# Patient Record
Sex: FEMALE | Race: WHITE | NOT HISPANIC OR LATINO | Employment: OTHER | URBAN - METROPOLITAN AREA
[De-identification: names, ages, dates, MRNs, and addresses within clinical notes are randomized per-mention and may not be internally consistent; named-entity substitution may affect disease eponyms.]

---

## 2017-01-04 ENCOUNTER — OFFICE VISIT (OUTPATIENT)
Dept: MEDICAL GROUP | Facility: MEDICAL CENTER | Age: 72
End: 2017-01-04
Payer: MEDICARE

## 2017-01-04 VITALS
SYSTOLIC BLOOD PRESSURE: 128 MMHG | TEMPERATURE: 98.2 F | OXYGEN SATURATION: 97 % | WEIGHT: 173 LBS | DIASTOLIC BLOOD PRESSURE: 72 MMHG | BODY MASS INDEX: 27.8 KG/M2 | HEIGHT: 66 IN | HEART RATE: 66 BPM | RESPIRATION RATE: 12 BRPM

## 2017-01-04 DIAGNOSIS — Z01.818 PREOPERATIVE CLEARANCE: ICD-10-CM

## 2017-01-04 DIAGNOSIS — R73.03 PREDIABETES: ICD-10-CM

## 2017-01-04 DIAGNOSIS — M54.2 NECK PAIN: ICD-10-CM

## 2017-01-04 DIAGNOSIS — R73.9 HYPERGLYCEMIA: ICD-10-CM

## 2017-01-04 DIAGNOSIS — I10 HYPERTENSION GOAL BP (BLOOD PRESSURE) < 140/80: Chronic | ICD-10-CM

## 2017-01-04 PROCEDURE — 99214 OFFICE O/P EST MOD 30 MIN: CPT | Mod: 25 | Performed by: NURSE PRACTITIONER

## 2017-01-04 PROCEDURE — 93000 ELECTROCARDIOGRAM COMPLETE: CPT | Performed by: NURSE PRACTITIONER

## 2017-01-04 NOTE — ASSESSMENT & PLAN NOTE
Chronic issue.  Not checking BG @ home.  Taking metformin 500 mg twice daily - forgets about 1-2 x weekly.  Not exercising.  Trying to eat healthy but admits this has been tough.  Has lost 3 lbs in the past 6 months.

## 2017-01-04 NOTE — MR AVS SNAPSHOT
"        Larissa Ramirez   2017 11:20 AM   Office Visit   MRN: [id number]    Department:  [address]   Dept Phone:  [phone]    Description:  Female : 1945   Provider:  CORNELIA Yi           Reason for Visit     Other patient needs clearance to be sedated for a MRI    Orders Needed she needs labs ordered before MRI     Foot Pain patient states she is having issues her feet burning and looking red, onset 2-3 days       Allergies as of 2017     Allergen Noted Reactions    Sulfa Drugs 2013   Nausea    Levofloxacin 2014   Swelling    Swelling of tongue and neck    Lyrica 2013   Rash    Nitrofurantoin 2015       Swelling of lips tongue      You were diagnosed with     Preoperative clearance   [485866]       Neck pain   [770417]       Prediabetes   [276070]       Hyperglycemia   [678304]       Hypertension goal BP (blood pressure) < 140/80   [003753]         Vital Signs     Blood Pressure Pulse Temperature Respirations Height Weight    128/72 mmHg 66 36.8 °C (98.2 °F) 12 1.676 m (5' 6\") 78.472 kg (173 lb)    Body Mass Index Oxygen Saturation Last Menstrual Period Smoking Status          27.94 kg/m2 97% 1986 Never Smoker         Basic Information     Date Of Birth Sex Race Ethnicity Preferred Language    1945 Female White Non- English      Problem List              ICD-10-CM Priority Class Noted - Resolved    Hypertension goal BP (blood pressure) < 140/80 (Chronic) I10   2012 - Present    Prediabetes R73.03   2014 - Present    Vitamin D deficiency disease E55.9   2014 - Present    Chronic sciatica of right side M54.31   10/13/2014 - Present    DDD (degenerative disc disease), lumbar M51.36   10/13/2014 - Present    Chronic shoulder pain M25.519, G89.29   10/13/2014 - Present      Health Maintenance        Date Due Completion Dates    IMM DTaP/Tdap/Td Vaccine (1 - Tdap) 3/5/1965 ---    IMM ZOSTER VACCINE 3/5/2006 ---    IMM " PNEUMOCOCCAL 65+ (ADULT) LOW/MEDIUM RISK SERIES (1 of 2 - PCV13) 3/5/2011 ---    IMM INFLUENZA (1) 9/1/2016 ---    MAMMOGRAM 3/23/2017 3/23/2016, 2/24/2015, 2/19/2015, 2/18/2014, 1/14/2013    BONE DENSITY 2/18/2019 2/18/2014    COLONOSCOPY 1/22/2023 1/22/2013 (Prv Comp), 9/20/2012    Override on 1/22/2013: Previously completed            Current Immunizations     No immunizations on file.      Below and/or attached are the medications your provider expects you to take. Review all of your home medications and newly ordered medications with your provider and/or pharmacist. Follow medication instructions as directed by your provider and/or pharmacist. Please keep your medication list with you and share with your provider. Update the information when medications are discontinued, doses are changed, or new medications (including over-the-counter products) are added; and carry medication information at all times in the event of emergency situations     Allergies:  SULFA DRUGS - Nausea     LEVOFLOXACIN - Swelling     LYRICA - Rash     NITROFURANTOIN - (reactions not documented)               Medications  Valid as of: January 04, 2017 - 11:49 AM    Generic Name Brand Name Tablet Size Instructions for use    Cholecalciferol (Tab) cholecalciferol 1000 UNIT Take 1,000 Units by mouth every day.        DiazePAM (Tab) VALIUM 2 MG Take 2 mg by mouth every 6 hours as needed for Sleep. Take two to three tabs by mouth at bedtime as needed        Estradiol (RING) ESTRING 2 MG Insert vaginally, leave in place for 3 months and remove        Hydrocodone-Acetaminophen (Tab) NORCO  MG Take 1 Tab by mouth every 8 hours as needed.        Lisinopril-Hydrochlorothiazide (Tab) PRINZIDE, ZESTORETIC 20-25 MG Take 1 Tab by mouth every day. TAKE 1 TAB BY MOUTH EVERY DAY.        MetFORMIN HCl (Tab) GLUCOPHAGE 500 MG TAKE 1 TABLET BY MOUTH TWICE A DAY WITH MEAL        Multiple Vitamins-Iron   Take 1 Tab by mouth every day.        TraMADol HCl  (Tab) ULTRAM 50 MG Take  mg by mouth every 6 hours as needed.        .                 Medicines prescribed today were sent to:     [id number] [place] [place] - [address] [place] [address]    Phone: [phone] Fax: [phone]    Open 24 Hours?: No    [id number] [place] [place] - [address]    [address] [place] [address]    Phone: [phone] Fax: [phone]    Open 24 Hours?: No      Medication refill instructions:       If your prescription bottle indicates you have medication refills left, it is not necessary to call your provider’s office. Please contact your pharmacy and they will refill your medication.    If your prescription bottle indicates you do not have any refills left, you may request refills at any time through one of the following ways: The online [employer] system (except Urgent Care), by calling your provider’s office, or by asking your pharmacy to contact your provider’s office with a refill request. Medication refills are processed only during regular business hours and may not be available until the next business day. Your provider may request additional information or to have a follow-up visit with you prior to refilling your medication.   *Please Note: Medication refills are assigned a new Rx number when refilled electronically. Your pharmacy may indicate that no refills were authorized even though a new prescription for the same medication is available at the pharmacy. Please request the medicine by name with the pharmacy before contacting your provider for a refill.        Your To Do List     Future Labs/Procedures Complete By Expires    BASIC METABOLIC PANEL  As directed 1/4/2018    CBC WITH DIFFERENTIAL  As directed 1/4/2018    HEMOGLOBIN A1C  As directed 1/5/2018    LIPID PROFILE  As directed 1/5/2018         [employer] Access Code: Activation code not generated  Current [employer] Status: Active

## 2017-01-04 NOTE — PROGRESS NOTES
"Chief Complaint   Patient presents with   • Other     patient needs clearance to be sedated for a MRI   • Orders Needed     she needs labs ordered before MRI    • Foot Pain     patient states she is having issues her feet burning and looking red, onset 2-3 days        HPI   Larissa is a 71-year-old established female here with a few issues:  #1- neck pain:    Chronic issue but worsening.  History of laminectomy in 2003. Had a fall 3 months ago and needs to have a repeat MRI.  Requesting clearance to have conscious sedation for MRI.  Seeing Dr. Mobley for her neck care. Not having any chest pain, heart palpitations, difficulty breathing or swallowing. She has a history of hypertension but no other cardiac issues that we're aware of.   #2-Prediabetes:  Chronic issue.  Not checking BG @ home.  Taking metformin 500 mg twice daily - forgets about 1-2 x weekly.  Not exercising.  Trying to eat healthy but admits this has been tough.  Has lost 3 lbs in the past 6 months.  She is due for blood work. She's concerned because she occasionally notices that she has increased redness to the bottom of her feet with intermittent burning sensation. She is off of gabapentin because she states it was causing her memory loss.  #3-Hypertension goal BP (blood pressure) < 140/80  Chronic issue.  Well controlled all the time per pt.  No CP or leg swelling.  Taking lisinopril / hctz in the morning.      Past medical, surgical, family, and social history is reviewed and updated in Epic chart by me today.   Medications and allergies reviewed and updated in Epic chart by me today.     ROS:   As documented in history of present illness above    Exam:  Blood pressure 128/72, pulse 66, temperature 36.8 °C (98.2 °F), resp. rate 12, height 1.676 m (5' 6\"), weight 78.472 kg (173 lb), last menstrual period 06/27/1986, SpO2 97 %.  Constitutional: Alert, no distress, plus 3 vital signs  Skin:  Warm, dry, no rashes invisible areas  Eye: Equal, round and " reactive, conjunctiva clear  ENMT: Lips without lesions, good dentition, oropharynx clear    Neck: Trachea midline  Respiratory: Unlabored respiration, lungs clear to auscultation, no wheezes, no rhonchi  Cardiovascular: Normal rate and rhythm  Abdomen: Soft, nontender, no masses or hepatosplenomegaly  Psych: Alert, pleasant, well-groomed, normal affect  Monofilament exam:  Monofilament testing with a 10 gram force: sensation intact in 4/4 bilaterally.   Visual Inspection: Feet without maceration, ulcers, fissures. The bottoms of her feet feel slightly cold. No cyanosis to her feet.      A/P:  1. Preoperative clearance  -check bmp.  Fax clearance when labs return.  - EKG - Clinic Performed - [facility].      2. Neck pain  -followed by Dr. Mobley    3. Prediabetes  -monofilament updated.  Needs labs.   -She is working on her weight and has progressively lost a few pounds every 6 months. Discussed a low carbohydrate diet and the need for her to return to exercise.   - BASIC METABOLIC PANEL; Future  - LIPID PROFILE; Future  - CBC WITH DIFFERENTIAL; Future    4. Hyperglycemia  - HEMOGLOBIN A1C; Future    5. Hypertension goal BP (blood pressure) < 140/80  -stable.  Continue same.  - BASIC METABOLIC PANEL; Future  - LIPID PROFILE; Future  - CBC WITH DIFFERENTIAL; Future

## 2017-01-04 NOTE — ASSESSMENT & PLAN NOTE
Chronic issue.  Well controlled all the time per pt.  No CP or leg swelling.  Taking lisinopril / hctz in the morning.

## 2017-01-11 ENCOUNTER — HOSPITAL ENCOUNTER (OUTPATIENT)
Dept: LAB | Facility: MEDICAL CENTER | Age: 72
End: 2017-01-11
Attending: NURSE PRACTITIONER
Payer: MEDICARE

## 2017-01-11 DIAGNOSIS — I10 HYPERTENSION GOAL BP (BLOOD PRESSURE) < 140/80: Chronic | ICD-10-CM

## 2017-01-11 DIAGNOSIS — R73.9 HYPERGLYCEMIA: ICD-10-CM

## 2017-01-11 DIAGNOSIS — R73.03 PREDIABETES: ICD-10-CM

## 2017-01-11 LAB
ANION GAP SERPL CALC-SCNC: 7 MMOL/L (ref 0–11.9)
BASOPHILS # BLD AUTO: 0.06 K/UL (ref 0–0.12)
BASOPHILS NFR BLD AUTO: 1.1 % (ref 0–1.8)
BUN SERPL-MCNC: 21 MG/DL (ref 8–22)
CALCIUM SERPL-MCNC: 10.5 MG/DL (ref 8.5–10.5)
CHLORIDE SERPL-SCNC: 102 MMOL/L (ref 96–112)
CHOLEST SERPL-MCNC: 143 MG/DL (ref 100–199)
CO2 SERPL-SCNC: 31 MMOL/L (ref 20–33)
CREAT SERPL-MCNC: 0.88 MG/DL (ref 0.5–1.4)
EOSINOPHIL # BLD: 0.16 K/UL (ref 0–0.51)
EOSINOPHIL NFR BLD AUTO: 2.8 % (ref 0–6.9)
ERYTHROCYTE [DISTWIDTH] IN BLOOD BY AUTOMATED COUNT: 41.5 FL (ref 35.9–50)
EST. AVERAGE GLUCOSE BLD GHB EST-MCNC: 105 MG/DL
GLUCOSE SERPL-MCNC: 91 MG/DL (ref 65–99)
HBA1C MFR BLD: 5.3 % (ref 0–5.6)
HCT VFR BLD AUTO: 41 % (ref 37–47)
HDLC SERPL-MCNC: 45 MG/DL
HGB BLD-MCNC: 13.1 G/DL (ref 12–16)
IMM GRANULOCYTES # BLD AUTO: 0.01 K/UL (ref 0–0.11)
IMM GRANULOCYTES NFR BLD AUTO: 0.2 % (ref 0–0.9)
LDLC SERPL CALC-MCNC: 63 MG/DL
LYMPHOCYTES # BLD: 2.29 K/UL (ref 1–4.8)
LYMPHOCYTES NFR BLD AUTO: 40.3 % (ref 22–41)
MCH RBC QN AUTO: 29.6 PG (ref 27–33)
MCHC RBC AUTO-ENTMCNC: 32 G/DL (ref 33.6–35)
MCV RBC AUTO: 92.8 FL (ref 81.4–97.8)
MONOCYTES # BLD: 0.46 K/UL (ref 0–0.85)
MONOCYTES NFR BLD AUTO: 8.1 % (ref 0–13.4)
NEUTROPHILS # BLD: 2.7 K/UL (ref 2–7.15)
NEUTROPHILS NFR BLD AUTO: 47.5 % (ref 44–72)
NRBC # BLD AUTO: 0 K/UL
NRBC BLD-RTO: 0 /100 WBC
PLATELET # BLD AUTO: 264 K/UL (ref 164–446)
PMV BLD AUTO: 9.2 FL (ref 9–12.9)
POTASSIUM SERPL-SCNC: 4.2 MMOL/L (ref 3.6–5.5)
RBC # BLD AUTO: 4.42 M/UL (ref 4.2–5.4)
SODIUM SERPL-SCNC: 140 MMOL/L (ref 135–145)
TRIGL SERPL-MCNC: 176 MG/DL (ref 0–149)
WBC # BLD AUTO: 5.7 K/UL (ref 4.8–10.8)

## 2017-01-11 PROCEDURE — 36415 COLL VENOUS BLD VENIPUNCTURE: CPT

## 2017-01-11 PROCEDURE — 85025 COMPLETE CBC W/AUTO DIFF WBC: CPT

## 2017-01-11 PROCEDURE — 80061 LIPID PANEL: CPT

## 2017-01-11 PROCEDURE — 83036 HEMOGLOBIN GLYCOSYLATED A1C: CPT

## 2017-01-11 PROCEDURE — 80048 BASIC METABOLIC PNL TOTAL CA: CPT

## 2017-01-13 ENCOUNTER — TELEPHONE (OUTPATIENT)
Dept: MEDICAL GROUP | Facility: MEDICAL CENTER | Age: 72
End: 2017-01-13

## 2017-01-13 NOTE — TELEPHONE ENCOUNTER
"----- Message from CORNELIA Yi sent at 1/12/2017  7:26 PM PST -----  Regarding: FW: Non-Urgent Medical Question  Contact: [phone]  See \"letters,\" and this needs to be faxed to [facility].  Thanks!  ----- Message -----     From: Bakari Rosado Ass't     Sent: 1/12/2017   3:10 PM       To: CORNELIA Yi  Subject: FW: Non-Urgent Medical Question                      ----- Message -----     From: Larissa Ramirez     Sent: 1/12/2017   3:09 PM       To: [address] [place] [address]  Subject: Non-Urgent Medical Question                      Hi,  Just wanted to make sure you fax my authorization to be sedated to the [facility] [place] at the [facility].  Thank you    "

## 2017-01-19 ENCOUNTER — OFFICE VISIT (OUTPATIENT)
Dept: MEDICAL GROUP | Facility: MEDICAL CENTER | Age: 72
End: 2017-01-19
Payer: MEDICARE

## 2017-01-19 VITALS
HEIGHT: 66 IN | RESPIRATION RATE: 12 BRPM | DIASTOLIC BLOOD PRESSURE: 84 MMHG | WEIGHT: 174 LBS | TEMPERATURE: 97.4 F | BODY MASS INDEX: 27.97 KG/M2 | SYSTOLIC BLOOD PRESSURE: 142 MMHG | OXYGEN SATURATION: 93 % | HEART RATE: 69 BPM

## 2017-01-19 DIAGNOSIS — N94.9 VAGINAL DISCOMFORT: ICD-10-CM

## 2017-01-19 PROCEDURE — 99213 OFFICE O/P EST LOW 20 MIN: CPT | Performed by: NURSE PRACTITIONER

## 2017-01-19 NOTE — MR AVS SNAPSHOT
"        Larissa Ramirez   2017 3:20 PM   Office Visit   MRN: [id number]    Department:  [address]   Dept Phone:  [phone]    Description:  Female : 1945   Provider:  CORNELIA Yi           Reason for Visit     Other patient is having issues with her estring because she states she can't get it to feel comfortable and she feels a bump inside her vagina on the right side       Allergies as of 2017     Allergen Noted Reactions    Sulfa Drugs 2013   Nausea    Levofloxacin 2014   Swelling    Swelling of tongue and neck    Lyrica 2013   Rash    Nitrofurantoin 2015       Swelling of lips tongue      Vital Signs     Blood Pressure Pulse Temperature Respirations Height Weight    142/84 mmHg 69 36.3 °C (97.4 °F) 12 1.676 m (5' 6\") 78.926 kg (174 lb)    Body Mass Index Oxygen Saturation Last Menstrual Period Smoking Status          28.10 kg/m2 93% 1986 Never Smoker         Basic Information     Date Of Birth Sex Race Ethnicity Preferred Language    1945 Female White Non- English      Your appointments     2017  8:45 AM   MR SP WO 30 with [address] MRI 2   [facility] - MRI - [address]    [address]  [place] [id number] [phone]              Problem List              ICD-10-CM Priority Class Noted - Resolved    Hypertension goal BP (blood pressure) < 140/80 (Chronic) I10   2012 - Present    Prediabetes R73.03   2014 - Present    Vitamin D deficiency disease E55.9   2014 - Present    Chronic sciatica of right side M54.31   10/13/2014 - Present    DDD (degenerative disc disease), lumbar M51.36   10/13/2014 - Present    Chronic shoulder pain M25.519, G89.29   10/13/2014 - Present      Health Maintenance        Date Due Completion Dates    IMM DTaP/Tdap/Td Vaccine (1 - Tdap) 3/5/1965 ---    IMM ZOSTER VACCINE 3/5/2006 ---    IMM PNEUMOCOCCAL 65+ (ADULT) LOW/MEDIUM RISK SERIES (1 of 2 - PCV13) 3/5/2011 " ---    IMM INFLUENZA (1) 9/1/2016 ---    MAMMOGRAM 3/23/2017 3/23/2016, 2/24/2015, 2/19/2015, 2/18/2014, 1/14/2013    BONE DENSITY 2/18/2019 2/18/2014    COLONOSCOPY 1/22/2023 1/22/2013 (Prv Comp), 9/20/2012    Override on 1/22/2013: Previously completed            Current Immunizations     No immunizations on file.      Below and/or attached are the medications your provider expects you to take. Review all of your home medications and newly ordered medications with your provider and/or pharmacist. Follow medication instructions as directed by your provider and/or pharmacist. Please keep your medication list with you and share with your provider. Update the information when medications are discontinued, doses are changed, or new medications (including over-the-counter products) are added; and carry medication information at all times in the event of emergency situations     Allergies:  SULFA DRUGS - Nausea     LEVOFLOXACIN - Swelling     LYRICA - Rash     NITROFURANTOIN - (reactions not documented)               Medications  Valid as of: January 19, 2017 -  4:24 PM    Generic Name Brand Name Tablet Size Instructions for use    Cholecalciferol (Tab) cholecalciferol 1000 UNIT Take 1,000 Units by mouth every day.        DiazePAM (Tab) VALIUM 2 MG Take 2 mg by mouth every 6 hours as needed for Sleep. Take two to three tabs by mouth at bedtime as needed        Estradiol (RING) ESTRING 2 MG Insert vaginally, leave in place for 3 months and remove        Hydrocodone-Acetaminophen (Tab) NORCO  MG Take 1 Tab by mouth every 8 hours as needed.        Lisinopril-Hydrochlorothiazide (Tab) PRINZIDE, ZESTORETIC 20-25 MG Take 1 Tab by mouth every day. TAKE 1 TAB BY MOUTH EVERY DAY.        MetFORMIN HCl (Tab) GLUCOPHAGE 500 MG TAKE 1 TABLET BY MOUTH TWICE A DAY WITH MEAL        Multiple Vitamins-Iron   Take 1 Tab by mouth every day.        TraMADol HCl (Tab) ULTRAM 50 MG Take  mg by mouth every 6 hours as needed.         .                 Medicines prescribed today were sent to:     [id number] - [place], [place] - [address]    [address] [place] [address]    Phone: [phone] Fax: [phone]    Open 24 Hours?: No    [id number] [place] [place] - [address]    [address] [place] [address]    Phone: [phone] Fax: [phone]    Open 24 Hours?: No      Medication refill instructions:       If your prescription bottle indicates you have medication refills left, it is not necessary to call your provider’s office. Please contact your pharmacy and they will refill your medication.    If your prescription bottle indicates you do not have any refills left, you may request refills at any time through one of the following ways: The online [employer] system (except Urgent Care), by calling your provider’s office, or by asking your pharmacy to contact your provider’s office with a refill request. Medication refills are processed only during regular business hours and may not be available until the next business day. Your provider may request additional information or to have a follow-up visit with you prior to refilling your medication.   *Please Note: Medication refills are assigned a new Rx number when refilled electronically. Your pharmacy may indicate that no refills were authorized even though a new prescription for the same medication is available at the pharmacy. Please request the medicine by name with the pharmacy before contacting your provider for a refill.           [employer] Access Code: Activation code not generated  Current [employer] Status: Active

## 2017-01-20 NOTE — PROGRESS NOTES
"Chief Complaint   Patient presents with   • Other     patient is having issues with her estring because she states she can't get it to feel comfortable and she feels a bump inside her vagina on the right side        HPI  Ce is a 71-year-old established female here with concern that something is abnormal to the inside of her vaginal area on the right side. She states that she typically uses an Estring for vaginal dryness but removed it about a week ago because it felt uncomfortable. Her first Estring usage was back in July and she states that it went really well for several months. She mentions that she does not typically do any sort of self vaginal exams. She is not having any abnormal vaginal discharge. She had a hysterectomy in 2015 because of large fibroids and irregular bleeding.      Past medical, surgical, family, and social history is reviewed and updated in Epic chart by me today.   Medications and allergies reviewed and updated in Epic chart by me today.     ROS:   As documented in history of present illness above    Exam:  Blood pressure 142/84, pulse 69, temperature 36.3 °C (97.4 °F), resp. rate 12, height 1.676 m (5' 6\"), weight 78.926 kg (174 lb), last menstrual period 06/27/1986, SpO2 93 %.  Constitutional: Alert, no distress, plus 3 vital signs  Skin:  Warm, dry, no rashes invisible areas  Eye: Equal, round and reactive, conjunctiva clear  ENMT: Lips without lesions  Neck: Trachea midline  Abdomen: Soft, nontender  : She had a normal pelvic exam today without any abnormalities noticed in her vaginal vault. With a speculum, no cervix is seen or any masses. She does not have any tenderness on pelvic exam. She has a very mild vaginal wall prolapse with Valsalva maneuver.  Psych: Alert, pleasant, well-groomed, normal affect    A/P:  #1-vaginal discomfort: Benign exam today. Suspect that she has never become familiar with what her internal vaginal area feels like. Encouraged her to replace her Estring " and insert it further up if possible. Discussed she can bring her Estring by and I will insert it for her if needed. Discussed that she has very slight vaginal wall prolapse and she should work on Jerrica exercises. She'll follow up as needed.

## 2017-01-25 ENCOUNTER — HOSPITAL ENCOUNTER (OUTPATIENT)
Dept: RADIOLOGY | Facility: MEDICAL CENTER | Age: 72
End: 2017-01-25
Attending: PHYSICIAN ASSISTANT
Payer: MEDICARE

## 2017-01-25 ENCOUNTER — HOSPITAL ENCOUNTER (OUTPATIENT)
Dept: RADIOLOGY | Facility: MEDICAL CENTER | Age: 72
End: 2017-01-25
Attending: SURGERY
Payer: MEDICARE

## 2017-01-25 VITALS
DIASTOLIC BLOOD PRESSURE: 62 MMHG | BODY MASS INDEX: 27.32 KG/M2 | OXYGEN SATURATION: 96 % | HEART RATE: 60 BPM | HEIGHT: 66 IN | SYSTOLIC BLOOD PRESSURE: 138 MMHG | WEIGHT: 170 LBS | TEMPERATURE: 97.6 F | RESPIRATION RATE: 16 BRPM

## 2017-01-25 DIAGNOSIS — M54.12 RADICULOPATHY, CERVICAL: ICD-10-CM

## 2017-01-25 DIAGNOSIS — M54.12 CERVICAL RADICULOPATHY: ICD-10-CM

## 2017-01-25 PROCEDURE — 01922 ANES N-INVAS IMG/RADJ THER: CPT

## 2017-01-25 PROCEDURE — 72141 MRI NECK SPINE W/O DYE: CPT

## 2017-01-25 PROCEDURE — 700111 HCHG RX REV CODE 636 W/ 250 OVERRIDE (IP)

## 2017-01-25 PROCEDURE — 72050 X-RAY EXAM NECK SPINE 4/5VWS: CPT

## 2017-01-25 PROCEDURE — 700101 HCHG RX REV CODE 250

## 2017-01-25 ASSESSMENT — PAIN SCALES - GENERAL
PAINLEVEL_OUTOF10: 0

## 2017-01-25 NOTE — IP AVS SNAPSHOT
" <p align=\"LEFT\"><IMG SRC=\"//[email or web address]$/[email or web address]\" alt=\"Image\" WIDTH=\"50%\" HEIGHT=\"200\" BORDER=\"\"></p>      `           Larissa Ramirez   MRN: [id number]    Department:  [facility] MRI [address]   Date of Visit:              `  Discharge Instructions       MRI ADULT DISCHARGE INSTRUCTIONS    You have been medicated today for your scan. Please follow the instructions below to ensure your safe recovery. If you have any questions or problems, feel free to call us at [phone] or [phone].     1.   Have someone stay with you to assist you as needed.    2.   Do not drive or operate any mechanical devices.    3.   Do not perform any activity that requires concentration. Make no major decisions over the next 24 hours.     4.   Be careful changing positions from laying down to sitting or standing, as you may become dizzy.     5.   Do not drink alcohol for 48 hours.    6.   There are no restrictions for eating your normal meals. Drink fluids.    7.   You may continue your usual medications for pain, tranquilizers, muscle relaxants or sedatives when awake.     8.   Tomorrow, you may continue your normal daily activities.     9.   Pressure dressing on 10 - 15 minutes. If swelling or bleeding occurs when removed, continue placing direct pressure on injection site for another 5 minutes, or until bleeding stops.     I have been informed of and understand the above discharge instructions.      `       Diet / Nutrition:    Follow any diet instructions given to you by your doctor or the dietician, including how much salt (sodium) you are allowed each day.    If you are overweight, talk to your doctor about a weight reduction plan.    Activity:    Remain physically active following your doctor's instructions about exercise and activity.    Rest often.     Any time you become even a little tired or short of breath, SIT DOWN and rest.    Worsening Symptoms:    Report any of the following signs and symptoms to the doctor's " office immediately:    *Pain of jaw, arm, or neck  *Chest pain not relieved by medication                               *Dizziness or loss of consciousness  *Difficulty breathing even when at rest   *More tired than usual                                       *Bleeding drainage or swelling of surgical site  *Swelling of feet, ankles, legs or stomach                 *Fever (>100ºF)  *Pink or blood tinged sputum  *Weight gain (3lbs/day or 5lbs /week)           *Shock from internal defibrillator (if applicable)  *Palpitations or irregular heartbeats                *Cool and/or numb extremities    Stroke Awareness    Common Risk Factors for Stroke include:    Age  Atrial Fibrillation  Carotid Artery Stenosis  Diabetes Mellitus  Excessive alcohol consumption  High blood pressure  Overweight   Physical inactivity  Smoking    Warning signs and symptoms of a stroke include:    *Sudden numbness or weakness of the face, arm or leg (especially on one side of the body).  *Sudden confusion, trouble speaking or understanding.  *Sudden trouble seeing in one or both eyes.  *Sudden trouble walking, dizziness, loss of balance or coordination.Sudden severe headache with no known cause.    It is very important to get treatment quickly when a stroke occurs. If you experience any of the above warning signs, call 911 immediately.                    `     Quit Smoking / Tobacco Use:    I understand the use of any tobacco products increases my chance of suffering from future heart disease or stroke and could cause other illnesses which may shorten my life. Quitting the use of tobacco products is the single most important thing I can do to improve my health. For further information on smoking / tobacco cessation call a Toll Free Quit Line at 1-893.636.3699 (*[employer] [place]) or 1-938.861.4970 (American Lung Association) or you can access the web based program at www.lungusa.org.    [place] Tobacco Users Help Line:  (689) 538-6400        Toll Free: 2-615-584-6236  Quit Tobacco Program [facility] (854)327-2771    Crisis Hotline:    [place] Crisis Hotline:  3-345-EMZZPLM or 1-431.857.2808    [place] Crisis Hotline:    1-879.867.4454 or 982-102-2346    Discharge Survey:   Thank you for choosing [facility]. We hope we did everything we could to make your hospital stay a pleasant one. You may be receiving a phone survey and we would appreciate your time and participation in answering the questions. Your input is very valuable to us in our efforts to improve our service to our patients and their families.        My signature on this form indicates that:    1. I have reviewed and understand the above information.  2. My questions regarding this information have been answered to my satisfaction.  3. I have formulated a plan with my discharge nurse to obtain my prescribed medications for home.                   `           Patient or Caregiver Signature:  ____________________________________________________________    Date:  ____________________________________________________________       `

## 2017-01-25 NOTE — DISCHARGE INSTRUCTIONS
MRI ADULT DISCHARGE INSTRUCTIONS    You have been medicated today for your scan. Please follow the instructions below to ensure your safe recovery. If you have any questions or problems, feel free to call us at [phone] or [phone].     1.   Have someone stay with you to assist you as needed.    2.   Do not drive or operate any mechanical devices.    3.   Do not perform any activity that requires concentration. Make no major decisions over the next 24 hours.     4.   Be careful changing positions from laying down to sitting or standing, as you may become dizzy.     5.   Do not drink alcohol for 48 hours.    6.   There are no restrictions for eating your normal meals. Drink fluids.    7.   You may continue your usual medications for pain, tranquilizers, muscle relaxants or sedatives when awake.     8.   Tomorrow, you may continue your normal daily activities.     9.   Pressure dressing on 10 - 15 minutes. If swelling or bleeding occurs when removed, continue placing direct pressure on injection site for another 5 minutes, or until bleeding stops.     I have been informed of and understand the above discharge instructions.

## 2017-01-30 ENCOUNTER — OFFICE VISIT (OUTPATIENT)
Dept: MEDICAL GROUP | Facility: MEDICAL CENTER | Age: 72
End: 2017-01-30
Payer: MEDICARE

## 2017-01-30 VITALS
BODY MASS INDEX: 27.32 KG/M2 | TEMPERATURE: 97 F | DIASTOLIC BLOOD PRESSURE: 72 MMHG | SYSTOLIC BLOOD PRESSURE: 132 MMHG | HEART RATE: 64 BPM | HEIGHT: 66 IN | OXYGEN SATURATION: 95 % | WEIGHT: 170 LBS

## 2017-01-30 DIAGNOSIS — M50.30 DDD (DEGENERATIVE DISC DISEASE), CERVICAL: ICD-10-CM

## 2017-01-30 DIAGNOSIS — N81.10 VAGINAL WALL PROLAPSE: ICD-10-CM

## 2017-01-30 DIAGNOSIS — N95.2 ATROPHIC VAGINITIS: ICD-10-CM

## 2017-01-30 DIAGNOSIS — I10 ESSENTIAL HYPERTENSION: ICD-10-CM

## 2017-01-30 DIAGNOSIS — R73.03 PREDIABETES: ICD-10-CM

## 2017-01-30 PROCEDURE — 1101F PT FALLS ASSESS-DOCD LE1/YR: CPT | Performed by: NURSE PRACTITIONER

## 2017-01-30 PROCEDURE — G8432 DEP SCR NOT DOC, RNG: HCPCS | Performed by: NURSE PRACTITIONER

## 2017-01-30 PROCEDURE — G8484 FLU IMMUNIZE NO ADMIN: HCPCS | Performed by: NURSE PRACTITIONER

## 2017-01-30 PROCEDURE — 3017F COLORECTAL CA SCREEN DOC REV: CPT | Performed by: NURSE PRACTITIONER

## 2017-01-30 PROCEDURE — 4040F PNEUMOC VAC/ADMIN/RCVD: CPT | Mod: 8P | Performed by: NURSE PRACTITIONER

## 2017-01-30 PROCEDURE — G8420 CALC BMI NORM PARAMETERS: HCPCS | Performed by: NURSE PRACTITIONER

## 2017-01-30 PROCEDURE — 3014F SCREEN MAMMO DOC REV: CPT | Performed by: NURSE PRACTITIONER

## 2017-01-30 PROCEDURE — 99214 OFFICE O/P EST MOD 30 MIN: CPT | Performed by: NURSE PRACTITIONER

## 2017-01-30 PROCEDURE — 1036F TOBACCO NON-USER: CPT | Performed by: NURSE PRACTITIONER

## 2017-01-30 RX ORDER — LISINOPRIL AND HYDROCHLOROTHIAZIDE 25; 20 MG/1; MG/1
1 TABLET ORAL
Qty: 90 TAB | Refills: 2 | Status: SHIPPED | OUTPATIENT
Start: 2017-01-30 | End: 2017-12-26 | Stop reason: SDUPTHER

## 2017-01-30 NOTE — ASSESSMENT & PLAN NOTE
"Chronic issue.  Having increased pain with inserting estring for the past few weeks - brings in estring today to have me insert it.  Feels the estring closer to vaginal opening and \"like it isn't in far enough.\"   Had hysterectomy 2015 with Dr. Parks.  Feels vaginal area is very \"hot,\" without estring but no itching.       "

## 2017-01-30 NOTE — MR AVS SNAPSHOT
"Larissa Ramirez   2017 8:20 AM   Office Visit   MRN: [id number]    Department:  [address]   Dept Phone:  [phone]    Description:  Female : 1945   Provider:  CORNELIA Yi           Reason for Visit     Follow-Up     Medication Refill           Allergies as of 2017     Allergen Noted Reactions    Sulfa Drugs 2013   Nausea    Levofloxacin 2014   Swelling    Swelling of tongue and neck    Lyrica 2013   Rash    Nitrofurantoin 2015       Swelling of lips tongue      You were diagnosed with     Essential hypertension   [2217184]       Prediabetes   [139633]       DDD (degenerative disc disease), cervical   [861614]       Atrophic vaginitis   [082786]       Vaginal wall prolapse   [917388]         Vital Signs     Blood Pressure Pulse Temperature Height Weight Body Mass Index    132/72 mmHg 64 36.1 °C (97 °F) 1.676 m (5' 6\") 77.111 kg (170 lb) 27.45 kg/m2    Oxygen Saturation Last Menstrual Period Breastfeeding? Smoking Status          95% 1986 No Never Smoker         Basic Information     Date Of Birth Sex Race Ethnicity Preferred Language    1945 Female White Non- English      Problem List              ICD-10-CM Priority Class Noted - Resolved    Hypertension goal BP (blood pressure) < 140/80 (Chronic) I10   2012 - Present    Prediabetes R73.03   2014 - Present    Vitamin D deficiency disease E55.9   2014 - Present    Chronic sciatica of right side M54.31   10/13/2014 - Present    DDD (degenerative disc disease), lumbar M51.36   10/13/2014 - Present    Chronic shoulder pain M25.519, G89.29   10/13/2014 - Present    DDD (degenerative disc disease), cervical M50.30   2017 - Present    Atrophic vaginitis N95.2   2017 - Present      Health Maintenance        Date Due Completion Dates    IMM DTaP/Tdap/Td Vaccine (1 - Tdap) 3/5/1965 ---    IMM ZOSTER VACCINE 3/5/2006 ---    IMM PNEUMOCOCCAL 65+ (ADULT) LOW/MEDIUM " RISK SERIES (1 of 2 - PCV13) 3/5/2011 ---    IMM INFLUENZA (1) 9/1/2016 ---    MAMMOGRAM 3/23/2017 3/23/2016, 2/24/2015, 2/19/2015, 2/18/2014, 1/14/2013    COLONOSCOPY 9/20/2017 9/20/2012    BONE DENSITY 2/18/2019 2/18/2014            Current Immunizations     No immunizations on file.      Below and/or attached are the medications your provider expects you to take. Review all of your home medications and newly ordered medications with your provider and/or pharmacist. Follow medication instructions as directed by your provider and/or pharmacist. Please keep your medication list with you and share with your provider. Update the information when medications are discontinued, doses are changed, or new medications (including over-the-counter products) are added; and carry medication information at all times in the event of emergency situations     Allergies:  SULFA DRUGS - Nausea     LEVOFLOXACIN - Swelling     LYRICA - Rash     NITROFURANTOIN - (reactions not documented)               Medications  Valid as of: January 30, 2017 -  8:44 AM    Generic Name Brand Name Tablet Size Instructions for use    Cholecalciferol (Tab) cholecalciferol 1000 UNIT Take 1,000 Units by mouth every day.        DiazePAM (Tab) VALIUM 2 MG Take 2 mg by mouth every 6 hours as needed for Sleep. Take two to three tabs by mouth at bedtime as needed        Estradiol (RING) ESTRING 2 MG Insert vaginally, leave in place for 3 months and remove        Hydrocodone-Acetaminophen (Tab) NORCO  MG Take 1 Tab by mouth every 8 hours as needed.        Lisinopril-Hydrochlorothiazide (Tab) PRINZIDE, ZESTORETIC 20-25 MG Take 1 Tab by mouth every day. TAKE 1 TAB BY MOUTH EVERY DAY.        MetFORMIN HCl (Tab) GLUCOPHAGE 500 MG TAKE 1 TABLET BY MOUTH TWICE A DAY WITH MEAL        MetFORMIN HCl (Tab) GLUCOPHAGE 500 MG Take 1 Tab by mouth every day.        Multiple Vitamins-Iron   Take 1 Tab by mouth every day.        TraMADol HCl (Tab) ULTRAM 50 MG Take   mg by mouth every 6 hours as needed.        .                 Medicines prescribed today were sent to:     [facility]Y #[id number] - [place], [place] - [address]    [address] [place] [address]    Phone: [phone] Fax: [phone]    Open 24 Hours?: No    [facility]/PHARMACY #[id number] - [place], [place] - [address]    [address] [place] [address]    Phone: [phone] Fax: [phone]    Open 24 Hours?: No      Medication refill instructions:       If your prescription bottle indicates you have medication refills left, it is not necessary to call your provider’s office. Please contact your pharmacy and they will refill your medication.    If your prescription bottle indicates you do not have any refills left, you may request refills at any time through one of the following ways: The online SAVO system (except Urgent Care), by calling your provider’s office, or by asking your pharmacy to contact your provider’s office with a refill request. Medication refills are processed only during regular business hours and may not be available until the next business day. Your provider may request additional information or to have a follow-up visit with you prior to refilling your medication.   *Please Note: Medication refills are assigned a new Rx number when refilled electronically. Your pharmacy may indicate that no refills were authorized even though a new prescription for the same medication is available at the pharmacy. Please request the medicine by name with the pharmacy before contacting your provider for a refill.           SAVO Access Code: Activation code not generated  Current SAVO Status: Active

## 2017-01-30 NOTE — PROGRESS NOTES
"Chief Complaint   Patient presents with   • Follow-Up   • Medication Refill       HPI  Larissa is a 70 yo est female here with a few issues:   #1-Prediabetes  chronic issue. Requesting a refill on her metformin. Working very hard to cut down on sugar.  Requesting refill of metformin - taking once daily at 500 mg.  A1C 5.3 last month.      #2-DDD (degenerative disc disease), cervical  Chronic issue.  Working with Dr. Mobley to avoid surgery - just told that she can try facet injections for a bit, very excited about this.     #3-Atrophic vaginitis  Chronic issue.  Having increased pain with inserting estring for the past few weeks - brings in estring today to have me insert it.  Feels the estring closer to vaginal opening and \"like it isn't in far enough.\"   Had hysterectomy 2015 with Dr. Parks.  Feels vaginal area is very \"hot,\" without estring but no itching.     #4-hypertension:  Chronic issue. Blood pressure very well controlled. Requesting refill of lisinopril/hydrochlorothiazide. Not having any chest pain or leg swelling.    Past medical, surgical, family, and social history is reviewed and updated in Epic chart by me today.   Medications and allergies reviewed and updated in Epic chart by me today.     ROS:   As documented in history of present illness above    Exam:  Blood pressure 132/72, pulse 64, temperature 36.1 °C (97 °F), height 1.676 m (5' 6\"), weight 77.111 kg (170 lb), last menstrual period 06/27/1986, SpO2 95 %, not currently breastfeeding.  Constitutional: Alert, no distress, plus 3 vital signs  Skin:  Warm, dry, no rashes invisible areas  Eye: Equal, round and reactive, conjunctiva clear  ENMT: Lips without lesions  Neck: Trachea midline  Respiratory: Unlabored respiration  Cardiovascular: Normal rate   : She does have vaginal wall prolapse and a slight rectocele with Valsalva maneuver. I was easily able to insert her Estring without pain and she states that it felt comfortable after being " inserted  Psych: Alert, pleasant, well-groomed, normal affect    A/P:  1. Essential hypertension  -Stable. Continue same. Refilled medication.  - lisinopril-hydrochlorothiazide (PRINZIDE, ZESTORETIC) 20-25 MG per tablet; Take 1 Tab by mouth every day. TAKE 1 TAB BY MOUTH EVERY DAY.  Dispense: 90 Tab; Refill: 2    2. Prediabetes  -Excellent, she may be able to completely come off of metformin if her A1c stays below 5.8 at 500 mg of metformin once daily. She feels more comfortable staying on metformin at this time  - metformin (GLUCOPHAGE) 500 MG Tab; Take 1 Tab by mouth every day.  Dispense: 90 Tab; Refill: 2    3. DDD (degenerative disc disease), cervical  -Stable and being followed by neurosurgery.    4. Atrophic vaginitis  -Estring reinserted by myself. Discussed ways in which to make the Estring insertion more comfortable. Encouraged her to return to gynecology for a check on vaginal wall prolapse.    5. Vaginal wall prolapse  -As above. Discussed Kegle exercises, trunk strengthening and weight loss.

## 2017-01-30 NOTE — ASSESSMENT & PLAN NOTE
Working very hard to cut down on sugar.  Requesting refill of metformin - taking once daily at 500 mg.  A1C 5.3.

## 2017-01-30 NOTE — ASSESSMENT & PLAN NOTE
Chronic issue.  Working with Dr. Mobley to avoid surgery - just told that she can try facet injections for a bit.

## 2017-02-14 ENCOUNTER — HOSPITAL ENCOUNTER (OUTPATIENT)
Dept: PAIN MANAGEMENT | Facility: REHABILITATION | Age: 72
End: 2017-02-14
Attending: SPECIALIST
Payer: MEDICARE

## 2017-02-14 ENCOUNTER — HOSPITAL ENCOUNTER (OUTPATIENT)
Dept: RADIOLOGY | Facility: REHABILITATION | Age: 72
End: 2017-02-14
Attending: SPECIALIST
Payer: MEDICARE

## 2017-02-14 VITALS
HEIGHT: 66 IN | BODY MASS INDEX: 26.96 KG/M2 | WEIGHT: 167.77 LBS | SYSTOLIC BLOOD PRESSURE: 139 MMHG | OXYGEN SATURATION: 92 % | RESPIRATION RATE: 16 BRPM | HEART RATE: 57 BPM | DIASTOLIC BLOOD PRESSURE: 73 MMHG | TEMPERATURE: 97.8 F

## 2017-02-14 PROCEDURE — 64494 INJ PARAVERT F JNT L/S 2 LEV: CPT

## 2017-02-14 PROCEDURE — 64495 INJ PARAVERT F JNT L/S 3 LEV: CPT

## 2017-02-14 PROCEDURE — 700111 HCHG RX REV CODE 636 W/ 250 OVERRIDE (IP)

## 2017-02-14 PROCEDURE — 64491 INJ PARAVERT F JNT C/T 2 LEV: CPT

## 2017-02-14 PROCEDURE — 64492 INJ PARAVERT F JNT C/T 3 LEV: CPT

## 2017-02-14 PROCEDURE — 64490 INJ PARAVERT F JNT C/T 1 LEV: CPT

## 2017-02-14 PROCEDURE — 64493 INJ PARAVERT F JNT L/S 1 LEV: CPT

## 2017-02-14 RX ORDER — TRIAMCINOLONE ACETONIDE 40 MG/ML
INJECTION, SUSPENSION INTRA-ARTICULAR; INTRAMUSCULAR
Status: COMPLETED
Start: 2017-02-14 | End: 2017-02-14

## 2017-02-14 RX ORDER — DEXAMETHASONE SODIUM PHOSPHATE 10 MG/ML
INJECTION, SOLUTION INTRAMUSCULAR; INTRAVENOUS
Status: COMPLETED
Start: 2017-02-14 | End: 2017-02-14

## 2017-02-14 RX ORDER — BUPIVACAINE HYDROCHLORIDE 2.5 MG/ML
INJECTION, SOLUTION EPIDURAL; INFILTRATION; INTRACAUDAL
Status: COMPLETED
Start: 2017-02-14 | End: 2017-02-14

## 2017-02-14 RX ORDER — MIDAZOLAM HYDROCHLORIDE 1 MG/ML
INJECTION INTRAMUSCULAR; INTRAVENOUS
Status: COMPLETED
Start: 2017-02-14 | End: 2017-02-14

## 2017-02-14 RX ORDER — IBUPROFEN 800 MG/1
800 TABLET ORAL EVERY 8 HOURS PRN
Status: ON HOLD | COMMUNITY
End: 2018-05-18

## 2017-02-14 RX ADMIN — DEXAMETHASONE SODIUM PHOSPHATE 10 MG: 10 INJECTION, SOLUTION INTRAMUSCULAR; INTRAVENOUS at 10:40

## 2017-02-14 RX ADMIN — FENTANYL CITRATE 50 MCG: 50 INJECTION, SOLUTION INTRAMUSCULAR; INTRAVENOUS at 10:31

## 2017-02-14 RX ADMIN — BUPIVACAINE HYDROCHLORIDE 5 ML: 2.5 INJECTION, SOLUTION EPIDURAL; INFILTRATION; INTRACAUDAL; PERINEURAL at 10:40

## 2017-02-14 RX ADMIN — MIDAZOLAM HYDROCHLORIDE 1 MG: 1 INJECTION, SOLUTION INTRAMUSCULAR; INTRAVENOUS at 10:31

## 2017-02-14 ASSESSMENT — PAIN SCALES - GENERAL
PAINLEVEL_OUTOF10: 4
PAINLEVEL_OUTOF10: 3
PAINLEVEL_OUTOF10: 6

## 2017-02-14 NOTE — PROGRESS NOTES
Current meds. See medication reconciliation form. Reviewed with pt.Pt took ibuprofen 400 mg last night. Takes nightly. Pt denies taking ASA,other blood thinners or other anti-inflammatories.Dr. Gonzales made aware pre procedure.Pt has a ride post-procedure (, Guillermo is ). Printed and verbal discharge instructions given to pt who verbalized understanding.

## 2017-02-15 NOTE — PROCEDURES
DATE OF SERVICE:  02/14/2017    PROCEDURES:  1.  Right C3 medial branch block.  2.  Right C4 medial branch block.  3.  Right C5 medial branch block.  4.  Right C6 medial branch block.  5.  Right C7 medial branch block.  6.  Fluoroscopy for needle guidance, confirmation of placement.  7.  IV sedation per patient request.    DIAGNOSES:  1.  Cervical spondylosis with mechanical neck pain, shoulder pain, right side   greater than left.  2.  Anxiety over procedure, patient requests intravenous sedation.    DESCRIPTION OF PROCEDURE:  After informed consent with the patient prone, she   is monitored and sedated with Versed and fentanyl.  Head was turned slightly   to the left.  Fluoroscopy was utilized to identify the lateral facet concavity   margin of C3 through C7.  The neck was prepped with Betadine, sterile draped   and anesthetized.  Under intermittent fluoroscopic guidance and local   anesthesia, a 25-gauge 3-1/2 inch spinal needle was passed to the junction of   the right C7 transverse process with the superior articular process and   advanced 1-2 millimeters over the superior edge; 0.5 mL of 0.25% bupivacaine,   2 mg of dexamethasone were injected.  Needle was removed.    The right C6 targeting the posterolateral facet body concavity is approached   with the same technique, injected with the same medication.  The needle was   removed.    The right C5 target was approached with the same technique, injected with the   same medication.  The needle was removed.    The right C4 target was approached with the same technique and injected with   the same medication.  The needle was removed.    The right C3 target was approached with the same technique and injected with   the same medication.  The needle was removed.    She tolerated this well.    PLAN:  The patient will be discharged with a pain diary to document pain   relief to screen for radiofrequency ablation.       ____________________________________     CELINA BRAVO  MD TEZ GROVES / CHARLIE    DD:  02/14/2017 10:45:53  DT:  02/15/2017 04:10:24    D#:  [id number]  Job#:  [id number]

## 2017-03-06 ENCOUNTER — HOSPITAL ENCOUNTER (OUTPATIENT)
Dept: RADIOLOGY | Facility: MEDICAL CENTER | Age: 72
End: 2017-03-06
Attending: NEUROLOGICAL SURGERY
Payer: MEDICARE

## 2017-03-06 DIAGNOSIS — M25.562 ARTHRALGIA OF BOTH LOWER LEGS: ICD-10-CM

## 2017-03-06 DIAGNOSIS — M25.561 ARTHRALGIA OF BOTH LOWER LEGS: ICD-10-CM

## 2017-03-06 PROCEDURE — 73562 X-RAY EXAM OF KNEE 3: CPT | Mod: LT

## 2017-03-06 PROCEDURE — 73562 X-RAY EXAM OF KNEE 3: CPT | Mod: RT

## 2017-03-10 ENCOUNTER — PATIENT OUTREACH (OUTPATIENT)
Dept: HEALTH INFORMATION MANAGEMENT | Facility: OTHER | Age: 72
End: 2017-03-10

## 2017-03-20 NOTE — PROGRESS NOTES
Attempt #:4    Verify PCP: yes    Communication Preference Obtained: yes     Annual Wellness Visit Scheduling  1. Scheduling Status:Not Scheduled. Patient states they have too many other visits          Care Gap Scheduling (Attempt to Schedule EACH Overdue Care Gap!)     Health Maintenance Due   Topic Date Due   • IMM DTaP/Tdap/Td Vaccine (1 - Tdap) 03/05/1965   • IMM ZOSTER VACCINE  03/05/2006   • IMM PNEUMOCOCCAL 65+ (ADULT) LOW/MEDIUM RISK SERIES (1 of 2 - PCV13) 03/05/2011   • IMM INFLUENZA (1) 09/01/2016   • Annual Wellness Visit  03/17/2017         "[employer] Activation: already active  "[employer] Fabricio: no  Virtual Visits: no  Opt In to Text Messages: no

## 2017-03-22 ENCOUNTER — HOSPITAL ENCOUNTER (OUTPATIENT)
Dept: RADIOLOGY | Facility: MEDICAL CENTER | Age: 72
End: 2017-03-22
Attending: NEUROLOGICAL SURGERY
Payer: MEDICARE

## 2017-03-22 VITALS
RESPIRATION RATE: 15 BRPM | HEIGHT: 66 IN | WEIGHT: 169 LBS | HEART RATE: 70 BPM | DIASTOLIC BLOOD PRESSURE: 64 MMHG | TEMPERATURE: 98.6 F | SYSTOLIC BLOOD PRESSURE: 122 MMHG | BODY MASS INDEX: 27.16 KG/M2 | OXYGEN SATURATION: 96 %

## 2017-03-22 DIAGNOSIS — M48.061 SPINAL STENOSIS, LUMBAR REGION, WITHOUT NEUROGENIC CLAUDICATION: ICD-10-CM

## 2017-03-22 LAB — GLUCOSE BLD-MCNC: 90 MG/DL (ref 65–99)

## 2017-03-22 PROCEDURE — 700111 HCHG RX REV CODE 636 W/ 250 OVERRIDE (IP)

## 2017-03-22 PROCEDURE — 01922 ANES N-INVAS IMG/RADJ THER: CPT

## 2017-03-22 PROCEDURE — 700101 HCHG RX REV CODE 250

## 2017-03-22 PROCEDURE — 82962 GLUCOSE BLOOD TEST: CPT

## 2017-03-22 PROCEDURE — 72148 MRI LUMBAR SPINE W/O DYE: CPT

## 2017-03-22 ASSESSMENT — PAIN SCALES - GENERAL
PAINLEVEL_OUTOF10: 0
PAINLEVEL_OUTOF10: 3

## 2017-03-22 NOTE — DISCHARGE INSTRUCTIONS
MRI ADULT DISCHARGE INSTRUCTIONS    You have been medicated today for your scan. Please follow the instructions below to ensure your safe recovery. If you have any questions or problems, feel free to call us at [phone] or [phone].     1.   Have someone stay with you to assist you as needed.    2.   Do not drive or operate any mechanical devices.    3.   Do not perform any activity that requires concentration. Make no major decisions over the next 24 hours.     4.   Be careful changing positions from laying down to sitting or standing, as you may become dizzy.     5.   Do not drink alcohol for 48 hours.    6.   There are no restrictions for eating your normal meals. Drink fluids.    7.   You may continue your usual medications for pain, tranquilizers, muscle relaxants or sedatives when awake.     8.   Tomorrow, you may continue your normal daily activities.     9.   Pressure dressing on 10 - 15 minutes. If swelling or bleeding occurs when removed, continue placing direct pressure on injection site for another 5 minutes, or until bleeding stops.     I have been informed of and understand the above discharge instructions. Midazolam (VERSED)  What is this medicine?  You were given MIDAZOLAM (DANIEL wetzel) for your procedure today. This medication is a benzodiazepine. It is used to cause relaxation or sleep before surgery and to block the memory of the procedure.  This medicine may be used for other purposes; ask your health care provider or pharmacist if you have questions.  What side effects may I notice from receiving this medicine?  Side effects that you should report to your doctor or health care professional as soon as possible:  • allergic reactions like skin rash, itching or hives, swelling of the face, lips, or tongue  • breathing problems  • confusion  • dizziness or lightheadedness  • fast, irregular heartbeat  • halluninations during recovery  • numbness or tingling in the hands or feet  • pain, redness,  or swelling at site where injected  • seizures  Side effects that usually do not require medical attention (report to your doctor or health care professional if they continue or are bothersome):  • coughing  • headache  • hiccups  • involuntary eye and muscle movements  • loss of memory of events just before, during, and after use  • nausea, vomiting  • speech problems  • tiredness  • trouble sleeping or nightmares  This list may not describe all possible side effects. Call your doctor for medical advice about side effects. You may report side effects to FDA at 1-459-MCW-2667.    Fentanyl  What is this medicine?  You were given FENTANYL (FEN ta nil) for your procedure today, it is a pain reliever. It is used to treat breakthrough pain that your long acting pain medicine does not control. Do not use this medicine for a pain that will go away in a few days like pain from surgery, doctor or dentist visits.   This medicine may be used for other purposes; ask your health care provider or pharmacist if you have questions.  What side effects may I notice from receiving this medicine?  Side effects that you should report to your doctor or health care professional as soon as possible:  • allergic reactions like skin rash, itching or hives, swelling of the face, lips, or tongue  • breathing problems  • changes in vision  • confusion  • dry mouth  • feeling faint, lightheaded  • hallucination  • irregular heartbeat  • mouth pain, sores  • problems with balance, talking, walking  • trouble passing urine or change in the amount of urine  • unusual bleeding or bruising  • unusually weak or tired  Side effects that usually do not require medical attention (report to your doctor or health care professional if they continue or are bothersome):  • dizzy  • headache  • loss of appetite  • nausea, vomiting  • sweating  • tingling in mouth  This list may not describe all possible side effects. Call your doctor for medical advice about  side effects. You may report side effects to FDA at 2-330-FDA-0925.

## 2017-03-22 NOTE — IP AVS SNAPSHOT
" <p align=\"LEFT\"><IMG SRC=\"//[email or web address]$/[email or web address]\" alt=\"Image\" WIDTH=\"50%\" HEIGHT=\"200\" BORDER=\"\"></p>      `           Larissa Ramirez   MRN: [id number]    Department:  [address]   Date of Visit:              `  Discharge Instructions       MRI ADULT DISCHARGE INSTRUCTIONS    You have been medicated today for your scan. Please follow the instructions below to ensure your safe recovery. If you have any questions or problems, feel free to call us at [phone] or [phone].     1.   Have someone stay with you to assist you as needed.    2.   Do not drive or operate any mechanical devices.    3.   Do not perform any activity that requires concentration. Make no major decisions over the next 24 hours.     4.   Be careful changing positions from laying down to sitting or standing, as you may become dizzy.     5.   Do not drink alcohol for 48 hours.    6.   There are no restrictions for eating your normal meals. Drink fluids.    7.   You may continue your usual medications for pain, tranquilizers, muscle relaxants or sedatives when awake.     8.   Tomorrow, you may continue your normal daily activities.     9.   Pressure dressing on 10 - 15 minutes. If swelling or bleeding occurs when removed, continue placing direct pressure on injection site for another 5 minutes, or until bleeding stops.     I have been informed of and understand the above discharge instructions. Midazolam (VERSED)  What is this medicine?  You were given MIDAZOLAM (DANIEL wetzel) for your procedure today. This medication is a benzodiazepine. It is used to cause relaxation or sleep before surgery and to block the memory of the procedure.  This medicine may be used for other purposes; ask your health care provider or pharmacist if you have questions.  What side effects may I notice from receiving this medicine?  Side effects that you should report to your doctor or health care professional as soon as possible:  • allergic " reactions like skin rash, itching or hives, swelling of the face, lips, or tongue  • breathing problems  • confusion  • dizziness or lightheadedness  • fast, irregular heartbeat  • halluninations during recovery  • numbness or tingling in the hands or feet  • pain, redness, or swelling at site where injected  • seizures  Side effects that usually do not require medical attention (report to your doctor or health care professional if they continue or are bothersome):  • coughing  • headache  • hiccups  • involuntary eye and muscle movements  • loss of memory of events just before, during, and after use  • nausea, vomiting  • speech problems  • tiredness  • trouble sleeping or nightmares  This list may not describe all possible side effects. Call your doctor for medical advice about side effects. You may report side effects to FDA at 6-312-FDA-2091.    Fentanyl  What is this medicine?  You were given FENTANYL (FEN ta nil) for your procedure today, it is a pain reliever. It is used to treat breakthrough pain that your long acting pain medicine does not control. Do not use this medicine for a pain that will go away in a few days like pain from surgery, doctor or dentist visits.   This medicine may be used for other purposes; ask your health care provider or pharmacist if you have questions.  What side effects may I notice from receiving this medicine?  Side effects that you should report to your doctor or health care professional as soon as possible:  • allergic reactions like skin rash, itching or hives, swelling of the face, lips, or tongue  • breathing problems  • changes in vision  • confusion  • dry mouth  • feeling faint, lightheaded  • hallucination  • irregular heartbeat  • mouth pain, sores  • problems with balance, talking, walking  • trouble passing urine or change in the amount of urine  • unusual bleeding or bruising  • unusually weak or tired  Side effects that usually do not require medical attention  (report to your doctor or health care professional if they continue or are bothersome):  • dizzy  • headache  • loss of appetite  • nausea, vomiting  • sweating  • tingling in mouth  This list may not describe all possible side effects. Call your doctor for medical advice about side effects. You may report side effects to FDA at 3-704-SDS-0340.       `       Diet / Nutrition:    Follow any diet instructions given to you by your doctor or the dietician, including how much salt (sodium) you are allowed each day.    If you are overweight, talk to your doctor about a weight reduction plan.    Activity:    Remain physically active following your doctor's instructions about exercise and activity.    Rest often.     Any time you become even a little tired or short of breath, SIT DOWN and rest.    Worsening Symptoms:    Report any of the following signs and symptoms to the doctor's office immediately:    *Pain of jaw, arm, or neck  *Chest pain not relieved by medication                               *Dizziness or loss of consciousness  *Difficulty breathing even when at rest   *More tired than usual                                       *Bleeding drainage or swelling of surgical site  *Swelling of feet, ankles, legs or stomach                 *Fever (>100ºF)  *Pink or blood tinged sputum  *Weight gain (3lbs/day or 5lbs /week)           *Shock from internal defibrillator (if applicable)  *Palpitations or irregular heartbeats                *Cool and/or numb extremities    Stroke Awareness    Common Risk Factors for Stroke include:    Age  Atrial Fibrillation  Carotid Artery Stenosis  Diabetes Mellitus  Excessive alcohol consumption  High blood pressure  Overweight   Physical inactivity  Smoking    Warning signs and symptoms of a stroke include:    *Sudden numbness or weakness of the face, arm or leg (especially on one side of the body).  *Sudden confusion, trouble speaking or understanding.  *Sudden trouble seeing in one or  both eyes.  *Sudden trouble walking, dizziness, loss of balance or coordination.Sudden severe headache with no known cause.    It is very important to get treatment quickly when a stroke occurs. If you experience any of the above warning signs, call 911 immediately.                    `     Quit Smoking / Tobacco Use:    I understand the use of any tobacco products increases my chance of suffering from future heart disease or stroke and could cause other illnesses which may shorten my life. Quitting the use of tobacco products is the single most important thing I can do to improve my health. For further information on smoking / tobacco cessation call a Toll Free Quit Line at 1-123.637.7767 (*[employer] [place]) or 1-583.762.8649 (American [employer] Association) or you can access the web based program at www.lungTune.org.    [place] Tobacco Users Help Line:  (455) 747-8620       Toll Free: 1-905.514.6321  Quit Tobacco Program [facility] (417)528-6415    Crisis Hotline:    [place] Crisis Hotline:  1-935-BHRSVNB or 1-564.377.1573    [place] Crisis Hotline:    1-492.968.3120 or 984-178-2478    Discharge Survey:   Thank you for choosing [facility]. We hope we did everything we could to make your hospital stay a pleasant one. You may be receiving a phone survey and we would appreciate your time and participation in answering the questions. Your input is very valuable to us in our efforts to improve our service to our patients and their families.        My signature on this form indicates that:    1. I have reviewed and understand the above information.  2. My questions regarding this information have been answered to my satisfaction.  3. I have formulated a plan with my discharge nurse to obtain my prescribed medications for home.                   `           Patient or Caregiver Signature:  ____________________________________________________________    Date:   ____________________________________________________________       `

## 2017-03-28 ENCOUNTER — HOSPITAL ENCOUNTER (OUTPATIENT)
Dept: RADIOLOGY | Facility: REHABILITATION | Age: 72
End: 2017-03-28
Attending: SPECIALIST
Payer: MEDICARE

## 2017-03-28 ENCOUNTER — HOSPITAL ENCOUNTER (OUTPATIENT)
Dept: PAIN MANAGEMENT | Facility: REHABILITATION | Age: 72
End: 2017-03-28
Attending: SPECIALIST
Payer: MEDICARE

## 2017-03-28 VITALS
WEIGHT: 170.19 LBS | DIASTOLIC BLOOD PRESSURE: 59 MMHG | OXYGEN SATURATION: 97 % | BODY MASS INDEX: 27.35 KG/M2 | HEIGHT: 66 IN | TEMPERATURE: 97.1 F | SYSTOLIC BLOOD PRESSURE: 122 MMHG | RESPIRATION RATE: 16 BRPM | HEART RATE: 61 BPM

## 2017-03-28 PROCEDURE — 700111 HCHG RX REV CODE 636 W/ 250 OVERRIDE (IP)

## 2017-03-28 PROCEDURE — 62321 NJX INTERLAMINAR CRV/THRC: CPT

## 2017-03-28 PROCEDURE — 700117 HCHG RX CONTRAST REV CODE 255

## 2017-03-28 PROCEDURE — 99152 MOD SED SAME PHYS/QHP 5/>YRS: CPT

## 2017-03-28 PROCEDURE — 62323 NJX INTERLAMINAR LMBR/SAC: CPT | Mod: PO

## 2017-03-28 RX ORDER — DEXAMETHASONE SODIUM PHOSPHATE 10 MG/ML
INJECTION, SOLUTION INTRAMUSCULAR; INTRAVENOUS
Status: COMPLETED
Start: 2017-03-28 | End: 2017-03-28

## 2017-03-28 RX ORDER — METHYLPREDNISOLONE ACETATE 80 MG/ML
INJECTION, SUSPENSION INTRA-ARTICULAR; INTRALESIONAL; INTRAMUSCULAR; SOFT TISSUE
Status: COMPLETED
Start: 2017-03-28 | End: 2017-03-28

## 2017-03-28 RX ORDER — MIDAZOLAM HYDROCHLORIDE 1 MG/ML
INJECTION INTRAMUSCULAR; INTRAVENOUS
Status: COMPLETED
Start: 2017-03-28 | End: 2017-03-28

## 2017-03-28 RX ORDER — BUPIVACAINE HYDROCHLORIDE 2.5 MG/ML
INJECTION, SOLUTION EPIDURAL; INFILTRATION; INTRACAUDAL
Status: COMPLETED
Start: 2017-03-28 | End: 2017-03-28

## 2017-03-28 RX ADMIN — MIDAZOLAM HYDROCHLORIDE 1 MG: 1 INJECTION, SOLUTION INTRAMUSCULAR; INTRAVENOUS at 10:10

## 2017-03-28 RX ADMIN — FENTANYL CITRATE 50 MCG: 50 INJECTION, SOLUTION INTRAMUSCULAR; INTRAVENOUS at 10:07

## 2017-03-28 RX ADMIN — IOHEXOL 3 ML: 240 INJECTION, SOLUTION INTRATHECAL; INTRAVASCULAR; INTRAVENOUS; ORAL at 10:11

## 2017-03-28 RX ADMIN — MIDAZOLAM HYDROCHLORIDE 1 MG: 1 INJECTION, SOLUTION INTRAMUSCULAR; INTRAVENOUS at 10:07

## 2017-03-28 RX ADMIN — METHYLPREDNISOLONE ACETATE 80 MG: 80 INJECTION, SUSPENSION INTRA-ARTICULAR; INTRALESIONAL; INTRAMUSCULAR; SOFT TISSUE at 10:13

## 2017-03-28 ASSESSMENT — PAIN SCALES - GENERAL
PAINLEVEL_OUTOF10: 4
PAINLEVEL_OUTOF10: 6

## 2017-03-28 NOTE — PROGRESS NOTES
Pt positioned prone by CNA and X-ray tech. Arms resting on stool under table . Pillow placed under feet and lower legs by CNA.

## 2017-03-28 NOTE — PROGRESS NOTES
Current meds. See medication reconciliation form. Reviewed with pt. Pt denies taking ASA,other blood thinners or anti-inflammatories. Pt has a ride post-procedure (, Guillermo is ). Printed and verbal discharge instructions given to pt who verbalized understanding.

## 2017-03-29 NOTE — PROCEDURES
DATE OF SERVICE:  03/28/2017    PROCEDURES:    1.  Lumbar epidural steroid injection.    2.  Fluoroscopy for needle guidance, confirmation of placement, and   epidurogram.    3.  IV sedation per patient request.      DIAGNOSES:    1.  Lumbar spondylostenosis with back and left greater than right leg pain.    2.  Anxiety over procedure, patient requests intravenous sedation.      DESCRIPTION OF PROCEDURE:  After informed consent with the patient prone, she   is monitored and sedated with Versed and fentanyl.  Fluoroscopy was utilized   to identify the L4-L5 interspace.  The back is prepped with Betadine, sterile   draped and anesthetized.  Under intermittent fluoroscopic guidance and local   anesthesia, a 20-gauge epidural needle was passed just left of midline to the   dorsal epidural space at L4-L5 using loss of resistance technique with saline.    Contrast was injected confirming epidurogram with bilateral cephalocaudal   spread.  I slowly injected 80 mg Depo-Medrol, 4 mL of 1% lidocaine, 4 mL of   normal saline.  Needle was removed.  She tolerated this well.      PLAN:  Follow up Dr. Mobley for reevaluation for possible surgery.       ____________________________________     CELINA GROVES MD    [facility] / [facility]    DD:  03/28/2017 10:19:10  DT:  03/28/2017 19:26:33    D#:  [id number]  Job#:  [id number]    cc: RAMON MOBLEY MD, [facility]

## 2017-04-11 ENCOUNTER — HOSPITAL ENCOUNTER (OUTPATIENT)
Dept: RADIOLOGY | Facility: MEDICAL CENTER | Age: 72
End: 2017-04-11
Attending: NEUROLOGICAL SURGERY
Payer: MEDICARE

## 2017-04-11 DIAGNOSIS — M48.061 SPINAL STENOSIS, LUMBAR REGION, WITHOUT NEUROGENIC CLAUDICATION: ICD-10-CM

## 2017-04-11 PROCEDURE — 72110 X-RAY EXAM L-2 SPINE 4/>VWS: CPT

## 2017-04-17 ENCOUNTER — OFFICE VISIT (OUTPATIENT)
Dept: MEDICAL GROUP | Facility: PHYSICIAN GROUP | Age: 72
End: 2017-04-17
Payer: MEDICARE

## 2017-04-17 VITALS
HEART RATE: 70 BPM | SYSTOLIC BLOOD PRESSURE: 124 MMHG | OXYGEN SATURATION: 97 % | WEIGHT: 166 LBS | HEIGHT: 66 IN | DIASTOLIC BLOOD PRESSURE: 66 MMHG | RESPIRATION RATE: 16 BRPM | BODY MASS INDEX: 26.68 KG/M2 | TEMPERATURE: 98.6 F

## 2017-04-17 DIAGNOSIS — G89.29 CHRONIC BILATERAL LOW BACK PAIN WITH BILATERAL SCIATICA: ICD-10-CM

## 2017-04-17 DIAGNOSIS — E78.5 DYSLIPIDEMIA: ICD-10-CM

## 2017-04-17 DIAGNOSIS — I10 ESSENTIAL HYPERTENSION: ICD-10-CM

## 2017-04-17 DIAGNOSIS — R73.01 IMPAIRED FASTING BLOOD SUGAR: ICD-10-CM

## 2017-04-17 DIAGNOSIS — M54.42 CHRONIC BILATERAL LOW BACK PAIN WITH BILATERAL SCIATICA: ICD-10-CM

## 2017-04-17 DIAGNOSIS — Z11.59 NEED FOR HEPATITIS C SCREENING TEST: ICD-10-CM

## 2017-04-17 DIAGNOSIS — G89.29 CHRONIC NECK PAIN: ICD-10-CM

## 2017-04-17 DIAGNOSIS — K80.20 GALLSTONES: ICD-10-CM

## 2017-04-17 DIAGNOSIS — M54.41 CHRONIC BILATERAL LOW BACK PAIN WITH BILATERAL SCIATICA: ICD-10-CM

## 2017-04-17 DIAGNOSIS — M85.80 OSTEOPENIA: ICD-10-CM

## 2017-04-17 DIAGNOSIS — Z78.0 POSTMENOPAUSAL: ICD-10-CM

## 2017-04-17 DIAGNOSIS — M54.2 CHRONIC NECK PAIN: ICD-10-CM

## 2017-04-17 PROCEDURE — 99215 OFFICE O/P EST HI 40 MIN: CPT | Performed by: FAMILY MEDICINE

## 2017-04-17 PROCEDURE — 3014F SCREEN MAMMO DOC REV: CPT | Performed by: FAMILY MEDICINE

## 2017-04-17 PROCEDURE — G8417 CALC BMI ABV UP PARAM F/U: HCPCS | Performed by: FAMILY MEDICINE

## 2017-04-17 PROCEDURE — 3017F COLORECTAL CA SCREEN DOC REV: CPT | Performed by: FAMILY MEDICINE

## 2017-04-17 PROCEDURE — 1036F TOBACCO NON-USER: CPT | Performed by: FAMILY MEDICINE

## 2017-04-17 PROCEDURE — G8432 DEP SCR NOT DOC, RNG: HCPCS | Performed by: FAMILY MEDICINE

## 2017-04-17 PROCEDURE — 1101F PT FALLS ASSESS-DOCD LE1/YR: CPT | Performed by: FAMILY MEDICINE

## 2017-04-17 PROCEDURE — 4040F PNEUMOC VAC/ADMIN/RCVD: CPT | Mod: 8P | Performed by: FAMILY MEDICINE

## 2017-04-17 ASSESSMENT — PATIENT HEALTH QUESTIONNAIRE - PHQ9: CLINICAL INTERPRETATION OF PHQ2 SCORE: 0

## 2017-04-17 NOTE — PROGRESS NOTES
Subjective:      Larissa Ramirez is a 72 y.o. female who presents with [facility]            HPI     This is a 72-year-old white female who [facility]. She was previously under the care of AMANDA Yi. She wants to switch to me because Milagros moved to this outside of [facility].    She has impaired fasting blood sugar for which she is taking metformin. When she had her last blood work in January 2017 her hemoglobin A1c went down to normal at 5.3 and her fasting blood sugar was normal. She was instructed to decrease the dose of her metformin from 500 mg twice daily to 500 mg once daily only which she has been doing. She said she has been careful in her diet and she has been trying to lose weight. She has lost 8 pounds since January 2017.    In terms of her hypertension, she takes lisinopril/HCTZ with good control of her blood pressure.    She has dyslipidemia which she manages with her efforts only.    She sees a pain specialist Dr. Garcia with the [facility] for chronic neck pain with radiation to the right upper extremity with numbness and tingling of the right arm and right hand. She had C-spine surgery in 2006. She also has chronic low back pain with radiation of the pain down the legs. She is followed by Dr. Mobley who did her C-spine surgery. She said she will need to have another C-spine surgery in the future and lumbar spine surgery as well. She takes pain medications tramadol, hydrocodone/APAP prescribed by her pain specialist and diazepam at night. She is also on ibuprofen.    She was found to have incidental finding of gallstones when she had her MRI of the lumbar spine as far back as 2014. This showed again when she had most recent MRI done in March 2017. She did not have any problems or symptoms before but lately she started having pain sides of the upper back when she eats fatty foods. She denies any pain in the abdomen, nausea,  vomiting. She also suffers from osteopenia with the last bone density scan in 2014. She takes calcium and vitamin D supplementation.    She is requesting hepatitis C screening. She is a baby boomer.    I reviewed the following    Past Medical History   Diagnosis Date   • Other specified symptom associated with female genital organs    • Dental disorder      partials   • Bronchitis 2005   • Hypertension 2015     controlled on meds   • Pre-diabetes    • Chronic neck pain      followed by [facility] Dr. Itz Lopez   • Chronic low back pain      followed by [facility] Dr. Itz Lopez        Past Surgical History   Procedure Laterality Date   • Other neurological surg  2006     neck cage/fusion   • Hysterectomy robotic xi  7/9/2015     Procedure: HYSTERECTOMY ROBOTIC XI W/ BILATERAL SALPINGO-OOPHORECTOMY, MCCALLS PROCEDURES;  Surgeon: Brian Parks M.D.;  Location: SURGERY [facility];  Service:        Allergies   Allergen Reactions   • Sulfa Drugs Nausea   • Amitriptyline Swelling     Facial swelling    • Levofloxacin Swelling     Swelling of tongue and neck   • Lyrica Rash   • Nitrofurantoin      Swelling of lips tongue       Current Outpatient Prescriptions   Medication Sig Dispense Refill   • DICLOFENAC SODIUM TD Apply  to skin as directed.     • ibuprofen (MOTRIN) 800 MG Tab Take 800 mg by mouth every 8 hours as needed.     • lisinopril-hydrochlorothiazide (PRINZIDE, ZESTORETIC) 20-25 MG per tablet Take 1 Tab by mouth every day. TAKE 1 TAB BY MOUTH EVERY DAY. 90 Tab 2   • metformin (GLUCOPHAGE) 500 MG Tab Take 1 Tab by mouth every day. 90 Tab 2   • metformin (GLUCOPHAGE) 500 MG Tab TAKE 1 TABLET BY MOUTH TWICE A DAY WITH MEAL 60 Tab 2   • estradiol (ESTRING) 2 MG vaginal ring Insert vaginally, leave in place for 3 months and remove 1 Each 3   • hydrocodone/acetaminophen (NORCO)  MG Tab Take 1 Tab by mouth every 8 hours as needed. 90 Tab 0   • vitamin D (CHOLECALCIFEROL) 1000 UNIT TABS Take 1,000 Units  by mouth every day.     • Multiple Vitamins-Iron (MULTIVITAMIN/IRON PO) Take 1 Tab by mouth every day.     • diazepam (VALIUM) 2 MG TABS Take 2 mg by mouth every 6 hours as needed for Sleep. Take two to three tabs by mouth at bedtime as needed     • tramadol (ULTRAM) 50 MG TABS Take  mg by mouth every 6 hours as needed.       No current facility-administered medications for this visit.        History reviewed. No pertinent family history.    Social History     Social History   • Marital Status:      Spouse Name: N/A   • Number of Children: 3   • Years of Education: N/A     Occupational History   • retired      Social History Main Topics   • Smoking status: Never Smoker    • Smokeless tobacco: Never Used   • Alcohol Use: 0.0 oz/week     0 Standard drinks or equivalent per week      Comment: 1 per  2-3 months   • Drug Use: No   • Sexual Activity:     Partners: Male     Other Topics Concern   • Not on file     Social History Narrative          ROS     Review of Systems  Constitutional: Negative for fever, chills, weight loss and malaise/fatigue.   HEENT: Negative for ear pain, nosebleeds, congestion, sore throat and neck pain.    Eyes: Negative for blurred vision.   Respiratory: Negative for cough, sputum production, shortness of breath and wheezing.    Cardiovascular: Negative for chest pain, palpitations, orthopnea and leg swelling.   Gastrointestinal: Negative for heartburn, nausea, vomiting and abdominal pain.   Genitourinary: Negative for dysuria, urgency and frequency.   Musculoskeletal: Negative for myalgias, joint pain. Positive neck pain and back pain.   Skin: Negative for rash and itching.   Neurological: Negative for dizziness, tingling, tremors, sensory change, focal weakness and headaches.   Endo/Heme/Allergies: Does not bruise/bleed easily.   Psychiatric/Behavioral: Negative for depression, anxiety, or memory loss.     All other systems reviewed and are negative except as in HPI.          "Objective:     /66 mmHg  Pulse 70  Temp(Src) 37 °C (98.6 °F)  Resp 16  Ht 1.676 m (5' 6\")  Wt 75.297 kg (166 lb)  BMI 26.81 kg/m2  SpO2 97%  LMP 06/27/1986     Physical Exam   Constitutional: She is oriented to person, place, and time. She appears well-developed and well-nourished. No distress.   HENT:   Head: Normocephalic and atraumatic.   Right Ear: External ear normal.   Left Ear: External ear normal.   Nose: Nose normal.   Mouth/Throat: Oropharynx is clear and moist. No oropharyngeal exudate.   Eyes: Conjunctivae and EOM are normal. Pupils are equal, round, and reactive to light. Right eye exhibits no discharge. Left eye exhibits no discharge. No scleral icterus.   Neck: Normal range of motion. Neck supple. No JVD present. No tracheal deviation present. No thyromegaly present.   Cardiovascular: Normal rate, regular rhythm, normal heart sounds and intact distal pulses.  Exam reveals no gallop and no friction rub.    No murmur heard.  Pulmonary/Chest: Effort normal and breath sounds normal. No stridor. No respiratory distress. She has no wheezes. She has no rales. She exhibits no tenderness.   Abdominal: Soft. Bowel sounds are normal. She exhibits no distension and no mass. There is no tenderness. There is no rebound and no guarding. No hernia.   Musculoskeletal: Normal range of motion. She exhibits no edema, tenderness or deformity.   Lymphadenopathy:     She has no cervical adenopathy.   Neurological: She is alert and oriented to person, place, and time. She has normal reflexes. She displays normal reflexes. No cranial nerve deficit. She exhibits normal muscle tone. Coordination normal.   Skin: Skin is warm and dry. No rash noted. She is not diaphoretic. No erythema. No pallor.   Psychiatric: She has a normal mood and affect. Her behavior is normal. Judgment and thought content normal.   Nursing note and vitals reviewed.                 Assessment/Plan:     1. Impaired fasting blood sugar  Her " last blood work came back normal fasting blood sugar and normal hemoglobin A1c. We will discontinue metformin. Even if her blood sugar starts to run slightly high and her hemoglobin A1c also slightly high I told her I will not put her on medication unless she has uncontrolled diabetes. She will manage this with her own efforts including diet, exercise and weight loss. Recheck blood work next visit.  - HEMOGLOBIN A1C; Future  - LIPID PROFILE; Future  - COMP METABOLIC PANEL; Future  - VITAMIN D,25 HYDROXY; Future  - HEP C VIRUS ANTIBODY; Future    2. Essential hypertension  Controlled on her medication.  - HEMOGLOBIN A1C; Future  - LIPID PROFILE; Future  - COMP METABOLIC PANEL; Future  - VITAMIN D,25 HYDROXY; Future  - HEP C VIRUS ANTIBODY; Future    3. Dyslipidemia  We will do a follow-up lipid panel next visit. She is managing this with her own efforts.  - HEMOGLOBIN A1C; Future  - LIPID PROFILE; Future  - COMP METABOLIC PANEL; Future  - VITAMIN D,25 HYDROXY; Future  - HEP C VIRUS ANTIBODY; Future    4. Chronic neck pain  Continue follow-up at pain specialists and surgeon. She is on pain medications and anti-inflammatory including diazepam prescribed by the pain specialist.  - HEMOGLOBIN A1C; Future  - LIPID PROFILE; Future  - COMP METABOLIC PANEL; Future  - VITAMIN D,25 HYDROXY; Future  - HEP C VIRUS ANTIBODY; Future    5. Chronic bilateral low back pain with bilateral sciatica  Same as #4.  - HEMOGLOBIN A1C; Future  - LIPID PROFILE; Future  - COMP METABOLIC PANEL; Future  - VITAMIN D,25 HYDROXY; Future  - HEP C VIRUS ANTIBODY; Future    6. Gallstones  At this time she wants to hold off on referral to surgeon unless it starts to get worse especially if she starts having pain in the right upper quadrant with nausea and vomiting. ER precautions discussed with patient.  - HEMOGLOBIN A1C; Future  - LIPID PROFILE; Future  - COMP METABOLIC PANEL; Future  - VITAMIN D,25 HYDROXY; Future  - HEP C VIRUS ANTIBODY;  Future    7. Osteopenia  Continue calcium and vitamin D supplementation. We will check vitamin D level. We will do bone density scan.  - DS-BONE DENSITY STUDY (DEXA); Future  - HEMOGLOBIN A1C; Future  - LIPID PROFILE; Future  - COMP METABOLIC PANEL; Future  - VITAMIN D,25 HYDROXY; Future  - HEP C VIRUS ANTIBODY; Future    8. Postmenopausal  We will do bone density scan.  - DS-BONE DENSITY STUDY (DEXA); Future  - HEMOGLOBIN A1C; Future  - LIPID PROFILE; Future  - COMP METABOLIC PANEL; Future  - VITAMIN D,25 HYDROXY; Future  - HEP C VIRUS ANTIBODY; Future    9. Need for hepatitis C screening test  We will do screening hepatitis C per her request and per CDC guidelines because she was born between 1944 and 1964..  - HEMOGLOBIN A1C; Future  - LIPID PROFILE; Future  - COMP METABOLIC PANEL; Future  - VITAMIN D,25 HYDROXY; Future  - HEP C VIRUS ANTIBODY; Future    40 minutes were spent on this visit. More than 50% of which was spent going over her medical problems, medications, blood work, plan of care and follow-up.    Please note that this dictation was created using voice recognition software. I have worked with consultants from the vendor as well as technical experts from [facility]  [employer] to optimize the interface. I have made every reasonable attempt to correct obvious errors, but I expect that there are errors of grammar and possibly content I did not discover before finalizing the note.

## 2017-04-17 NOTE — MR AVS SNAPSHOT
"        Larissa Ramirez   2017 1:20 PM   Office Visit   MRN: [id number]    Department:  [facility]   Dept Phone:  [phone]    Description:  Female : 1945   Provider:  Kaitlyn Long M.D.           Reason for Visit     Establish Care new pt       Allergies as of 2017     Allergen Noted Reactions    Sulfa Drugs 2013   Nausea    Amitriptyline 2017   Swelling    Facial swelling     Levofloxacin 2014   Swelling    Swelling of tongue and neck    Lyrica 2013   Rash    Nitrofurantoin 2015       Swelling of lips tongue      You were diagnosed with     Impaired fasting blood sugar   [826967]       Essential hypertension   [7222067]       Dyslipidemia   [221278]       Chronic neck pain   [368309]       Chronic bilateral low back pain with bilateral sciatica   [6676494]       Gallstones   [463347]       Osteopenia   [680970]       Postmenopausal   [721451]       Need for hepatitis C screening test   [726451]         Vital Signs     Blood Pressure Pulse Temperature Respirations Height Weight    124/66 mmHg 70 37 °C (98.6 °F) 16 1.676 m (5' 6\") 75.297 kg (166 lb)    Body Mass Index Oxygen Saturation Last Menstrual Period Smoking Status          26.81 kg/m2 97% 1986 Never Smoker         Basic Information     Date Of Birth Sex Race Ethnicity Preferred Language    1945 Female White Non- English      Your appointments     2017  1:00 PM   ANNUAL WELLNESS with Kaitlyn Long M.D., [facility]    [facility] (--)    [address]  [address]  [facility] [id number] [phone]              Problem List              ICD-10-CM Priority Class Noted - Resolved    Hypertension goal BP (blood pressure) < 140/80 (Chronic) I10   2012 - Present    Prediabetes R73.03   2014 - Present    Vitamin D deficiency disease E55.9   2014 - Present    Chronic sciatica of right side M54.31   10/13/2014 - Present    DDD (degenerative disc disease), " lumbar M51.36   10/13/2014 - Present    Chronic shoulder pain M25.519, G89.29   10/13/2014 - Present    DDD (degenerative disc disease), cervical M50.30   1/30/2017 - Present    Atrophic vaginitis N95.2   1/30/2017 - Present      Health Maintenance        Date Due Completion Dates    MAMMOGRAM 3/23/2017 3/23/2016, 2/24/2015, 2/19/2015, 2/18/2014, 1/14/2013    IMM PNEUMOCOCCAL 65+ (ADULT) LOW/MEDIUM RISK SERIES (1 of 2 - PCV13) 4/17/2018 (Originally 3/5/2011) ---    IMM DTaP/Tdap/Td Vaccine (1 - Tdap) 4/17/2018 (Originally 3/5/1965) ---    IMM ZOSTER VACCINE 4/17/2018 (Originally 3/5/2006) ---    COLONOSCOPY 9/20/2017 9/20/2012    BONE DENSITY 2/18/2019 2/18/2014            Current Immunizations     No immunizations on file.      Below and/or attached are the medications your provider expects you to take. Review all of your home medications and newly ordered medications with your provider and/or pharmacist. Follow medication instructions as directed by your provider and/or pharmacist. Please keep your medication list with you and share with your provider. Update the information when medications are discontinued, doses are changed, or new medications (including over-the-counter products) are added; and carry medication information at all times in the event of emergency situations     Allergies:  SULFA DRUGS - Nausea     AMITRIPTYLINE - Swelling     LEVOFLOXACIN - Swelling     LYRICA - Rash     NITROFURANTOIN - (reactions not documented)               Medications  Valid as of: April 17, 2017 -  3:19 PM    Generic Name Brand Name Tablet Size Instructions for use    Cholecalciferol (Tab) cholecalciferol 1000 UNIT Take 1,000 Units by mouth every day.        DiazePAM (Tab) VALIUM 2 MG Take 2 mg by mouth every 6 hours as needed for Sleep. Take two to three tabs by mouth at bedtime as needed        Diclofenac Sodium   Apply  to skin as directed.        Estradiol (RING) ESTRING 2 MG Insert vaginally, leave in place for 3  months and remove        Hydrocodone-Acetaminophen (Tab) NORCO  MG Take 1 Tab by mouth every 8 hours as needed.        Ibuprofen (Tab) MOTRIN 800 MG Take 800 mg by mouth every 8 hours as needed.        Lisinopril-Hydrochlorothiazide (Tab) PRINZIDE, ZESTORETIC 20-25 MG Take 1 Tab by mouth every day. TAKE 1 TAB BY MOUTH EVERY DAY.        MetFORMIN HCl (Tab) GLUCOPHAGE 500 MG TAKE 1 TABLET BY MOUTH TWICE A DAY WITH MEAL        MetFORMIN HCl (Tab) GLUCOPHAGE 500 MG Take 1 Tab by mouth every day.        Multiple Vitamins-Iron   Take 1 Tab by mouth every day.        TraMADol HCl (Tab) ULTRAM 50 MG Take  mg by mouth every 6 hours as needed.        .                 Medicines prescribed today were sent to:     [id number] [place] - [address]    [address] [place] [address]    Phone: [phone] Fax: [phone]    Open 24 Hours?: No    [id number] - [place] - [address] [place] [address]    Phone: [phone] Fax: [phone]    Open 24 Hours?: No      Medication refill instructions:       If your prescription bottle indicates you have medication refills left, it is not necessary to call your provider’s office. Please contact your pharmacy and they will refill your medication.    If your prescription bottle indicates you do not have any refills left, you may request refills at any time through one of the following ways: The online [employer] system (except Urgent Care), by calling your provider’s office, or by asking your pharmacy to contact your provider’s office with a refill request. Medication refills are processed only during regular business hours and may not be available until the next business day. Your provider may request additional information or to have a follow-up visit with you prior to refilling your medication.   *Please Note: Medication refills are assigned a new Rx number when refilled electronically. Your pharmacy may indicate that no refills were  authorized even though a new prescription for the same medication is available at the pharmacy. Please request the medicine by name with the pharmacy before contacting your provider for a refill.        Your To Do List     Future Labs/Procedures Complete By Expires    COMP METABOLIC PANEL  As directed 4/18/2018    DS-BONE DENSITY STUDY (DEXA)  As directed 10/18/2017    HEMOGLOBIN A1C  As directed 4/18/2018    HEP C VIRUS ANTIBODY  As directed 4/18/2018    LIPID PROFILE  As directed 4/18/2018    VITAMIN D,25 HYDROXY  As directed 4/18/2018         [employer] Access Code: Activation code not generated  Current [employer] Status: Active

## 2017-05-02 ENCOUNTER — HOSPITAL ENCOUNTER (OUTPATIENT)
Dept: PAIN MANAGEMENT | Facility: REHABILITATION | Age: 72
End: 2017-05-02
Attending: SPECIALIST
Payer: MEDICARE

## 2017-05-02 ENCOUNTER — HOSPITAL ENCOUNTER (OUTPATIENT)
Dept: RADIOLOGY | Facility: REHABILITATION | Age: 72
End: 2017-05-02
Attending: SPECIALIST
Payer: MEDICARE

## 2017-05-02 VITALS
RESPIRATION RATE: 17 BRPM | SYSTOLIC BLOOD PRESSURE: 120 MMHG | WEIGHT: 165.79 LBS | BODY MASS INDEX: 26.64 KG/M2 | OXYGEN SATURATION: 95 % | HEART RATE: 65 BPM | TEMPERATURE: 98.2 F | DIASTOLIC BLOOD PRESSURE: 53 MMHG | HEIGHT: 66 IN

## 2017-05-02 PROCEDURE — 700117 HCHG RX CONTRAST REV CODE 255

## 2017-05-02 PROCEDURE — 700111 HCHG RX REV CODE 636 W/ 250 OVERRIDE (IP)

## 2017-05-02 PROCEDURE — 62323 NJX INTERLAMINAR LMBR/SAC: CPT

## 2017-05-02 RX ORDER — METHYLPREDNISOLONE ACETATE 80 MG/ML
INJECTION, SUSPENSION INTRA-ARTICULAR; INTRALESIONAL; INTRAMUSCULAR; SOFT TISSUE
Status: COMPLETED
Start: 2017-05-02 | End: 2017-05-02

## 2017-05-02 RX ORDER — MIDAZOLAM HYDROCHLORIDE 1 MG/ML
INJECTION INTRAMUSCULAR; INTRAVENOUS
Status: COMPLETED
Start: 2017-05-02 | End: 2017-05-02

## 2017-05-02 RX ORDER — DEXAMETHASONE SODIUM PHOSPHATE 10 MG/ML
INJECTION, SOLUTION INTRAMUSCULAR; INTRAVENOUS
Status: COMPLETED
Start: 2017-05-02 | End: 2017-05-02

## 2017-05-02 RX ORDER — BUPIVACAINE HYDROCHLORIDE 2.5 MG/ML
INJECTION, SOLUTION EPIDURAL; INFILTRATION; INTRACAUDAL
Status: COMPLETED
Start: 2017-05-02 | End: 2017-05-02

## 2017-05-02 RX ADMIN — METHYLPREDNISOLONE ACETATE 80 MG: 80 INJECTION, SUSPENSION INTRA-ARTICULAR; INTRALESIONAL; INTRAMUSCULAR; SOFT TISSUE at 10:05

## 2017-05-02 RX ADMIN — IOHEXOL 8 ML: 240 INJECTION, SOLUTION INTRATHECAL; INTRAVASCULAR; INTRAVENOUS; ORAL at 10:05

## 2017-05-02 RX ADMIN — MIDAZOLAM HYDROCHLORIDE 1 MG: 1 INJECTION, SOLUTION INTRAMUSCULAR; INTRAVENOUS at 10:04

## 2017-05-02 RX ADMIN — FENTANYL CITRATE 50 MCG: 50 INJECTION, SOLUTION INTRAMUSCULAR; INTRAVENOUS at 10:04

## 2017-05-02 ASSESSMENT — PAIN SCALES - GENERAL
PAINLEVEL_OUTOF10: 3
PAINLEVEL_OUTOF10: 5
PAINLEVEL_OUTOF10: 3

## 2017-05-03 NOTE — PROCEDURES
DATE OF SERVICE:  05/02/2017    DATE OF PROCEDURE:  05/02/2017    PROCEDURES:  1.  Right L4-L5 lumbar epidural steroid injection.  2.  Fluoroscopy for needle guidance, confirmation, and placement.  3.  IV sedation per patient request.    DIAGNOSES:  1.  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  2.  Anxiety over procedure, patient requests intravenous sedation.    DESCRIPTION OF PROCEDURE:  After informed consent with the patient prone,   fluoroscopy was utilized to identify the right L4-L5 interspace.  She is   monitored and sedated with Versed and fentanyl.  Back was prepped with   Betadine, sterilely draped and anesthetized.  Under intermittent fluoroscopic   guidance and local anesthesia, a 20-gauge epidural needle was passed right of   midline to the dorsal epidural space at L4-L5 using loss of resistance   technique with saline.  Contrast was injected confirming patchy epidurogram   bilateral.  I slowly injected 80 mg Depo-Medrol, 5 mL normal saline, 1-2 mL of   1% lidocaine.  Needle was removed.  She tolerated this well.    PLAN:  Follow up with Dr. Mobley for ongoing care.  In the meantime, she is   requesting I repeat the right cervical facet blocks to screen for   radiofrequency ablation as this was extremely effective in relieving both her   neck and shoulder pain.       ____________________________________     MD TEZ CAMPOS / CHARLIE    DD:  05/02/2017 10:14:03  DT:  05/02/2017 22:44:29    D#:  [id number]  Job#:  [id number]    cc: RAMON MOBLEY MD

## 2017-05-17 ENCOUNTER — APPOINTMENT (OUTPATIENT)
Dept: MEDICAL GROUP | Facility: PHYSICIAN GROUP | Age: 72
End: 2017-05-17
Payer: MEDICARE

## 2017-05-18 ENCOUNTER — TELEPHONE (OUTPATIENT)
Dept: MEDICAL GROUP | Facility: PHYSICIAN GROUP | Age: 72
End: 2017-05-18

## 2017-05-18 ENCOUNTER — APPOINTMENT (OUTPATIENT)
Dept: MEDICAL GROUP | Facility: PHYSICIAN GROUP | Age: 72
End: 2017-05-18
Payer: MEDICARE

## 2017-05-19 ENCOUNTER — OFFICE VISIT (OUTPATIENT)
Dept: MEDICAL GROUP | Facility: PHYSICIAN GROUP | Age: 72
End: 2017-05-19
Payer: MEDICARE

## 2017-05-19 ENCOUNTER — TELEPHONE (OUTPATIENT)
Dept: MEDICAL GROUP | Facility: PHYSICIAN GROUP | Age: 72
End: 2017-05-19

## 2017-05-19 VITALS
HEART RATE: 81 BPM | HEIGHT: 66 IN | WEIGHT: 166 LBS | RESPIRATION RATE: 16 BRPM | BODY MASS INDEX: 26.68 KG/M2 | DIASTOLIC BLOOD PRESSURE: 72 MMHG | TEMPERATURE: 98.2 F | OXYGEN SATURATION: 95 % | SYSTOLIC BLOOD PRESSURE: 122 MMHG

## 2017-05-19 DIAGNOSIS — N81.10 ACQUIRED FEMALE BLADDER PROLAPSE: ICD-10-CM

## 2017-05-19 PROCEDURE — 1036F TOBACCO NON-USER: CPT | Performed by: NURSE PRACTITIONER

## 2017-05-19 PROCEDURE — 1101F PT FALLS ASSESS-DOCD LE1/YR: CPT | Performed by: NURSE PRACTITIONER

## 2017-05-19 PROCEDURE — 4040F PNEUMOC VAC/ADMIN/RCVD: CPT | Mod: 8P | Performed by: NURSE PRACTITIONER

## 2017-05-19 PROCEDURE — 99213 OFFICE O/P EST LOW 20 MIN: CPT | Performed by: NURSE PRACTITIONER

## 2017-05-19 PROCEDURE — G8432 DEP SCR NOT DOC, RNG: HCPCS | Performed by: NURSE PRACTITIONER

## 2017-05-19 PROCEDURE — G8419 CALC BMI OUT NRM PARAM NOF/U: HCPCS | Performed by: NURSE PRACTITIONER

## 2017-05-19 PROCEDURE — 3017F COLORECTAL CA SCREEN DOC REV: CPT | Performed by: NURSE PRACTITIONER

## 2017-05-19 PROCEDURE — 3014F SCREEN MAMMO DOC REV: CPT | Performed by: NURSE PRACTITIONER

## 2017-05-19 ASSESSMENT — PATIENT HEALTH QUESTIONNAIRE - PHQ9: CLINICAL INTERPRETATION OF PHQ2 SCORE: 0

## 2017-05-19 NOTE — MR AVS SNAPSHOT
"        Larissa Ramirez   2017 2:40 PM   Office Visit   MRN: [id number]    Department:  [facility]   Dept Phone:  [phone]    Description:  Female : 1945   Provider:  CECE Basilio           Reason for Visit     Pelvic Pain           Allergies as of 2017     Allergen Noted Reactions    Sulfa Drugs 2013   Nausea    Amitriptyline 2017   Swelling    Facial swelling     Levofloxacin 2014   Swelling    Swelling of tongue and neck    Lyrica 2013   Rash    Nitrofurantoin 2015       Swelling of lips tongue      You were diagnosed with     Acquired female bladder prolapse   [843391]         Vital Signs     Blood Pressure Pulse Temperature Respirations Height Weight    122/72 mmHg 81 36.8 °C (98.2 °F) 16 1.676 m (5' 5.98\") 75.297 kg (166 lb)    Body Mass Index Oxygen Saturation Last Menstrual Period Smoking Status          26.81 kg/m2 95% 1986 Never Smoker         Basic Information     Date Of Birth Sex Race Ethnicity Preferred Language    1945 Female White Non- English      Your appointments     2017 12:30 PM   BONE DENSITY (DEXASCAN) with [facility] BD 1   [facility] ([address])    [address] [address]   [phone]           No calcium supplements 24 hours prior to exam, including antacids or multivitamins w/calcium.  Pt may eat and drink normally.  No injection procedures prior to Dexa on the same day.  No barium contrast, no CTs with IV or oral contrast, no Pet/CTs and no Nuc Med injections for 7 days prior to a Dexa (including Barium Swallow, Upper GI, Small Bowel Series).  If scheduling a Dexa on the same day as a CT with IV or oral contrast, any test with barium, Pet/CT or a Nuc Med with injection, always schedule the Dexa before the other study.            2017  1:00 PM   MA SCRN10 with [facility] MG 2   [facility] ([address])    [address]  [place] [address]   [phone]           No deodorant, powder, perfume or lotion under the arm or breast area.            Jun 12, 2017  1:00 PM   ANNUAL WELLNESS with Kaitlyn Long M.D., [facility]    [facility] [place] (--)    [address]  [address]  [place] [address]   [phone]              Problem List              ICD-10-CM Priority Class Noted - Resolved    Hypertension goal BP (blood pressure) < 140/80 (Chronic) I10   4/20/2012 - Present    Prediabetes R73.03   1/22/2014 - Present    Vitamin D deficiency disease E55.9   2/25/2014 - Present    Chronic sciatica of right side M54.31   10/13/2014 - Present    DDD (degenerative disc disease), lumbar M51.36   10/13/2014 - Present    Chronic shoulder pain M25.519, G89.29   10/13/2014 - Present    DDD (degenerative disc disease), cervical M50.30   1/30/2017 - Present    Atrophic vaginitis N95.2   1/30/2017 - Present    Acquired female bladder prolapse N81.10   5/19/2017 - Present      Health Maintenance        Date Due Completion Dates    MAMMOGRAM 3/23/2017 3/23/2016, 2/24/2015, 2/19/2015, 2/18/2014, 1/14/2013    IMM PNEUMOCOCCAL 65+ (ADULT) LOW/MEDIUM RISK SERIES (1 of 2 - PCV13) 4/17/2018 (Originally 3/5/2011) ---    IMM DTaP/Tdap/Td Vaccine (1 - Tdap) 4/17/2018 (Originally 3/5/1965) ---    IMM ZOSTER VACCINE 4/17/2018 (Originally 3/5/2006) ---    COLONOSCOPY 9/20/2017 9/20/2012    BONE DENSITY 2/18/2019 2/18/2014            Current Immunizations     No immunizations on file.      Below and/or attached are the medications your provider expects you to take. Review all of your home medications and newly ordered medications with your provider and/or pharmacist. Follow medication instructions as directed by your provider and/or pharmacist. Please keep your medication list with you and share with your provider. Update the information when medications are discontinued, doses are changed, or new medications (including  over-the-counter products) are added; and carry medication information at all times in the event of emergency situations     Allergies:  SULFA DRUGS - Nausea     AMITRIPTYLINE - Swelling     LEVOFLOXACIN - Swelling     LYRICA - Rash     NITROFURANTOIN - (reactions not documented)               Medications  Valid as of: May 19, 2017 -  3:14 PM    Generic Name Brand Name Tablet Size Instructions for use    Cholecalciferol (Tab) cholecalciferol 1000 UNIT Take 1,000 Units by mouth every day.        DiazePAM (Tab) VALIUM 2 MG Take 2 mg by mouth every 6 hours as needed for Sleep. Take two to three tabs by mouth at bedtime as needed        Diclofenac Sodium   Apply  to skin as directed.        Estradiol (RING) ESTRING 2 MG Insert vaginally, leave in place for 3 months and remove        Hydrocodone-Acetaminophen (Tab) NORCO  MG Take 1 Tab by mouth every 8 hours as needed.        Ibuprofen (Tab) MOTRIN 800 MG Take 800 mg by mouth every 8 hours as needed.        Lisinopril-Hydrochlorothiazide (Tab) PRINZIDE, ZESTORETIC 20-25 MG Take 1 Tab by mouth every day. TAKE 1 TAB BY MOUTH EVERY DAY.        Multiple Vitamins-Iron   Take 1 Tab by mouth every day.        TraMADol HCl (Tab) ULTRAM 50 MG Take  mg by mouth every 6 hours as needed.        .                 Medicines prescribed today were sent to:     [id number] - [place] - [address] [place] [address]    Phone: [phone] Fax: [phone]    Open 24 Hours?: No    [id number] - [place] - [address] [place] [address]    Phone: [phone] Fax: [phone]    Open 24 Hours?: No      Medication refill instructions:       If your prescription bottle indicates you have medication refills left, it is not necessary to call your provider’s office. Please contact your pharmacy and they will refill your medication.    If your prescription bottle indicates you do not have any refills left, you may request refills at any  time through one of the following ways: The online [employer] system (except Urgent Care), by calling your provider’s office, or by asking your pharmacy to contact your provider’s office with a refill request. Medication refills are processed only during regular business hours and may not be available until the next business day. Your provider may request additional information or to have a follow-up visit with you prior to refilling your medication.   *Please Note: Medication refills are assigned a new Rx number when refilled electronically. Your pharmacy may indicate that no refills were authorized even though a new prescription for the same medication is available at the pharmacy. Please request the medicine by name with the pharmacy before contacting your provider for a refill.        Referral     A referral request has been sent to our patient care coordination department. Please allow 3-5 business days for us to process this request and contact you either by phone or mail. If you do not hear from us by the 5th business day, please call us at [phone].           [employer] Access Code: Activation code not generated  Current [employer] Status: Active

## 2017-05-19 NOTE — PROGRESS NOTES
Chief Complaint   Patient presents with   • Pelvic Pain       HISTORY OF PRESENT ILLNESS: Patient is a 72 y.o. female established patient who presents today to discuss bladder prolapse.    Acquired female bladder prolapse  This is a new problem. Patient states that she's been unable to insert her Estring as it is painful. Patient states that she does do self vaginal exam and noticed something pushing down, patient states that she is having to urinate more frequently. Is having issues with waking up at night to urinate. States that she was also having increased vaginal dryness related to inability to use Estring. Patient denies pain, dysuria. Denies issues with defecation.       Patient Active Problem List    Diagnosis Date Noted   • Acquired female bladder prolapse 05/19/2017   • DDD (degenerative disc disease), cervical 01/30/2017   • Atrophic vaginitis 01/30/2017   • Chronic sciatica of right side 10/13/2014   • DDD (degenerative disc disease), lumbar 10/13/2014   • Chronic shoulder pain 10/13/2014   • Vitamin D deficiency disease 02/25/2014   • Prediabetes 01/22/2014   • Hypertension goal BP (blood pressure) < 140/80 04/20/2012       Allergies:Sulfa drugs; Amitriptyline; Levofloxacin; Lyrica; and Nitrofurantoin    Current Outpatient Prescriptions   Medication Sig Dispense Refill   • DICLOFENAC SODIUM TD Apply  to skin as directed.     • ibuprofen (MOTRIN) 800 MG Tab Take 800 mg by mouth every 8 hours as needed.     • lisinopril-hydrochlorothiazide (PRINZIDE, ZESTORETIC) 20-25 MG per tablet Take 1 Tab by mouth every day. TAKE 1 TAB BY MOUTH EVERY DAY. 90 Tab 2   • estradiol (ESTRING) 2 MG vaginal ring Insert vaginally, leave in place for 3 months and remove 1 Each 3   • hydrocodone/acetaminophen (NORCO)  MG Tab Take 1 Tab by mouth every 8 hours as needed. 90 Tab 0   • vitamin D (CHOLECALCIFEROL) 1000 UNIT TABS Take 1,000 Units by mouth every day.     • Multiple Vitamins-Iron (MULTIVITAMIN/IRON PO) Take 1 Tab  "by mouth every day.     • diazepam (VALIUM) 2 MG TABS Take 2 mg by mouth every 6 hours as needed for Sleep. Take two to three tabs by mouth at bedtime as needed     • tramadol (ULTRAM) 50 MG TABS Take  mg by mouth every 6 hours as needed.       No current facility-administered medications for this visit.       Social History   Substance Use Topics   • Smoking status: Never Smoker    • Smokeless tobacco: Never Used   • Alcohol Use: 0.0 oz/week     0 Standard drinks or equivalent per week      Comment: 1 per  2-3 months       Family Status   Relation Status Death Age   • Mother     • Father     • Son Alive    • Daughter Alive    • Daughter Alive    History reviewed. No pertinent family history.    Review of Systems:   Constitutional:  Negative for fever, chills, weight loss and malaise/fatigue.   HEENT:  Negative for ear pain, nosebleeds, congestion, sore throat and neck pain.    Eyes:  Negative for blurred vision.   Respiratory:  Negative for cough, sputum production, shortness of breath and wheezing.    Cardiovascular:  Negative for chest pain, palpitations, orthopnea and leg swelling.   Gastrointestinal:  Negative for heartburn, nausea, vomiting and abdominal pain.   Genitourinary:  Negative for dysuria, positive urgency and frequency.   Musculoskeletal:  Negative for myalgias, back pain and joint pain.   Skin:  Negative for rash and itching.   Neurological:  Negative for dizziness, tingling, tremors, sensory change, focal weakness and headaches.   Endo/Heme/Allergies:  Does not bruise/bleed easily.   Psychiatric/Behavioral:  Negative for depression, suicidal ideas and memory loss.  The patient is not nervous/anxious and does not have insomnia.    All other systems reviewed and are negative except as in HPI.    Exam:  Blood pressure 122/72, pulse 81, temperature 36.8 °C (98.2 °F), resp. rate 16, height 1.676 m (5' 5.98\"), weight 75.297 kg (166 lb), last menstrual period 1986, SpO2 95 " "%.  General:  Normal appearing. No distress.  Pulmonary:  Clear to ausculation.  Normal effort. No rales, ronchi, or wheezing.  Cardiovascular:  Regular rate and rhythm without murmur. Carotid and radial pulses are intact and equal bilaterally.  Abdomen:  Soft, nontender, nondistended. Normal bowel sounds. Liver and spleen are not palpable  Neurologic:  Grossly nonfocal  Skin:  Warm and dry.  No obvious lesions.  Musculoskeletal:  Normal gait. No extremity cyanosis, clubbing, or edema.  Psych:  Normal mood and affect. Alert and oriented x3. Judgment and insight is normal.  :Perineum and external genitalia normal without rash. Vagina with scant discharge. Hysterectomy scar noted. Bimanual exam without adnexal masses or cervical motion tenderness. Vaginal wall prolapse is noted on exam, worsens with valsalva.      Medical decision-making and discussion: Ce is a generally well-appearing 72-year-old female patient here today to discuss vaginal symptoms. Patient states she has been having vaginal discomfort and feels like something is \"pushing out\" of her vagina. Patient states symptoms have been worsening and that she was in the past told if symptoms worsen she will need to be referred to urology. Urology referral is placed at this time. Patient states that all questions and concerns addressed at this time. Patient is encouraged to be seen in the emergency room for chest pain, palpitations, shortness of breath, dizziness, severe abdominal pain or other concerning symptoms.    Please note that this dictation was created using voice recognition software. I have made every reasonable attempt to correct obvious errors, but I expect that there are errors of grammar and possibly content that I did not discover before finalizing the note.    Assessment/Plan:  1. Acquired female bladder prolapse  REFERRAL TO UROLOGY          I have placed the below orders and discussed them with an approved delegating provider. The MA is " performing the below orders under the direction of Dr. Long.

## 2017-05-19 NOTE — ASSESSMENT & PLAN NOTE
This is a new problem. Patient states that she's been unable to insert her Estring as it is painful. Patient states that she does do self vaginal exam and noticed something pushing down, patient states that she is having to urinate more frequently. Is having issues with waking up at night to urinate. States that she was also having increased vaginal dryness related to inability to use Estring. Patient denies pain, dysuria. Denies issues with defecation.

## 2017-05-30 ENCOUNTER — HOSPITAL ENCOUNTER (OUTPATIENT)
Dept: PAIN MANAGEMENT | Facility: REHABILITATION | Age: 72
End: 2017-05-30
Attending: SPECIALIST
Payer: MEDICARE

## 2017-05-30 ENCOUNTER — HOSPITAL ENCOUNTER (OUTPATIENT)
Dept: RADIOLOGY | Facility: REHABILITATION | Age: 72
End: 2017-05-30
Attending: SPECIALIST
Payer: MEDICARE

## 2017-05-30 VITALS
TEMPERATURE: 97.2 F | HEART RATE: 62 BPM | SYSTOLIC BLOOD PRESSURE: 126 MMHG | BODY MASS INDEX: 26.22 KG/M2 | HEIGHT: 66 IN | OXYGEN SATURATION: 96 % | WEIGHT: 163.14 LBS | DIASTOLIC BLOOD PRESSURE: 55 MMHG | RESPIRATION RATE: 16 BRPM

## 2017-05-30 PROCEDURE — 64492 INJ PARAVERT F JNT C/T 3 LEV: CPT

## 2017-05-30 PROCEDURE — 700111 HCHG RX REV CODE 636 W/ 250 OVERRIDE (IP)

## 2017-05-30 PROCEDURE — 99152 MOD SED SAME PHYS/QHP 5/>YRS: CPT

## 2017-05-30 PROCEDURE — 700117 HCHG RX CONTRAST REV CODE 255

## 2017-05-30 PROCEDURE — 64491 INJ PARAVERT F JNT C/T 2 LEV: CPT

## 2017-05-30 PROCEDURE — 64490 INJ PARAVERT F JNT C/T 1 LEV: CPT

## 2017-05-30 RX ORDER — DEXAMETHASONE SODIUM PHOSPHATE 10 MG/ML
INJECTION, SOLUTION INTRAMUSCULAR; INTRAVENOUS
Status: COMPLETED
Start: 2017-05-30 | End: 2017-05-30

## 2017-05-30 RX ORDER — MIDAZOLAM HYDROCHLORIDE 1 MG/ML
INJECTION INTRAMUSCULAR; INTRAVENOUS
Status: COMPLETED
Start: 2017-05-30 | End: 2017-05-30

## 2017-05-30 RX ORDER — BUPIVACAINE HYDROCHLORIDE 2.5 MG/ML
INJECTION, SOLUTION EPIDURAL; INFILTRATION; INTRACAUDAL
Status: COMPLETED
Start: 2017-05-30 | End: 2017-05-30

## 2017-05-30 RX ADMIN — MIDAZOLAM HYDROCHLORIDE 1 MG: 1 INJECTION, SOLUTION INTRAMUSCULAR; INTRAVENOUS at 09:57

## 2017-05-30 RX ADMIN — BUPIVACAINE HYDROCHLORIDE 4 ML: 2.5 INJECTION, SOLUTION EPIDURAL; INFILTRATION; INTRACAUDAL; PERINEURAL at 10:05

## 2017-05-30 RX ADMIN — IOHEXOL 1 ML: 240 INJECTION, SOLUTION INTRATHECAL; INTRAVASCULAR; INTRAVENOUS; ORAL at 10:04

## 2017-05-30 RX ADMIN — DEXAMETHASONE SODIUM PHOSPHATE 10 MG: 10 INJECTION, SOLUTION INTRAMUSCULAR; INTRAVENOUS at 10:06

## 2017-05-30 RX ADMIN — FENTANYL CITRATE 50 MCG: 50 INJECTION, SOLUTION INTRAMUSCULAR; INTRAVENOUS at 09:57

## 2017-05-30 ASSESSMENT — PAIN SCALES - GENERAL
PAINLEVEL_OUTOF10: 5
PAINLEVEL_OUTOF10: 2

## 2017-05-30 NOTE — PROGRESS NOTES
Medication reconciliation reviewed with patient denied taking   any anti- inflammatories medication.Patient stated that she's  been  off  Motrin 800 mg.   for  2 days . Discharge instruction given to patient and verbalized understanding. Patient  has ride home ( [place] /  /  ). Dr. Gonzales notified.

## 2017-05-30 NOTE — PROGRESS NOTES
Pt position prone by RN and CST.  Arms tucked to the sides.  Pillow placed under feet and lower legs by CST. Right side done not bilateral due to insurance requirements.

## 2017-05-31 NOTE — PROCEDURES
DATE OF SERVICE:  05/31/2017    PROCEDURES:  1.  Right C3 medial branch block.  2.  Right C4 medial branch block.  3.  Right C5 medial branch block.  4.  Right C6 medial branch block.  5.  Fluoroscopy for needle guidance confirmation placement.  6.  IV sedation per patient request.    DIAGNOSES:  1.  Cervical spondylosis with mechanical neck pain and facet syndrome.  2.  Anxiety over procedure, patient requests intravenous sedation.    DESCRIPTION OF PROCEDURE:  After informed consent, patient was placed prone,   monitored and sedated with Versed and fentanyl.  Head was turned slightly to   the left.  Fluoroscopy was utilized to identify the right lateral facet body   concavities of C3, C4, C5 and C6 and neck is prepped with Betadine, sterile   draped and anesthetized.  Under intermittent fluoroscopic guidance and local   anesthesia, a 22-gauge 3.5-inch spinal needle was passed to the posterolateral   facet body concavity and then advanced partially across the lateral aspect of   the facet body starting at C3.  Contrast was injected confirming spread along   the pathway of the medial branch nerve.  I slowly injected 0.5 mL of 0.25%   bupivacaine, 2 mg of dexamethasone, needle was removed.    The right C4 target was approached with the same technique and injected with   the same medication.  The needle was removed.    The right C5 target was approached with the same technique, injected with the   same medication.  The needle was removed.    The right C6 target was approached with the same technique, injected with the   same medication.  The needle was removed.    She tolerated this well and discharged with a pain diary.    PLAN:  Return to treat the left side.  Offer radiofrequency ablation if   appropriate.       ____________________________________     MD TEZ CAMPOS / CHARLIE    DD:  05/30/2017 10:15:27  DT:  05/30/2017 23:06:20    D#:  [id number]  Job#:  [id number]    cc: [facility]

## 2017-06-01 ENCOUNTER — TELEPHONE (OUTPATIENT)
Dept: MEDICAL GROUP | Facility: PHYSICIAN GROUP | Age: 72
End: 2017-06-01

## 2017-06-01 NOTE — TELEPHONE ENCOUNTER
Left message for patient to call back regarding pre-visit planning. Please transfer call to Gogo @ [phone]

## 2017-06-05 ENCOUNTER — HOSPITAL ENCOUNTER (OUTPATIENT)
Dept: RADIOLOGY | Facility: MEDICAL CENTER | Age: 72
End: 2017-06-05
Attending: FAMILY MEDICINE
Payer: MEDICARE

## 2017-06-05 DIAGNOSIS — Z78.0 POSTMENOPAUSAL: ICD-10-CM

## 2017-06-05 DIAGNOSIS — Z12.31 VISIT FOR SCREENING MAMMOGRAM: ICD-10-CM

## 2017-06-05 DIAGNOSIS — Z12.31 ENCOUNTER FOR SCREENING MAMMOGRAM FOR BREAST CANCER: ICD-10-CM

## 2017-06-05 DIAGNOSIS — M85.80 OSTEOPENIA: ICD-10-CM

## 2017-06-05 PROCEDURE — 77063 BREAST TOMOSYNTHESIS BI: CPT

## 2017-06-05 PROCEDURE — 77080 DXA BONE DENSITY AXIAL: CPT

## 2017-06-09 ENCOUNTER — HOSPITAL ENCOUNTER (OUTPATIENT)
Dept: LAB | Facility: MEDICAL CENTER | Age: 72
End: 2017-06-09
Attending: FAMILY MEDICINE
Payer: MEDICARE

## 2017-06-09 DIAGNOSIS — G89.29 CHRONIC BILATERAL LOW BACK PAIN WITH BILATERAL SCIATICA: ICD-10-CM

## 2017-06-09 DIAGNOSIS — M85.80 OSTEOPENIA: ICD-10-CM

## 2017-06-09 DIAGNOSIS — M54.2 CHRONIC NECK PAIN: ICD-10-CM

## 2017-06-09 DIAGNOSIS — M54.42 CHRONIC BILATERAL LOW BACK PAIN WITH BILATERAL SCIATICA: ICD-10-CM

## 2017-06-09 DIAGNOSIS — I10 ESSENTIAL HYPERTENSION: ICD-10-CM

## 2017-06-09 DIAGNOSIS — E78.5 DYSLIPIDEMIA: ICD-10-CM

## 2017-06-09 DIAGNOSIS — Z78.0 POSTMENOPAUSAL: ICD-10-CM

## 2017-06-09 DIAGNOSIS — G89.29 CHRONIC NECK PAIN: ICD-10-CM

## 2017-06-09 DIAGNOSIS — Z11.59 NEED FOR HEPATITIS C SCREENING TEST: ICD-10-CM

## 2017-06-09 DIAGNOSIS — M54.41 CHRONIC BILATERAL LOW BACK PAIN WITH BILATERAL SCIATICA: ICD-10-CM

## 2017-06-09 DIAGNOSIS — R73.01 IMPAIRED FASTING BLOOD SUGAR: ICD-10-CM

## 2017-06-09 DIAGNOSIS — K80.20 GALLSTONES: ICD-10-CM

## 2017-06-09 LAB
25(OH)D3 SERPL-MCNC: 31 NG/ML (ref 30–100)
ALBUMIN SERPL BCP-MCNC: 3.6 G/DL (ref 3.2–4.9)
ALBUMIN/GLOB SERPL: 1.2 G/DL
ALP SERPL-CCNC: 60 U/L (ref 30–99)
ALT SERPL-CCNC: 12 U/L (ref 2–50)
ANION GAP SERPL CALC-SCNC: 8 MMOL/L (ref 0–11.9)
AST SERPL-CCNC: 13 U/L (ref 12–45)
BILIRUB SERPL-MCNC: 0.3 MG/DL (ref 0.1–1.5)
BUN SERPL-MCNC: 21 MG/DL (ref 8–22)
CALCIUM SERPL-MCNC: 10.2 MG/DL (ref 8.5–10.5)
CHLORIDE SERPL-SCNC: 102 MMOL/L (ref 96–112)
CHOLEST SERPL-MCNC: 125 MG/DL (ref 100–199)
CO2 SERPL-SCNC: 31 MMOL/L (ref 20–33)
CREAT SERPL-MCNC: 0.88 MG/DL (ref 0.5–1.4)
EST. AVERAGE GLUCOSE BLD GHB EST-MCNC: 117 MG/DL
GFR SERPL CREATININE-BSD FRML MDRD: >60 ML/MIN/1.73 M 2
GLOBULIN SER CALC-MCNC: 3 G/DL (ref 1.9–3.5)
GLUCOSE SERPL-MCNC: 81 MG/DL (ref 65–99)
HBA1C MFR BLD: 5.7 % (ref 0–5.6)
HCV AB SER QL: NEGATIVE
HDLC SERPL-MCNC: 41 MG/DL
LDLC SERPL CALC-MCNC: 48 MG/DL
POTASSIUM SERPL-SCNC: 3.8 MMOL/L (ref 3.6–5.5)
PROT SERPL-MCNC: 6.6 G/DL (ref 6–8.2)
SODIUM SERPL-SCNC: 141 MMOL/L (ref 135–145)
TRIGL SERPL-MCNC: 178 MG/DL (ref 0–149)

## 2017-06-09 PROCEDURE — 80061 LIPID PANEL: CPT

## 2017-06-09 PROCEDURE — 80053 COMPREHEN METABOLIC PANEL: CPT

## 2017-06-09 PROCEDURE — 86803 HEPATITIS C AB TEST: CPT

## 2017-06-09 PROCEDURE — 83036 HEMOGLOBIN GLYCOSYLATED A1C: CPT

## 2017-06-09 PROCEDURE — 36415 COLL VENOUS BLD VENIPUNCTURE: CPT

## 2017-06-09 PROCEDURE — 82306 VITAMIN D 25 HYDROXY: CPT

## 2017-06-12 ENCOUNTER — OFFICE VISIT (OUTPATIENT)
Dept: MEDICAL GROUP | Facility: PHYSICIAN GROUP | Age: 72
End: 2017-06-12
Payer: MEDICARE

## 2017-06-12 VITALS
TEMPERATURE: 98.7 F | DIASTOLIC BLOOD PRESSURE: 61 MMHG | WEIGHT: 167.11 LBS | BODY MASS INDEX: 27.84 KG/M2 | OXYGEN SATURATION: 97 % | HEART RATE: 62 BPM | SYSTOLIC BLOOD PRESSURE: 112 MMHG | HEIGHT: 65 IN | RESPIRATION RATE: 16 BRPM

## 2017-06-12 DIAGNOSIS — E78.5 DYSLIPIDEMIA: ICD-10-CM

## 2017-06-12 DIAGNOSIS — Z00.00 MEDICARE ANNUAL WELLNESS VISIT, SUBSEQUENT: ICD-10-CM

## 2017-06-12 DIAGNOSIS — N81.10 FEMALE BLADDER PROLAPSE: ICD-10-CM

## 2017-06-12 DIAGNOSIS — R73.01 IMPAIRED FASTING BLOOD SUGAR: ICD-10-CM

## 2017-06-12 DIAGNOSIS — M85.89 OSTEOPENIA OF MULTIPLE SITES: ICD-10-CM

## 2017-06-12 DIAGNOSIS — M54.2 CHRONIC NECK PAIN: ICD-10-CM

## 2017-06-12 DIAGNOSIS — M54.42 CHRONIC BILATERAL LOW BACK PAIN WITH BILATERAL SCIATICA: ICD-10-CM

## 2017-06-12 DIAGNOSIS — M54.41 CHRONIC BILATERAL LOW BACK PAIN WITH BILATERAL SCIATICA: ICD-10-CM

## 2017-06-12 DIAGNOSIS — G89.29 CHRONIC NECK PAIN: ICD-10-CM

## 2017-06-12 DIAGNOSIS — G89.29 CHRONIC BILATERAL LOW BACK PAIN WITH BILATERAL SCIATICA: ICD-10-CM

## 2017-06-12 DIAGNOSIS — I10 ESSENTIAL HYPERTENSION: ICD-10-CM

## 2017-06-12 PROCEDURE — G0439 PPPS, SUBSEQ VISIT: HCPCS | Performed by: FAMILY MEDICINE

## 2017-06-12 ASSESSMENT — PATIENT HEALTH QUESTIONNAIRE - PHQ9: CLINICAL INTERPRETATION OF PHQ2 SCORE: 0

## 2017-06-12 ASSESSMENT — PAIN SCALES - GENERAL: PAINLEVEL: 3=SLIGHT PAIN

## 2017-06-12 NOTE — MR AVS SNAPSHOT
"        Larissa Ramirez   2017 1:00 PM   Office Visit   MRN: [id number]    Department:  [facility]   Dept Phone:  [phone]    Description:  Female : 1945   Provider:  Kaitlyn Long M.D.; [facility]            Reason for Visit     Annual Wellness Visit           Allergies as of 2017     Allergen Noted Reactions    Sulfa Drugs 2013   Nausea    Amitriptyline 2017   Swelling    Facial swelling     Levofloxacin 2014   Swelling    Swelling of tongue and neck    Lyrica 2013   Rash    Nitrofurantoin 2015       Swelling of lips tongue      You were diagnosed with     Medicare annual wellness visit, subsequent   [998381]       Essential hypertension   [3744427]       Impaired fasting blood sugar   [209879]       Dyslipidemia   [361222]       Osteopenia of multiple sites   [7411910]       Chronic neck pain   [155263]       Chronic bilateral low back pain with bilateral sciatica   [6293422]       Female bladder prolapse   [698493]         Vital Signs     Blood Pressure Pulse Temperature Respirations Height Weight    112/61 mmHg 62 37.1 °C (98.7 °F) 16 1.651 m (5' 5\") 75.8 kg (167 lb 1.7 oz)    Body Mass Index Oxygen Saturation Last Menstrual Period Smoking Status          27.81 kg/m2 97% 1986 Never Smoker         Basic Information     Date Of Birth Sex Race Ethnicity Preferred Language    1945 Female White Non- English      Your appointments     Dec 18, 2017  1:00 PM   Established Patient with Kaitlyn Long M.D.   [facility] (--)    [address]  [address]  [place] [address] [phone]           You will be receiving a confirmation call a few days before your appointment from our automated call confirmation system.              Problem List              ICD-10-CM Priority Class Noted - Resolved    Hypertension goal BP (blood pressure) < 140/80 (Chronic) I10   2012 - Present    Prediabetes R73.03   2014 - Present "    Vitamin D deficiency disease E55.9   2/25/2014 - Present    Chronic sciatica of right side M54.31   10/13/2014 - Present    DDD (degenerative disc disease), lumbar M51.36   10/13/2014 - Present    Chronic shoulder pain M25.519, G89.29   10/13/2014 - Present    DDD (degenerative disc disease), cervical M50.30   1/30/2017 - Present    Atrophic vaginitis N95.2   1/30/2017 - Present    Acquired female bladder prolapse N81.10   5/19/2017 - Present    Dyslipidemia E78.5   6/12/2017 - Present    Impaired fasting blood sugar R73.01   6/12/2017 - Present    Chronic bilateral low back pain with bilateral sciatica M54.42, M54.41, G89.29   6/12/2017 - Present    Chronic neck pain M54.2, G89.29   6/12/2017 - Present      Health Maintenance        Date Due Completion Dates    IMM PNEUMOCOCCAL 65+ (ADULT) LOW/MEDIUM RISK SERIES (1 of 2 - PCV13) 4/17/2018 (Originally 3/5/2011) ---    IMM DTaP/Tdap/Td Vaccine (1 - Tdap) 4/17/2018 (Originally 3/5/1965) ---    IMM ZOSTER VACCINE 4/17/2018 (Originally 3/5/2006) ---    COLONOSCOPY 9/20/2017 9/20/2012    MAMMOGRAM 6/5/2018 6/5/2017, 3/23/2016, 2/24/2015, 2/19/2015, 2/18/2014, 1/14/2013    BONE DENSITY 6/5/2022 6/5/2017, 2/18/2014            Current Immunizations     No immunizations on file.      Below and/or attached are the medications your provider expects you to take. Review all of your home medications and newly ordered medications with your provider and/or pharmacist. Follow medication instructions as directed by your provider and/or pharmacist. Please keep your medication list with you and share with your provider. Update the information when medications are discontinued, doses are changed, or new medications (including over-the-counter products) are added; and carry medication information at all times in the event of emergency situations     Allergies:  SULFA DRUGS - Nausea     AMITRIPTYLINE - Swelling     LEVOFLOXACIN - Swelling     LYRICA - Rash     NITROFURANTOIN -  (reactions not documented)               Medications  Valid as of: June 12, 2017 -  2:09 PM    Generic Name Brand Name Tablet Size Instructions for use    Cholecalciferol (Tab) cholecalciferol 1000 UNIT Take 1,000 Units by mouth every day.        DiazePAM (Tab) VALIUM 2 MG Take 2 mg by mouth every 6 hours as needed for Sleep. Take two to three tabs by mouth at bedtime as needed        Diclofenac Sodium   Apply  to skin as directed.        Estradiol (RING) ESTRING 2 MG Insert vaginally, leave in place for 3 months and remove        Hydrocodone-Acetaminophen (Tab) NORCO  MG Take 1 Tab by mouth every 8 hours as needed.        Ibuprofen (Tab) MOTRIN 800 MG Take 800 mg by mouth every 8 hours as needed.        Lisinopril-Hydrochlorothiazide (Tab) PRINZIDE, ZESTORETIC 20-25 MG Take 1 Tab by mouth every day. TAKE 1 TAB BY MOUTH EVERY DAY.        Multiple Vitamins-Iron   Take 1 Tab by mouth every day.        TraMADol HCl (Tab) ULTRAM 50 MG Take  mg by mouth every 6 hours as needed.        .                 Medicines prescribed today were sent to:     [facility]Y #[id number] - [place] - [address] [place] [address]    Phone: [phone] Fax: [phone]    Open 24 Hours?: No    [facility]ARM[facility]Y #[id number] - [place] - [address] [address]    Phone: [phone] Fax: [phone]    Open 24 Hours?: No      Medication refill instructions:       If your prescription bottle indicates you have medication refills left, it is not necessary to call your provider’s office. Please contact your pharmacy and they will refill your medication.    If your prescription bottle indicates you do not have any refills left, you may request refills at any time through one of the following ways: The online [employer] system (except Urgent Care), by calling your provider’s office, or by asking your pharmacy to contact your provider’s office with a refill request. Medication refills are processed only during  regular business hours and may not be available until the next business day. Your provider may request additional information or to have a follow-up visit with you prior to refilling your medication.   *Please Note: Medication refills are assigned a new Rx number when refilled electronically. Your pharmacy may indicate that no refills were authorized even though a new prescription for the same medication is available at the pharmacy. Please request the medicine by name with the pharmacy before contacting your provider for a refill.           [employer] Access Code: Activation code not generated  Current [employer] Status: Active

## 2017-06-12 NOTE — PROGRESS NOTES
Chief Complaint   Patient presents with   • Annual Wellness Visit         HPI:  Larissa Ramirez is a 72 y.o. female here for Medicare Annual Wellness Visit      Patient Active Problem List    Diagnosis Date Noted   • Acquired female bladder prolapse 05/19/2017   • DDD (degenerative disc disease), cervical 01/30/2017   • Atrophic vaginitis 01/30/2017   • Chronic sciatica of right side 10/13/2014   • DDD (degenerative disc disease), lumbar 10/13/2014   • Chronic shoulder pain 10/13/2014   • Vitamin D deficiency disease 02/25/2014   • Prediabetes 01/22/2014   • Hypertension goal BP (blood pressure) < 140/80 04/20/2012       Current Outpatient Prescriptions   Medication Sig Dispense Refill   • DICLOFENAC SODIUM TD Apply  to skin as directed.     • ibuprofen (MOTRIN) 800 MG Tab Take 800 mg by mouth every 8 hours as needed.     • lisinopril-hydrochlorothiazide (PRINZIDE, ZESTORETIC) 20-25 MG per tablet Take 1 Tab by mouth every day. TAKE 1 TAB BY MOUTH EVERY DAY. 90 Tab 2   • hydrocodone/acetaminophen (NORCO)  MG Tab Take 1 Tab by mouth every 8 hours as needed. 90 Tab 0   • vitamin D (CHOLECALCIFEROL) 1000 UNIT TABS Take 1,000 Units by mouth every day.     • diazepam (VALIUM) 2 MG TABS Take 2 mg by mouth every 6 hours as needed for Sleep. Take two to three tabs by mouth at bedtime as needed     • tramadol (ULTRAM) 50 MG TABS Take  mg by mouth every 6 hours as needed.     • estradiol (ESTRING) 2 MG vaginal ring Insert vaginally, leave in place for 3 months and remove 1 Each 3   • Multiple Vitamins-Iron (MULTIVITAMIN/IRON PO) Take 1 Tab by mouth every day.       No current facility-administered medications for this visit.        Patient is taking medications as noted in medication list.  Current supplements as per medication list.   Chronic narcotic pain medicines: yes    Allergies: Sulfa drugs; Amitriptyline; Levofloxacin; Lyrica; and Nitrofurantoin    Current social contact/activities: out to dinner with friends      Is patient current with immunizations?  No, due for Pt declines all vaccines. Patient is interested in receiving NONE today.     She  reports that she has never smoked. She has never used smokeless tobacco. She reports that she drinks alcohol. She reports that she does not use illicit drugs.  Counseling given: Not Answered        DPA/Advanced Directive:  Patient does not have an Advanced Directive.  A packet and workshop information was given on Advanced Directives.    ROS:    Gait: Uses no assistive device   Ostomy: no  Other tubes: no   Amputations: no   Chronic oxygen use: no   Last eye exam: 2012   Wears hearing aids: no   : Reports incontinence.       Screening:        Little interest or pleasure in doing things?  0 - not at all  Feeling down, depressed, or hopeless?  0 - not at all  Patient Health Questionnaire Score: 0  If depressive symptoms identified deferred to follow up visit unless specifically addressed in assessment and plan.    Interpretation of PHQ-9 Total Score   Score Severity   1-4 Minimal Depression   5-9 Mild Depression   10-14 Moderate Depression   15-19 Moderately Severe Depression   20-27 Severe Depression    Screening for Cognitive Impairment    Three Minute Recall (banana, sunrise, fence)  3/3    Draw clock face with all 12 numbers set to the hand to show 10 minutes past 11 o'clock  1 4/5    If cognitive concerns identified deferred to follow up visit unless specifically addressed in assessment and plan.    Fall Risk Assessment    Has the patient had two or more falls in the last year or any fall with injury in the last year?  No  If Fall Risk identified deferred to follow up visit unless specifically addressed in assessment and plan.    Safety Assessment    Throw rugs on floor.  No  Handrails on all stairs.  Yes  Good lighting in all hallways.  Yes  Difficulty hearing.  No  Patient counseled about all safety risks that were identified.    Functional Assessment ADLs    Are there any  barriers preventing you from cooking for yourself or meeting nutritional needs?  No.    Are there any barriers preventing you from driving safely or obtaining transportation?  No.    Are there any barriers preventing you from using a telephone or calling for help?  No.    Are there any barriers preventing you from shopping?  No.    Are there any barriers preventing you from taking care of your own finances?  No.    Are there any barriers preventing you from managing your medications?  No.    Are currently engaging any exercise or physical activity?  Yes.  Walk 1.5 miles a day    Health Maintenance Summary                Annual Wellness Visit Overdue 3/17/2017      Done 3/16/2016 Visit Dx: Medicare annual wellness visit, subsequent    IMM PNEUMOCOCCAL 65+ (ADULT) LOW/MEDIUM RISK SERIES Postponed 4/17/2018 Originally 3/5/2011. Patient Refused    IMM DTaP/Tdap/Td Vaccine Postponed 4/17/2018 Originally 3/5/1965. Patient Refused    IMM ZOSTER VACCINE Postponed 4/17/2018 Originally 3/5/2006. Patient Refused    COLONOSCOPY Next Due 9/20/2017      Done 9/20/2012 REFERRAL TO GI FOR COLONOSCOPY    MAMMOGRAM Next Due 6/5/2018      Done 6/5/2017 MA-MAMMO SCREENING BILAT W/TOMOSYNTHESIS W/CAD     Patient has more history with this topic...    BONE DENSITY Next Due 6/5/2022      Done 6/5/2017 DS-BONE DENSITY STUDY (DEXA)     Patient has more history with this topic...          Patient Care Team:  Kaitlyn Long M.D. as PCP - General (Family Medicine)  Alhaji Nair PA-C as Consulting Physician (Surgery)  Safia Mobley M.D. as Consulting Physician (Neurosurgery)  Will Naqvi O.D. as Consulting Physician (Optometry)  Sugey Florentino M.D. (OB/Gyn)  CORNELIA Lujan (Family Medicine)  Jose Gonzales M.D. (Anesthesia)    Social History   Substance Use Topics   • Smoking status: Never Smoker    • Smokeless tobacco: Never Used   • Alcohol Use: 0.0 oz/week     0 Standard drinks or equivalent per week       "Comment: 1 per  2-3 months     No family history on file.  She  has a past medical history of Other specified symptom associated with female genital organs; Dental disorder; Bronchitis (2005); Hypertension (2015); Pre-diabetes; Chronic neck pain; and Chronic low back pain. She also has no past medical history of Breast cancer (CMS-HCC).   Past Surgical History   Procedure Laterality Date   • Other neurological surg  2006     neck cage/fusion   • Hysterectomy robotic xi  7/9/2015     Procedure: HYSTERECTOMY ROBOTIC XI W/ BILATERAL SALPINGO-OOPHORECTOMY, MCCALLS PROCEDURES;  Surgeon: Brian Parks M.D.;  Location[facility] SURGERY [facility];  Service:            Exam:     Blood pressure 112/61, pulse 62, temperature 37.1 °C (98.7 °F), resp. rate 16, height 1.651 m (5' 5\"), weight 75.8 kg (167 lb 1.7 oz), last menstrual period 06/27/1986, SpO2 97 %. Body mass index is 27.81 kg/(m^2).    Hearing good.    Dentition good  Alert, oriented in no acute distress.  Eye contact is good, speech goal directed, affect calm  Neck-supple, no lymphadenopathy, no thyromegaly  Lungs-clear to auscultation, no rales, no wheezes  Heart-regular rate and rhythm, no murmur  Extremities-no edema, clubbing, cyanosis    Hospital Outpatient Visit on 06/09/2017   Component Date Value   • Glycohemoglobin 06/09/2017 5.7*   • Est Avg Glucose 06/09/2017 117    • Cholesterol,Tot 06/09/2017 125    • Triglycerides 06/09/2017 178*   • HDL 06/09/2017 41    • LDL 06/09/2017 48    • Sodium 06/09/2017 141    • Potassium 06/09/2017 3.8    • Chloride 06/09/2017 102    • Co2 06/09/2017 31    • Anion Gap 06/09/2017 8.0    • Glucose 06/09/2017 81    • Bun 06/09/2017 21    • Creatinine 06/09/2017 0.88    • Calcium 06/09/2017 10.2    • AST(SGOT) 06/09/2017 13    • ALT(SGPT) 06/09/2017 12    • Alkaline Phosphatase 06/09/2017 60    • Total Bilirubin 06/09/2017 0.3    • Albumin 06/09/2017 3.6    • Total Protein 06/09/2017 6.6    • Globulin 06/09/2017 3.0    • A-G Ratio " 06/09/2017 1.2    • 25-Hydroxy   Vitamin D 25 06/09/2017 31    • Hepatitis C Antibody 06/09/2017 Negative    • GFR If  06/09/2017 >60    • GFR If Non  Ameri* 06/09/2017 >60        Assessment and Plan. The following treatment and monitoring plan is recommended:      1. Medicare annual wellness visit, subsequent  Up-to-date with colonoscopy. She will be due for another colonoscopy in September of this year due to one adenomatous polyp when she had colonoscopy 5 years ago. Up-to-date with mammogram and bone density scan. Patient refuses to get all vaccinations.    2. Essential hypertension  Controlled on her medication.    3. Impaired fasting blood sugar  Blood sugar is normal but she has increased risk for diabetes. She will continue to avoid sweets and decrease the carbohydrates.    4. Dyslipidemia  All at target except slightly elevated triglycerides. Advised to watch the carbohydrates and to avoid sweets.    5. Osteopenia of multiple sites  Continue calcium and vitamin D supplementation. Vitamin D level is normal.    6. Chronic neck pain  She is followed by her pain specialist Dr. Garcia. She is on narcotic pain medication prescribed by her pain specialist. She takes Valium at night for muscle spasms. She will contact her surgeon Dr. Mobley when she is ready to have another surgery.    7. Chronic bilateral low back pain with bilateral sciatica  She is followed by her pain specialist Dr. Garcia. She is on narcotic pain medication. She is also on Valium to help with muscle spasm.    8. Female bladder prolapse  She has an appointment with the urologist next month for evaluation and treatment.      Services suggested: No services needed at this time  Health Care Screening recommendations as per orders if indicated.  Referrals offered: PT/OT/Nutrition counseling/Behavioral Health/Smoking cessation as per orders if indicated.    Discussion today about general wellness and lifestyle habits:     · Prevent falls and reduce trip hazards; Cautioned about securing or removing rugs.  · Have a working fire alarm and carbon monoxide detector;   · Engage in regular physical activity and social activities       Follow-up: Follow-up in 6 months.      Please note that this dictation was created using voice recognition software. I have worked with consultants from the vendor as well as technical experts from [facility] to optimize the interface. I have made every reasonable attempt to correct obvious errors, but I expect that there are errors of grammar and possibly content I did not discover before finalizing the note.

## 2017-06-20 ENCOUNTER — HOSPITAL ENCOUNTER (OUTPATIENT)
Dept: RADIOLOGY | Facility: REHABILITATION | Age: 72
End: 2017-06-20
Attending: SPECIALIST
Payer: MEDICARE

## 2017-06-20 ENCOUNTER — HOSPITAL ENCOUNTER (OUTPATIENT)
Dept: PAIN MANAGEMENT | Facility: REHABILITATION | Age: 72
End: 2017-06-20
Attending: SPECIALIST
Payer: MEDICARE

## 2017-06-20 VITALS
TEMPERATURE: 97.4 F | BODY MASS INDEX: 26.57 KG/M2 | DIASTOLIC BLOOD PRESSURE: 63 MMHG | WEIGHT: 165.34 LBS | HEIGHT: 66 IN | RESPIRATION RATE: 17 BRPM | OXYGEN SATURATION: 96 % | SYSTOLIC BLOOD PRESSURE: 135 MMHG | HEART RATE: 59 BPM

## 2017-06-20 PROCEDURE — 64492 INJ PARAVERT F JNT C/T 3 LEV: CPT

## 2017-06-20 PROCEDURE — 99152 MOD SED SAME PHYS/QHP 5/>YRS: CPT

## 2017-06-20 PROCEDURE — 64491 INJ PARAVERT F JNT C/T 2 LEV: CPT

## 2017-06-20 PROCEDURE — 64490 INJ PARAVERT F JNT C/T 1 LEV: CPT

## 2017-06-20 PROCEDURE — 700111 HCHG RX REV CODE 636 W/ 250 OVERRIDE (IP)

## 2017-06-20 RX ORDER — BUPIVACAINE HYDROCHLORIDE 2.5 MG/ML
INJECTION, SOLUTION EPIDURAL; INFILTRATION; INTRACAUDAL
Status: COMPLETED
Start: 2017-06-20 | End: 2017-06-20

## 2017-06-20 RX ORDER — DEXAMETHASONE SODIUM PHOSPHATE 10 MG/ML
INJECTION, SOLUTION INTRAMUSCULAR; INTRAVENOUS
Status: COMPLETED
Start: 2017-06-20 | End: 2017-06-20

## 2017-06-20 RX ORDER — MIDAZOLAM HYDROCHLORIDE 1 MG/ML
INJECTION INTRAMUSCULAR; INTRAVENOUS
Status: COMPLETED
Start: 2017-06-20 | End: 2017-06-20

## 2017-06-20 RX ORDER — LIDOCAINE HYDROCHLORIDE 20 MG/ML
INJECTION, SOLUTION EPIDURAL; INFILTRATION; INTRACAUDAL; PERINEURAL
Status: COMPLETED
Start: 2017-06-20 | End: 2017-06-20

## 2017-06-20 RX ADMIN — MIDAZOLAM HYDROCHLORIDE 1 MG: 1 INJECTION, SOLUTION INTRAMUSCULAR; INTRAVENOUS at 10:12

## 2017-06-20 RX ADMIN — DEXAMETHASONE SODIUM PHOSPHATE 10 MG: 10 INJECTION, SOLUTION INTRAMUSCULAR; INTRAVENOUS at 10:17

## 2017-06-20 RX ADMIN — FENTANYL CITRATE 50 MCG: 50 INJECTION, SOLUTION INTRAMUSCULAR; INTRAVENOUS at 10:12

## 2017-06-20 RX ADMIN — BUPIVACAINE HYDROCHLORIDE 4 ML: 2.5 INJECTION, SOLUTION EPIDURAL; INFILTRATION; INTRACAUDAL; PERINEURAL at 10:17

## 2017-06-20 ASSESSMENT — PAIN SCALES - GENERAL
PAINLEVEL_OUTOF10: 2
PAINLEVEL_OUTOF10: 2
PAINLEVEL_OUTOF10: 4

## 2017-06-20 NOTE — PROGRESS NOTES
Current meds. See medication reconciliation form. Reviewed with pt. Pt denies taking ASA,other blood thinners or anti-inflammatories. Pt has a ride post-procedure (, [facility]ill[facility] is ). Printed and verbal discharge instructions given to pt who verbalized understanding.t took tramadol and hydrocodone this AM. Dr. Gonzales made aware pre-procedure.

## 2017-06-20 NOTE — PROGRESS NOTES
Pt position prone by CST, RN and X-ray tech.  Arms right tucked to the side, left resting on head of table.  Pillow placed under feet and lower legs by CST.

## 2017-06-20 NOTE — PROGRESS NOTES
Patient had coffee and italo crackers tolerated well. Cold compress applied to the affected area. Printed home care instruction reviewed with patient and verbalized understanding.

## 2017-06-21 NOTE — PROCEDURES
DATE OF SERVICE:  06/20/2017    DATE OF PROCEDURE:  06/20/2017    PROCEDURES:  1.  Left C3 medial branch block.  2.  Left C4 medial branch block.  3.  Left C5 medial branch block.  4.  Left C6 medial branch block.  5.  Fluoroscopy for needle guidance confirmation of placement.  6.  IV sedation per patient request.    DIAGNOSES:  1.  Cervical spondylosis with facet syndrome.  2.  Anxiety over procedure, patient requests intravenous sedation.    DESCRIPTION OF PROCEDURE:  After informed consent with the patient prone, she   is monitored and sedated with Versed and fentanyl.  Fluoroscopy was utilized   to identify the posterolateral border of the C3, C4, C5, and C6 facet body   concavity.  Neck is prepped with Betadine, sterile draped and anesthetized   under intermittent fluoroscopic guidance and local anesthesia, a 22-guage   spinal needle was passed all along the posterolateral facet body concavity of   C3 and advanced a few millimeters.  There was negative aspiration for heme.  I   injected 0.5 mL of 0.25% bupivacaine and 2 mg of dexamethasone, needle was   removed.    The left C4 target was approached with the same technique and injected with   the same medication.  The needle was removed.    The left C5 target was approached with the same technique, injected with the   same medication.  The needle was removed.    The left C6 target was approached with the same technique, injected with the   same medication.  The needle was removed.    She tolerated this well.    PLAN:  Follow up for ongoing pain management needs.  She will be discharged   with a pain diary to document pain relief today to screen for radiofrequency   ablation.       ____________________________________     MD TEZ CAMPOS / CHARLIE    DD:  06/20/2017 10:24:45  DT:  06/20/2017 22:21:58    D#:  [id number]  Job#:  [id number]    cc: [facility]

## 2017-08-22 ENCOUNTER — HOSPITAL ENCOUNTER (OUTPATIENT)
Dept: PAIN MANAGEMENT | Facility: REHABILITATION | Age: 72
End: 2017-08-22
Attending: SPECIALIST
Payer: MEDICARE

## 2017-08-22 ENCOUNTER — HOSPITAL ENCOUNTER (OUTPATIENT)
Dept: RADIOLOGY | Facility: REHABILITATION | Age: 72
End: 2017-08-22
Attending: SPECIALIST
Payer: MEDICARE

## 2017-08-22 VITALS
WEIGHT: 166.01 LBS | BODY MASS INDEX: 26.68 KG/M2 | SYSTOLIC BLOOD PRESSURE: 124 MMHG | HEIGHT: 66 IN | HEART RATE: 57 BPM | DIASTOLIC BLOOD PRESSURE: 51 MMHG | TEMPERATURE: 97 F | RESPIRATION RATE: 16 BRPM | OXYGEN SATURATION: 96 %

## 2017-08-22 PROCEDURE — 64491 INJ PARAVERT F JNT C/T 2 LEV: CPT

## 2017-08-22 PROCEDURE — 700117 HCHG RX CONTRAST REV CODE 255

## 2017-08-22 PROCEDURE — 64492 INJ PARAVERT F JNT C/T 3 LEV: CPT

## 2017-08-22 PROCEDURE — 700111 HCHG RX REV CODE 636 W/ 250 OVERRIDE (IP)

## 2017-08-22 PROCEDURE — 64490 INJ PARAVERT F JNT C/T 1 LEV: CPT

## 2017-08-22 PROCEDURE — 99152 MOD SED SAME PHYS/QHP 5/>YRS: CPT

## 2017-08-22 RX ORDER — MIDAZOLAM HYDROCHLORIDE 1 MG/ML
INJECTION INTRAMUSCULAR; INTRAVENOUS
Status: COMPLETED
Start: 2017-08-22 | End: 2017-08-22

## 2017-08-22 RX ORDER — DEXAMETHASONE SODIUM PHOSPHATE 10 MG/ML
INJECTION, SOLUTION INTRAMUSCULAR; INTRAVENOUS
Status: COMPLETED
Start: 2017-08-22 | End: 2017-08-22

## 2017-08-22 RX ORDER — BUPIVACAINE HYDROCHLORIDE 2.5 MG/ML
INJECTION, SOLUTION EPIDURAL; INFILTRATION; INTRACAUDAL
Status: COMPLETED
Start: 2017-08-22 | End: 2017-08-22

## 2017-08-22 RX ADMIN — MIDAZOLAM HYDROCHLORIDE 1 MG: 1 INJECTION, SOLUTION INTRAMUSCULAR; INTRAVENOUS at 09:13

## 2017-08-22 RX ADMIN — IOHEXOL 3 ML: 240 INJECTION, SOLUTION INTRATHECAL; INTRAVASCULAR; INTRAVENOUS; ORAL at 09:20

## 2017-08-22 RX ADMIN — FENTANYL CITRATE 50 MCG: 50 INJECTION, SOLUTION INTRAMUSCULAR; INTRAVENOUS at 09:14

## 2017-08-22 RX ADMIN — BUPIVACAINE HYDROCHLORIDE 4 ML: 2.5 INJECTION, SOLUTION EPIDURAL; INFILTRATION; INTRACAUDAL; PERINEURAL at 09:21

## 2017-08-22 RX ADMIN — DEXAMETHASONE SODIUM PHOSPHATE 10 MG: 10 INJECTION, SOLUTION INTRAMUSCULAR; INTRAVENOUS at 09:21

## 2017-08-22 NOTE — PROGRESS NOTES
Patient positioned pre-procedure by RN,CSTs and xray tech. Pillow placed under lower legs and feet for support.

## 2017-10-03 ENCOUNTER — HOSPITAL ENCOUNTER (OUTPATIENT)
Dept: PAIN MANAGEMENT | Facility: REHABILITATION | Age: 72
End: 2017-10-03
Attending: SPECIALIST
Payer: MEDICARE

## 2017-10-03 ENCOUNTER — HOSPITAL ENCOUNTER (OUTPATIENT)
Dept: RADIOLOGY | Facility: REHABILITATION | Age: 72
End: 2017-10-03
Attending: SPECIALIST

## 2017-10-03 VITALS
DIASTOLIC BLOOD PRESSURE: 47 MMHG | SYSTOLIC BLOOD PRESSURE: 119 MMHG | HEART RATE: 62 BPM | TEMPERATURE: 96.9 F | RESPIRATION RATE: 16 BRPM | HEIGHT: 66 IN | WEIGHT: 166.89 LBS | OXYGEN SATURATION: 94 % | BODY MASS INDEX: 26.82 KG/M2

## 2017-10-03 PROCEDURE — 64492 INJ PARAVERT F JNT C/T 3 LEV: CPT

## 2017-10-03 PROCEDURE — 700101 HCHG RX REV CODE 250

## 2017-10-03 PROCEDURE — 700111 HCHG RX REV CODE 636 W/ 250 OVERRIDE (IP)

## 2017-10-03 PROCEDURE — 64491 INJ PARAVERT F JNT C/T 2 LEV: CPT

## 2017-10-03 PROCEDURE — 700117 HCHG RX CONTRAST REV CODE 255

## 2017-10-03 PROCEDURE — 64490 INJ PARAVERT F JNT C/T 1 LEV: CPT

## 2017-10-03 PROCEDURE — 99152 MOD SED SAME PHYS/QHP 5/>YRS: CPT

## 2017-10-03 RX ORDER — MIDAZOLAM HYDROCHLORIDE 1 MG/ML
INJECTION INTRAMUSCULAR; INTRAVENOUS
Status: COMPLETED
Start: 2017-10-03 | End: 2017-10-03

## 2017-10-03 RX ORDER — LIDOCAINE HYDROCHLORIDE 20 MG/ML
INJECTION, SOLUTION EPIDURAL; INFILTRATION; INTRACAUDAL; PERINEURAL
Status: COMPLETED
Start: 2017-10-03 | End: 2017-10-03

## 2017-10-03 RX ORDER — BUPIVACAINE HYDROCHLORIDE 2.5 MG/ML
INJECTION, SOLUTION EPIDURAL; INFILTRATION; INTRACAUDAL
Status: COMPLETED
Start: 2017-10-03 | End: 2017-10-03

## 2017-10-03 RX ORDER — DEXAMETHASONE SODIUM PHOSPHATE 10 MG/ML
INJECTION, SOLUTION INTRAMUSCULAR; INTRAVENOUS
Status: COMPLETED
Start: 2017-10-03 | End: 2017-10-03

## 2017-10-03 RX ADMIN — MIDAZOLAM HYDROCHLORIDE 2 MG: 1 INJECTION, SOLUTION INTRAMUSCULAR; INTRAVENOUS at 09:24

## 2017-10-03 RX ADMIN — FENTANYL CITRATE 50 MCG: 50 INJECTION, SOLUTION INTRAMUSCULAR; INTRAVENOUS at 09:26

## 2017-10-03 RX ADMIN — LIDOCAINE HYDROCHLORIDE 5 ML: 20 INJECTION, SOLUTION EPIDURAL; INFILTRATION; INTRACAUDAL; PERINEURAL at 09:29

## 2017-10-03 RX ADMIN — IOHEXOL 5 ML: 240 INJECTION, SOLUTION INTRATHECAL; INTRAVASCULAR; INTRAVENOUS; ORAL at 09:29

## 2017-10-03 RX ADMIN — DEXAMETHASONE SODIUM PHOSPHATE 10 MG: 10 INJECTION, SOLUTION INTRAMUSCULAR; INTRAVENOUS at 09:30

## 2017-10-03 ASSESSMENT — PAIN SCALES - GENERAL
PAINLEVEL_OUTOF10: 4
PAINLEVEL_OUTOF10: 2

## 2017-10-03 NOTE — PROGRESS NOTES
Reviewed Plan of Care with patient. Confirmed that she has stopped her Ibuprofen on Saturday September 30 pm and her aspirin one week ago.all blood thinning medications for last weeks.  Confirmed that she has a . Reviewed home care instructions with patient prior to procedure. All questions and concerns addressed.   Dr Gonzales aware.

## 2017-10-04 NOTE — PROCEDURES
DATE OF SERVICE:  10/03/2017    PROCEDURES:  1.  Left C3, C4, C5, C6 medial branch blocks.  2.  Fluoroscopy for needle guidance confirmation of placement.  3.  IV sedation per patient request.    DIAGNOSES:  1.  Cervical spondylosis with mechanical neck pain.  2.  Extreme anxiety over procedure, the patient requests intravenous sedation.    DESCRIPTION OF PROCEDURE:  After informed consent, the patient was placed   prone, head turned slightly to the right, she was monitored and sedated with   Versed and fentanyl.  Fluoroscopy was utilized to identify the lateral facet   body concavities of C3, C4, C5, C6.  Neck is prepped with Betadine, sterile   draped and anesthetized under intermittent fluoroscopic guidance and local   anesthesia.  A 22-gauge spinal needle was passed to the posterolateral facet   body concavity of C3, then advanced partially across the lateral facet body   trapezoid, contrast was injected confirming appropriate spread.  I slowly   injected 0.5 mL of 0.2% lidocaine and 2 mg of dexamethasone.  The needle was   removed.    The left C4 target was approached with the same technique, injected with the   same medication.  The needle was removed.    The left C5 target was approached with the same technique, injected with the   same medication.  The needle was removed.    The left C6 target was approached with the same technique and injected with   the same medication.  The needle removed.    She tolerated this well.    PLAN:  Discharged with a pain diary to document pain relief to screen for   radiofrequency ablation.       ____________________________________     MD TEZ CAMPOS / CHARLIE    DD:  10/03/2017 09:37:56  DT:  10/03/2017 17:18:14    D#:  [id number]  Job#:  [id number]    cc: [facility]

## 2017-11-09 ENCOUNTER — HOSPITAL ENCOUNTER (OUTPATIENT)
Dept: LAB | Facility: MEDICAL CENTER | Age: 72
End: 2017-11-09
Attending: SPECIALIST
Payer: MEDICARE

## 2017-11-09 PROCEDURE — 88175 CYTOPATH C/V AUTO FLUID REDO: CPT

## 2017-11-13 LAB — CYTOLOGY REG CYTOL: NORMAL

## 2017-12-05 ENCOUNTER — HOSPITAL ENCOUNTER (OUTPATIENT)
Dept: RADIOLOGY | Facility: REHABILITATION | Age: 72
End: 2017-12-05
Attending: SPECIALIST

## 2017-12-05 ENCOUNTER — HOSPITAL ENCOUNTER (OUTPATIENT)
Dept: PAIN MANAGEMENT | Facility: REHABILITATION | Age: 72
End: 2017-12-05
Attending: SPECIALIST
Payer: MEDICARE

## 2017-12-05 VITALS
BODY MASS INDEX: 27.49 KG/M2 | RESPIRATION RATE: 16 BRPM | TEMPERATURE: 98.1 F | HEIGHT: 66 IN | HEART RATE: 60 BPM | SYSTOLIC BLOOD PRESSURE: 135 MMHG | DIASTOLIC BLOOD PRESSURE: 62 MMHG | WEIGHT: 171.08 LBS | OXYGEN SATURATION: 96 %

## 2017-12-05 PROCEDURE — 99153 MOD SED SAME PHYS/QHP EA: CPT

## 2017-12-05 PROCEDURE — 700117 HCHG RX CONTRAST REV CODE 255

## 2017-12-05 PROCEDURE — 64634 DESTROY C/TH FACET JNT ADDL: CPT

## 2017-12-05 PROCEDURE — 64633 DESTROY CERV/THOR FACET JNT: CPT

## 2017-12-05 PROCEDURE — 700101 HCHG RX REV CODE 250

## 2017-12-05 PROCEDURE — 700111 HCHG RX REV CODE 636 W/ 250 OVERRIDE (IP)

## 2017-12-05 PROCEDURE — 99152 MOD SED SAME PHYS/QHP 5/>YRS: CPT

## 2017-12-05 RX ORDER — MIDAZOLAM HYDROCHLORIDE 1 MG/ML
INJECTION INTRAMUSCULAR; INTRAVENOUS
Status: COMPLETED
Start: 2017-12-05 | End: 2017-12-05

## 2017-12-05 RX ORDER — LIDOCAINE HYDROCHLORIDE 20 MG/ML
INJECTION, SOLUTION EPIDURAL; INFILTRATION; INTRACAUDAL; PERINEURAL
Status: COMPLETED
Start: 2017-12-05 | End: 2017-12-05

## 2017-12-05 RX ORDER — DEXAMETHASONE SODIUM PHOSPHATE 10 MG/ML
INJECTION, SOLUTION INTRAMUSCULAR; INTRAVENOUS
Status: COMPLETED
Start: 2017-12-05 | End: 2017-12-05

## 2017-12-05 RX ADMIN — DEXAMETHASONE SODIUM PHOSPHATE 5 MG: 10 INJECTION, SOLUTION INTRAMUSCULAR; INTRAVENOUS at 09:27

## 2017-12-05 RX ADMIN — IOHEXOL 6 ML: 240 INJECTION, SOLUTION INTRATHECAL; INTRAVASCULAR; INTRAVENOUS; ORAL at 09:36

## 2017-12-05 RX ADMIN — FENTANYL CITRATE 25 MCG: 50 INJECTION, SOLUTION INTRAMUSCULAR; INTRAVENOUS at 09:39

## 2017-12-05 RX ADMIN — LIDOCAINE HYDROCHLORIDE 5 ML: 20 INJECTION, SOLUTION EPIDURAL; INFILTRATION; INTRACAUDAL; PERINEURAL at 09:27

## 2017-12-05 RX ADMIN — MIDAZOLAM HYDROCHLORIDE 2 MG: 1 INJECTION, SOLUTION INTRAMUSCULAR; INTRAVENOUS at 09:21

## 2017-12-05 RX ADMIN — FENTANYL CITRATE 25 MCG: 50 INJECTION, SOLUTION INTRAMUSCULAR; INTRAVENOUS at 09:23

## 2017-12-05 ASSESSMENT — PAIN SCALES - GENERAL
PAINLEVEL_OUTOF10: 3
PAINLEVEL_OUTOF10: 4
PAINLEVEL_OUTOF10: 0

## 2017-12-05 NOTE — PROGRESS NOTES
Current medications reviewed with pt, see medications reconciliation form. Pt shanice taking ASA or other blood thinners or anti-inflammatories.  Pt has a ride post-procedure ( Guillermo to drive).  Printed and verbal discharge instructions given to pt who verbalized understanding.Hand off report given to JULI Min RN at 0848.

## 2017-12-05 NOTE — PROGRESS NOTES
Hand off report received from Keven at 0910.Pt tolerated fluids, no nausea, able to ambulate. Discharged per MD orders.at 1015.

## 2017-12-05 NOTE — PROGRESS NOTES
Pt tolerated procedure. Grounding pad removed leaving intact skin. Ambulated back.   Handoff report to Radha Krueger RN

## 2017-12-06 NOTE — PROCEDURES
DATE OF SERVICE:  12/05/2017    PROCEDURES:  1.  Right C3, C4, C5, and C6 medial branch neurotomies.  2.  Fluoroscopy for needle guidance confirmation of placement.  3.  IV sedation per patient request.    DIAGNOSES:  1.  Cervical spondylosis with mechanical neck pain, cervicogenic headache.  2.  Anxiety over procedure, the patient requests intravenous sedation.    DESCRIPTION OF PROCEDURE:  After informed consent with the patient prone, she   is monitored and sedated with Versed and fentanyl.  Fluoroscopy was utilized   to identify the lateral facet body concavities of C3, C4, C5, and C6.  Neck is   prepped with Betadine, sterile draped and anesthetized under intermittent   fluoroscopic guidance.  Local anesthesia, 20-gauge radiofrequency needle was   passed to the aforementioned target at each level making contact with   periosteum and advanced midway across the facet body concavity confirmed in   both AP and oblique views, 2 Hz motor stimulation was carried out at each   level confirming local motor activity without radicular motor activity.  I   then injected small amounts 2% lidocaine and dexamethasone and performed the   following lesions:  1.  C3 underwent radiofrequency lesioning at 80 degrees centigrade for 90   seconds x2.  2.  C4 underwent radiofrequency lesioning at 80 degrees centigrade for 90   seconds x2.  3.  C5 underwent radiofrequency lesioning at 80 degrees centigrade for 90   seconds x2.  4.  C6 underwent radiofrequency lesioning at 80 degrees centigrade for 90   seconds x2.    Needles were removed.  She tolerated this well.    PLAN:  Follow up for ongoing pain management needs.  Treat the left side if   necessary.       ____________________________________     MD TEZ CAMPOS / CHARLIE    DD:  12/05/2017 10:12:25  DT:  12/05/2017 16:32:57    D#:  [id number]  Job#:  [id number]    cc: [facility]

## 2017-12-18 ENCOUNTER — APPOINTMENT (OUTPATIENT)
Dept: MEDICAL GROUP | Facility: PHYSICIAN GROUP | Age: 72
End: 2017-12-18

## 2017-12-19 ENCOUNTER — HOSPITAL ENCOUNTER (OUTPATIENT)
Dept: RADIOLOGY | Facility: REHABILITATION | Age: 72
End: 2017-12-19
Attending: SPECIALIST

## 2017-12-19 ENCOUNTER — HOSPITAL ENCOUNTER (OUTPATIENT)
Dept: PAIN MANAGEMENT | Facility: REHABILITATION | Age: 72
End: 2017-12-19
Attending: SPECIALIST
Payer: MEDICARE

## 2017-12-19 VITALS
HEIGHT: 66 IN | RESPIRATION RATE: 16 BRPM | OXYGEN SATURATION: 94 % | TEMPERATURE: 98 F | WEIGHT: 171.74 LBS | HEART RATE: 54 BPM | SYSTOLIC BLOOD PRESSURE: 116 MMHG | DIASTOLIC BLOOD PRESSURE: 51 MMHG | BODY MASS INDEX: 27.6 KG/M2

## 2017-12-19 PROCEDURE — 700111 HCHG RX REV CODE 636 W/ 250 OVERRIDE (IP)

## 2017-12-19 PROCEDURE — 700117 HCHG RX CONTRAST REV CODE 255

## 2017-12-19 PROCEDURE — 99152 MOD SED SAME PHYS/QHP 5/>YRS: CPT

## 2017-12-19 PROCEDURE — 64633 DESTROY CERV/THOR FACET JNT: CPT

## 2017-12-19 PROCEDURE — 64634 DESTROY C/TH FACET JNT ADDL: CPT

## 2017-12-19 PROCEDURE — 700101 HCHG RX REV CODE 250

## 2017-12-19 RX ORDER — DEXAMETHASONE SODIUM PHOSPHATE 10 MG/ML
INJECTION, SOLUTION INTRAMUSCULAR; INTRAVENOUS
Status: COMPLETED
Start: 2017-12-19 | End: 2017-12-19

## 2017-12-19 RX ORDER — MIDAZOLAM HYDROCHLORIDE 1 MG/ML
INJECTION INTRAMUSCULAR; INTRAVENOUS
Status: COMPLETED
Start: 2017-12-19 | End: 2017-12-19

## 2017-12-19 RX ORDER — LIDOCAINE HYDROCHLORIDE 20 MG/ML
INJECTION, SOLUTION EPIDURAL; INFILTRATION; INTRACAUDAL; PERINEURAL
Status: COMPLETED
Start: 2017-12-19 | End: 2017-12-19

## 2017-12-19 RX ORDER — LIDOCAINE HYDROCHLORIDE 10 MG/ML
INJECTION, SOLUTION EPIDURAL; INFILTRATION; INTRACAUDAL; PERINEURAL
Status: DISPENSED
Start: 2017-12-19 | End: 2017-12-19

## 2017-12-19 RX ADMIN — IOHEXOL 3 ML: 240 INJECTION, SOLUTION INTRATHECAL; INTRAVASCULAR; INTRAVENOUS; ORAL at 10:00

## 2017-12-19 RX ADMIN — FENTANYL CITRATE 25 MCG: 50 INJECTION, SOLUTION INTRAMUSCULAR; INTRAVENOUS at 10:13

## 2017-12-19 RX ADMIN — MIDAZOLAM HYDROCHLORIDE 2 MG: 1 INJECTION, SOLUTION INTRAMUSCULAR; INTRAVENOUS at 10:11

## 2017-12-19 RX ADMIN — DEXAMETHASONE SODIUM PHOSPHATE 5 MG: 10 INJECTION, SOLUTION INTRAMUSCULAR; INTRAVENOUS at 10:00

## 2017-12-19 RX ADMIN — LIDOCAINE HYDROCHLORIDE 5 ML: 20 INJECTION, SOLUTION EPIDURAL; INFILTRATION; INTRACAUDAL; PERINEURAL at 10:00

## 2017-12-19 ASSESSMENT — PAIN SCALES - GENERAL
PAINLEVEL_OUTOF10: 0
PAINLEVEL_OUTOF10: 5
PAINLEVEL_OUTOF10: 0

## 2017-12-19 NOTE — PROGRESS NOTES
Hand off report received from Sagrario LYNCH RN at 1036.Pt tolerated fluids, no nausea, able to ambulate. Discharged per MD orders.at 1055

## 2017-12-19 NOTE — PROCEDURES
DATE OF SERVICE:  12/19/2017    PROCEDURES:  1.  Left C3, C4, C5, C6 medial branch neurotomies.  2.  Fluoroscopy for needle guidance, confirmation of placement.  3.  IV sedation per patient request for painful procedure.    DIAGNOSES:  1.  Cervical spondylosis with facet syndrome.  2.  Anxiety over painful procedure, the patient requests intravenous sedation.    DESCRIPTION OF PROCEDURE:  After informed consent with the patient prone, she   is monitored and sedated with Versed and fentanyl.  Fluoroscopy was utilized   to identify the posterolateral facet body concavity of C3, C4, C5, C6.  Neck   is prepped with Betadine, sterile draped and anesthetized under intermittent   fluoroscopic guidance and local anesthesia, 20-gauge radiofrequency needles   were passed to the target making contact with periosteum and advanced midway   across the lateral facet body concavity, 2 Hz motor stimulation was carried   out at each level confirming local motor activity without radicular motor   activity.  Following this, I injected contrast followed by injection of 2%   lidocaine x5 mL with 5 mg of dexamethasone in divided doses.  I performed the   following lesions:  1.  C3 underwent radiofrequency lesioning at 80 degrees centigrade for 90   seconds x2.  2.  C4 underwent radiofrequency lesioning at 80 degrees centigrade for 90   seconds x2.  3.  C5 underwent radiofrequency lesioning at 80 degrees centigrade for 90   seconds x2.  4.  C6 underwent radiofrequency lesioning at 80 degrees centigrade for 90   seconds x2.    Needles were removed.  She tolerated this well.    PLAN:  Follow up for ongoing pain management needs.       ____________________________________     MD TEZ CAMPOS / CHARLIE    DD:  12/19/2017 10:35:15  DT:  12/19/2017 14:16:50    D#:  [id number]  Job#:  [id number]    cc: [facility]

## 2017-12-19 NOTE — PROGRESS NOTES
Current medications reviewed with pt, see medications reconciliation form. Pt shanice taking ASA or other blood thinners or anti-inflammatories.  Pt has a ride post-procedure( to drive).  Printed and verbal discharge instructions given to pt who verbalized understanding.Hand off report given to Sagrario LYNCH Rn at 1000

## 2017-12-26 DIAGNOSIS — I10 ESSENTIAL HYPERTENSION: ICD-10-CM

## 2017-12-26 RX ORDER — LISINOPRIL AND HYDROCHLOROTHIAZIDE 25; 20 MG/1; MG/1
1 TABLET ORAL
Qty: 90 TAB | Refills: 1 | Status: SHIPPED | OUTPATIENT
Start: 2017-12-26 | End: 2018-06-27 | Stop reason: SDUPTHER

## 2017-12-26 NOTE — TELEPHONE ENCOUNTER
----- Message from Larissa Ramirez sent at 12/23/2017  8:29 AM PST -----  Regarding: Non-Urgent Medical Question  Contact: [phone]  I am almost out of my blood pressure medication - Lisinopril 20-25 MG. HCTZ.  Can you fax a refill over to [address]    Thank you  Larissa Ramirez

## 2018-01-15 ENCOUNTER — OFFICE VISIT (OUTPATIENT)
Dept: MEDICAL GROUP | Facility: PHYSICIAN GROUP | Age: 73
End: 2018-01-15
Payer: MEDICARE

## 2018-01-15 VITALS
WEIGHT: 172.4 LBS | BODY MASS INDEX: 28.72 KG/M2 | OXYGEN SATURATION: 94 % | DIASTOLIC BLOOD PRESSURE: 60 MMHG | HEIGHT: 65 IN | TEMPERATURE: 97.6 F | SYSTOLIC BLOOD PRESSURE: 120 MMHG | HEART RATE: 61 BPM

## 2018-01-15 DIAGNOSIS — G89.29 CHRONIC NECK PAIN: ICD-10-CM

## 2018-01-15 DIAGNOSIS — M54.41 CHRONIC BILATERAL LOW BACK PAIN WITH BILATERAL SCIATICA: ICD-10-CM

## 2018-01-15 DIAGNOSIS — G89.29 CHRONIC BILATERAL LOW BACK PAIN WITH BILATERAL SCIATICA: ICD-10-CM

## 2018-01-15 DIAGNOSIS — R73.01 IMPAIRED FASTING BLOOD SUGAR: ICD-10-CM

## 2018-01-15 DIAGNOSIS — E78.5 DYSLIPIDEMIA: ICD-10-CM

## 2018-01-15 DIAGNOSIS — M54.2 CHRONIC NECK PAIN: ICD-10-CM

## 2018-01-15 DIAGNOSIS — M85.89 OSTEOPENIA OF MULTIPLE SITES: ICD-10-CM

## 2018-01-15 DIAGNOSIS — M54.42 CHRONIC BILATERAL LOW BACK PAIN WITH BILATERAL SCIATICA: ICD-10-CM

## 2018-01-15 DIAGNOSIS — N95.2 VAGINAL ATROPHY: ICD-10-CM

## 2018-01-15 DIAGNOSIS — I10 ESSENTIAL HYPERTENSION: ICD-10-CM

## 2018-01-15 PROCEDURE — 99214 OFFICE O/P EST MOD 30 MIN: CPT | Performed by: FAMILY MEDICINE

## 2018-01-16 NOTE — PROGRESS NOTES
Subjective:      Larissa Ramirez is a 72 y.o. female who presents with Follow-Up (up coming procedures and past)            HPI     Patient returns for follow-up of her medical problems.    In the interim she said she had tooth extractions done. She was also seen by the optometrist and was found to have early cataracts in both eyes and recommendation is to follow-up yearly. She was told she does not need cataract extraction at this time.    She was initially sent to the urologist for possible urinary bladder prolapse. She said she was seen and evaluated and was told she did not have urinary bladder prolapse but vaginal wall prolapse. She wanted see Dr. Brian Parks and was diagnosed with vaginal atrophy. She was subsequently referred to Dr. Hall for laser treatments. She was started on Premarin cream 0.5 mg intravaginally every other day which she has been using with help. She said she will undergo laser treatment as planned.    In terms of her hypertension, this is under control on lisinopril/HCTZ.    For her impaired fasting blood sugar, she is managing this by watching her diet.    In terms of her dyslipidemia, her main problem is elevated triglycerides. She is managing this by watching her diet.    For her osteopenia involving the lumbar spine and the hip, she is on calcium and vitamin D supplementation. Last bone density scan was in June 2017.     She continues to follow-up with her pain specialist Dr. Garcia for chronic neck pain and chronic bilateral low back pain with bilateral sciatica. She recently had nerve ablation done of the neck in December 2017 or a month ago with good results and resolution of the pain of the neck. She gets prescription for pain medication from her pain specialist.    Past medical history, past surgical history, family history reviewed-no changes    Social history reviewed-no changes    Allergies reviewed-no changes    Medications reviewed-no changes      ROS     Review of systems as  "per history of present illness, the rest are negative.       Objective:     /60   Pulse 61   Temp 36.4 °C (97.6 °F)   Ht 1.651 m (5' 5\")   Wt 78.2 kg (172 lb 6.4 oz)   LMP 06/27/1986   SpO2 94%   BMI 28.69 kg/m²      Physical Exam     Examined alert, awake, oriented, not in distress    Neck-supple, no lymphadenopathy, no thyromegaly  Lungs-clear to auscultation, no rales, no wheezes  Heart-regular rate and rhythm, no murmur  Extremities-no edema, clubbing, cyanosis            Assessment/Plan:     1. Vaginal atrophy  She asked for refill of the Premarin which was initially prescribed by Dr. Brian Parks. Refills given to the patient. She is scheduled to have laser treatment to be done by Dr. Hall. We will follow along.  - conjugated estrogen (PREMARIN) 0.625 MG/GM Cream; Insert 0.5 g in vagina every day.  Dispense: 1 Tube; Refill: 2  - LIPID PROFILE; Future  - COMP METABOLIC PANEL; Future  - CBC WITH DIFFERENTIAL; Future  - VITAMIN D,25 HYDROXY; Future  - HEMOGLOBIN A1C; Future  - TSH; Future    2. Essential hypertension  Controlled on her medication.  - LIPID PROFILE; Future  - COMP METABOLIC PANEL; Future  - CBC WITH DIFFERENTIAL; Future  - VITAMIN D,25 HYDROXY; Future  - HEMOGLOBIN A1C; Future  - TSH; Future    3. Impaired fasting blood sugar  She will continue to manage this with her own efforts. We will do follow-up blood work next visit.  - LIPID PROFILE; Future  - COMP METABOLIC PANEL; Future  - CBC WITH DIFFERENTIAL; Future  - VITAMIN D,25 HYDROXY; Future  - HEMOGLOBIN A1C; Future  - TSH; Future    4. Dyslipidemia  She will continue to manage this with her own efforts. We will do follow-up lipid panel next visit.  - LIPID PROFILE; Future  - COMP METABOLIC PANEL; Future  - CBC WITH DIFFERENTIAL; Future  - VITAMIN D,25 HYDROXY; Future  - HEMOGLOBIN A1C; Future  - TSH; Future    5. Osteopenia of multiple sites  Continue calcium and vitamin D supplementation. Up-to-date with bone density scan.  - " LIPID PROFILE; Future  - COMP METABOLIC PANEL; Future  - CBC WITH DIFFERENTIAL; Future  - VITAMIN D,25 HYDROXY; Future  - HEMOGLOBIN A1C; Future  - TSH; Future    6. Chronic neck pain  She had good results with nerve ablation done by her pain specialist. She is followed by her pain specialist Dr. Garcia.    7. Chronic bilateral low back pain with bilateral sciatica  She is followed by her pain specialist Dr. Garcia. We will follow along.      Please note that this dictation was created using voice recognition software. I have worked with consultants from the vendor as well as technical experts from [employer] to optimize the interface. I have made every reasonable attempt to correct obvious errors, but I expect that there are errors of grammar and possibly content I did not discover before finalizing the note.

## 2018-03-13 ENCOUNTER — HOSPITAL ENCOUNTER (OUTPATIENT)
Dept: PAIN MANAGEMENT | Facility: REHABILITATION | Age: 73
End: 2018-03-13
Attending: SPECIALIST
Payer: MEDICARE

## 2018-03-13 ENCOUNTER — HOSPITAL ENCOUNTER (OUTPATIENT)
Dept: RADIOLOGY | Facility: REHABILITATION | Age: 73
End: 2018-03-13
Attending: SPECIALIST
Payer: MEDICARE

## 2018-03-13 VITALS
DIASTOLIC BLOOD PRESSURE: 57 MMHG | BODY MASS INDEX: 25.79 KG/M2 | HEIGHT: 66 IN | SYSTOLIC BLOOD PRESSURE: 121 MMHG | HEART RATE: 60 BPM | RESPIRATION RATE: 16 BRPM | WEIGHT: 160.5 LBS | OXYGEN SATURATION: 100 %

## 2018-03-13 PROCEDURE — 99152 MOD SED SAME PHYS/QHP 5/>YRS: CPT

## 2018-03-13 PROCEDURE — 700111 HCHG RX REV CODE 636 W/ 250 OVERRIDE (IP)

## 2018-03-13 PROCEDURE — 700117 HCHG RX CONTRAST REV CODE 255

## 2018-03-13 PROCEDURE — 64494 INJ PARAVERT F JNT L/S 2 LEV: CPT

## 2018-03-13 PROCEDURE — 700101 HCHG RX REV CODE 250

## 2018-03-13 PROCEDURE — 64495 INJ PARAVERT F JNT L/S 3 LEV: CPT

## 2018-03-13 PROCEDURE — 64493 INJ PARAVERT F JNT L/S 1 LEV: CPT

## 2018-03-13 RX ORDER — TOPIRAMATE 100 MG/1
50 TABLET, FILM COATED ORAL
COMMUNITY
End: 2018-10-29

## 2018-03-13 RX ORDER — MIDAZOLAM HYDROCHLORIDE 1 MG/ML
INJECTION INTRAMUSCULAR; INTRAVENOUS
Status: COMPLETED
Start: 2018-03-13 | End: 2018-03-13

## 2018-03-13 RX ORDER — TRIAMCINOLONE ACETONIDE 40 MG/ML
INJECTION, SUSPENSION INTRA-ARTICULAR; INTRAMUSCULAR
Status: COMPLETED
Start: 2018-03-13 | End: 2018-03-13

## 2018-03-13 RX ORDER — BUPIVACAINE HYDROCHLORIDE 5 MG/ML
INJECTION, SOLUTION EPIDURAL; INTRACAUDAL
Status: COMPLETED
Start: 2018-03-13 | End: 2018-03-13

## 2018-03-13 RX ORDER — LIDOCAINE HYDROCHLORIDE 20 MG/ML
INJECTION, SOLUTION EPIDURAL; INFILTRATION; INTRACAUDAL; PERINEURAL
Status: COMPLETED
Start: 2018-03-13 | End: 2018-03-13

## 2018-03-13 RX ADMIN — BUPIVACAINE HYDROCHLORIDE 4 ML: 5 INJECTION, SOLUTION EPIDURAL; INTRACAUDAL; PERINEURAL at 11:50

## 2018-03-13 RX ADMIN — MIDAZOLAM HYDROCHLORIDE 0.5 MG: 1 INJECTION, SOLUTION INTRAMUSCULAR; INTRAVENOUS at 11:46

## 2018-03-13 RX ADMIN — FENTANYL CITRATE 25 MCG: 50 INJECTION, SOLUTION INTRAMUSCULAR; INTRAVENOUS at 11:47

## 2018-03-13 RX ADMIN — TRIAMCINOLONE ACETONIDE 20 MG: 40 INJECTION, SUSPENSION INTRA-ARTICULAR; INTRAMUSCULAR at 11:50

## 2018-03-13 RX ADMIN — IOHEXOL 2 ML: 240 INJECTION, SOLUTION INTRATHECAL; INTRAVASCULAR; INTRAVENOUS; ORAL at 11:47

## 2018-03-13 ASSESSMENT — PAIN SCALES - GENERAL
PAINLEVEL_OUTOF10: 4
PAINLEVEL_OUTOF10: 2

## 2018-03-13 NOTE — PROGRESS NOTES
Patient has history of COPD & Asthma. Patient positioned by RN, CST, & X - ray Tech. Hands supported on stool under head of the bed, lower extremities and feet pillow placed for support.  Patient tolerated procedure well.Reflexes remain intact.Accompanied  to recovery room, ambulatory.

## 2018-03-13 NOTE — PROGRESS NOTES
Reviewed Plan of Care with patient. Confirmed that she has stopped all blood thinning medications for last 3 days.  Confirmed that she has a . Reviewed home care instructions with patient prior to procedure. All questions and concerns addressed.

## 2018-03-13 NOTE — PROCEDURES
DATE OF SERVICE:  03/13/2018    PROCEDURES:  1.  Right L2, L3, L4 medial branch blocks, right L5 dorsal primary ramus   block.  2.  Fluoroscopy for needle guidance, confirmation of placement.  3.  IV sedation per patient request for painful procedure.    DIAGNOSES:  1.  Right-sided low back pain secondary to lumbar spondylosis with facet   syndrome.  2.  Anxiety over painful procedure, patient requesting intravenous sedation.    DESCRIPTION OF PROCEDURE:  After informed consent with the patient prone, she   was monitored and sedated with Versed and fentanyl.  Fluoroscopy was utilized   to identify the junction of the transverse process with the superior articular   process on the right at L3, L4, L5, sacral alar depression.  These areas are   prepped with Betadine, sterile draped and anesthetized.  Under intermittent   fluoroscopic guidance and local anesthesia, a 22-gauge spinal needle was   passed to the first target at L3 making contact with periosteum.  Contrast was   injected confirming appropriate spread.  I slowly injected 0.5 mL 0.375%   bupivacaine and 10 mg Kenalog.  Needle was removed.    The right L4 target was approached with the same technique and injected with   the same medication.  The needle was removed.    The right L5 target was approached with the same technique and injected with   the same medication.  The needle was removed.    The right sacral alar depression target was approached with the same   technique, injected with the same medication.  The needle was removed.    She tolerated this well.    PLAN:  She will be discharged with a pain diary to document pain relief to   screen for radiofrequency ablation.       ____________________________________     MD TEZ CAMPOS / CHARLIE    DD:  03/13/2018 12:05:21  DT:  03/13/2018 14:20:58    D#:  [id number]  Job#:  [id number]    cc: [facility]

## 2018-03-14 ENCOUNTER — PATIENT OUTREACH (OUTPATIENT)
Dept: HEALTH INFORMATION MANAGEMENT | Facility: OTHER | Age: 73
End: 2018-03-14

## 2018-03-14 NOTE — PROGRESS NOTES
Outcome: Left Message    Please transfer to Patient Outreach Team at [phone] when patient returns call.        Attempt # 1

## 2018-03-15 NOTE — PROGRESS NOTES
1. Attempt #: 1    2. [facility] Verified: yes    3. Verify PCP: yes    4. Care Team Updated:       •   DME Company (gait device, O2, CPAP, etc.): YES       •   Other Specialists (eye doctor, derm, GYN, cardiology, endo, etc): YES - PT STATED SHE IS GOING TO BRING IN THE NAME OF HER OGBYN WHEN SHE COMES IN SHE COULDN'T REMEMBER THE NAME.    5.  Reviewed/Updated the following with patient:       •   Communication Preference Obtained? YES       •   Preferred Pharmacy? YES       •   Preferred Lab? YES       •   Family History (document living status of immediate family members and if + hx of cancer, diabetes, hypertension, hyperlipidemia, heart attack, stroke) YES. Was Abstract Encounter opened and chart updated? YES    6. [employer] Activation: already active    7. [employer]: no    8. Annual Wellness Visit Scheduling  Scheduling Status:Scheduled      9. Care Gap Scheduling (Attempt to Schedule EACH Overdue Care Gap!)     Health Maintenance Due   Topic Date Due   • IMM INFLUENZA (1) 09/01/2017   • COLONOSCOPY  09/20/2017        Scheduled patient for Annual Wellness Visit    10. Patient was advised: “This is a free wellness visit. The provider will screen for medical conditions to help you stay healthy. If you have other concerns to address you may be asked to discuss these at a separate visit or there may be an additional fee.”     11. Patient was informed to arrive 15 min prior to their scheduled appointment and bring in their medication bottles.

## 2018-03-20 ENCOUNTER — PATIENT OUTREACH (OUTPATIENT)
Dept: HEALTH INFORMATION MANAGEMENT | Facility: OTHER | Age: 73
End: 2018-03-20

## 2018-03-20 NOTE — PROGRESS NOTES
Outcome: Left Message to reschedule awv    Please transfer to Patient Outreach Team at [phone] when patient returns call.      Attempt #1

## 2018-04-11 ENCOUNTER — OFFICE VISIT (OUTPATIENT)
Dept: MEDICAL GROUP | Facility: PHYSICIAN GROUP | Age: 73
End: 2018-04-11
Payer: MEDICARE

## 2018-04-11 ENCOUNTER — HOSPITAL ENCOUNTER (OUTPATIENT)
Facility: MEDICAL CENTER | Age: 73
End: 2018-04-11
Attending: FAMILY MEDICINE
Payer: MEDICARE

## 2018-04-11 VITALS
SYSTOLIC BLOOD PRESSURE: 110 MMHG | BODY MASS INDEX: 26.3 KG/M2 | HEIGHT: 65 IN | WEIGHT: 157.85 LBS | TEMPERATURE: 97.4 F | HEART RATE: 65 BPM | OXYGEN SATURATION: 94 % | DIASTOLIC BLOOD PRESSURE: 60 MMHG

## 2018-04-11 DIAGNOSIS — F11.20 OPIATE DEPENDENCE, CONTINUOUS (HCC): ICD-10-CM

## 2018-04-11 DIAGNOSIS — K80.20 CHOLELITHIASIS WITHOUT CHOLECYSTITIS: ICD-10-CM

## 2018-04-11 DIAGNOSIS — R10.9 RIGHT FLANK PAIN: ICD-10-CM

## 2018-04-11 LAB
APPEARANCE UR: NORMAL
BILIRUB UR STRIP-MCNC: NORMAL MG/DL
COLOR UR AUTO: NORMAL
GLUCOSE UR STRIP.AUTO-MCNC: NORMAL MG/DL
KETONES UR STRIP.AUTO-MCNC: NORMAL MG/DL
LEUKOCYTE ESTERASE UR QL STRIP.AUTO: NORMAL
NITRITE UR QL STRIP.AUTO: NORMAL
PH UR STRIP.AUTO: 6 [PH] (ref 5–8)
PROT UR QL STRIP: NORMAL MG/DL
RBC UR QL AUTO: NORMAL
SP GR UR STRIP.AUTO: 1.02
UROBILINOGEN UR STRIP-MCNC: 0.2 MG/DL

## 2018-04-11 PROCEDURE — 81002 URINALYSIS NONAUTO W/O SCOPE: CPT | Performed by: FAMILY MEDICINE

## 2018-04-11 PROCEDURE — 99213 OFFICE O/P EST LOW 20 MIN: CPT | Performed by: FAMILY MEDICINE

## 2018-04-11 PROCEDURE — 87086 URINE CULTURE/COLONY COUNT: CPT

## 2018-04-12 PROBLEM — F11.20 OPIATE DEPENDENCE, CONTINUOUS (HCC): Status: ACTIVE | Noted: 2018-04-12

## 2018-04-12 NOTE — PROGRESS NOTES
"Subjective:      Larissa Ramirez is a 73 y.o. female who presents with Cholelithiasis (pian in bladder area)            HPI     Patient is here because she has been having right flank pain every time she eats carbohydrates during the day. She does not feel the pain if she has scar is at dinnertime. She has been avoiding carbohydrates during the day. Patient has incidental finding of choledocholithiasis when she had lumbar spine MRI in 2017. She denies any pain in the right upper quadrant, nausea, vomiting, disturbances in urination or bowel movement.     Patient continues to see her pain specialist Dr. Garcia for her chronic neck pain and chronic low back pain with bilateral sciatica. She takes pain narcotic medication prescribed by her pain specialist.    Past medical history, past surgical history, family history reviewed-no changes    Social history reviewed-no changes    Allergies reviewed-no changes    Medications reviewed-no changes    ROS     As per history of present illness, the rest are negative       Objective:     /60   Pulse 65   Temp 36.3 °C (97.4 °F)   Ht 1.651 m (5' 5\")   Wt 71.6 kg (157 lb 13.6 oz)   LMP 06/27/1986   SpO2 94%   BMI 26.27 kg/m²      Physical Exam     Examined alert, awake, oriented, not in distress    Neck-supple, no lymphadenopathy, no thyromegaly  Lungs-clear to auscultation, no rales, no wheezes  Heart-regular rate and rhythm, no murmur  Abdomen-good bowel sounds, soft, nontender, no hepatosplenomegaly, no masses, no CVA tenderness  Extremities-no edema, clubbing, cyanosis   urine dipstick-trace blood, urobilinogen 0.2, otherwise negative      Assessment/Plan:     1. Cholelithiasis without cholecystitis  She has pain in the right flank with eating carbohydrates during the day but not at dinnertime. She is asking when she should get her gallbladder removed. Discussed with patient if she has right upper quadrant pain radiating to the back especially with intake of fatty " foods then she would consider having a cholecystectomy. We will get an ultrasound of the abdomen for further evaluation since the cholelithiasis was diagnosed as an incidental finding on lumbar MRI and she has not had an ultrasound before.  - POCT Urinalysis  - US-ABDOMEN COMPLETE SURVEY; Future    2. Right flank pain  Urine dipstick came back trace blood and urobilinogen otherwise negative. I will send urine for culture to confirm if she has an infection and we will treat if positive for infection. Otherwise trace blood is probably not a significant finding.  - POCT Urinalysis  - US-ABDOMEN COMPLETE SURVEY; Future  - URINE CULTURE(NEW); Future    3. Opiate dependence, continuous (CMS-HCC)  Patient sees her pain specialist Dr. Garcia for prescription of her narcotic pain medication for chronic neck pain and chronic back pain.      Please note that this dictation was created using voice recognition software. I have worked with consultants from the vendor as well as technical experts from [facility] to optimize the interface. I have made every reasonable attempt to correct obvious errors, but I expect that there are errors of grammar and possibly content I did not discover before finalizing the note.

## 2018-04-13 LAB
BACTERIA UR CULT: NORMAL
SIGNIFICANT IND 70042: NORMAL
SITE SITE: NORMAL
SOURCE SOURCE: NORMAL

## 2018-04-17 ENCOUNTER — HOSPITAL ENCOUNTER (OUTPATIENT)
Dept: RADIOLOGY | Facility: MEDICAL CENTER | Age: 73
End: 2018-04-17
Attending: FAMILY MEDICINE
Payer: MEDICARE

## 2018-04-17 ENCOUNTER — TELEPHONE (OUTPATIENT)
Dept: MEDICAL GROUP | Facility: PHYSICIAN GROUP | Age: 73
End: 2018-04-17

## 2018-04-17 DIAGNOSIS — K86.89 MASS OF PANCREAS: ICD-10-CM

## 2018-04-17 DIAGNOSIS — K80.20 CHOLELITHIASIS WITHOUT CHOLECYSTITIS: ICD-10-CM

## 2018-04-17 DIAGNOSIS — R10.9 RIGHT FLANK PAIN: ICD-10-CM

## 2018-04-17 PROCEDURE — 76700 US EXAM ABDOM COMPLETE: CPT

## 2018-04-17 NOTE — TELEPHONE ENCOUNTER
Dr. MC called from Radiology and stated the Patient has a pancreatic mass that needs a CT Scan for further Evalualtion.

## 2018-04-18 ENCOUNTER — HOSPITAL ENCOUNTER (OUTPATIENT)
Dept: LAB | Facility: MEDICAL CENTER | Age: 73
End: 2018-04-18
Attending: FAMILY MEDICINE
Payer: MEDICARE

## 2018-04-18 DIAGNOSIS — E78.5 DYSLIPIDEMIA: ICD-10-CM

## 2018-04-18 DIAGNOSIS — I10 ESSENTIAL HYPERTENSION: ICD-10-CM

## 2018-04-18 DIAGNOSIS — M85.89 OSTEOPENIA OF MULTIPLE SITES: ICD-10-CM

## 2018-04-18 DIAGNOSIS — N95.2 VAGINAL ATROPHY: ICD-10-CM

## 2018-04-18 DIAGNOSIS — R73.01 IMPAIRED FASTING BLOOD SUGAR: ICD-10-CM

## 2018-04-18 LAB
25(OH)D3 SERPL-MCNC: 31 NG/ML (ref 30–100)
ALBUMIN SERPL BCP-MCNC: 3.8 G/DL (ref 3.2–4.9)
ALBUMIN/GLOB SERPL: 1.2 G/DL
ALP SERPL-CCNC: 51 U/L (ref 30–99)
ALT SERPL-CCNC: 13 U/L (ref 2–50)
ANION GAP SERPL CALC-SCNC: 6 MMOL/L (ref 0–11.9)
AST SERPL-CCNC: 14 U/L (ref 12–45)
BASOPHILS # BLD AUTO: 0.6 % (ref 0–1.8)
BASOPHILS # BLD: 0.04 K/UL (ref 0–0.12)
BILIRUB SERPL-MCNC: 0.3 MG/DL (ref 0.1–1.5)
BUN SERPL-MCNC: 25 MG/DL (ref 8–22)
CALCIUM SERPL-MCNC: 10.1 MG/DL (ref 8.5–10.5)
CHLORIDE SERPL-SCNC: 103 MMOL/L (ref 96–112)
CHOLEST SERPL-MCNC: 129 MG/DL (ref 100–199)
CO2 SERPL-SCNC: 30 MMOL/L (ref 20–33)
CREAT SERPL-MCNC: 1 MG/DL (ref 0.5–1.4)
EOSINOPHIL # BLD AUTO: 0.14 K/UL (ref 0–0.51)
EOSINOPHIL NFR BLD: 2.2 % (ref 0–6.9)
ERYTHROCYTE [DISTWIDTH] IN BLOOD BY AUTOMATED COUNT: 44.2 FL (ref 35.9–50)
EST. AVERAGE GLUCOSE BLD GHB EST-MCNC: 126 MG/DL
GLOBULIN SER CALC-MCNC: 3.2 G/DL (ref 1.9–3.5)
GLUCOSE SERPL-MCNC: 99 MG/DL (ref 65–99)
HBA1C MFR BLD: 6 % (ref 0–5.6)
HCT VFR BLD AUTO: 41.5 % (ref 37–47)
HDLC SERPL-MCNC: 47 MG/DL
HGB BLD-MCNC: 13.3 G/DL (ref 12–16)
IMM GRANULOCYTES # BLD AUTO: 0.01 K/UL (ref 0–0.11)
IMM GRANULOCYTES NFR BLD AUTO: 0.2 % (ref 0–0.9)
LDLC SERPL CALC-MCNC: 64 MG/DL
LYMPHOCYTES # BLD AUTO: 1.77 K/UL (ref 1–4.8)
LYMPHOCYTES NFR BLD: 27.3 % (ref 22–41)
MCH RBC QN AUTO: 30.2 PG (ref 27–33)
MCHC RBC AUTO-ENTMCNC: 32 G/DL (ref 33.6–35)
MCV RBC AUTO: 94.1 FL (ref 81.4–97.8)
MONOCYTES # BLD AUTO: 0.58 K/UL (ref 0–0.85)
MONOCYTES NFR BLD AUTO: 9 % (ref 0–13.4)
NEUTROPHILS # BLD AUTO: 3.94 K/UL (ref 2–7.15)
NEUTROPHILS NFR BLD: 60.7 % (ref 44–72)
NRBC # BLD AUTO: 0 K/UL
NRBC BLD-RTO: 0 /100 WBC
PLATELET # BLD AUTO: 264 K/UL (ref 164–446)
PMV BLD AUTO: 9.7 FL (ref 9–12.9)
POTASSIUM SERPL-SCNC: 3.5 MMOL/L (ref 3.6–5.5)
PROT SERPL-MCNC: 7 G/DL (ref 6–8.2)
RBC # BLD AUTO: 4.41 M/UL (ref 4.2–5.4)
SODIUM SERPL-SCNC: 139 MMOL/L (ref 135–145)
TRIGL SERPL-MCNC: 89 MG/DL (ref 0–149)
TSH SERPL DL<=0.005 MIU/L-ACNC: 2.46 UIU/ML (ref 0.38–5.33)
WBC # BLD AUTO: 6.5 K/UL (ref 4.8–10.8)

## 2018-04-18 PROCEDURE — 80061 LIPID PANEL: CPT

## 2018-04-18 PROCEDURE — 83036 HEMOGLOBIN GLYCOSYLATED A1C: CPT

## 2018-04-18 PROCEDURE — 82306 VITAMIN D 25 HYDROXY: CPT

## 2018-04-18 PROCEDURE — 36415 COLL VENOUS BLD VENIPUNCTURE: CPT

## 2018-04-18 PROCEDURE — 80053 COMPREHEN METABOLIC PANEL: CPT

## 2018-04-18 PROCEDURE — 85025 COMPLETE CBC W/AUTO DIFF WBC: CPT

## 2018-04-18 PROCEDURE — 84443 ASSAY THYROID STIM HORMONE: CPT

## 2018-05-11 ENCOUNTER — HOSPITAL ENCOUNTER (OUTPATIENT)
Dept: RADIOLOGY | Facility: MEDICAL CENTER | Age: 73
End: 2018-05-11
Attending: FAMILY MEDICINE
Payer: MEDICARE

## 2018-05-11 DIAGNOSIS — K86.89 MASS OF PANCREAS: ICD-10-CM

## 2018-05-11 PROCEDURE — 74170 CT ABD WO CNTRST FLWD CNTRST: CPT

## 2018-05-11 PROCEDURE — 700117 HCHG RX CONTRAST REV CODE 255: Performed by: FAMILY MEDICINE

## 2018-05-11 RX ADMIN — IOHEXOL 100 ML: 350 INJECTION, SOLUTION INTRAVENOUS at 10:25

## 2018-05-17 DIAGNOSIS — Z01.812 PRE-OPERATIVE LABORATORY EXAMINATION: ICD-10-CM

## 2018-05-17 DIAGNOSIS — Z01.810 PRE-OPERATIVE CARDIOVASCULAR EXAMINATION: ICD-10-CM

## 2018-05-17 LAB
ANION GAP SERPL CALC-SCNC: 4 MMOL/L (ref 0–11.9)
BUN SERPL-MCNC: 28 MG/DL (ref 8–22)
CALCIUM SERPL-MCNC: 10.1 MG/DL (ref 8.5–10.5)
CHLORIDE SERPL-SCNC: 104 MMOL/L (ref 96–112)
CO2 SERPL-SCNC: 30 MMOL/L (ref 20–33)
CREAT SERPL-MCNC: 0.89 MG/DL (ref 0.5–1.4)
EKG IMPRESSION: NORMAL
ERYTHROCYTE [DISTWIDTH] IN BLOOD BY AUTOMATED COUNT: 42.9 FL (ref 35.9–50)
GLUCOSE SERPL-MCNC: 104 MG/DL (ref 65–99)
HCT VFR BLD AUTO: 38.2 % (ref 37–47)
HGB BLD-MCNC: 12.2 G/DL (ref 12–16)
MCH RBC QN AUTO: 30 PG (ref 27–33)
MCHC RBC AUTO-ENTMCNC: 31.9 G/DL (ref 33.6–35)
MCV RBC AUTO: 93.9 FL (ref 81.4–97.8)
PLATELET # BLD AUTO: 225 K/UL (ref 164–446)
PMV BLD AUTO: 9.6 FL (ref 9–12.9)
POTASSIUM SERPL-SCNC: 4 MMOL/L (ref 3.6–5.5)
RBC # BLD AUTO: 4.07 M/UL (ref 4.2–5.4)
SODIUM SERPL-SCNC: 138 MMOL/L (ref 135–145)
WBC # BLD AUTO: 6.4 K/UL (ref 4.8–10.8)

## 2018-05-17 PROCEDURE — 85027 COMPLETE CBC AUTOMATED: CPT

## 2018-05-17 PROCEDURE — 80048 BASIC METABOLIC PNL TOTAL CA: CPT

## 2018-05-17 PROCEDURE — 93010 ELECTROCARDIOGRAM REPORT: CPT | Performed by: INTERNAL MEDICINE

## 2018-05-17 PROCEDURE — 36415 COLL VENOUS BLD VENIPUNCTURE: CPT

## 2018-05-17 PROCEDURE — 93005 ELECTROCARDIOGRAM TRACING: CPT

## 2018-05-17 NOTE — OR NURSING
"Pre-admit appointment completed. \"Preparing for your procedure\" sheet given to pt along with verbal and written instructions. Pt instructed to continue regularly prescribed medications through the day before surgery. Pt instructed to take the following medications the day of surgery with a sip of water, per anesthesia protocol; if needed-norco or tramadol.  "

## 2018-05-18 ENCOUNTER — HOSPITAL ENCOUNTER (OUTPATIENT)
Facility: MEDICAL CENTER | Age: 73
End: 2018-05-18
Attending: INTERNAL MEDICINE | Admitting: INTERNAL MEDICINE
Payer: MEDICARE

## 2018-05-18 VITALS
DIASTOLIC BLOOD PRESSURE: 54 MMHG | WEIGHT: 154.1 LBS | RESPIRATION RATE: 16 BRPM | TEMPERATURE: 97.2 F | HEIGHT: 66 IN | BODY MASS INDEX: 24.77 KG/M2 | OXYGEN SATURATION: 96 % | SYSTOLIC BLOOD PRESSURE: 133 MMHG

## 2018-05-18 LAB — PATHOLOGY CONSULT NOTE: NORMAL

## 2018-05-18 PROCEDURE — 88173 CYTOPATH EVAL FNA REPORT: CPT

## 2018-05-18 PROCEDURE — 88172 CYTP DX EVAL FNA 1ST EA SITE: CPT

## 2018-05-18 PROCEDURE — 700111 HCHG RX REV CODE 636 W/ 250 OVERRIDE (IP)

## 2018-05-18 PROCEDURE — 160035 HCHG PACU - 1ST 60 MINS PHASE I: Performed by: INTERNAL MEDICINE

## 2018-05-18 PROCEDURE — 500066 HCHG BITE BLOCK, ECT: Performed by: INTERNAL MEDICINE

## 2018-05-18 PROCEDURE — 700102 HCHG RX REV CODE 250 W/ 637 OVERRIDE(OP)

## 2018-05-18 PROCEDURE — 160208 HCHG ENDO MINUTES - EA ADDL 1 MIN LEVEL 4: Performed by: INTERNAL MEDICINE

## 2018-05-18 PROCEDURE — 160002 HCHG RECOVERY MINUTES (STAT): Performed by: INTERNAL MEDICINE

## 2018-05-18 PROCEDURE — A9270 NON-COVERED ITEM OR SERVICE: HCPCS

## 2018-05-18 PROCEDURE — 700101 HCHG RX REV CODE 250

## 2018-05-18 PROCEDURE — 700105 HCHG RX REV CODE 258

## 2018-05-18 PROCEDURE — 88312 SPECIAL STAINS GROUP 1: CPT

## 2018-05-18 PROCEDURE — 88307 TISSUE EXAM BY PATHOLOGIST: CPT

## 2018-05-18 PROCEDURE — 160009 HCHG ANES TIME/MIN: Performed by: INTERNAL MEDICINE

## 2018-05-18 PROCEDURE — 88305 TISSUE EXAM BY PATHOLOGIST: CPT

## 2018-05-18 PROCEDURE — 160048 HCHG OR STATISTICAL LEVEL 1-5: Performed by: INTERNAL MEDICINE

## 2018-05-18 PROCEDURE — 160203 HCHG ENDO MINUTES - 1ST 30 MINS LEVEL 4: Performed by: INTERNAL MEDICINE

## 2018-05-18 PROCEDURE — 700105 HCHG RX REV CODE 258: Performed by: INTERNAL MEDICINE

## 2018-05-18 PROCEDURE — 160025 RECOVERY II MINUTES (STATS): Performed by: INTERNAL MEDICINE

## 2018-05-18 PROCEDURE — 160046 HCHG PACU - 1ST 60 MINS PHASE II: Performed by: INTERNAL MEDICINE

## 2018-05-18 RX ORDER — CHLORHEXIDINE GLUCONATE ORAL RINSE 1.2 MG/ML
SOLUTION DENTAL
Status: COMPLETED
Start: 2018-05-18 | End: 2018-05-18

## 2018-05-18 RX ORDER — LIDOCAINE HYDROCHLORIDE 10 MG/ML
INJECTION, SOLUTION INFILTRATION; PERINEURAL
Status: COMPLETED
Start: 2018-05-18 | End: 2018-05-18

## 2018-05-18 RX ORDER — SODIUM CHLORIDE, SODIUM LACTATE, POTASSIUM CHLORIDE, CALCIUM CHLORIDE 600; 310; 30; 20 MG/100ML; MG/100ML; MG/100ML; MG/100ML
1000 INJECTION, SOLUTION INTRAVENOUS
Status: DISCONTINUED | OUTPATIENT
Start: 2018-05-18 | End: 2018-05-18 | Stop reason: HOSPADM

## 2018-05-18 RX ORDER — OMEPRAZOLE 40 MG/1
40 CAPSULE, DELAYED RELEASE ORAL DAILY
Qty: 14 CAP | Refills: 0 | Status: SHIPPED | OUTPATIENT
Start: 2018-05-18 | End: 2018-06-01

## 2018-05-18 RX ADMIN — SODIUM CHLORIDE, POTASSIUM CHLORIDE, SODIUM LACTATE AND CALCIUM CHLORIDE 1000 ML: 600; 310; 30; 20 INJECTION, SOLUTION INTRAVENOUS at 14:26

## 2018-05-18 RX ADMIN — FENTANYL CITRATE 25 MCG: 50 INJECTION, SOLUTION INTRAMUSCULAR; INTRAVENOUS at 16:08

## 2018-05-18 RX ADMIN — FENTANYL CITRATE 25 MCG: 50 INJECTION, SOLUTION INTRAMUSCULAR; INTRAVENOUS at 15:59

## 2018-05-18 RX ADMIN — CHLORHEXIDINE GLUCONATE: 1.2 RINSE ORAL at 14:26

## 2018-05-18 RX ADMIN — HYDROCODONE BITARTRATE AND ACETAMINOPHEN 15 ML: 7.5; 325 SOLUTION ORAL at 15:55

## 2018-05-18 RX ADMIN — PIPERACILLIN AND TAZOBACTAM: 3; .375 INJECTION, POWDER, FOR SOLUTION INTRAVENOUS at 14:50

## 2018-05-18 RX ADMIN — LIDOCAINE HYDROCHLORIDE: 10 INJECTION, SOLUTION INFILTRATION; PERINEURAL at 14:26

## 2018-05-18 ASSESSMENT — PAIN SCALES - GENERAL
PAINLEVEL_OUTOF10: 6
PAINLEVEL_OUTOF10: ASSUMED PAIN PRESENT
PAINLEVEL_OUTOF10: 5
PAINLEVEL_OUTOF10: 3
PAINLEVEL_OUTOF10: 0
PAINLEVEL_OUTOF10: 5
PAINLEVEL_OUTOF10: 5

## 2018-05-18 NOTE — PROGRESS NOTES
Patient seen and examined before EGD/EUS-FNA for pancreas mass.  Risks, benefits, and alternatives were discussed with patient and  (Guillermo).  Consenting persons were given an opportunity to ask questions and discuss other options.  Risks including but not limited to pancreatitis, malignant seeding, perforation, infection, bleeding, missed lesion(s), cardiac and/or pulmonary event, aspiration, stroke, possible need for surgery and/or interventional radiology, hospitalization possibly prolonged, discomfort, unsuccessful and/or incomplete procedure, indefinite diagnosis, ineffective therapy and/or persistent symptoms, possible need for repeat procedures and/or additional testings, damage to adjacent organs and/or vascular structures, medication reaction, disability, death, and other adverse events possibly life-threatening.  Discussion was undertaken with Layman's terms.  Consenting persons stated understanding and acceptance of these risks, and wished to proceed.  Informed consent was given in clear state of mind.

## 2018-05-18 NOTE — PROCEDURES
Pre-procedure Diagnoses:   Mass of pancreas [K86.9]   Abnormal abdominal CT scan [R93.5]   Post-procedure Diagnoses:   Pancreatic adenocarcinoma (HCC) [C25.9]   Acute superficial gastritis with hemorrhage [K29.01]   Benign gastric polyp [K31.7]   Procedures:   UPPER ENDOSCOPIC ULTRASOUND-GUIDED FINE-NEEDLE ASPIRATION BIOPSIES OF PANCREAS TAIL MASS [97211 (CPT®)]   ESOPHAGOGASTRODUODENOSCOPY OR UPPER GI ENDOSCOPY WITH BIOPSIES [31853 (CPT®)]     Endoscopist: Geovanni Romero MD, [facility], [facility]    Anesthesiologist: Dr. Valeriano Arthur    Premedications: zosyn 3.375 grams IV    Consent: Risks, benefits, and alternatives were discussed with patient and  (Guillermo). Consenting persons were given an opportunity to ask questions and discuss other options. Risks including but not limited to pancreatitis, malignant seeding, perforation, infection, bleeding, missed lesion(s), cardiac and/or pulmonary event, aspiration, stroke, possible need for surgery and/or interventional radiology, hospitalization possibly prolonged, discomfort, unsuccessful and/or incomplete procedure, indefinite diagnosis, ineffective therapy and/or persistent symptoms, possible need for repeat procedures and/or additional testings, damage to adjacent organs and/or vascular structures, medication reaction, disability, death, and other adverse events possibly life-threatening. Discussion was undertaken with Layman's terms. Consenting persons stated understanding and acceptance of these risks, and wished to proceed. Informed consent was given in clear state of mind.     Endoscopic procedures in detail: Diagnostic endoscope was inserted from mouth into second portion of the duodenum, retroflexion was performed in the stomach.  Gastric biopsies were obtained to evaluate H.pylori status.  There was suction of insufflated air and stomach fluid contents upon removal.      Curvilinear echoendoscope was inserted from mouth to second portion of the duodenum.  Pancreas  was examined with identification of normal vascular landmarks including superior mesenteric artery, superior mesenteric vein, portal vein, celiac artery, portal vein confluence, and splenorenal junction.  Biliary duct was imaged and traced from intrapancreatic portion to hepatic hilum.  Celiac axis was imaged to look for echogenic lymph nodes.    Fine-needle aspiration biopsies were obtained via a transduodenal approach from the pancreas tail mass after Doppler studies had identified a vessel-free path for all needle biopsies.  A total of 3 passes were obtained with a 19-gauge SharkCore, multiple cytology slides were created and core or sampling remnants were sent to pathology.   There was suction of insufflated air and stomach fluid contents upon removal.    Procedure times:  - In-room 14:42  - Start 14:58  - Completed 15:15  - Out of room 15:32    Esophagogastroduodenoscopy Findings:  - Esophagus: endoscopically unremarkable, no evidence of Bowers's esophagus.  - Stomach: superficial non-erosive antral gastritis, biopsied to evaluate H.pylori status.  Small sub-centimeter sessile benign-appearing fundic gland polyps noted.  - Duodenum: endoscopically unremarkable, no evidence of duodenal stricture.    Endoscopic Ultrasound Findings:  - Pancreas: 29.3x22.1mm pancreas tail hypoechoic mass invades splenic vein and abuts splenic artery, fine-needle aspirated as per above.  No invasion of superior mesenteric artery nor vein.  - Lymph nodes: 15.1x20mm & 7.5x3.5mm marquez hepatis lymph node metastasis with the largest lymph node invading and encasing the portal vein.  No echogenic celiac lymph nodes.  - Bile duct: non-dilated without stricture or common bile duct stones.    Impression:  1. T4 N1 Mx pancreas tail adenocarcinoma with marquez hepatis and portal vein encasement.  2. Gastritis, biopsied to evaluate H.pylori status.    Recommendations:   1.  Routine post-endoscopy anesthesia recovery care.  Discharge patient when  she is awake, alert and comfortable, when recovery criteria are met.  Aspiration and fall precautions x 24 hours.   2. Avoid NSAIDs  3. Prilosec 40mg PO QD x 14 days  4. Follow-up with established GI Guille Porter PA-C in 2 weeks.  5. I spoke to patient,  (Guillermo) and recovery nurse about impression, diagnosis and recommendations.

## 2018-05-18 NOTE — OR NURSING
"1531 Pt to PACU from OR via gurney, respirations spontaneous and non-labored via 3L NC. Pt arousable on calling with no complaints of pain or nausea.  1545 Pt calm resting with eyes, closed, warm blanket provided for comfort.   1555 Pt c/o 6/10 abdominal pain denies nausea. Plan to medicate, oral analgesia administered.    1559 IV analgesia administered.   1600 No changes, VSS.   1608 Pt report pain improved but not tolerable IV analgesia administered.   1615 Pt reports 5/10 \"barely tolerable\" pain. Unable to give anymore narcotic at this time due to decreased respiratory rate spo2 90-92 %, pt educated, verbalizes understanding.     1630 Pt meets criteria for transfer to stage II.   1632 Report to ALEX Lawrence.   1635 Pt transported to stage II.         "

## 2018-05-18 NOTE — DISCHARGE INSTRUCTIONS
ENDOSCOPY HOME CARE INSTRUCTIONS    GASTROSCOPY OR ERCP  1. Don't eat or drink anything for about an hour after the test. You can then resume your regular diet.  2. Don't drive or drink alcohol for 24 hours. The medication you received will make you too drowsy.  3. Don't take any coffee, tea, or aspirin products until after you see your doctor. These can harm the lining of your stomach.  4. If you begin to vomit bloody material, or develop black or bloody stools, call your doctor as soon as possible.  5. If you have any neck, chest, abdominal pain or temp of 100 degrees, call your doctor.  6. See your doctor as scheduled   7. Additional instructions: No alcohol or sleeping pills today.  Avoid aspirin, NSAIDs (e.g. Ibuprofen, naprosyn, etc.) and fish oil for 5days.  Patient not to drive, operate heavy machinery or make important decisions for 24 hours.  Please confirm that patient has a responsible adult /chaparone to accompany patient.  [facility] office will call for follow-up with Guille Porter PA-C.  8. 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    Dr. Romero [phone].     You should call 911 if you develop problems with breathing or chest pain.  If any questions arise, call your doctor. If your doctor is not available, please feel free to call [phone]. You can also call the HEALTH HOTLINE open 24 hours/day, 7 days/week and speak to a nurse at [phone], or toll free [phone].    Depression / Suicide Risk    As you are discharged from this [facility] facility, it is important to learn how to keep safe from harming yourself.    Recognize the warning signs:  · Abrupt changes in personality, positive or negative- including increase in energy   · Giving away possessions  · Change in  eating patterns- significant weight changes-  positive or negative  · Change in sleeping patterns- unable to sleep or sleeping all the time   · Unwillingness or inability to communicate  · Depression  · Unusual sadness, discouragement and loneliness  · Talk of wanting to die  · Neglect of personal appearance   · Rebelliousness- reckless behavior  · Withdrawal from people/activities they love  · Confusion- inability to concentrate     If you or a loved one observes any of these behaviors or has concerns about self-harm, here's what you can do:  · Talk about it- your feelings and reasons for harming yourself  · Remove any means that you might use to hurt yourself (examples: pills, rope, extension cords, firearm)  · Get professional help from the community (Mental Health, Substance Abuse, psychological counseling)  · Do not be alone:Call your Safe Contact- someone whom you trust who will be there for you.  · Call your local CRISIS HOTLINE 529-3271 or 936-325-8472  · Call your local Children's Mobile Crisis Response Team [place] (414) 825-9638 or www.[employer]  · Call the toll free National Suicide Prevention Hotlines   · National Suicide Prevention Lifeline 648-698-QLSQ (0469)  · National Hope Line Network 800-SUICIDE (067-4108)    I acknowledge receipt and understanding of these Home Care Instructions.

## 2018-05-19 NOTE — OR NURSING
1635: Settled in recliner post short ambulation from [facility], pain is tolerable, no nausea, awake and alert.  1711: D/Piter to care of family post uneventful stay in PACU 2.

## 2018-05-25 ENCOUNTER — HOSPITAL ENCOUNTER (OUTPATIENT)
Dept: LAB | Facility: MEDICAL CENTER | Age: 73
End: 2018-05-25
Attending: INTERNAL MEDICINE
Payer: MEDICARE

## 2018-05-25 LAB
ALBUMIN SERPL BCP-MCNC: 3.9 G/DL (ref 3.2–4.9)
ALBUMIN/GLOB SERPL: 1.4 G/DL
ALP SERPL-CCNC: 69 U/L (ref 30–99)
ALT SERPL-CCNC: 15 U/L (ref 2–50)
ANION GAP SERPL CALC-SCNC: 8 MMOL/L (ref 0–11.9)
AST SERPL-CCNC: 16 U/L (ref 12–45)
BASOPHILS # BLD AUTO: 0.8 % (ref 0–1.8)
BASOPHILS # BLD: 0.05 K/UL (ref 0–0.12)
BILIRUB SERPL-MCNC: 0.5 MG/DL (ref 0.1–1.5)
BUN SERPL-MCNC: 18 MG/DL (ref 8–22)
CALCIUM SERPL-MCNC: 10.2 MG/DL (ref 8.5–10.5)
CANCER AG19-9 SERPL-ACNC: 6.1 U/ML (ref 0–35)
CHLORIDE SERPL-SCNC: 104 MMOL/L (ref 96–112)
CO2 SERPL-SCNC: 30 MMOL/L (ref 20–33)
CREAT SERPL-MCNC: 0.89 MG/DL (ref 0.5–1.4)
EOSINOPHIL # BLD AUTO: 0.13 K/UL (ref 0–0.51)
EOSINOPHIL NFR BLD: 2.1 % (ref 0–6.9)
ERYTHROCYTE [DISTWIDTH] IN BLOOD BY AUTOMATED COUNT: 44.6 FL (ref 35.9–50)
GLOBULIN SER CALC-MCNC: 2.8 G/DL (ref 1.9–3.5)
GLUCOSE SERPL-MCNC: 124 MG/DL (ref 65–99)
HCT VFR BLD AUTO: 36 % (ref 37–47)
HGB BLD-MCNC: 11.4 G/DL (ref 12–16)
IMM GRANULOCYTES # BLD AUTO: 0.03 K/UL (ref 0–0.11)
IMM GRANULOCYTES NFR BLD AUTO: 0.5 % (ref 0–0.9)
LYMPHOCYTES # BLD AUTO: 1.58 K/UL (ref 1–4.8)
LYMPHOCYTES NFR BLD: 25.9 % (ref 22–41)
MCH RBC QN AUTO: 29.8 PG (ref 27–33)
MCHC RBC AUTO-ENTMCNC: 31.7 G/DL (ref 33.6–35)
MCV RBC AUTO: 94.2 FL (ref 81.4–97.8)
MONOCYTES # BLD AUTO: 0.58 K/UL (ref 0–0.85)
MONOCYTES NFR BLD AUTO: 9.5 % (ref 0–13.4)
NEUTROPHILS # BLD AUTO: 3.73 K/UL (ref 2–7.15)
NEUTROPHILS NFR BLD: 61.2 % (ref 44–72)
NRBC # BLD AUTO: 0 K/UL
NRBC BLD-RTO: 0 /100 WBC
PLATELET # BLD AUTO: 276 K/UL (ref 164–446)
PMV BLD AUTO: 9.3 FL (ref 9–12.9)
POTASSIUM SERPL-SCNC: 3.5 MMOL/L (ref 3.6–5.5)
PROT SERPL-MCNC: 6.7 G/DL (ref 6–8.2)
RBC # BLD AUTO: 3.82 M/UL (ref 4.2–5.4)
SODIUM SERPL-SCNC: 142 MMOL/L (ref 135–145)
WBC # BLD AUTO: 6.1 K/UL (ref 4.8–10.8)

## 2018-05-25 PROCEDURE — 85025 COMPLETE CBC W/AUTO DIFF WBC: CPT

## 2018-05-25 PROCEDURE — 86301 IMMUNOASSAY TUMOR CA 19-9: CPT

## 2018-05-25 PROCEDURE — 36415 COLL VENOUS BLD VENIPUNCTURE: CPT

## 2018-05-25 PROCEDURE — 80053 COMPREHEN METABOLIC PANEL: CPT

## 2018-05-29 ENCOUNTER — PATIENT OUTREACH (OUTPATIENT)
Dept: OTHER | Facility: MEDICAL CENTER | Age: 73
End: 2018-05-29

## 2018-05-29 NOTE — PROGRESS NOTES
Meeting with pt at the request of Dr. Valadez.  Reviewed POC and PET prep instructions.  Also, at pt's request, showed her where the Infusion room is located. Pt stated that she wanted to have her treatment at [facility] for financial reasons.  She stated that she would not be able to pay the full co-pay at the time of service, which is the expectation at Dr. Eaton's office.  She is requesting to be billed for the co-pays for infusion so that she can make payments on her limited income.  She was a little overwhelmed with the information given today by Dr. GUTIERREZ and by MARTÍNEZ.  POC was repeated.  Pt asked questions regarding appointments and lab work, and expectations for that.  These questions were answered several times until pt and spouse verbalized understanding.  Pt was anxious to leave, so further assessment was deferred.  Referrals were placed to [facility], and dietician.  domo

## 2018-05-30 ENCOUNTER — HOSPITAL ENCOUNTER (OUTPATIENT)
Facility: MEDICAL CENTER | Age: 73
End: 2018-05-30
Attending: SURGERY | Admitting: SURGERY
Payer: MEDICARE

## 2018-05-30 ENCOUNTER — HOSPITAL ENCOUNTER (OUTPATIENT)
Dept: RADIOLOGY | Facility: MEDICAL CENTER | Age: 73
End: 2018-05-30
Attending: INTERNAL MEDICINE
Payer: MEDICARE

## 2018-05-30 ENCOUNTER — APPOINTMENT (OUTPATIENT)
Dept: RADIOLOGY | Facility: MEDICAL CENTER | Age: 73
End: 2018-05-30
Attending: SURGERY
Payer: MEDICARE

## 2018-05-30 VITALS
WEIGHT: 152.56 LBS | BODY MASS INDEX: 24.52 KG/M2 | SYSTOLIC BLOOD PRESSURE: 117 MMHG | DIASTOLIC BLOOD PRESSURE: 57 MMHG | RESPIRATION RATE: 20 BRPM | TEMPERATURE: 97.2 F | OXYGEN SATURATION: 94 % | HEIGHT: 66 IN | HEART RATE: 75 BPM

## 2018-05-30 DIAGNOSIS — C25.8 MALIGNANT NEOPLASM OF OVERLAPPING SITES OF PANCREAS (HCC): ICD-10-CM

## 2018-05-30 PROBLEM — C25.9 PANCREATIC CANCER (HCC): Status: ACTIVE | Noted: 2018-05-30

## 2018-05-30 LAB
INR PPP: 0.99 (ref 0.87–1.13)
PROTHROMBIN TIME: 12.8 SEC (ref 12–14.6)

## 2018-05-30 PROCEDURE — C1788 PORT, INDWELLING, IMP: HCPCS | Performed by: SURGERY

## 2018-05-30 PROCEDURE — 700111 HCHG RX REV CODE 636 W/ 250 OVERRIDE (IP)

## 2018-05-30 PROCEDURE — 160028 HCHG SURGERY MINUTES - 1ST 30 MINS LEVEL 3: Performed by: SURGERY

## 2018-05-30 PROCEDURE — 85610 PROTHROMBIN TIME: CPT

## 2018-05-30 PROCEDURE — 160009 HCHG ANES TIME/MIN: Performed by: SURGERY

## 2018-05-30 PROCEDURE — A6402 STERILE GAUZE <= 16 SQ IN: HCPCS | Performed by: SURGERY

## 2018-05-30 PROCEDURE — 160036 HCHG PACU - EA ADDL 30 MINS PHASE I: Performed by: SURGERY

## 2018-05-30 PROCEDURE — 160035 HCHG PACU - 1ST 60 MINS PHASE I: Performed by: SURGERY

## 2018-05-30 PROCEDURE — 160039 HCHG SURGERY MINUTES - EA ADDL 1 MIN LEVEL 3: Performed by: SURGERY

## 2018-05-30 PROCEDURE — 160048 HCHG OR STATISTICAL LEVEL 1-5: Performed by: SURGERY

## 2018-05-30 PROCEDURE — 500002 HCHG ADHESIVE, DERMABOND: Performed by: SURGERY

## 2018-05-30 PROCEDURE — 160002 HCHG RECOVERY MINUTES (STAT): Performed by: SURGERY

## 2018-05-30 PROCEDURE — 700101 HCHG RX REV CODE 250

## 2018-05-30 PROCEDURE — 501838 HCHG SUTURE GENERAL: Performed by: SURGERY

## 2018-05-30 PROCEDURE — A9552 F18 FDG: HCPCS

## 2018-05-30 DEVICE — CATHETER POWERPORT CLEARVUE MRI 8FR ATTACHABLE CHRONOFLEX: Type: IMPLANTABLE DEVICE | Status: FUNCTIONAL

## 2018-05-30 RX ORDER — HEPARIN SODIUM,PORCINE 1000/ML
VIAL (ML) INJECTION
Status: DISCONTINUED | OUTPATIENT
Start: 2018-05-30 | End: 2018-05-30 | Stop reason: HOSPADM

## 2018-05-30 RX ORDER — BUPIVACAINE HYDROCHLORIDE 2.5 MG/ML
INJECTION, SOLUTION EPIDURAL; INFILTRATION; INTRACAUDAL
Status: DISCONTINUED
Start: 2018-05-30 | End: 2018-05-30 | Stop reason: HOSPADM

## 2018-05-30 RX ORDER — BUPIVACAINE HYDROCHLORIDE AND EPINEPHRINE 5; 5 MG/ML; UG/ML
INJECTION, SOLUTION EPIDURAL; INTRACAUDAL; PERINEURAL
Status: DISCONTINUED | OUTPATIENT
Start: 2018-05-30 | End: 2018-05-30 | Stop reason: HOSPADM

## 2018-05-30 RX ORDER — EPINEPHRINE 1 MG/ML
INJECTION INTRAMUSCULAR; INTRAVENOUS; SUBCUTANEOUS
Status: DISCONTINUED
Start: 2018-05-30 | End: 2018-05-30 | Stop reason: HOSPADM

## 2018-05-30 RX ORDER — PROMETHAZINE HYDROCHLORIDE 25 MG/1
12.5 TABLET ORAL EVERY 6 HOURS PRN
Qty: 30 TAB | Refills: 0 | Status: SHIPPED | OUTPATIENT
Start: 2018-05-30 | End: 2018-10-29

## 2018-05-30 RX ORDER — SODIUM CHLORIDE, SODIUM LACTATE, POTASSIUM CHLORIDE, CALCIUM CHLORIDE 600; 310; 30; 20 MG/100ML; MG/100ML; MG/100ML; MG/100ML
INJECTION, SOLUTION INTRAVENOUS CONTINUOUS
Status: DISCONTINUED | OUTPATIENT
Start: 2018-05-30 | End: 2018-05-30 | Stop reason: HOSPADM

## 2018-05-30 RX ORDER — HEPARIN SODIUM,PORCINE 1000/ML
VIAL (ML) INJECTION
Status: DISCONTINUED
Start: 2018-05-30 | End: 2018-05-30 | Stop reason: HOSPADM

## 2018-05-30 RX ADMIN — SODIUM CHLORIDE, SODIUM LACTATE, POTASSIUM CHLORIDE, CALCIUM CHLORIDE: 600; 310; 30; 20 INJECTION, SOLUTION INTRAVENOUS at 06:20

## 2018-05-30 ASSESSMENT — PAIN SCALES - GENERAL
PAINLEVEL_OUTOF10: 3
PAINLEVEL_OUTOF10: 0

## 2018-05-30 NOTE — OR NURSING
RECEIVED FROM OR WITH DR DAVID.  ORAL AIRWAY IN PLACE  VSS  INCISION ON RIGHT SUBCLAVIAN CLOSED WITH DERMABOND.  AIRWAY DC'D.  PATIENT DENIES PAIN/NAUSEA.  0815 MORE AWAKE.  GIVEN WATER.   BROUGHT TO BEDSIDE.  0830 PATIENT WITHOUT COMPLAINT.  DISCHARGE INSTRUCTIONS TO PATIENT AND .  0845 UP AND DRESSED.  NO CHANGE AT SITE.  FEELS READY FOR DISCHARGE.  ALL QUESTIONS ANSWERED.

## 2018-05-30 NOTE — DISCHARGE INSTRUCTIONS
ACTIVITY: Rest and take it easy for the first 24 hours.  A responsible adult is recommended to remain with you during that time.  It is normal to feel sleepy.  We encourage you to not do anything that requires balance, judgment or coordination.    MILD FLU-LIKE SYMPTOMS ARE NORMAL. YOU MAY EXPERIENCE GENERALIZED MUSCLE ACHES, THROAT IRRITATION, HEADACHE AND/OR SOME NAUSEA.    FOR 24 HOURS DO NOT:  Drive, operate machinery or run household appliances.  Drink beer or alcoholic beverages.   Make important decisions or sign legal documents.    SPECIAL INSTRUCTIONS: *PLEASE SEE POWER PORT PACKET**    DIET: To avoid nausea, slowly advance diet as tolerated, avoiding spicy or greasy foods for the first day.  Add more substantial food to your diet according to your physician's instructions.  Babies can be fed formula or breast milk as soon as they are hungry.  INCREASE FLUIDS AND FIBER TO AVOID CONSTIPATION.    SURGICAL DRESSING/BATHING: *OK TO SHOWER TOMORROW MORNING**    FOLLOW-UP APPOINTMENT:  A follow-up appointment should be arranged with your doctor in **FOUR WEEKS*; call to schedule.    You should CALL YOUR PHYSICIAN if you develop:  Fever greater than 101 degrees F.  Pain not relieved by medication, or persistent nausea or vomiting.  Excessive bleeding (blood soaking through dressing) or unexpected drainage from the wound.  Extreme redness or swelling around the incision site, drainage of pus or foul smelling drainage.  Inability to urinate or empty your bladder within 8 hours.  Problems with breathing or chest pain.    You should call 911 if you develop problems with breathing or chest pain.  If you are unable to contact your doctor or surgical center, you should go to the nearest emergency room or urgent care center.    Physician's telephone #: *[phone]**    If any questions arise, call your doctor.  If your doctor is not available, please feel free to call the Surgical Center at [phone].  The Center is open  Monday through Friday from 7AM to 7PM.  You can also call the HEALTH HOTLINE open 24 hours/day, 7 days/week and speak to a nurse at [phone], or toll free at [phone].    A registered nurse may call you a few days after your surgery to see how you are doing after your procedure.    MEDICATIONS: Resume taking daily medication.  Take prescribed pain medication with food.  If no medication is prescribed, you may take non-aspirin pain medication if needed.  PAIN MEDICATION CAN BE VERY CONSTIPATING.  Take a stool softener or laxative such as senokot, pericolace, or milk of magnesia if needed.    Prescription given for **PHENERGAN*.  Last pain medication given at *_____________**.    If your physician has prescribed pain medication that includes Acetaminophen (Tylenol), do not take additional Acetaminophen (Tylenol) while taking the prescribed medication.    Depression / Suicide Risk    As you are discharged from this [facility] facility, it is important to learn how to keep safe from harming yourself.    Recognize the warning signs:  · Abrupt changes in personality, positive or negative- including increase in energy   · Giving away possessions  · Change in eating patterns- significant weight changes-  positive or negative  · Change in sleeping patterns- unable to sleep or sleeping all the time   · Unwillingness or inability to communicate  · Depression  · Unusual sadness, discouragement and loneliness  · Talk of wanting to die  · Neglect of personal appearance   · Rebelliousness- reckless behavior  · Withdrawal from people/activities they love  · Confusion- inability to concentrate     If you or a loved one observes any of these behaviors or has concerns about self-harm, here's what you can do:  · Talk about it- your feelings and reasons for harming yourself  · Remove any means that you might use to hurt yourself (examples: pills, rope, extension cords, firearm)  · Get professional help from the community  (Mental Health, Substance Abuse, psychological counseling)  · Do not be alone:Call your Safe Contact- someone whom you trust who will be there for you.  · Call your local CRISIS HOTLINE 307-3708 or 738-745-7565  · Call your local Children's Mobile Crisis Response Team [place] (152) 578-7529 or www.[employer]  · Call the toll free National Suicide Prevention Hotlines   · National Suicide Prevention Lifeline 064-023-YIWY (6411)  · National Hope Line Network 800-SUICIDE (035-8666)

## 2018-05-30 NOTE — OP REPORT
DATE OF SERVICE:  05/30/2018    INDICATIONS:  Patient is a 73-year-old female patient of Dr. Eaton who was   found to have borderline resectable pancreatic cancer.  After long discussion   with the patient and Dr. Eaton, we elected to go ahead and proceed with   neoadjuvant chemotherapy.    SURGEON:  Carl Valadez MD    ASSISTANT:  None.    ANESTHESIOLOGIST:  Mike Hudson MD    ANESTHESIA:  General and local anesthetic.    ESTIMATED BLOOD LOSS:  Less than 5 mL.    COMPLICATIONS:  None.    FINDINGS:  Fluoroscopically placed catheter through the right cephalic vein to   the superior vena cava atrial junction with good inflow and outflow x3.    PROCEDURE DONE:  Placement of right cephalic cutdown PowerPort.    DESCRIPTION OF PROCEDURE:  Patient was brought to OR, placed under general   anesthetic, prepped with chlorhexidine prep, covered with sterile drapes,   given preoperative antibiotics.  Time-out was done, which was adequate.  At   that point, she was given 5 mL of 0.5% Marcaine with epinephrine in the right   deltopectoral groove.  Incision made with a 10 blade and Bovie electrocautery.    Upon entering the deltopectoral groove, patient was found to have really   well-developed deltoid and pec muscle, the cephalic vein was identified.    Proximal and distal control was gained with 3-0 Vicryl.  The distal was tied,   proximal was elevated and a small venotomy was created with 11 blade,   10-Turkmen flush PowerPort catheter was inserted through the vein under   fluoroscopic guidance to the superior vena cava atrial junction.  Once in   position, there was no ectopy.  It was tested having great inflow and outflow.    At that point, the proximal was tied.  It was clamped with a rubber shot,   attached to a flush PowerPort with the cuff.  Rubber shod was removed.  It was   tested for a second time having good inflow and outflow.  At that point, the   patient was placed in reverse Trendelenburg  exposing the chest wall.  Pocket   was created with Bovie electrocautery.  The port was then secured to the pec   fascia with interrupted 2-0 Prolene x4.  Once in final position, it was tested   for a third time having good inflow and outflow without any resistance.  At   that point, operation was completed.  We gave total of 30 mL of 0.5% Marcaine   with epinephrine throughout the area of the pec fascia, deltoid and soft   tissue.  The soft tissue was brought together with interrupted 3-0 Vicryl,   skin was closed with 4-0 Monocryl, covered with Dermabond, extubated, and   transferred to recovery.       ____________________________________     MD CORTEZ LOPEZ / NTS    DD:  05/30/2018 07:55:03  DT:  05/30/2018 08:12:46    D#:  [id number]  Job#:  [id number]    cc: YANN KELSEY MD

## 2018-05-31 ENCOUNTER — DOCUMENTATION (OUTPATIENT)
Dept: HEMATOLOGY ONCOLOGY | Facility: MEDICAL CENTER | Age: 73
End: 2018-05-31

## 2018-05-31 NOTE — PROGRESS NOTES
Nutrition Services:     RD referral received; pt scheduled for chemo on 6/11; therefore, RD to meet w/ pt then and leave recommendations accordingly.

## 2018-06-02 ENCOUNTER — HOSPITAL ENCOUNTER (OUTPATIENT)
Dept: RADIOLOGY | Facility: MEDICAL CENTER | Age: 73
End: 2018-06-02
Attending: INTERNAL MEDICINE
Payer: MEDICARE

## 2018-06-02 DIAGNOSIS — C25.2 MALIGNANT NEOPLASM OF TAIL OF PANCREAS (HCC): ICD-10-CM

## 2018-06-02 PROCEDURE — A9579 GAD-BASE MR CONTRAST NOS,1ML: HCPCS | Performed by: INTERNAL MEDICINE

## 2018-06-02 PROCEDURE — 700117 HCHG RX CONTRAST REV CODE 255: Performed by: INTERNAL MEDICINE

## 2018-06-02 PROCEDURE — 74183 MRI ABD W/O CNTR FLWD CNTR: CPT

## 2018-06-02 RX ADMIN — GADODIAMIDE 15 ML: 287 INJECTION INTRAVENOUS at 15:24

## 2018-06-03 ENCOUNTER — OUTPATIENT INFUSION SERVICES (OUTPATIENT)
Dept: ONCOLOGY | Facility: MEDICAL CENTER | Age: 73
End: 2018-06-03
Attending: INTERNAL MEDICINE
Payer: MEDICARE

## 2018-06-03 VITALS
SYSTOLIC BLOOD PRESSURE: 99 MMHG | HEIGHT: 65 IN | RESPIRATION RATE: 18 BRPM | BODY MASS INDEX: 25.45 KG/M2 | HEART RATE: 67 BPM | TEMPERATURE: 98 F | WEIGHT: 152.78 LBS | OXYGEN SATURATION: 96 % | DIASTOLIC BLOOD PRESSURE: 49 MMHG

## 2018-06-03 PROCEDURE — 306780 HCHG STAT FOR TRANSFUSION PER CASE

## 2018-06-03 ASSESSMENT — PAIN SCALES - GENERAL: PAINLEVEL_OUTOF10: 0

## 2018-06-03 NOTE — PROGRESS NOTES
Patient and spouse present ambulatory for chemotherapy education appointment.  Patient reports feeling well and denies any s/s of infection at this time.  Chemotherapy power point reviewed with patient and spouse, patient's medications FOLFIRINOX reviewed, handouts provided, all questions asked and answered.  Chemotherapy folder contents reviewed and given to patient.  Patient oriented to OPIC, educated on timing of lab draws and emla cream, patient to call MD to request prescription of emla cream prior to first chemo appt,.  Patient to return 6/11/18 for first chemo, confirmed time and date with patient and spouse.  Patient and spouse verbalized understanding of all education and all questions asked and answered, stating they have no further questions at this time.  Patient and spouse left ambulatory in no distress.

## 2018-06-04 RX ORDER — LOPERAMIDE HYDROCHLORIDE 2 MG/1
2 CAPSULE ORAL PRN
Status: CANCELLED | OUTPATIENT
Start: 2018-06-12

## 2018-06-04 RX ORDER — DEXTROSE MONOHYDRATE 50 MG/ML
INJECTION, SOLUTION INTRAVENOUS CONTINUOUS
Status: CANCELLED | OUTPATIENT
Start: 2018-06-26

## 2018-06-04 RX ORDER — 0.9 % SODIUM CHLORIDE 0.9 %
5 VIAL (ML) INJECTION PRN
Status: CANCELLED | OUTPATIENT
Start: 2018-06-26

## 2018-06-04 RX ORDER — PROCHLORPERAZINE MALEATE 10 MG
10 TABLET ORAL EVERY 6 HOURS PRN
Status: CANCELLED | OUTPATIENT
Start: 2018-06-12

## 2018-06-04 RX ORDER — LOPERAMIDE HYDROCHLORIDE 2 MG/1
2 CAPSULE ORAL PRN
Status: CANCELLED | OUTPATIENT
Start: 2018-06-26

## 2018-06-04 RX ORDER — 0.9 % SODIUM CHLORIDE 0.9 %
20 VIAL (ML) INJECTION PRN
Status: CANCELLED | OUTPATIENT
Start: 2018-06-14

## 2018-06-04 RX ORDER — 0.9 % SODIUM CHLORIDE 0.9 %
VIAL (ML) INJECTION PRN
Status: CANCELLED | OUTPATIENT
Start: 2018-06-26

## 2018-06-04 RX ORDER — DEXTROSE MONOHYDRATE 50 MG/ML
INJECTION, SOLUTION INTRAVENOUS CONTINUOUS
Status: CANCELLED | OUTPATIENT
Start: 2018-06-12

## 2018-06-04 RX ORDER — ONDANSETRON 8 MG/1
8 TABLET, ORALLY DISINTEGRATING ORAL
Status: CANCELLED | OUTPATIENT
Start: 2018-06-12

## 2018-06-04 RX ORDER — 0.9 % SODIUM CHLORIDE 0.9 %
VIAL (ML) INJECTION PRN
Status: CANCELLED | OUTPATIENT
Start: 2018-06-12

## 2018-06-04 RX ORDER — ONDANSETRON 2 MG/ML
4 INJECTION INTRAMUSCULAR; INTRAVENOUS
Status: CANCELLED | OUTPATIENT
Start: 2018-06-12

## 2018-06-04 RX ORDER — PROCHLORPERAZINE MALEATE 10 MG
10 TABLET ORAL EVERY 6 HOURS PRN
Status: CANCELLED | OUTPATIENT
Start: 2018-06-26

## 2018-06-04 RX ORDER — ONDANSETRON 8 MG/1
8 TABLET, ORALLY DISINTEGRATING ORAL
Status: CANCELLED | OUTPATIENT
Start: 2018-06-26

## 2018-06-04 RX ORDER — 0.9 % SODIUM CHLORIDE 0.9 %
20 VIAL (ML) INJECTION PRN
Status: CANCELLED | OUTPATIENT
Start: 2018-06-28

## 2018-06-04 RX ORDER — ONDANSETRON 2 MG/ML
4 INJECTION INTRAMUSCULAR; INTRAVENOUS
Status: CANCELLED | OUTPATIENT
Start: 2018-06-26

## 2018-06-04 RX ORDER — 0.9 % SODIUM CHLORIDE 0.9 %
5 VIAL (ML) INJECTION PRN
Status: CANCELLED | OUTPATIENT
Start: 2018-06-12

## 2018-06-06 ENCOUNTER — OFFICE VISIT (OUTPATIENT)
Dept: MEDICAL GROUP | Facility: MEDICAL CENTER | Age: 73
End: 2018-06-06
Payer: MEDICARE

## 2018-06-06 VITALS
HEART RATE: 64 BPM | SYSTOLIC BLOOD PRESSURE: 114 MMHG | WEIGHT: 153 LBS | TEMPERATURE: 97.1 F | HEIGHT: 65 IN | BODY MASS INDEX: 25.49 KG/M2 | OXYGEN SATURATION: 98 % | DIASTOLIC BLOOD PRESSURE: 60 MMHG | RESPIRATION RATE: 18 BRPM

## 2018-06-06 DIAGNOSIS — C25.1 MALIGNANT NEOPLASM OF BODY OF PANCREAS (HCC): ICD-10-CM

## 2018-06-06 PROCEDURE — 99214 OFFICE O/P EST MOD 30 MIN: CPT | Mod: 25 | Performed by: FAMILY MEDICINE

## 2018-06-06 PROCEDURE — 99358 PROLONG SERVICE W/O CONTACT: CPT | Performed by: FAMILY MEDICINE

## 2018-06-06 ASSESSMENT — PATIENT HEALTH QUESTIONNAIRE - PHQ9: CLINICAL INTERPRETATION OF PHQ2 SCORE: 0

## 2018-06-07 ENCOUNTER — HOSPITAL ENCOUNTER (OUTPATIENT)
Dept: LAB | Facility: MEDICAL CENTER | Age: 73
End: 2018-06-07
Attending: INTERNAL MEDICINE
Payer: MEDICARE

## 2018-06-07 LAB
ALBUMIN SERPL BCP-MCNC: 3.9 G/DL (ref 3.2–4.9)
ALBUMIN/GLOB SERPL: 1.3 G/DL
ALP SERPL-CCNC: 63 U/L (ref 30–99)
ALT SERPL-CCNC: 15 U/L (ref 2–50)
ANION GAP SERPL CALC-SCNC: 6 MMOL/L (ref 0–11.9)
AST SERPL-CCNC: 15 U/L (ref 12–45)
BASOPHILS # BLD AUTO: 0.8 % (ref 0–1.8)
BASOPHILS # BLD: 0.05 K/UL (ref 0–0.12)
BILIRUB SERPL-MCNC: 0.4 MG/DL (ref 0.1–1.5)
BUN SERPL-MCNC: 24 MG/DL (ref 8–22)
CALCIUM SERPL-MCNC: 10.5 MG/DL (ref 8.5–10.5)
CHLORIDE SERPL-SCNC: 103 MMOL/L (ref 96–112)
CO2 SERPL-SCNC: 32 MMOL/L (ref 20–33)
CREAT SERPL-MCNC: 1.05 MG/DL (ref 0.5–1.4)
EOSINOPHIL # BLD AUTO: 0.25 K/UL (ref 0–0.51)
EOSINOPHIL NFR BLD: 4.1 % (ref 0–6.9)
ERYTHROCYTE [DISTWIDTH] IN BLOOD BY AUTOMATED COUNT: 44.2 FL (ref 35.9–50)
GLOBULIN SER CALC-MCNC: 3 G/DL (ref 1.9–3.5)
GLUCOSE SERPL-MCNC: 93 MG/DL (ref 65–99)
HCT VFR BLD AUTO: 38.3 % (ref 37–47)
HGB BLD-MCNC: 12.3 G/DL (ref 12–16)
IMM GRANULOCYTES # BLD AUTO: 0.02 K/UL (ref 0–0.11)
IMM GRANULOCYTES NFR BLD AUTO: 0.3 % (ref 0–0.9)
LYMPHOCYTES # BLD AUTO: 1.84 K/UL (ref 1–4.8)
LYMPHOCYTES NFR BLD: 30.4 % (ref 22–41)
MCH RBC QN AUTO: 30.6 PG (ref 27–33)
MCHC RBC AUTO-ENTMCNC: 32.1 G/DL (ref 33.6–35)
MCV RBC AUTO: 95.3 FL (ref 81.4–97.8)
MONOCYTES # BLD AUTO: 0.53 K/UL (ref 0–0.85)
MONOCYTES NFR BLD AUTO: 8.8 % (ref 0–13.4)
NEUTROPHILS # BLD AUTO: 3.36 K/UL (ref 2–7.15)
NEUTROPHILS NFR BLD: 55.6 % (ref 44–72)
NRBC # BLD AUTO: 0 K/UL
NRBC BLD-RTO: 0 /100 WBC
PLATELET # BLD AUTO: 220 K/UL (ref 164–446)
PMV BLD AUTO: 9.3 FL (ref 9–12.9)
POTASSIUM SERPL-SCNC: 3.9 MMOL/L (ref 3.6–5.5)
PROT SERPL-MCNC: 6.9 G/DL (ref 6–8.2)
RBC # BLD AUTO: 4.02 M/UL (ref 4.2–5.4)
SODIUM SERPL-SCNC: 141 MMOL/L (ref 135–145)
WBC # BLD AUTO: 6.1 K/UL (ref 4.8–10.8)

## 2018-06-07 PROCEDURE — 36415 COLL VENOUS BLD VENIPUNCTURE: CPT

## 2018-06-07 PROCEDURE — 80053 COMPREHEN METABOLIC PANEL: CPT

## 2018-06-07 PROCEDURE — 85025 COMPLETE CBC W/AUTO DIFF WBC: CPT

## 2018-06-07 NOTE — ASSESSMENT & PLAN NOTE
Patient presents today for care coordination of her pancreatic cancer   I strongly encourage her to continue care with her current primary care provider as continuity is essential at this time  She may be radiation candidate so I discuss this with her and place referral for consultation   She is est with heme onc  She is est with Dr. GUTIERREZ hepatobiliary surgeon   Pain per private pain group - a agree with this as she is not opiate niave patient

## 2018-06-07 NOTE — PROGRESS NOTES
This medical record contains text that has been entered with the assistance of computer voice recognition and dictation software.  Therefore, it may contain unintended errors in text, spelling, punctuation, or grammar        Chief Complaint   Patient presents with   • Establish Care     DX: Stage III Pancreatic CA (Per Pt.)   • Back Pain     HX: Chronic back pain (Sees a specialist)       Larissa Ramirez is a 73 y.o. female here evaluation and management of: coordination of care pancreatic CA      HPI:     Pt feels in her usual state of health today   No new or sudden symptoms       Current Outpatient Prescriptions   Medication Sig Dispense Refill   • Menthol, Topical Analgesic, (BIOFREEZE COLORLESS) 4 % Gel by Apply externally route 2 times a day as needed.     • magnesium hydroxide (MILK OF MAGNESIA) 400 MG/5ML Suspension Take 30 mL by mouth 1 time daily as needed.     • Calcium Carb-Cholecalciferol (CALCIUM 1000 + D PO) Take 1 Tab by mouth every day.     • conjugated estrogen (PREMARIN) 0.625 MG/GM Cream Insert 0.5 g in vagina every day. 1 Tube 2   • lisinopril-hydrochlorothiazide (PRINZIDE, ZESTORETIC) 20-25 MG per tablet Take 1 Tab by mouth every day. 90 Tab 1   • hydrocodone/acetaminophen (NORCO)  MG Tab Take 1 Tab by mouth every 8 hours as needed. 90 Tab 0   • vitamin D (CHOLECALCIFEROL) 1000 UNIT TABS Take 1,000 Units by mouth every day.     • Multiple Vitamins-Iron (MULTIVITAMIN/IRON PO) Take 1 Tab by mouth every day.     • diazepam (VALIUM) 2 MG TABS Take 2 mg by mouth every 6 hours as needed for Sleep. Take two to three tabs by mouth at bedtime as needed     • tramadol (ULTRAM) 50 MG TABS Take  mg by mouth every 6 hours as needed.     • promethazine (PHENERGAN) 25 MG Tab Take 0.5 Tabs by mouth every 6 hours as needed. 30 Tab 0   • topiramate (TOPAMAX) 100 MG Tab Take 50 mg by mouth every bedtime.       No current facility-administered medications for this visit.      Patient Active Problem List     Diagnosis Date Noted   • Pancreatic cancer (HCC) 05/30/2018   • Opiate dependence, continuous (HCC) 04/12/2018   • Dyslipidemia 06/12/2017   • Impaired fasting blood sugar 06/12/2017   • Chronic bilateral low back pain with bilateral sciatica 06/12/2017   • Chronic neck pain 06/12/2017   • Acquired female bladder prolapse 05/19/2017   • DDD (degenerative disc disease), cervical 01/30/2017   • Atrophic vaginitis 01/30/2017   • Chronic sciatica of right side 10/13/2014   • DDD (degenerative disc disease), lumbar 10/13/2014   • Chronic shoulder pain 10/13/2014   • Vitamin D deficiency disease 02/25/2014   • Prediabetes 01/22/2014   • Hypertension goal BP (blood pressure) < 140/80 04/20/2012     Past Surgical History:   Procedure Laterality Date   • CATH PLACEMENT Right 5/30/2018    Procedure: CATH PLACEMENT - CEPHALIC POWER PORT;  Surgeon: Carl Hunter M.D.;  Location: SURGERY SAME DAY [facility];  Service: General   • GASTROSCOPY  5/18/2018    Procedure: GASTROSCOPY;  Surgeon: Geovanni Romero M.D.;  Location: [facility];  Service: EUS   • EGD W/ENDOSCOPIC ULTRASOUND  5/18/2018    Procedure: EGD W/ENDOSCOPIC ULTRASOUND-  UPPER CURVILLINEAR;  Surgeon: Geovanni Romero M.D.;  Location: [facility];  Service: EUS   • EGD WITH ASP/BX  5/18/2018    Procedure: EGD WITH ASP/BX-  GASTRIC BIOPSIES,  FNA BIOPSIES OF PANCREAS HEAD MASS AND OR GALLBLADDER;  Surgeon: Geovanni Romero M.D.;  Location: [facility];  Service: EUS   • DENTAL EXTRACTION(S)  12/2017    AND hx of other dental extractions with sedation   • HYSTERECTOMY ROBOTIC XI  7/9/2015    Procedure: HYSTERECTOMY ROBOTIC XI W/ BILATERAL SALPINGO-OOPHORECTOMY, MCCALLS PROCEDURES;  Surgeon: Brian Parks M.D.;  Location: [facility];  Service:    • COLONOSCOPY  2008   • CERVICAL DISK AND FUSION ANTERIOR  2006    neck cage/fusion   • DENTAL EXTRACTION(S)  1966    [place] teeth   • OTHER      Sedation for  "several MRI's   • OTHER NEUROLOGICAL SURG      cervical 2007      Social History   Substance Use Topics   • Smoking status: Never Smoker   • Smokeless tobacco: Never Used   • Alcohol use No     Family History   Problem Relation Age of Onset   • No Known Problems Mother    • No Known Problems Father            ROS  No fever/chill  all review of system completed and negative except for those listed above     Objective:     Blood pressure 114/60, pulse 64, temperature 36.2 °C (97.1 °F), resp. rate 18, height 1.651 m (5' 5\"), weight 69.4 kg (153 lb), last menstrual period 06/27/1986, SpO2 98 %. Body mass index is 25.46 kg/m².  Physical Exam:      GEN: comfortable, alert and oriented, well nourished, well developed, in no apparent distress   HEENT: NCAT, eyes: pupils equal and reactive, sclera white, EOMIT, good dentition  HEART: limbs warm and well perfused, regular rate, no JVD, no lower extremity edema  LUNGS: speaking in full sentences, not in apparent respiratory distress, no audible wheezes  MSK: normal tone and bulk, no swelling of the joints, gait steady and normal         Assessment and Plan:   The following treatment plan was discussed        Problem List Items Addressed This Visit     Pancreatic cancer (HCC)     Patient presents today for care coordination of her pancreatic cancer   I strongly encourage her to continue care with her current primary care provider as continuity is essential at this time  She may be radiation candidate so I discuss this with her and place referral for consultation   She is est with heme onc  She is est with Dr. GUTIERREZ hepatobiliary surgeon   Pain per private pain group - a agree with this as she is not opiate niave patient          Relevant Orders    REFERRAL TO RADIATION ONCOLOGY          Over 25 minutes spent with patient face to face, greater than 50% time spent with plan/coordination of care regarding that which is discussed in the HPI and A&P  Non face to face time 7pm-7:31 pm on " 6/6/18 discussing case with specialist and reviewing records       Instructed to follow up if symptoms worsen or fail to improve, ER/UC precautions discussed as well    Debi Dee MD  [facility], Family Medicine   [address] [place] [address]   Malcolm SAMANO [id number]  Phone: [phone]

## 2018-06-10 NOTE — PROGRESS NOTES
"Pharmacy Chemotherapy Calculations Note:    Patient Name: Larissa Ramirez      Dx: Locally Advanced Pancreatic Cancer     Cycle 1, Days 1-2  Previous treatment: n/a      Protocol: FOLFIRINOX (Fluorouracil/Leucovorin/Irinotecan/OXALIplatin)    *Dosing Reference*  OXALIplatin 85 mg/m2 IV over 2 hours on Day 1  Irinotecan 180 mg/m2 IV over 90 min on Day 1   06/11/18: dose reduced to 150 mg/m2 for anticipated tolerance   06/11/18: Leucovorin and 5-FU push are omitted from TP d/t anticipated tolerance.  Fluorouracil 1200 mg/m2 CIVI over 24 hours on Days 1-2 (2400 mg/m2 IV over 46 hours)  Q14 days x 4-12 cycles (unresectable or locally advanced)  NCCN Guidelines for Pancreatic Adenocarcinoma. V.2.2016.  Juan Pablo T, et al. N Engl J Med. 2011;364(19):181-25.      Alleriges: Levofloxacin; Nitrofurantoin; Amitriptyline; Sulfa drugs; and Lyrica    /48   Pulse 73   Temp 37.2 °C (99 °F)   Resp 18   Ht 1.651 m (5' 5\")   Wt 70 kg (154 lb 5.2 oz)   LMP 06/27/1986   SpO2 91%   BMI 25.68 kg/m²  Body surface area is 1.79 meters squared.    Labs 06/07/18  ANC~ 3400 Plt = 220 k Hgb = 12.3 SCr = 1.05 mg/dL CrCl = 54 mL/min  LFT's = WNL TBili = 0.4      OXALIplatin (Eloxatin) 85 mg/m² x 1.79 m² = 152.15 mg   <5% difference, ok to treat with final written dose = 152.15 mg IV    Irinotecan (Camptosar) 150 mg/m² x 1.79 m² = 268.5 mg   <5% difference, ok to treat with final written dose = 268.6 mg IV    Fluorouracil (5-FU) 2400 mg/m² x 1.79 m² = 4296 mg   <5% difference, ok to treat with final dose = 4295 mg CIVI via CADD pump @ 1.9 mL/hr     Dung Perez, PharmD, BCOP           "

## 2018-06-11 ENCOUNTER — OUTPATIENT INFUSION SERVICES (OUTPATIENT)
Dept: ONCOLOGY | Facility: MEDICAL CENTER | Age: 73
End: 2018-06-11
Attending: INTERNAL MEDICINE
Payer: MEDICARE

## 2018-06-11 ENCOUNTER — DOCUMENTATION (OUTPATIENT)
Dept: HEMATOLOGY ONCOLOGY | Facility: MEDICAL CENTER | Age: 73
End: 2018-06-11

## 2018-06-11 VITALS
WEIGHT: 154.32 LBS | HEART RATE: 73 BPM | SYSTOLIC BLOOD PRESSURE: 119 MMHG | HEIGHT: 65 IN | DIASTOLIC BLOOD PRESSURE: 48 MMHG | TEMPERATURE: 99 F | OXYGEN SATURATION: 91 % | RESPIRATION RATE: 18 BRPM | BODY MASS INDEX: 25.71 KG/M2

## 2018-06-11 DIAGNOSIS — C25.9 MALIGNANT NEOPLASM OF PANCREAS, UNSPECIFIED LOCATION OF MALIGNANCY (HCC): ICD-10-CM

## 2018-06-11 DIAGNOSIS — C25.1 MALIGNANT NEOPLASM OF BODY OF PANCREAS (HCC): ICD-10-CM

## 2018-06-11 LAB
CANCER AG19-9 SERPL-ACNC: 6.4 U/ML (ref 0–35)
MAGNESIUM SERPL-MCNC: 2 MG/DL (ref 1.5–2.5)

## 2018-06-11 PROCEDURE — 96415 CHEMO IV INFUSION ADDL HR: CPT

## 2018-06-11 PROCEDURE — 700101 HCHG RX REV CODE 250

## 2018-06-11 PROCEDURE — 96375 TX/PRO/DX INJ NEW DRUG ADDON: CPT

## 2018-06-11 PROCEDURE — 700105 HCHG RX REV CODE 258: Performed by: INTERNAL MEDICINE

## 2018-06-11 PROCEDURE — 96413 CHEMO IV INFUSION 1 HR: CPT

## 2018-06-11 PROCEDURE — 83735 ASSAY OF MAGNESIUM: CPT

## 2018-06-11 PROCEDURE — 700111 HCHG RX REV CODE 636 W/ 250 OVERRIDE (IP): Performed by: INTERNAL MEDICINE

## 2018-06-11 PROCEDURE — 700111 HCHG RX REV CODE 636 W/ 250 OVERRIDE (IP): Mod: JG

## 2018-06-11 PROCEDURE — 700105 HCHG RX REV CODE 258

## 2018-06-11 PROCEDURE — G0498 CHEMO EXTEND IV INFUS W/PUMP: HCPCS

## 2018-06-11 PROCEDURE — 86301 IMMUNOASSAY TUMOR CA 19-9: CPT

## 2018-06-11 PROCEDURE — A4212 NON CORING NEEDLE OR STYLET: HCPCS

## 2018-06-11 PROCEDURE — 96417 CHEMO IV INFUS EACH ADDL SEQ: CPT

## 2018-06-11 RX ORDER — LIDOCAINE AND PRILOCAINE 25; 25 MG/G; MG/G
CREAM TOPICAL PRN
COMMUNITY
End: 2018-10-29

## 2018-06-11 RX ORDER — LIDOCAINE HYDROCHLORIDE 10 MG/ML
INJECTION, SOLUTION INFILTRATION; PERINEURAL
Status: COMPLETED
Start: 2018-06-11 | End: 2018-06-11

## 2018-06-11 RX ORDER — DEXTROSE MONOHYDRATE 50 MG/ML
INJECTION, SOLUTION INTRAVENOUS CONTINUOUS
Status: DISCONTINUED | OUTPATIENT
Start: 2018-06-11 | End: 2018-06-11 | Stop reason: HOSPADM

## 2018-06-11 RX ORDER — ONDANSETRON 4 MG/1
4 TABLET, ORALLY DISINTEGRATING ORAL EVERY 6 HOURS PRN
COMMUNITY
End: 2018-07-31

## 2018-06-11 RX ADMIN — DEXTROSE MONOHYDRATE 152.15 MG: 50 INJECTION, SOLUTION INTRAVENOUS at 09:51

## 2018-06-11 RX ADMIN — FLUOROURACIL 4295 MG: 50 INJECTION, SOLUTION INTRAVENOUS at 14:00

## 2018-06-11 RX ADMIN — ONDANSETRON HYDROCHLORIDE 16 MG: 2 INJECTION, SOLUTION INTRAMUSCULAR; INTRAVENOUS at 08:55

## 2018-06-11 RX ADMIN — DEXTROSE MONOHYDRATE: 50 INJECTION, SOLUTION INTRAVENOUS at 08:56

## 2018-06-11 RX ADMIN — LIDOCAINE HYDROCHLORIDE: 10 INJECTION, SOLUTION INFILTRATION; PERINEURAL at 08:41

## 2018-06-11 RX ADMIN — DEXTROSE MONOHYDRATE 268.6 MG: 50 INJECTION, SOLUTION INTRAVENOUS at 12:11

## 2018-06-11 RX ADMIN — DEXAMETHASONE SODIUM PHOSPHATE 12 MG: 4 INJECTION, SOLUTION INTRAMUSCULAR; INTRAVENOUS at 08:45

## 2018-06-11 RX ADMIN — ATROPINE SULFATE 0.5 MG: 1 INJECTION, SOLUTION INTRAMUSCULAR; INTRAVENOUS; SUBCUTANEOUS at 12:05

## 2018-06-11 NOTE — PROGRESS NOTES
Chemotherapy Verification - PRIMARY RN      Height = 65in  Weight = 70kg  BSA = 1.78m2       Medication: Oxaliplatin  Dose: 85mg/m2  Calculated Dose: 151.9mg                             (In mg/m2, AUC, mg/kg)     Medication: Irinotecan  Dose: 150mg/m2  Calculated Dose: 267mg                             (In mg/m2, AUC, mg/kg)    Medication: Fluorouracil  Dose: 2400mg/m2 over 46 hours Calculated Dose: 4272mg                             (In mg/m2, AUC, mg/kg)    I confirm this process was performed independently with the BSA and all final chemotherapy dosing calculations congruent.  Any discrepancies of 5% or greater have been addressed with the chemotherapy pharmacist. The resolution of the discrepancy has been documented in the EPIC progress notes.

## 2018-06-11 NOTE — PROGRESS NOTES
Chemotherapy Verification - PRIMARY RN      Height = 165.1cm  Weight = 70kg  BSA = 1.79m2       Medication: Oxaliplatin  Dose: 85mg/m2  Calculated Dose: 152.15mg                             (In mg/m2, AUC, mg/kg)     Medication: Irinotecan  Dose: 150mg/m2  Calculated Dose: 268.5mg                             (In mg/m2, AUC, mg/kg)    Medication: 5-FU   Dose: 2400mg/m2  Calculated Dose: 4296mg                             (In mg/m2, AUC, mg/kg)          I confirm this process was performed independently with the BSA and all final chemotherapy dosing calculations congruent.  Any discrepancies of 5% or greater have been addressed with the chemotherapy pharmacist. The resolution of the discrepancy has been documented in the EPIC progress notes.

## 2018-06-11 NOTE — PROGRESS NOTES
"Patient Name: Larissa Ramirez   Dx: Pancreatic Ca         Protocol: FOLFIRINOX     *Dosing Reference*  OXALIplatin 85 mg/m2 IV over 2 hours on Day 1   Irinotecan 180 mg/m2 IV over 90 min on Day 1  -6/11/18: MD dosing 150 mg/m2 per experts per Dr. Eaton      -6/11/18:Removed with C1 for tolerance per MD   -6/11/18:Removed with C1 for tolerance per MD  Fluorouracil 2400 mg/m2 IV over 46 hours  Repeat every 14 days x 4-12 cycles  -per Dr. Eaton give 3 cycles up front then restage for possible resection.   NCCN Guidelines for Pancreatic Adenocarcinoma V.2.2016.  Juan Pablo T, et al. N Engl J Med. 2011;364(19):1817-50.    Allergies:  Levofloxacin; Nitrofurantoin; Amitriptyline; Sulfa drugs; and Lyrica     /48   Pulse 73   Temp 37.2 °C (99 °F)   Resp 18   Ht 1.651 m (5' 5\")   Wt 70 kg (154 lb 5.2 oz)   LMP 06/27/1986   SpO2 91%   BMI 25.68 kg/m²  Body surface area is 1.79 meters squared.    Labs 6/7/18:  ANC~ 3360 Plt = 220k   Hgb = 12.3     SCr = 1.05mg/dL CrCl ~ 54 mL/min   LFT's = WNL TBili = 0.4  K+ = 3.9    Drug Order   (Drug name, dose, route, IV Fluid & volume, frequency, number of doses) Cycle: C1    Days 1-2   Previous treatment: none      Medication = OXALIplatin  Base Dose= 85 mg/m2  Calc Dose: Base Dose x 1.79 m2 = 152.15  mg  Final Dose = 152.15 mg  Route = IV  Fluid & Volume = D5W 250 mL  Admin Duration = Over 2 hours          <5% difference, ok to treat with final dose     Medication = Irinotecan   Base Dose= 150 mg/m2  Calc Dose: Base Dose x 1.79 m2 = 268.5 mg  Final Dose = 268.6 mg  Route = IV  Fluid & Volume = D5W  500 mL  Admin Duration = Over 90 min           <5% difference, ok to treat with final dose     Medication = Fluorouracil (5-FU) CIVI  Base Dose = 2400 mg/m2  Calc Dose:Base Dose x 1.79 m2 = 4296 mg  Final Dose = 4295 mg  Route = IVP  Fluid & Volume = in CADD pump 85.9 mL  Conc = 50 mg/mL  Admin Duration = Over 46 hours    Rate = 1.9  + ml/hr           <5% difference, ok to treat " with final dose       By my signature below, I confirm this process was performed independently with the BSA and all final chemotherapy dosing calculations congruent. I have reviewed the above chemotherapy order and that my calculation of the final dose and BSA (when applicable) corroborate those calculations of the  pharmacist. Discrepancies of 5% or greater in the written dose have been addressed and documented within the EPIC Progress notes.    Signature: Clare Dhaliwal, PharmD, BCOP

## 2018-06-11 NOTE — PROGRESS NOTES
"Nutrition services: RD consultation/new chemo start  Larissa Ramirez is a 73 y.o. female is a new chemo start 2' dx of pancreatic cancer w/ known past medical hx significant for snoring, pre-diabetes, HTN, bowel habit changes, and bronchitis per H&P. Therefore, met w/ pt this date to discuss current intake and appetite. Pt reports that her intake is good w/ no current c/o GI distress, taste changes, nor poor tolerance such as diarrhea and constipation. Pt has experienced a severe 10.5% (18#) wt loss in the past four months. Pt reports that it is a result of topamax treatment for her knee pain that has caused her appetite to decrease. However, appetite is improved w/ good tolerance of food and states that she has been able to consistently maintain her weight @155# for the past 6 weeks.     Dx: pancreatic cancer  PMHx: snoring, pre-diabetes, HTN, bowel habit changes, and bronchitis  Labs (): glucose: 93 Na: 141 K: 3.9 albumin: 3.9 M.3 BUN: 24 Creatinine: 1.05    Assessment:  Ht: 65\" Weight: 69.5kg (153#) BMI: 25.5   Recent wt change: 8# (5%) not significant wt loss x 2 months; 18# (10.5%) severe wt loss x 4 months     Recommendations/Plan:  1. Provided pt w/ tips to ensure adequate intake if side effects of treatment should occur.   2. Also provided pt w/ a list of foods that are high in protein and healthy snack ideas to promote wt maintenance and preservation of lean body mass.   3. Also encouraged pt to monitor tolerance of foods such as foods that are higher in fat that may contribute to abdominal pain, chronic diarrhea/constipation, and/or GI discomfort.     RD to monitor ongoing PO adequacy and wt status to leave further recommendations accordingly.      Nutrition Needs Assessment:     Total Calorie Needs per HBE x.1.2 1550 1850   Total Protein Needs (1.2 - 1.5g/kg of CBW) 85 105   Daily Fluids (30ml/kg of CBW) 2100      "

## 2018-06-11 NOTE — PROGRESS NOTES
Pt is here for her first chemotherapy. Plan of care discussed; financial aide, nurse navigator, and nutritionist here to visit the patient. Lidocaine Sub-Q applied to the port site prior access - pt had applied EMLA cream to the incision, not the port site - education provided. 5-FU pump consent signed/video played. Pt is comfortable with indirmation. Premedicated as ordered. Pt states she has zodran ODT available at home - bowel management discussed (she will have MOM and imodium available). Oxaliplatin infused followed by irinotecan and 5-FU pump connect at 1400. Discharged home to self care. Returns Wednesday.

## 2018-06-13 ENCOUNTER — DOCUMENTATION (OUTPATIENT)
Dept: RADIATION ONCOLOGY | Facility: MEDICAL CENTER | Age: 73
End: 2018-06-13

## 2018-06-13 ENCOUNTER — OUTPATIENT INFUSION SERVICES (OUTPATIENT)
Dept: ONCOLOGY | Facility: MEDICAL CENTER | Age: 73
End: 2018-06-13
Attending: INTERNAL MEDICINE
Payer: MEDICARE

## 2018-06-13 VITALS
HEIGHT: 65 IN | TEMPERATURE: 98.3 F | RESPIRATION RATE: 18 BRPM | HEART RATE: 78 BPM | WEIGHT: 152.56 LBS | DIASTOLIC BLOOD PRESSURE: 38 MMHG | SYSTOLIC BLOOD PRESSURE: 117 MMHG | BODY MASS INDEX: 25.42 KG/M2

## 2018-06-13 DIAGNOSIS — C25.1 MALIGNANT NEOPLASM OF BODY OF PANCREAS (HCC): ICD-10-CM

## 2018-06-13 DIAGNOSIS — Z00.6 RESEARCH STUDY PATIENT: ICD-10-CM

## 2018-06-13 PROCEDURE — 700111 HCHG RX REV CODE 636 W/ 250 OVERRIDE (IP)

## 2018-06-13 PROCEDURE — 96523 IRRIG DRUG DELIVERY DEVICE: CPT

## 2018-06-13 RX ADMIN — SODIUM CHLORIDE, PRESERVATIVE FREE 500 UNITS: 5 INJECTION INTRAVENOUS at 14:54

## 2018-06-13 ASSESSMENT — PAIN SCALES - GENERAL: PAINLEVEL_OUTOF10: 3

## 2018-06-14 NOTE — PROGRESS NOTES
Patient in for C1D3 pump disconnect following FOLFIRINOX therapy, family at chair side. Patient reports nausea x 2 days, uncontrolled by medication, denies any episodes of vomiting. She states she contacted MD's office and obtained a refill for one of the medications, unsure which one. Patient was instructed by this RN to alternate between the two anti-emetics for 48 hours and notify MD's office if no relief from nausea is obtained; also advised patient try mayelin chew/candies, which she states she does have at home. She is able to drink water, and eat small amounts of food throughout the day; bland foods in small amounts encouraged. Patient also advised about the sedating effects of ondansetron and phenergan; verbalized understanding. Pump reservoir with 0 mL remaining, 86.4 mL given; disconnected and port de-accessed. Appointment confirmed for 6/26/18 at 0800; discharged ambulatory, in no apparent distress.

## 2018-06-18 ENCOUNTER — HOSPITAL ENCOUNTER (OUTPATIENT)
Facility: MEDICAL CENTER | Age: 73
End: 2018-06-18
Attending: INTERNAL MEDICINE
Payer: MEDICARE

## 2018-06-18 LAB
ALBUMIN SERPL BCP-MCNC: 4.2 G/DL (ref 3.2–4.9)
ALBUMIN/GLOB SERPL: 1.6 G/DL
ALP SERPL-CCNC: 68 U/L (ref 30–99)
ALT SERPL-CCNC: 15 U/L (ref 2–50)
ANION GAP SERPL CALC-SCNC: 7 MMOL/L (ref 0–11.9)
AST SERPL-CCNC: 14 U/L (ref 12–45)
BILIRUB SERPL-MCNC: 0.4 MG/DL (ref 0.1–1.5)
BUN SERPL-MCNC: 18 MG/DL (ref 8–22)
CALCIUM SERPL-MCNC: 10.6 MG/DL (ref 8.5–10.5)
CHLORIDE SERPL-SCNC: 100 MMOL/L (ref 96–112)
CO2 SERPL-SCNC: 29 MMOL/L (ref 20–33)
CREAT SERPL-MCNC: 0.94 MG/DL (ref 0.5–1.4)
GLOBULIN SER CALC-MCNC: 2.7 G/DL (ref 1.9–3.5)
GLUCOSE SERPL-MCNC: 129 MG/DL (ref 65–99)
POTASSIUM SERPL-SCNC: 3.2 MMOL/L (ref 3.6–5.5)
PROT SERPL-MCNC: 6.9 G/DL (ref 6–8.2)
SODIUM SERPL-SCNC: 136 MMOL/L (ref 135–145)

## 2018-06-18 PROCEDURE — 80053 COMPREHEN METABOLIC PANEL: CPT

## 2018-06-23 ENCOUNTER — HOSPITAL ENCOUNTER (OUTPATIENT)
Dept: LAB | Facility: MEDICAL CENTER | Age: 73
End: 2018-06-23
Attending: INTERNAL MEDICINE
Payer: MEDICARE

## 2018-06-23 LAB
ALBUMIN SERPL BCP-MCNC: 3.8 G/DL (ref 3.2–4.9)
ALBUMIN/GLOB SERPL: 1.3 G/DL
ALP SERPL-CCNC: 49 U/L (ref 30–99)
ALT SERPL-CCNC: 15 U/L (ref 2–50)
ANION GAP SERPL CALC-SCNC: 5 MMOL/L (ref 0–11.9)
AST SERPL-CCNC: 18 U/L (ref 12–45)
BASOPHILS # BLD AUTO: 1.2 % (ref 0–1.8)
BASOPHILS # BLD: 0.07 K/UL (ref 0–0.12)
BILIRUB SERPL-MCNC: 0.3 MG/DL (ref 0.1–1.5)
BUN SERPL-MCNC: 17 MG/DL (ref 8–22)
CALCIUM SERPL-MCNC: 9.9 MG/DL (ref 8.5–10.5)
CHLORIDE SERPL-SCNC: 102 MMOL/L (ref 96–112)
CO2 SERPL-SCNC: 30 MMOL/L (ref 20–33)
CREAT SERPL-MCNC: 0.85 MG/DL (ref 0.5–1.4)
EOSINOPHIL # BLD AUTO: 0.59 K/UL (ref 0–0.51)
EOSINOPHIL NFR BLD: 10 % (ref 0–6.9)
ERYTHROCYTE [DISTWIDTH] IN BLOOD BY AUTOMATED COUNT: 44.3 FL (ref 35.9–50)
GLOBULIN SER CALC-MCNC: 3 G/DL (ref 1.9–3.5)
GLUCOSE SERPL-MCNC: 101 MG/DL (ref 65–99)
HCT VFR BLD AUTO: 38.7 % (ref 37–47)
HGB BLD-MCNC: 12.4 G/DL (ref 12–16)
IMM GRANULOCYTES # BLD AUTO: 0.01 K/UL (ref 0–0.11)
IMM GRANULOCYTES NFR BLD AUTO: 0.2 % (ref 0–0.9)
LYMPHOCYTES # BLD AUTO: 1.83 K/UL (ref 1–4.8)
LYMPHOCYTES NFR BLD: 31 % (ref 22–41)
MCH RBC QN AUTO: 29.8 PG (ref 27–33)
MCHC RBC AUTO-ENTMCNC: 32 G/DL (ref 33.6–35)
MCV RBC AUTO: 93 FL (ref 81.4–97.8)
MONOCYTES # BLD AUTO: 0.63 K/UL (ref 0–0.85)
MONOCYTES NFR BLD AUTO: 10.7 % (ref 0–13.4)
NEUTROPHILS # BLD AUTO: 2.77 K/UL (ref 2–7.15)
NEUTROPHILS NFR BLD: 46.9 % (ref 44–72)
NRBC # BLD AUTO: 0 K/UL
NRBC BLD-RTO: 0 /100 WBC
PLATELET # BLD AUTO: 225 K/UL (ref 164–446)
PMV BLD AUTO: 8.6 FL (ref 9–12.9)
POTASSIUM SERPL-SCNC: 3.5 MMOL/L (ref 3.6–5.5)
PROT SERPL-MCNC: 6.8 G/DL (ref 6–8.2)
RBC # BLD AUTO: 4.16 M/UL (ref 4.2–5.4)
SODIUM SERPL-SCNC: 137 MMOL/L (ref 135–145)
WBC # BLD AUTO: 5.9 K/UL (ref 4.8–10.8)

## 2018-06-23 PROCEDURE — 36415 COLL VENOUS BLD VENIPUNCTURE: CPT

## 2018-06-23 PROCEDURE — 85025 COMPLETE CBC W/AUTO DIFF WBC: CPT

## 2018-06-23 PROCEDURE — 80053 COMPREHEN METABOLIC PANEL: CPT

## 2018-06-25 ENCOUNTER — TELEPHONE (OUTPATIENT)
Dept: MEDICAL GROUP | Facility: PHYSICIAN GROUP | Age: 73
End: 2018-06-25

## 2018-06-25 NOTE — PROGRESS NOTES
"Patient Name: Larissa Ramirez   Dx: Pancreatic Ca         Protocol: FOLFIRINOX     *Dosing Reference*  OXALIplatin 85 mg/m2 IV over 2 hours on Day 1   Irinotecan 180 mg/m2 IV over 90 min on Day 1  -6/11/18: MD dosing 150 mg/m2 per experts per Dr. Eaton      -6/11/18:Removed with C1 for tolerance per MD   -6/11/18:Removed with C1 for tolerance per MD  Fluorouracil 2400 mg/m2 IV over 46 hours  Repeat every 14 days x 4-12 cycles  -per Dr. Etaon give 3 cycles up front then restage for possible resection.   NCCN Guidelines for Pancreatic Adenocarcinoma V.2.2016.  Juan Pablo T, et al. N Engl J Med. 2011;364(19):1817-19.    Allergies:  Levofloxacin; Nitrofurantoin; Amitriptyline; Sulfa drugs; and Lyrica     /42   Pulse 74   Temp 37 °C (98.6 °F)   Resp 18   Ht 1.651 m (5' 5\")   Wt 68.1 kg (150 lb 2.1 oz)   LMP 06/27/1986   SpO2 96%   BMI 24.98 kg/m²  Body surface area is 1.77 meters squared.    Labs 6/23/18:  ANC~ 2770 Plt = 225k   Hgb = 12.4  SCr = 0.85 mg/dL CrCl ~ 65 mL/min   LFT's = WNL TBili = 0.3  K+ = 3.5 (patient started oral K replacement last night)    6/26/18: Mag = 2.0    Drug Order   (Drug name, dose, route, IV Fluid & volume, frequency, number of doses) Cycle: 2  Previous treatment: 6/11/18     Medication = OXALIplatin  Base Dose= 85 mg/m2  Calc Dose: Base Dose x 1.77 m2 = 150  mg  Final Dose = 150.45 mg  Route = IV  Fluid & Volume = D5W 250 mL  Admin Duration = Over 2 hours          <5% difference, ok to treat with final dose     Medication = Irinotecan   Base Dose= 150 mg/m2  Calc Dose: Base Dose x 1.77 m2 = 265.5 mg  Final Dose = 265.6 mg  Route = IV  Fluid & Volume = D5W  500 mL  Admin Duration = Over 90 min           <5% difference, ok to treat with final dose     Medication = Fluorouracil (5-FU) CIVI  Base Dose = 2400 mg/m2  Calc Dose:Base Dose x 1.77 m2 = 4248 mg  Final Dose = 4250 mg  Route = IVP  Fluid & Volume = in CADD pump 85 mL + 3ml overfill  Conc = 50 mg/mL  Admin Duration = Over 46 " hours    Rate = 1.8 ml/hr           <5% difference, ok to treat with final dose       By my signature below, I confirm this process was performed independently with the BSA and all final chemotherapy dosing calculations congruent. I have reviewed the above chemotherapy order and that my calculation of the final dose and BSA (when applicable) corroborate those calculations of the  pharmacist. Discrepancies of 5% or greater in the written dose have been addressed and documented within the EPIC Progress notes.    Percy Ramirez, PharmD

## 2018-06-25 NOTE — TELEPHONE ENCOUNTER
PVP WITH OUTREACH  Future Appointments       Provider Department Center    6/26/2018 8:00 AM RN 6 Infusion Services [facility]    6/28/2018 3:00 PM INFUSION QUICK INJECT Infusion Services [facility]    7/9/2018 8:30 AM RN 2 Infusion Services [facility]    7/9/2018 10:20 AM Kaitlyn Long M.D.; [facility]  [facility]     7/11/2018 3:00 PM INFUSION QUICK INJECT Infusion Services [facility]    7/23/2018 8:30 AM RN 1 Infusion Services [facility]    7/24/2018 11:00 AM Kaitlyn Long M.D. [facility]     7/25/2018 3:00 PM RN 8 Infusion Services [facility]          ANNUAL WELLNESS VISIT PRE-VISIT PLANNING     1.  Immunizations were updated in Epic using WebIZ?: No WebIZ record       •  WebIZ Recommendations: UNKNOWN       •  Is patient due for Tdap? NO       •  Is patient due for Shingles? NO     2.  Specialty Comments was updated with diagnosis information provided by [facility]: NO

## 2018-06-25 NOTE — PROGRESS NOTES
"Pharmacy Chemotherapy Calculations Note:    Patient Name: Larissa Ramirez      Dx: Locally Advanced Pancreatic Cancer     Cycle 2, Days 1-2    Previous treatment: June 11-12, 2018 = C1     Protocol: FOLFIRINOX (Fluorouracil/Leucovorin/Irinotecan/OXALIplatin)    *Dosing Reference*  OXALIplatin 85 mg/m2 IV over 2 hours on Day 1  Irinotecan 180 mg/m2 IV over 90 min on Day 1   06/11/18: dose reduced to 150 mg/m2 for anticipated tolerance   06/11/18: Leucovorin and 5-FU push are omitted from TP d/t anticipated tolerance.  Fluorouracil 1200 mg/m2 CIVI over 24 hours on Days 1-2 (2400 mg/m2 IV over 46 hours)  Q14 days x 4-12 cycles (unresectable or locally advanced)  NCCN Guidelines for Pancreatic Adenocarcinoma. V.2.2016.  Juan Pablo T, et al. N Engl J Med. 2011;364(19):181-25.      Alleriges: Levofloxacin; Nitrofurantoin; Amitriptyline; Sulfa drugs; and Lyrica    /42   Pulse 74   Temp 37 °C (98.6 °F)   Resp 18   Ht 1.651 m (5' 5\")   Wt 68.1 kg (150 lb 2.1 oz)   LMP 06/27/1986   SpO2 96%   BMI 24.98 kg/m²  Body surface area is 1.77 meters squared.    6/23/18-  ANC~ 2770 Plt = 225k   Hgb = 12.4     SCr = 0.85mg/dL CrCl ~ 64mL/min   LFT's = WNL TBili = 0.3         OXALIplatin (Eloxatin) 85 mg/m² x 1.77m² = 150.5mg   <5% difference, ok to treat with final written dose = 150.45mg IV    Irinotecan (Camptosar) 150 mg/m² x 1.77m² = 265.5mg   <5% difference, ok to treat with final written dose = 265.6 mg IV    Fluorouracil (5-FU) 2400 mg/m² x 1.77 m² = 4248mg   <5% difference, ok to treat with final dose = 4250mg    CIVI via CADD pump @ 1.8mL/hr over 46 hours     ZACKERY Ramirez Pharm.D.             "

## 2018-06-26 ENCOUNTER — DOCUMENTATION (OUTPATIENT)
Dept: HEMATOLOGY ONCOLOGY | Facility: MEDICAL CENTER | Age: 73
End: 2018-06-26

## 2018-06-26 ENCOUNTER — OUTPATIENT INFUSION SERVICES (OUTPATIENT)
Dept: ONCOLOGY | Facility: MEDICAL CENTER | Age: 73
End: 2018-06-26
Attending: INTERNAL MEDICINE
Payer: MEDICARE

## 2018-06-26 VITALS
HEART RATE: 80 BPM | WEIGHT: 150.13 LBS | HEIGHT: 65 IN | OXYGEN SATURATION: 97 % | DIASTOLIC BLOOD PRESSURE: 52 MMHG | BODY MASS INDEX: 25.01 KG/M2 | SYSTOLIC BLOOD PRESSURE: 142 MMHG | TEMPERATURE: 98.6 F | RESPIRATION RATE: 18 BRPM

## 2018-06-26 DIAGNOSIS — Z51.11 ENCOUNTER FOR ANTINEOPLASTIC CHEMOTHERAPY AND IMMUNOTHERAPY: ICD-10-CM

## 2018-06-26 DIAGNOSIS — Z51.12 ENCOUNTER FOR ANTINEOPLASTIC CHEMOTHERAPY AND IMMUNOTHERAPY: ICD-10-CM

## 2018-06-26 DIAGNOSIS — C25.1 MALIGNANT NEOPLASM OF BODY OF PANCREAS (HCC): ICD-10-CM

## 2018-06-26 LAB
CANCER AG19-9 SERPL-ACNC: 8.3 U/ML (ref 0–35)
MAGNESIUM SERPL-MCNC: 2 MG/DL (ref 1.5–2.5)
POTASSIUM SERPL-SCNC: 3.8 MMOL/L (ref 3.6–5.5)

## 2018-06-26 PROCEDURE — 96415 CHEMO IV INFUSION ADDL HR: CPT

## 2018-06-26 PROCEDURE — 96417 CHEMO IV INFUS EACH ADDL SEQ: CPT

## 2018-06-26 PROCEDURE — 83735 ASSAY OF MAGNESIUM: CPT

## 2018-06-26 PROCEDURE — 700111 HCHG RX REV CODE 636 W/ 250 OVERRIDE (IP)

## 2018-06-26 PROCEDURE — 86301 IMMUNOASSAY TUMOR CA 19-9: CPT

## 2018-06-26 PROCEDURE — 84132 ASSAY OF SERUM POTASSIUM: CPT

## 2018-06-26 PROCEDURE — G0498 CHEMO EXTEND IV INFUS W/PUMP: HCPCS

## 2018-06-26 PROCEDURE — 700105 HCHG RX REV CODE 258: Performed by: INTERNAL MEDICINE

## 2018-06-26 PROCEDURE — 96413 CHEMO IV INFUSION 1 HR: CPT

## 2018-06-26 PROCEDURE — A4212 NON CORING NEEDLE OR STYLET: HCPCS

## 2018-06-26 PROCEDURE — 700105 HCHG RX REV CODE 258

## 2018-06-26 PROCEDURE — 700111 HCHG RX REV CODE 636 W/ 250 OVERRIDE (IP): Performed by: INTERNAL MEDICINE

## 2018-06-26 PROCEDURE — 96375 TX/PRO/DX INJ NEW DRUG ADDON: CPT

## 2018-06-26 RX ORDER — DEXTROSE MONOHYDRATE 50 MG/ML
INJECTION, SOLUTION INTRAVENOUS CONTINUOUS
Status: DISCONTINUED | OUTPATIENT
Start: 2018-06-26 | End: 2018-06-26 | Stop reason: HOSPADM

## 2018-06-26 RX ADMIN — DEXTROSE MONOHYDRATE: 50 INJECTION, SOLUTION INTRAVENOUS at 08:37

## 2018-06-26 RX ADMIN — ONDANSETRON HYDROCHLORIDE 16 MG: 2 INJECTION, SOLUTION INTRAMUSCULAR; INTRAVENOUS at 09:00

## 2018-06-26 RX ADMIN — DEXTROSE MONOHYDRATE 150.45 MG: 50 INJECTION, SOLUTION INTRAVENOUS at 09:40

## 2018-06-26 RX ADMIN — FLUOROURACIL 4250 MG: 50 INJECTION, SOLUTION INTRAVENOUS at 13:49

## 2018-06-26 RX ADMIN — DEXTROSE MONOHYDRATE 265.6 MG: 50 INJECTION, SOLUTION INTRAVENOUS at 12:00

## 2018-06-26 RX ADMIN — DEXAMETHASONE SODIUM PHOSPHATE 12 MG: 4 INJECTION, SOLUTION INTRAMUSCULAR; INTRAVENOUS at 08:37

## 2018-06-26 RX ADMIN — ATROPINE SULFATE 0.5 MG: 1 INJECTION, SOLUTION INTRAMUSCULAR; INTRAVENOUS; SUBCUTANEOUS at 11:54

## 2018-06-26 ASSESSMENT — PAIN SCALES - GENERAL: PAINLEVEL_OUTOF10: 3

## 2018-06-26 NOTE — PROGRESS NOTES
Chemotherapy Verification - SECONDARY RN       Height = 165.1 cm  Weight = 68.1 kg  BSA = 1.767 m2       Medication: Oxaliplatin  Dose: 85 mg/m2  Calculated Dose: 150.2 mg                             (In mg/m2, AUC, mg/kg)     Medication: Irinotecan  Dose: 150 mg/m2  Calculated Dose: 265.05 mg                             (In mg/m2, AUC, mg/kg)    Medication: Fluorouracil  Dose: 2400 mg/m2  Calculated Dose: 4240.8 mg                             (In mg/m2, AUC, mg/kg)    I confirm that this process was performed independently.

## 2018-06-26 NOTE — PROGRESS NOTES
"Nutrition Services: Brief Update  Met w/ pt this date to follow-up on current intake and appetite. Pt reports that her intake is great and that she is \"feeling good.\" Pt w/ no current c/o GI distress, taste changes, and tolerating meals w/ no c/o diarrhea nor constipation. Pt states that she \"eats a lot\" by consuming 6 small meals frequently throughout her day. She also has increased her intake of potassium rich foods 2' slight depletion in K+ levels (~3.5). Pt reports that she did have c/o nausea; however, is now on an anti-emetic tx that works best for her.     Otherwise, pt did experience a 3# (2%) insignificant wt loss in the past two weeks; however, appears well nourished w/ consistent good PO of meals. RD to continue to monitor ongoing PO adequacy and wt status to leave further recommendations accordingly.   "

## 2018-06-26 NOTE — PROGRESS NOTES
Chemotherapy Verification - PRIMARY RN      Height = 165.1 cm  Weight = 68.1 kg  BSA = 1.77 m2       Medication: Oxaliplatin  Dose: 85 mg/m2  Calculated Dose: 150.45 mg                                Medication: Irinotecan  Dose: 150 mg/m2  Calculated Dose: 265.5 mg                                Medication: Fluorouracil (5FU) home pump  Dose: 2400 mg/m2  Calculated Dose: 4248 mg over 46 hours                                 I confirm this process was performed independently with the BSA and all final chemotherapy dosing calculations congruent.  Any discrepancies of 5% or greater have been addressed with the chemotherapy pharmacist. The resolution of the discrepancy has been documented in the EPIC progress notes.

## 2018-06-26 NOTE — PROGRESS NOTES
"Pt arrived to [facility] ambulatory, here for C2 FOLFIRINOX for pancreatic cancer. Pt with R chest port, EMLA in place. EMLA wiped from site and port accessed in sterile field. Justyna from [facility] at chairside to collect blood for study pt is participating in. In review of pt's labs, Mag and Ca 19-9 had not been drawn. Pt's potassium low but pt stated she started an Rx for potassium at home yesterday and took doses yesterday. Call placed to Dr. Eaton's office and orders received to recheck potassium to determine if replacement needed. Labs resulted and WNL - no replacement needed. Premeds given as ordered. Oxaliplatin and Irinotecan infused as ordered. Atropine given prior to Irinotecan and pt re-educated on cold sensitivity with Oxaliplatin. Pt verbalized understanding. At completion of Irinotecan, pt stated she felt dizzy. VS assessed and appeared stable. Pt assisted to stand and immediately felt better. Pt verbalized that she had not been up to stand for her whole treatment. Assisted pt to ambulate around [facility] and pt stated she felt better. Home pump settings double checked with second RN and attached to pt. Verified pump read \"RUN\" and pt discharged home under care of . Pt knows to return Thursday for pump removal.   "

## 2018-06-27 DIAGNOSIS — I10 ESSENTIAL HYPERTENSION: ICD-10-CM

## 2018-06-27 RX ORDER — LISINOPRIL AND HYDROCHLOROTHIAZIDE 25; 20 MG/1; MG/1
1 TABLET ORAL
Qty: 90 TAB | Refills: 3 | Status: SHIPPED | OUTPATIENT
Start: 2018-06-27 | End: 2019-04-11 | Stop reason: SDUPTHER

## 2018-06-28 ENCOUNTER — OUTPATIENT INFUSION SERVICES (OUTPATIENT)
Dept: ONCOLOGY | Facility: MEDICAL CENTER | Age: 73
End: 2018-06-28
Attending: INTERNAL MEDICINE
Payer: MEDICARE

## 2018-06-28 VITALS
DIASTOLIC BLOOD PRESSURE: 51 MMHG | SYSTOLIC BLOOD PRESSURE: 124 MMHG | HEART RATE: 77 BPM | BODY MASS INDEX: 24.87 KG/M2 | HEIGHT: 65 IN | WEIGHT: 149.25 LBS | OXYGEN SATURATION: 94 % | RESPIRATION RATE: 18 BRPM | TEMPERATURE: 98.8 F

## 2018-06-28 DIAGNOSIS — C25.1 MALIGNANT NEOPLASM OF BODY OF PANCREAS (HCC): ICD-10-CM

## 2018-06-28 PROCEDURE — 96523 IRRIG DRUG DELIVERY DEVICE: CPT

## 2018-06-28 PROCEDURE — 700111 HCHG RX REV CODE 636 W/ 250 OVERRIDE (IP)

## 2018-06-28 RX ADMIN — HEPARIN 500 UNITS: 100 SYRINGE at 15:02

## 2018-06-28 ASSESSMENT — PAIN SCALES - GENERAL: PAINLEVEL_OUTOF10: 3

## 2018-06-28 NOTE — PROGRESS NOTES
Patient arrived to \[facility]\"" for C2D3 5FU CADD pump de-access.  Pump stopped with 0ml remaining in reservoir, 86.4 mLs administered.  Disconnected pump, Port flushed per protocol with brisk blood return noted, heparinized, de-accessed and gauze dressing applied.  Pump cleaned and placed in pharmacy drawer.  Confirmed pt's next appt. Pt discharged home in [facility].

## 2018-07-03 ENCOUNTER — HOSPITAL ENCOUNTER (OUTPATIENT)
Dept: LAB | Facility: MEDICAL CENTER | Age: 73
End: 2018-07-03
Attending: INTERNAL MEDICINE
Payer: MEDICARE

## 2018-07-03 LAB — AMBIGUOUS DTTM AMBI4: NORMAL

## 2018-07-03 PROCEDURE — 83735 ASSAY OF MAGNESIUM: CPT

## 2018-07-03 PROCEDURE — 80053 COMPREHEN METABOLIC PANEL: CPT

## 2018-07-03 PROCEDURE — 86301 IMMUNOASSAY TUMOR CA 19-9: CPT

## 2018-07-04 LAB
ALBUMIN SERPL BCP-MCNC: 4 G/DL (ref 3.2–4.9)
ALBUMIN/GLOB SERPL: 1.5 G/DL
ALP SERPL-CCNC: 59 U/L (ref 30–99)
ALT SERPL-CCNC: 19 U/L (ref 2–50)
ANION GAP SERPL CALC-SCNC: 8 MMOL/L (ref 0–11.9)
AST SERPL-CCNC: 17 U/L (ref 12–45)
BILIRUB SERPL-MCNC: 0.3 MG/DL (ref 0.1–1.5)
BUN SERPL-MCNC: 20 MG/DL (ref 8–22)
CALCIUM SERPL-MCNC: 10.2 MG/DL (ref 8.5–10.5)
CANCER AG19-9 SERPL-ACNC: 9.2 U/ML (ref 0–35)
CHLORIDE SERPL-SCNC: 103 MMOL/L (ref 96–112)
CO2 SERPL-SCNC: 27 MMOL/L (ref 20–33)
CREAT SERPL-MCNC: 0.98 MG/DL (ref 0.5–1.4)
GLOBULIN SER CALC-MCNC: 2.6 G/DL (ref 1.9–3.5)
GLUCOSE SERPL-MCNC: 146 MG/DL (ref 65–99)
MAGNESIUM SERPL-MCNC: 1.9 MG/DL (ref 1.5–2.5)
POTASSIUM SERPL-SCNC: 3.6 MMOL/L (ref 3.6–5.5)
PROT SERPL-MCNC: 6.6 G/DL (ref 6–8.2)
SODIUM SERPL-SCNC: 138 MMOL/L (ref 135–145)

## 2018-07-05 RX ORDER — 0.9 % SODIUM CHLORIDE 0.9 %
VIAL (ML) INJECTION PRN
Status: CANCELLED | OUTPATIENT
Start: 2018-07-23

## 2018-07-05 RX ORDER — ONDANSETRON 8 MG/1
8 TABLET, ORALLY DISINTEGRATING ORAL
Status: CANCELLED | OUTPATIENT
Start: 2018-07-10

## 2018-07-05 RX ORDER — 0.9 % SODIUM CHLORIDE 0.9 %
20 VIAL (ML) INJECTION PRN
Status: CANCELLED | OUTPATIENT
Start: 2018-07-25

## 2018-07-05 RX ORDER — DEXTROSE MONOHYDRATE 50 MG/ML
INJECTION, SOLUTION INTRAVENOUS CONTINUOUS
Status: CANCELLED | OUTPATIENT
Start: 2018-07-23

## 2018-07-05 RX ORDER — PROCHLORPERAZINE MALEATE 10 MG
10 TABLET ORAL EVERY 6 HOURS PRN
Status: CANCELLED | OUTPATIENT
Start: 2018-07-10

## 2018-07-05 RX ORDER — 0.9 % SODIUM CHLORIDE 0.9 %
20 VIAL (ML) INJECTION PRN
Status: CANCELLED | OUTPATIENT
Start: 2018-07-12

## 2018-07-05 RX ORDER — LOPERAMIDE HYDROCHLORIDE 2 MG/1
2 CAPSULE ORAL PRN
Status: CANCELLED | OUTPATIENT
Start: 2018-07-23

## 2018-07-05 RX ORDER — 0.9 % SODIUM CHLORIDE 0.9 %
5 VIAL (ML) INJECTION PRN
Status: CANCELLED | OUTPATIENT
Start: 2018-07-10

## 2018-07-05 RX ORDER — 0.9 % SODIUM CHLORIDE 0.9 %
5 VIAL (ML) INJECTION PRN
Status: CANCELLED | OUTPATIENT
Start: 2018-07-23

## 2018-07-05 RX ORDER — DEXTROSE MONOHYDRATE 50 MG/ML
INJECTION, SOLUTION INTRAVENOUS CONTINUOUS
Status: CANCELLED | OUTPATIENT
Start: 2018-07-10

## 2018-07-05 RX ORDER — 0.9 % SODIUM CHLORIDE 0.9 %
VIAL (ML) INJECTION PRN
Status: CANCELLED | OUTPATIENT
Start: 2018-07-10

## 2018-07-05 RX ORDER — PROCHLORPERAZINE MALEATE 10 MG
10 TABLET ORAL EVERY 6 HOURS PRN
Status: CANCELLED | OUTPATIENT
Start: 2018-07-23

## 2018-07-05 RX ORDER — LOPERAMIDE HYDROCHLORIDE 2 MG/1
2 CAPSULE ORAL PRN
Status: CANCELLED | OUTPATIENT
Start: 2018-07-10

## 2018-07-05 RX ORDER — ONDANSETRON 8 MG/1
8 TABLET, ORALLY DISINTEGRATING ORAL
Status: CANCELLED | OUTPATIENT
Start: 2018-07-23

## 2018-07-05 RX ORDER — ONDANSETRON 2 MG/ML
4 INJECTION INTRAMUSCULAR; INTRAVENOUS
Status: CANCELLED | OUTPATIENT
Start: 2018-07-10

## 2018-07-05 RX ORDER — ONDANSETRON 2 MG/ML
4 INJECTION INTRAMUSCULAR; INTRAVENOUS
Status: CANCELLED | OUTPATIENT
Start: 2018-07-23

## 2018-07-06 ENCOUNTER — HOSPITAL ENCOUNTER (OUTPATIENT)
Dept: LAB | Facility: MEDICAL CENTER | Age: 73
End: 2018-07-06
Attending: INTERNAL MEDICINE
Payer: MEDICARE

## 2018-07-06 LAB
ALBUMIN SERPL BCP-MCNC: 3.7 G/DL (ref 3.2–4.9)
ALBUMIN/GLOB SERPL: 1.3 G/DL
ALP SERPL-CCNC: 51 U/L (ref 30–99)
ALT SERPL-CCNC: 20 U/L (ref 2–50)
ANION GAP SERPL CALC-SCNC: 5 MMOL/L (ref 0–11.9)
AST SERPL-CCNC: 18 U/L (ref 12–45)
BASOPHILS # BLD AUTO: 0.6 % (ref 0–1.8)
BASOPHILS # BLD: 0.03 K/UL (ref 0–0.12)
BILIRUB SERPL-MCNC: 0.2 MG/DL (ref 0.1–1.5)
BUN SERPL-MCNC: 24 MG/DL (ref 8–22)
CALCIUM SERPL-MCNC: 10.1 MG/DL (ref 8.5–10.5)
CHLORIDE SERPL-SCNC: 104 MMOL/L (ref 96–112)
CO2 SERPL-SCNC: 27 MMOL/L (ref 20–33)
CREAT SERPL-MCNC: 0.94 MG/DL (ref 0.5–1.4)
EOSINOPHIL # BLD AUTO: 0.21 K/UL (ref 0–0.51)
EOSINOPHIL NFR BLD: 4 % (ref 0–6.9)
ERYTHROCYTE [DISTWIDTH] IN BLOOD BY AUTOMATED COUNT: 44.4 FL (ref 35.9–50)
GLOBULIN SER CALC-MCNC: 2.8 G/DL (ref 1.9–3.5)
GLUCOSE SERPL-MCNC: 97 MG/DL (ref 65–99)
HCT VFR BLD AUTO: 35.3 % (ref 37–47)
HGB BLD-MCNC: 11.2 G/DL (ref 12–16)
IMM GRANULOCYTES # BLD AUTO: 0 K/UL (ref 0–0.11)
IMM GRANULOCYTES NFR BLD AUTO: 0 % (ref 0–0.9)
LYMPHOCYTES # BLD AUTO: 1.9 K/UL (ref 1–4.8)
LYMPHOCYTES NFR BLD: 36.2 % (ref 22–41)
MCH RBC QN AUTO: 29.6 PG (ref 27–33)
MCHC RBC AUTO-ENTMCNC: 31.7 G/DL (ref 33.6–35)
MCV RBC AUTO: 93.4 FL (ref 81.4–97.8)
MONOCYTES # BLD AUTO: 0.49 K/UL (ref 0–0.85)
MONOCYTES NFR BLD AUTO: 9.3 % (ref 0–13.4)
NEUTROPHILS # BLD AUTO: 2.62 K/UL (ref 2–7.15)
NEUTROPHILS NFR BLD: 49.9 % (ref 44–72)
NRBC # BLD AUTO: 0 K/UL
NRBC BLD-RTO: 0 /100 WBC
PLATELET # BLD AUTO: 151 K/UL (ref 164–446)
PMV BLD AUTO: 8.5 FL (ref 9–12.9)
POTASSIUM SERPL-SCNC: 3.6 MMOL/L (ref 3.6–5.5)
PROT SERPL-MCNC: 6.5 G/DL (ref 6–8.2)
RBC # BLD AUTO: 3.78 M/UL (ref 4.2–5.4)
SODIUM SERPL-SCNC: 136 MMOL/L (ref 135–145)
WBC # BLD AUTO: 5.3 K/UL (ref 4.8–10.8)

## 2018-07-06 PROCEDURE — 80053 COMPREHEN METABOLIC PANEL: CPT

## 2018-07-06 PROCEDURE — 85025 COMPLETE CBC W/AUTO DIFF WBC: CPT

## 2018-07-06 PROCEDURE — 36415 COLL VENOUS BLD VENIPUNCTURE: CPT

## 2018-07-09 ENCOUNTER — OUTPATIENT INFUSION SERVICES (OUTPATIENT)
Dept: ONCOLOGY | Facility: MEDICAL CENTER | Age: 73
End: 2018-07-09
Attending: INTERNAL MEDICINE
Payer: MEDICARE

## 2018-07-09 ENCOUNTER — DOCUMENTATION (OUTPATIENT)
Dept: HEMATOLOGY ONCOLOGY | Facility: MEDICAL CENTER | Age: 73
End: 2018-07-09

## 2018-07-09 VITALS
SYSTOLIC BLOOD PRESSURE: 121 MMHG | BODY MASS INDEX: 24.76 KG/M2 | HEIGHT: 65 IN | TEMPERATURE: 97.6 F | WEIGHT: 148.59 LBS | HEART RATE: 82 BPM | DIASTOLIC BLOOD PRESSURE: 47 MMHG | RESPIRATION RATE: 18 BRPM | OXYGEN SATURATION: 95 %

## 2018-07-09 DIAGNOSIS — C25.1 MALIGNANT NEOPLASM OF BODY OF PANCREAS (HCC): ICD-10-CM

## 2018-07-09 LAB
CANCER AG19-9 SERPL-ACNC: 7.9 U/ML (ref 0–35)
MAGNESIUM SERPL-MCNC: 1.9 MG/DL (ref 1.5–2.5)

## 2018-07-09 PROCEDURE — 83735 ASSAY OF MAGNESIUM: CPT

## 2018-07-09 PROCEDURE — 700111 HCHG RX REV CODE 636 W/ 250 OVERRIDE (IP): Performed by: INTERNAL MEDICINE

## 2018-07-09 PROCEDURE — 96413 CHEMO IV INFUSION 1 HR: CPT

## 2018-07-09 PROCEDURE — 700105 HCHG RX REV CODE 258

## 2018-07-09 PROCEDURE — 96415 CHEMO IV INFUSION ADDL HR: CPT

## 2018-07-09 PROCEDURE — G0498 CHEMO EXTEND IV INFUS W/PUMP: HCPCS

## 2018-07-09 PROCEDURE — 96417 CHEMO IV INFUS EACH ADDL SEQ: CPT

## 2018-07-09 PROCEDURE — 700105 HCHG RX REV CODE 258: Performed by: INTERNAL MEDICINE

## 2018-07-09 PROCEDURE — 96375 TX/PRO/DX INJ NEW DRUG ADDON: CPT

## 2018-07-09 PROCEDURE — 700111 HCHG RX REV CODE 636 W/ 250 OVERRIDE (IP): Mod: JG

## 2018-07-09 PROCEDURE — 86301 IMMUNOASSAY TUMOR CA 19-9: CPT

## 2018-07-09 RX ADMIN — DEXTROSE MONOHYDRATE 149.6 MG: 50 INJECTION, SOLUTION INTRAVENOUS at 10:08

## 2018-07-09 RX ADMIN — DEXTROSE MONOHYDRATE 264 MG: 50 INJECTION, SOLUTION INTRAVENOUS at 12:21

## 2018-07-09 RX ADMIN — FLUOROURACIL 4225 MG: 50 INJECTION, SOLUTION INTRAVENOUS at 14:04

## 2018-07-09 RX ADMIN — ATROPINE SULFATE 0.5 MG: 1 INJECTION, SOLUTION INTRAMUSCULAR; INTRAVENOUS; SUBCUTANEOUS at 12:22

## 2018-07-09 RX ADMIN — DEXAMETHASONE SODIUM PHOSPHATE 12 MG: 4 INJECTION, SOLUTION INTRAMUSCULAR; INTRAVENOUS at 09:21

## 2018-07-09 RX ADMIN — ONDANSETRON HYDROCHLORIDE 16 MG: 2 SOLUTION INTRAMUSCULAR; INTRAVENOUS at 09:41

## 2018-07-09 ASSESSMENT — PAIN SCALES - GENERAL: PAINLEVEL_OUTOF10: 3

## 2018-07-09 NOTE — PROGRESS NOTES
"Patient Name: Larissa Ramirez   Dx: Pancreatic Ca         Protocol: FOLFIRINOX     OXALIplatin 85 mg/m2 IV over 2 hours on Day 1   Irinotecan  IV over 90 min on Day 1  -6/11/18: MD dosing 150 mg/m2 per experts per Dr. Eaton      -6/11/18:Removed with C1 for tolerance per MD   -6/11/18:Removed with C1 for tolerance per MD  Fluorouracil 2400 mg/m2 IV over 46 hours  Repeat every 14 days x 4-12 cycles  -per Dr. Eaton give 3 cycles up front then restage for possible resection.   NCCN Guidelines for Pancreatic Adenocarcinoma V.2.2016.  Juan Pablo T, et al. N Engl J Med. 2011;364(19):1811-34.    Allergies:  Levofloxacin; Nitrofurantoin; Amitriptyline; Sulfa drugs; and Lyrica     /47   Pulse 82   Temp 36.4 °C (97.6 °F)   Resp 18   Ht 1.651 m (5' 5\")   Wt 67.4 kg (148 lb 9.4 oz)   LMP 06/27/1986   SpO2 95%   BMI 24.73 kg/m²  Body surface area is 1.76 meters squared.    Labs 7/6/18:  ANC~ 2620 Plt = 151k   Hgb = 11.2  SCr = 0.94mg/dL CrCl ~ 58mL/min   LFT's = WNL TBili = 0.2     Drug Order   (Drug name, dose, route, IV Fluid & volume, frequency, number of doses) Cycle: 3  Previous treatment: 6/26/18     Medication = OXALIplatin  Base Dose= 85 mg/m2  Calc Dose: Base Dose x 1.76 m2 = 149.6  mg  Final Dose = 149.6 mg  Route = IV  Fluid & Volume = D5W 250 mL  Admin Duration = Over 2 hours          <5% difference, ok to treat with final dose     Medication = Irinotecan   Base Dose= 150 mg/m2  Calc Dose: Base Dose x 1.76 m2 = 264 mg  Final Dose = 264 mg  Route = IV  Fluid & Volume = D5W  500 mL  Admin Duration = Over 90 min           <5% difference, ok to treat with final dose     Medication = Fluorouracil (5-FU) CIVI  Base Dose = 2400 mg/m2  Calc Dose:Base Dose x 1.76 m2 = 4224 mg  Final Dose = 4225 mg  Route = IVP  Fluid & Volume = in CADD pump 84.5 mL + 3ml overfill  Conc = 50 mg/mL  Admin Duration = Over 46 hours    Rate = 1.8 ml/hr           <5% difference, ok to treat with final dose       By my signature below, I " confirm this process was performed independently with the BSA and all final chemotherapy dosing calculations congruent. I have reviewed the above chemotherapy order and that my calculation of the final dose and BSA (when applicable) corroborate those calculations of the  pharmacist. Discrepancies of 5% or greater in the written dose have been addressed and documented within the EPIC Progress notes.    Ericka Bentley, NarinderD, BCOP

## 2018-07-09 NOTE — PROGRESS NOTES
Nutrition Services: Brief Update  Met w/ pt this date to follow-up on current intake and appetite. Pt reports that her appetite and intake remain good and tolerating w/ no current c/o GI distress such as nausea/vomiting, diarrhea nor constipation. Pt also denies any taste changes and reports that she continues to eat the same without any significant changes at this time. Pt remains well nourished and wt stable @ ~149#. RD to continue to monitor ongoing PO adequacy and wt status to leave further recommendations accordingly.

## 2018-07-09 NOTE — PROGRESS NOTES
"Pharmacy Chemotherapy Calculations Note:    Patient Name: Larissa Ramirez      Dx: Locally Advanced Pancreatic Cancer     Cycle 3, Days 1-2    Previous treatment: C2- June 26-27, 2018     Protocol: FOLFIRINOX (Fluorouracil/Leucovorin/Irinotecan/OXALIplatin)    *Dosing Reference*  OXALIplatin 85 mg/m2 IV over 2 hours on Day 1  Irinotecan 180 mg/m2 IV over 90 min on Day 1   06/11/18: dose reduced to 150 mg/m2 for anticipated tolerance   06/11/18: Leucovorin and 5-FU push are omitted from TP d/t anticipated tolerance.  Fluorouracil 1200 mg/m2 CIVI over 24 hours on Days 1-2 (2400 mg/m2 IV over 46 hours)  Q14 days x 4-12 cycles (unresectable or locally advanced)  NCCN Guidelines for Pancreatic Adenocarcinoma. V.2.2016.  Juan Pablo T, et al. N Engl J Med. 2011;364(19):181-25.      Alleriges: Levofloxacin; Nitrofurantoin; Amitriptyline; Sulfa drugs; and Lyrica    /47   Pulse 82   Temp 36.4 °C (97.6 °F)   Resp 18   Ht 1.651 m (5' 5\")   Wt 67.4 kg (148 lb 9.4 oz)   LMP 06/27/1986   SpO2 95%   BMI 24.73 kg/m²  Body surface area is 1.76 meters squared.    7/6/18  ANC~ 2620 Plt = 151k   Hgb = 11.2     SCr = 0.94mg/dL CrCl ~ 58mL/min   LFT's = WNL TBili = 0.2        OXALIplatin (Eloxatin) 85 mg/m² x 1.76m² = 149.6mg   <5% difference, ok to treat with final written dose = 149.6mg IV    Irinotecan (Camptosar) 150 mg/m² x 1.76m² = 264mg   <5% difference, ok to treat with final written dose = 264mg IV    Fluorouracil (5-FU) 2400 mg/m² x 1.76m² = 4224mg   <5% difference, ok to treat with final dose = 4225mg    CIVI via CADD pump @ 1.8mL/hr over 46 hours     ZACKERY Ramirez PharmMarinoD.             "

## 2018-07-09 NOTE — PROGRESS NOTES
Chemotherapy Verification - SECONDARY RN       Height = 165.1cm  Weight = 67.4kg  BSA = 1.76m2       Medication: Oxaliplatin  Dose: 85mg/m2  Calculated Dose: 149.6mg                             (In mg/m2, AUC, mg/kg)     Medication: Irinotecan  Dose: 150mg/m2  Calculated Dose: 264mg                             (In mg/m2, AUC, mg/kg)    Medication: Fluorouracil  Dose: 2400mg/m2  Calculated Dose: 4224mg over 46 hours                             (In mg/m2, AUC, mg/kg)    I confirm that this process was performed independently.

## 2018-07-09 NOTE — PROGRESS NOTES
Pt returns to infusion center for chemotherapy.  Port accessed using 1 1/4 in low profile gusman needle with ease.  Brisk blood return noted.  Results reviewed from labs drawn on 7/6/18.  Pt given premeds as ordered.  Pt tolerated al infusions without incident.  Pt connected to home 5 FU pump; returns in two days for disconnect and de-access.  Pt left infusion center ambulatory and in good condition.

## 2018-07-09 NOTE — PROGRESS NOTES
Chemotherapy Verification - PRIMARY RN      Height = 165.1cm  Weight = 67.4kg  BSA = 1.76m2       Medication: Oxaliplatin  Dose: 85mg/m2  Calculated Dose: 149.6mg                             (In mg/m2, AUC, mg/kg)     Medication: Irinotecan  Dose: 150mg/m2  Calculated Dose: 264mg                             (In mg/m2, AUC, mg/kg)    Medication: Fluorouracil  Dose: 2400mg/m2  Calculated Dose: 4224mg                             (In mg/m2, AUC, mg/kg)      I confirm this process was performed independently with the BSA and all final chemotherapy dosing calculations congruent.  Any discrepancies of 5% or greater have been addressed with the chemotherapy pharmacist. The resolution of the discrepancy has been documented in the EPIC progress notes.

## 2018-07-11 ENCOUNTER — OUTPATIENT INFUSION SERVICES (OUTPATIENT)
Dept: ONCOLOGY | Facility: MEDICAL CENTER | Age: 73
End: 2018-07-11
Attending: INTERNAL MEDICINE
Payer: MEDICARE

## 2018-07-11 VITALS
DIASTOLIC BLOOD PRESSURE: 51 MMHG | TEMPERATURE: 98 F | SYSTOLIC BLOOD PRESSURE: 115 MMHG | HEART RATE: 69 BPM | HEIGHT: 65 IN | BODY MASS INDEX: 24.17 KG/M2 | OXYGEN SATURATION: 98 % | RESPIRATION RATE: 18 BRPM | WEIGHT: 145.06 LBS

## 2018-07-11 DIAGNOSIS — C25.1 MALIGNANT NEOPLASM OF BODY OF PANCREAS (HCC): ICD-10-CM

## 2018-07-11 PROCEDURE — 96523 IRRIG DRUG DELIVERY DEVICE: CPT

## 2018-07-11 PROCEDURE — 700111 HCHG RX REV CODE 636 W/ 250 OVERRIDE (IP)

## 2018-07-11 RX ADMIN — HEPARIN 500 UNITS: 100 SYRINGE at 15:13

## 2018-07-11 ASSESSMENT — PAIN SCALES - GENERAL: PAINLEVEL_OUTOF10: 1

## 2018-07-11 NOTE — PROGRESS NOTES
Patient arrived to hospitals for C3D3 5FU CADD pump de-access.  Pump stopped with 0ml remaining in reservoir, 85.5 mLs administered.  Disconnected pump, Port flushed per protocol with brisk blood return noted, heparinized, de-accessed and gauze dressing applied.  Pump cleaned and placed in pharmacy drawer.  Confirmed pt's next appt. Pt discharged home in [facility].

## 2018-07-20 ENCOUNTER — HOSPITAL ENCOUNTER (OUTPATIENT)
Dept: LAB | Facility: MEDICAL CENTER | Age: 73
End: 2018-07-20
Attending: INTERNAL MEDICINE
Payer: MEDICARE

## 2018-07-20 LAB
ALBUMIN SERPL BCP-MCNC: 4 G/DL (ref 3.2–4.9)
ALBUMIN/GLOB SERPL: 1.5 G/DL
ALP SERPL-CCNC: 59 U/L (ref 30–99)
ALT SERPL-CCNC: 30 U/L (ref 2–50)
ANION GAP SERPL CALC-SCNC: 7 MMOL/L (ref 0–11.9)
AST SERPL-CCNC: 26 U/L (ref 12–45)
BASOPHILS # BLD AUTO: 0.8 % (ref 0–1.8)
BASOPHILS # BLD: 0.03 K/UL (ref 0–0.12)
BILIRUB SERPL-MCNC: 0.3 MG/DL (ref 0.1–1.5)
BUN SERPL-MCNC: 24 MG/DL (ref 8–22)
CALCIUM SERPL-MCNC: 10.4 MG/DL (ref 8.5–10.5)
CHLORIDE SERPL-SCNC: 103 MMOL/L (ref 96–112)
CO2 SERPL-SCNC: 28 MMOL/L (ref 20–33)
CREAT SERPL-MCNC: 1.16 MG/DL (ref 0.5–1.4)
EOSINOPHIL # BLD AUTO: 0.14 K/UL (ref 0–0.51)
EOSINOPHIL NFR BLD: 3.6 % (ref 0–6.9)
ERYTHROCYTE [DISTWIDTH] IN BLOOD BY AUTOMATED COUNT: 48.2 FL (ref 35.9–50)
GLOBULIN SER CALC-MCNC: 2.6 G/DL (ref 1.9–3.5)
GLUCOSE SERPL-MCNC: 114 MG/DL (ref 65–99)
HCT VFR BLD AUTO: 35.7 % (ref 37–47)
HGB BLD-MCNC: 11.5 G/DL (ref 12–16)
IMM GRANULOCYTES # BLD AUTO: 0.01 K/UL (ref 0–0.11)
IMM GRANULOCYTES NFR BLD AUTO: 0.3 % (ref 0–0.9)
LYMPHOCYTES # BLD AUTO: 1.56 K/UL (ref 1–4.8)
LYMPHOCYTES NFR BLD: 39.9 % (ref 22–41)
MCH RBC QN AUTO: 30.6 PG (ref 27–33)
MCHC RBC AUTO-ENTMCNC: 32.2 G/DL (ref 33.6–35)
MCV RBC AUTO: 94.9 FL (ref 81.4–97.8)
MONOCYTES # BLD AUTO: 0.52 K/UL (ref 0–0.85)
MONOCYTES NFR BLD AUTO: 13.3 % (ref 0–13.4)
NEUTROPHILS # BLD AUTO: 1.65 K/UL (ref 2–7.15)
NEUTROPHILS NFR BLD: 42.1 % (ref 44–72)
NRBC # BLD AUTO: 0 K/UL
NRBC BLD-RTO: 0 /100 WBC
PLATELET # BLD AUTO: 161 K/UL (ref 164–446)
PMV BLD AUTO: 8.9 FL (ref 9–12.9)
POTASSIUM SERPL-SCNC: 3.5 MMOL/L (ref 3.6–5.5)
PROT SERPL-MCNC: 6.6 G/DL (ref 6–8.2)
RBC # BLD AUTO: 3.76 M/UL (ref 4.2–5.4)
SODIUM SERPL-SCNC: 138 MMOL/L (ref 135–145)
WBC # BLD AUTO: 3.9 K/UL (ref 4.8–10.8)

## 2018-07-20 PROCEDURE — 80053 COMPREHEN METABOLIC PANEL: CPT

## 2018-07-20 PROCEDURE — 85025 COMPLETE CBC W/AUTO DIFF WBC: CPT

## 2018-07-20 PROCEDURE — 36415 COLL VENOUS BLD VENIPUNCTURE: CPT

## 2018-07-22 NOTE — PROGRESS NOTES
"Pharmacy Chemotherapy Calculations Note:    Patient Name: Larissa Ramirez      Dx: Locally Advanced Pancreatic Cancer     Cycle 4, Days 1-2    Previous treatment: C3- July 9-10, 2018     Protocol: FOLFIRINOX (Fluorouracil/Leucovorin/Irinotecan/OXALIplatin)    *Dosing Reference*  OXALIplatin 85 mg/m2 IV over 2 hours on Day 1  Irinotecan 180 mg/m2 IV over 90 min on Day 1   06/11/18: dose reduced to 150 mg/m2 for anticipated tolerance   06/11/18: Leucovorin and 5-FU push are omitted from TP d/t anticipated tolerance.  Fluorouracil 1200 mg/m2 CIVI over 24 hours on Days 1-2 (2400 mg/m2 IV over 46 hours)  Q14 days x 4-12 cycles (unresectable or locally advanced)  NCCN Guidelines for Pancreatic Adenocarcinoma. V.2.2016.  Juan Pablo T, et al. N Engl J Med. 2011;364(19):1811-25.      Alleriges: Levofloxacin; Nitrofurantoin; Amitriptyline; Sulfa drugs; and Lyrica    /49   Pulse 74   Temp 37.2 °C (99 °F)   Resp 18   Ht 1.651 m (5' 5\")   Wt 65 kg (143 lb 4.8 oz)   LMP 06/27/1986   SpO2 97%   BMI 23.85 kg/m²  Body surface area is 1.73 meters squared.    7/20/18-  ANC~ 1650 Plt = 161k   Hgb = 11.5     SCr = 1.16mg/dL CrCl ~ 45mL/min   LFT's = WNL TBili = 0.3            OXALIplatin (Eloxatin) 85 mg/m² x 1.73m² = 147mg   <5% difference, ok to treat with final written dose = 147.05mg IV    Irinotecan (Camptosar) 150 mg/m² x 1.73m² = 259.5mg   <5% difference, ok to treat with final written dose = 259.6mg IV    Fluorouracil (5-FU) 2400 mg/m² x 1.73m² = 4152mg   <5% difference, ok to treat with final dose = 4150mg    CIVI via CADD pump @ 1.8mL/hr over 46 hours     ZACKERY Ramirez Pharm.D.             "

## 2018-07-22 NOTE — PROGRESS NOTES
"Pharmacy Chemotherapy Calculations Note:    Patient Name: Larissa Ramirez      Dx: Locally Advanced Pancreatic Cancer     Protocol: FOLFIRINOX (Fluorouracil/Leucovorin/Irinotecan/OXALIplatin)    *Dosing Reference*  OXALIplatin 85 mg/m2 IV over 2 hours on Day 1  Irinotecan 180 mg/m2 IV over 90 min on Day 1   06/11/18: dose reduced to 150 mg/m2 for anticipated tolerance   06/11/18: Leucovorin and 5-FU push are omitted from TP d/t anticipated tolerance.  Fluorouracil 1200 mg/m2 CIVI over 24 hours on Days 1-2 (2400 mg/m2 IV over 46 hours)  Q14 days x 4-12 cycles (unresectable or locally advanced)  NCCN Guidelines for Pancreatic Adenocarcinoma. V.2.2016.  Juan Pablo T, et al. N Engl J Med. 2011;364(19):181-25.      Alleriges: Levofloxacin; Nitrofurantoin; Amitriptyline; Sulfa drugs; and Lyrica    /49   Pulse 74   Temp 37.2 °C (99 °F)   Resp 18   Ht 1.651 m (5' 5\")   Wt 65 kg (143 lb 4.8 oz)   LMP 06/27/1986   SpO2 97%   BMI 23.85 kg/m²  Body surface area is 1.73 meters squared.    Labs 07/20/18  ANC~ 1650 Plt = 161k Hgb = 11.5 SCr = 1.16 mg/dL CrCl = 45 mL/min  LFT's = WNL TBili = 0.3 K = 3.5      Drug Order   (Drug name, dose, route, IV Fluid & volume, frequency, number of doses) Cycle 4  Previous treatment: C3 = Jul 9-10th, 2018       Medication = OXALIplatin (Eloxatin)  Base Dose= 85 mg/m2  Calc Dose: Base Dose x 1.73 m2 = 147 mg  Final Dose = 147.05 mg  Route = IV  Fluid & Volume = D5W 250 mL  Admin Duration = Over 2 hours            <5% difference, ok to treat with final dose      Medication = Irinotecan (Camptosar)  Base Dose= 150 mg/m2  Calc Dose: Base Dose x 1.73 m2 = 259.5 mg  Final Dose = 259.6 mg  Route = IV  Fluid & Volume = D5W 500 mL  Admin Duration = Over 90 min             <5% difference, ok to treat with final dose      Medication = Fluorouracil (5-FU)   Base Dose = 2400 mg/m2  Calc Dose:Base Dose x 1.73 m2 = 4152 mg  Final Dose = 4150 mg  Route = IVP  Conc = 50 mg/mL  Fluid & Volume = in CADD " pump 83 mL + 3ml overfill  Admin Duration = Over 46 hours    Rate = 1.8 ml/hr             <5% difference, ok to treat with final dose         By my signature below, I confirm this process was performed independently with the BSA and all final chemotherapy dosing calculations congruent. I have reviewed the above chemotherapy order and that my calculation of the final dose and BSA (when applicable) corroborate those calculations of the  pharmacist. Discrepancies of 5% or greater in the written dose have been addressed and documented within the EPIC Progress notes.       Dung Perez, PharmD, BCOP

## 2018-07-23 ENCOUNTER — DOCUMENTATION (OUTPATIENT)
Dept: HEMATOLOGY ONCOLOGY | Facility: MEDICAL CENTER | Age: 73
End: 2018-07-23

## 2018-07-23 ENCOUNTER — OUTPATIENT INFUSION SERVICES (OUTPATIENT)
Dept: ONCOLOGY | Facility: MEDICAL CENTER | Age: 73
End: 2018-07-23
Attending: INTERNAL MEDICINE
Payer: MEDICARE

## 2018-07-23 VITALS
OXYGEN SATURATION: 97 % | TEMPERATURE: 99 F | SYSTOLIC BLOOD PRESSURE: 115 MMHG | WEIGHT: 143.3 LBS | DIASTOLIC BLOOD PRESSURE: 49 MMHG | RESPIRATION RATE: 18 BRPM | BODY MASS INDEX: 23.88 KG/M2 | HEIGHT: 65 IN | HEART RATE: 74 BPM

## 2018-07-23 DIAGNOSIS — C25.1 MALIGNANT NEOPLASM OF BODY OF PANCREAS (HCC): ICD-10-CM

## 2018-07-23 DIAGNOSIS — C25.9 MALIGNANT NEOPLASM OF PANCREAS, UNSPECIFIED LOCATION OF MALIGNANCY (HCC): Primary | ICD-10-CM

## 2018-07-23 DIAGNOSIS — E87.6 HYPOKALEMIA: ICD-10-CM

## 2018-07-23 LAB
CANCER AG19-9 SERPL-ACNC: 9.1 U/ML (ref 0–35)
MAGNESIUM SERPL-MCNC: 2 MG/DL (ref 1.5–2.5)

## 2018-07-23 PROCEDURE — 700111 HCHG RX REV CODE 636 W/ 250 OVERRIDE (IP): Performed by: INTERNAL MEDICINE

## 2018-07-23 PROCEDURE — A9270 NON-COVERED ITEM OR SERVICE: HCPCS | Performed by: INTERNAL MEDICINE

## 2018-07-23 PROCEDURE — 96375 TX/PRO/DX INJ NEW DRUG ADDON: CPT

## 2018-07-23 PROCEDURE — 700105 HCHG RX REV CODE 258

## 2018-07-23 PROCEDURE — 96413 CHEMO IV INFUSION 1 HR: CPT

## 2018-07-23 PROCEDURE — 700105 HCHG RX REV CODE 258: Performed by: INTERNAL MEDICINE

## 2018-07-23 PROCEDURE — 700111 HCHG RX REV CODE 636 W/ 250 OVERRIDE (IP): Mod: JG

## 2018-07-23 PROCEDURE — 700102 HCHG RX REV CODE 250 W/ 637 OVERRIDE(OP): Performed by: INTERNAL MEDICINE

## 2018-07-23 PROCEDURE — 96417 CHEMO IV INFUS EACH ADDL SEQ: CPT

## 2018-07-23 PROCEDURE — 96415 CHEMO IV INFUSION ADDL HR: CPT

## 2018-07-23 PROCEDURE — 83735 ASSAY OF MAGNESIUM: CPT

## 2018-07-23 PROCEDURE — 86301 IMMUNOASSAY TUMOR CA 19-9: CPT

## 2018-07-23 PROCEDURE — G0498 CHEMO EXTEND IV INFUS W/PUMP: HCPCS

## 2018-07-23 PROCEDURE — A4212 NON CORING NEEDLE OR STYLET: HCPCS

## 2018-07-23 RX ORDER — POTASSIUM CHLORIDE 20 MEQ/1
40 TABLET, EXTENDED RELEASE ORAL ONCE
Status: COMPLETED | OUTPATIENT
Start: 2018-07-23 | End: 2018-07-23

## 2018-07-23 RX ORDER — DEXTROSE MONOHYDRATE 50 MG/ML
INJECTION, SOLUTION INTRAVENOUS CONTINUOUS
Status: DISCONTINUED | OUTPATIENT
Start: 2018-07-23 | End: 2018-07-23 | Stop reason: HOSPADM

## 2018-07-23 RX ADMIN — FLUOROURACIL 4150 MG: 50 INJECTION, SOLUTION INTRAVENOUS at 13:57

## 2018-07-23 RX ADMIN — ATROPINE SULFATE 0.5 MG: 1 INJECTION, SOLUTION INTRAMUSCULAR; INTRAVENOUS; SUBCUTANEOUS at 12:08

## 2018-07-23 RX ADMIN — POTASSIUM CHLORIDE 40 MEQ: 1500 TABLET, EXTENDED RELEASE ORAL at 09:37

## 2018-07-23 RX ADMIN — DEXAMETHASONE SODIUM PHOSPHATE 12 MG: 4 INJECTION, SOLUTION INTRAMUSCULAR; INTRAVENOUS at 09:00

## 2018-07-23 RX ADMIN — DEXTROSE MONOHYDRATE 147.05 MG: 50 INJECTION, SOLUTION INTRAVENOUS at 10:01

## 2018-07-23 RX ADMIN — DEXTROSE MONOHYDRATE 259.6 MG: 50 INJECTION, SOLUTION INTRAVENOUS at 12:08

## 2018-07-23 RX ADMIN — DEXTROSE MONOHYDRATE: 50 INJECTION, SOLUTION INTRAVENOUS at 09:00

## 2018-07-23 RX ADMIN — ONDANSETRON HYDROCHLORIDE 16 MG: 2 SOLUTION INTRAMUSCULAR; INTRAVENOUS at 09:15

## 2018-07-23 ASSESSMENT — PAIN SCALES - GENERAL: PAINLEVEL_OUTOF10: 0

## 2018-07-23 NOTE — PROGRESS NOTES
Chemotherapy Verification - PRIMARY RN      Height = 165.1cm  Weight = 65kg  BSA = 1.73m2       Medication: Oxaliplatin  Dose: 85mg/m2  Calculated Dose: 147.05mg                        Medication: Irinotecan  Dose: 150mg/m2  Calculated Dose: 259.5mg                       Medication: Fluorouracil  Dose: 2400mg/m2  Calculated Dose: 4152mg over 46h                               I confirm this process was performed independently with the BSA and all final chemotherapy dosing calculations congruent.  Any discrepancies of 5% or greater have been addressed with the chemotherapy pharmacist. The resolution of the discrepancy has been documented in the EPIC progress notes.

## 2018-07-23 NOTE — PROGRESS NOTES
Chemotherapy Verification - SECONDARY RN       Height = 165.1cm  Weight = 65kg  BSA = 1.73m2       Medication: Oxaliplatin  Dose: 85mg/m2  Calculated Dose: 147.05mg                             (In mg/m2, AUC, mg/kg)     Medication: Irinotecan  Dose: 150mg/m2  Calculated Dose: 259.5mg                             (In mg/m2, AUC, mg/kg)    Medication: 5 FU  Dose: 2400mg/m2  Calculated Dose: 4152mg                             (In mg/m2, AUC, mg/kg)    I confirm that this process was performed independently.

## 2018-07-23 NOTE — PROGRESS NOTES
Nutrition Services: Brief Update  Met w/ pt this date to follow-up on current intake and appetite. Pt reports that her intake remains good; however, in the past two week pt has experienced a severe 6# (4%) wt loss. Pt states she was experiencing more nauseas than usual; therefore, her intake decreased. However, states it has been well managed w/ anti-emetic treatment and is gradually resolving. Otherwise, pt reports her intake has improved and reports that she anticipates her weight to stabilize. RD to continue to monitor ongoing PO adequacy and wt status to leave further recommendations accordingly.

## 2018-07-23 NOTE — PROGRESS NOTES
Pt is here for her scheduled chemotherapy. Labs, including the research labs, drawn. K 3.5 replaced with KCl 40meq PO today as ordered. Pt is in good spirits - some nausea at home; controlled with nausea medication prn. Premedicated. Chemotherapy completed without an incident. Atropine given prior Irinotecan. 5-FU pump volume to be delivered 83ml - with overfill 84.5ml. Discharged home to self care. Next appointment confirmed.

## 2018-07-25 ENCOUNTER — OUTPATIENT INFUSION SERVICES (OUTPATIENT)
Dept: ONCOLOGY | Facility: MEDICAL CENTER | Age: 73
End: 2018-07-25
Attending: INTERNAL MEDICINE
Payer: MEDICARE

## 2018-07-25 VITALS
HEIGHT: 65 IN | BODY MASS INDEX: 23.73 KG/M2 | TEMPERATURE: 98.8 F | SYSTOLIC BLOOD PRESSURE: 89 MMHG | HEART RATE: 71 BPM | DIASTOLIC BLOOD PRESSURE: 40 MMHG | WEIGHT: 142.42 LBS | RESPIRATION RATE: 18 BRPM | OXYGEN SATURATION: 97 %

## 2018-07-25 DIAGNOSIS — C25.1 MALIGNANT NEOPLASM OF BODY OF PANCREAS (HCC): ICD-10-CM

## 2018-07-25 PROCEDURE — 96523 IRRIG DRUG DELIVERY DEVICE: CPT

## 2018-07-25 PROCEDURE — 700101 HCHG RX REV CODE 250: Performed by: INTERNAL MEDICINE

## 2018-07-25 PROCEDURE — 700111 HCHG RX REV CODE 636 W/ 250 OVERRIDE (IP)

## 2018-07-25 RX ORDER — 0.9 % SODIUM CHLORIDE 0.9 %
20 VIAL (ML) INJECTION PRN
Status: DISCONTINUED | OUTPATIENT
Start: 2018-07-25 | End: 2018-07-25 | Stop reason: HOSPADM

## 2018-07-25 RX ADMIN — HEPARIN 500 UNITS: 100 SYRINGE at 15:08

## 2018-07-25 RX ADMIN — SODIUM CHLORIDE 20 ML: 9 INJECTION, SOLUTION INTRAMUSCULAR; INTRAVENOUS; SUBCUTANEOUS at 15:08

## 2018-07-25 ASSESSMENT — PAIN SCALES - GENERAL
PAINLEVEL: NO PAIN
PAINLEVEL_OUTOF10: 0

## 2018-07-25 NOTE — PROGRESS NOTES
Patient here for pump de-access. 82.55 ml given. Pump turned off and disconnected from patient. Brisk blood return noted. Heparin instilled prior to de-accessing port. Port de-accessed; gauze/tape applied over site. Patient to have CT scan on 7/30. Educated patient on the importance of port flushes every 4-6 weeks. Patient verbalized understanding. Patient declined making next appointment. States she wants to have the CT scan first. Discharged to self care; no apparent distress noted.

## 2018-07-30 ENCOUNTER — HOSPITAL ENCOUNTER (OUTPATIENT)
Dept: RADIOLOGY | Facility: MEDICAL CENTER | Age: 73
End: 2018-07-30
Attending: INTERNAL MEDICINE
Payer: MEDICARE

## 2018-07-30 DIAGNOSIS — C25.2 MALIGNANT NEOPLASM OF TAIL OF PANCREAS (HCC): ICD-10-CM

## 2018-07-30 PROCEDURE — 700117 HCHG RX CONTRAST REV CODE 255: Performed by: INTERNAL MEDICINE

## 2018-07-30 PROCEDURE — 74178 CT ABD&PLV WO CNTR FLWD CNTR: CPT

## 2018-07-30 RX ADMIN — IOHEXOL 100 ML: 350 INJECTION, SOLUTION INTRAVENOUS at 10:52

## 2018-07-31 ENCOUNTER — HOSPITAL ENCOUNTER (OUTPATIENT)
Dept: PAIN MANAGEMENT | Facility: REHABILITATION | Age: 73
End: 2018-07-31
Attending: SPECIALIST
Payer: MEDICARE

## 2018-07-31 ENCOUNTER — HOSPITAL ENCOUNTER (OUTPATIENT)
Dept: RADIOLOGY | Facility: REHABILITATION | Age: 73
End: 2018-07-31
Attending: SPECIALIST
Payer: MEDICARE

## 2018-07-31 VITALS
OXYGEN SATURATION: 98 % | HEART RATE: 66 BPM | WEIGHT: 140.43 LBS | HEIGHT: 65 IN | SYSTOLIC BLOOD PRESSURE: 108 MMHG | BODY MASS INDEX: 23.4 KG/M2 | RESPIRATION RATE: 18 BRPM | DIASTOLIC BLOOD PRESSURE: 49 MMHG | TEMPERATURE: 97.7 F

## 2018-07-31 PROCEDURE — 64530 N BLOCK INJ CELIAC PELUS: CPT

## 2018-07-31 PROCEDURE — 700117 HCHG RX CONTRAST REV CODE 255

## 2018-07-31 PROCEDURE — 99152 MOD SED SAME PHYS/QHP 5/>YRS: CPT

## 2018-07-31 PROCEDURE — 700111 HCHG RX REV CODE 636 W/ 250 OVERRIDE (IP)

## 2018-07-31 RX ORDER — METHYLPREDNISOLONE ACETATE 80 MG/ML
INJECTION, SUSPENSION INTRA-ARTICULAR; INTRALESIONAL; INTRAMUSCULAR; SOFT TISSUE
Status: COMPLETED
Start: 2018-07-31 | End: 2018-07-31

## 2018-07-31 RX ORDER — BUPIVACAINE HYDROCHLORIDE 2.5 MG/ML
INJECTION, SOLUTION EPIDURAL; INFILTRATION; INTRACAUDAL
Status: COMPLETED
Start: 2018-07-31 | End: 2018-07-31

## 2018-07-31 RX ORDER — MIDAZOLAM HYDROCHLORIDE 1 MG/ML
INJECTION INTRAMUSCULAR; INTRAVENOUS
Status: COMPLETED
Start: 2018-07-31 | End: 2018-07-31

## 2018-07-31 RX ORDER — DEXAMETHASONE SODIUM PHOSPHATE 10 MG/ML
INJECTION, SOLUTION INTRAMUSCULAR; INTRAVENOUS
Status: COMPLETED
Start: 2018-07-31 | End: 2018-07-31

## 2018-07-31 RX ADMIN — FENTANYL CITRATE 25 MCG: 50 INJECTION, SOLUTION INTRAMUSCULAR; INTRAVENOUS at 10:54

## 2018-07-31 RX ADMIN — MIDAZOLAM HYDROCHLORIDE 0.5 MG: 1 INJECTION, SOLUTION INTRAMUSCULAR; INTRAVENOUS at 10:54

## 2018-07-31 RX ADMIN — DEXAMETHASONE SODIUM PHOSPHATE 10 MG: 10 INJECTION, SOLUTION INTRAMUSCULAR; INTRAVENOUS at 10:59

## 2018-07-31 RX ADMIN — BUPIVACAINE HYDROCHLORIDE 10 ML: 2.5 INJECTION, SOLUTION EPIDURAL; INFILTRATION; INTRACAUDAL; PERINEURAL at 11:05

## 2018-07-31 RX ADMIN — BUPIVACAINE HYDROCHLORIDE 10 ML: 2.5 INJECTION, SOLUTION EPIDURAL; INFILTRATION; INTRACAUDAL; PERINEURAL at 10:59

## 2018-07-31 RX ADMIN — IOHEXOL 2 ML: 240 INJECTION, SOLUTION INTRATHECAL; INTRAVASCULAR; INTRAVENOUS; ORAL at 10:58

## 2018-07-31 ASSESSMENT — PAIN SCALES - GENERAL
PAINLEVEL_OUTOF10: 7
PAINLEVEL_OUTOF10: 3
PAINLEVEL_OUTOF10: 3

## 2018-07-31 NOTE — PROGRESS NOTES
Received ambulatory accompanied by RN.  Patient awake, alert and verbally responsive. Tolerated fluids well.  Ice pack applied to affected area. Patient able to  move all extremities without difficulty voluntarily and on command. Reviewed home care instructions and understood by patient. Dr. Gonzales notified patient condition blood pressure was 108/49 and post pain level was # 3. He ordered to discharged patient.

## 2018-07-31 NOTE — PROGRESS NOTES
Medication reconciliation reviewed with patient. Denied taking any blood thinners and any  any anti- inflammatories medications. .Patient had a  Guillermo / spouse .Home care form and verbal  instruction given to patient and verbalized understanding. Dr. Gonzales made aware . Hand off reported to SOBEIDA Lott RN.

## 2018-07-31 NOTE — PROCEDURES
DATE OF SERVICE:  07/31/2018    PROCEDURES:  1.  Celiac plexus block.  2.  Fluoroscopy for needle guidance, confirmation and placement.  3.  IV sedation per patient request.    DIAGNOSES:  1.  Abdominal pain secondary to pancreatic cancer.  2.  Anxiety over painful procedure, patient requesting intravenous sedation.    DESCRIPTION OF PROCEDURE:  After informed consent with the patient prone,   fluoroscopy was utilized to identify the L1 vertebral body.  She is monitored,   sedated with Versed and fentanyl.  Right posterolateral oblique view was   taken of the L1 vertebral body.  The back is prepped with Betadine, sterile   draped and anesthetized under intermittent fluoroscopic guidance and local   anesthesia, a 22-gauge 5 inch needle was passed to the anterolateral L1   vertebral body just below the transverse process.  Lateral view was used to   position the tip of the needle along the ventral aspect of the vertebral body.    Contrast was injected confirming appropriate spread along the sympathetic   chain.  AP view was also used to confirm this.  I slowly injected 10 mL 0.25%   bupivacaine, 5 mg of dexamethasone, needle was removed.  The left side was   then approached using the same view and technique, the same medications were   injected on the left.  She tolerated this well.    PLAN:  Consider celiac plexus neurolytic block if necessary for pain control   related to her pancreatic cancer.       ____________________________________     MD TEZ CAMPOS / CHARLIE    DD:  07/31/2018 11:53:43  DT:  07/31/2018 14:16:45    D#:  [id number]  Job#:  [id number]

## 2018-08-01 RX ORDER — ONDANSETRON 2 MG/ML
4 INJECTION INTRAMUSCULAR; INTRAVENOUS
Status: CANCELLED | OUTPATIENT
Start: 2018-08-27

## 2018-08-01 RX ORDER — LORAZEPAM 2 MG/ML
1 INJECTION INTRAMUSCULAR ONCE
Status: CANCELLED
Start: 2018-08-08 | End: 2018-08-08

## 2018-08-01 RX ORDER — DEXTROSE MONOHYDRATE 50 MG/ML
INJECTION, SOLUTION INTRAVENOUS CONTINUOUS
Status: CANCELLED | OUTPATIENT
Start: 2018-08-27

## 2018-08-01 RX ORDER — LOPERAMIDE HYDROCHLORIDE 2 MG/1
2 CAPSULE ORAL PRN
Status: CANCELLED | OUTPATIENT
Start: 2018-08-06

## 2018-08-01 RX ORDER — LORAZEPAM 2 MG/ML
1 INJECTION INTRAMUSCULAR ONCE
Status: CANCELLED
Start: 2018-08-27 | End: 2018-08-20

## 2018-08-01 RX ORDER — 0.9 % SODIUM CHLORIDE 0.9 %
20 VIAL (ML) INJECTION PRN
Status: CANCELLED | OUTPATIENT
Start: 2018-08-29

## 2018-08-01 RX ORDER — PROCHLORPERAZINE MALEATE 10 MG
10 TABLET ORAL EVERY 6 HOURS PRN
Status: CANCELLED | OUTPATIENT
Start: 2018-08-27

## 2018-08-01 RX ORDER — LORAZEPAM 2 MG/ML
1 INJECTION INTRAMUSCULAR ONCE
Status: CANCELLED
Start: 2018-08-06 | End: 2018-08-06

## 2018-08-01 RX ORDER — ONDANSETRON 8 MG/1
8 TABLET, ORALLY DISINTEGRATING ORAL
Status: CANCELLED | OUTPATIENT
Start: 2018-08-06

## 2018-08-01 RX ORDER — 0.9 % SODIUM CHLORIDE 0.9 %
VIAL (ML) INJECTION PRN
Status: CANCELLED | OUTPATIENT
Start: 2018-08-06

## 2018-08-01 RX ORDER — PROCHLORPERAZINE MALEATE 10 MG
10 TABLET ORAL EVERY 6 HOURS PRN
Status: CANCELLED | OUTPATIENT
Start: 2018-08-06

## 2018-08-01 RX ORDER — 0.9 % SODIUM CHLORIDE 0.9 %
VIAL (ML) INJECTION PRN
Status: CANCELLED | OUTPATIENT
Start: 2018-08-27

## 2018-08-01 RX ORDER — LOPERAMIDE HYDROCHLORIDE 2 MG/1
2 CAPSULE ORAL PRN
Status: CANCELLED | OUTPATIENT
Start: 2018-08-27

## 2018-08-01 RX ORDER — 0.9 % SODIUM CHLORIDE 0.9 %
5 VIAL (ML) INJECTION PRN
Status: CANCELLED | OUTPATIENT
Start: 2018-08-06

## 2018-08-01 RX ORDER — DEXTROSE MONOHYDRATE 50 MG/ML
INJECTION, SOLUTION INTRAVENOUS CONTINUOUS
Status: CANCELLED | OUTPATIENT
Start: 2018-08-06

## 2018-08-01 RX ORDER — LORAZEPAM 2 MG/ML
1 INJECTION INTRAMUSCULAR ONCE
Status: CANCELLED
Start: 2018-08-29 | End: 2018-08-22

## 2018-08-01 RX ORDER — 0.9 % SODIUM CHLORIDE 0.9 %
20 VIAL (ML) INJECTION PRN
Status: CANCELLED | OUTPATIENT
Start: 2018-08-08

## 2018-08-01 RX ORDER — ONDANSETRON 2 MG/ML
4 INJECTION INTRAMUSCULAR; INTRAVENOUS
Status: CANCELLED | OUTPATIENT
Start: 2018-08-06

## 2018-08-01 RX ORDER — ONDANSETRON 8 MG/1
8 TABLET, ORALLY DISINTEGRATING ORAL
Status: CANCELLED | OUTPATIENT
Start: 2018-08-27

## 2018-08-01 RX ORDER — LORAZEPAM 2 MG/ML
1 INJECTION INTRAMUSCULAR ONCE
Status: CANCELLED
Start: 2018-08-19 | End: 2018-08-19

## 2018-08-01 RX ORDER — 0.9 % SODIUM CHLORIDE 0.9 %
5 VIAL (ML) INJECTION PRN
Status: CANCELLED | OUTPATIENT
Start: 2018-08-27

## 2018-08-03 ENCOUNTER — HOSPITAL ENCOUNTER (OUTPATIENT)
Dept: LAB | Facility: MEDICAL CENTER | Age: 73
End: 2018-08-03
Attending: INTERNAL MEDICINE
Payer: MEDICARE

## 2018-08-03 LAB
ALBUMIN SERPL BCP-MCNC: 3.8 G/DL (ref 3.2–4.9)
ALBUMIN/GLOB SERPL: 1.7 G/DL
ALP SERPL-CCNC: 65 U/L (ref 30–99)
ALT SERPL-CCNC: 51 U/L (ref 2–50)
ANION GAP SERPL CALC-SCNC: 5 MMOL/L (ref 0–11.9)
AST SERPL-CCNC: 36 U/L (ref 12–45)
BASOPHILS # BLD AUTO: 0.7 % (ref 0–1.8)
BASOPHILS # BLD: 0.03 K/UL (ref 0–0.12)
BILIRUB SERPL-MCNC: 0.3 MG/DL (ref 0.1–1.5)
BUN SERPL-MCNC: 21 MG/DL (ref 8–22)
CALCIUM SERPL-MCNC: 10 MG/DL (ref 8.5–10.5)
CHLORIDE SERPL-SCNC: 107 MMOL/L (ref 96–112)
CO2 SERPL-SCNC: 27 MMOL/L (ref 20–33)
CREAT SERPL-MCNC: 0.99 MG/DL (ref 0.5–1.4)
EOSINOPHIL # BLD AUTO: 0.09 K/UL (ref 0–0.51)
EOSINOPHIL NFR BLD: 2.1 % (ref 0–6.9)
ERYTHROCYTE [DISTWIDTH] IN BLOOD BY AUTOMATED COUNT: 53.6 FL (ref 35.9–50)
GLOBULIN SER CALC-MCNC: 2.3 G/DL (ref 1.9–3.5)
GLUCOSE SERPL-MCNC: 97 MG/DL (ref 65–99)
HCT VFR BLD AUTO: 34.8 % (ref 37–47)
HGB BLD-MCNC: 11.1 G/DL (ref 12–16)
IMM GRANULOCYTES # BLD AUTO: 0.01 K/UL (ref 0–0.11)
IMM GRANULOCYTES NFR BLD AUTO: 0.2 % (ref 0–0.9)
LYMPHOCYTES # BLD AUTO: 1.45 K/UL (ref 1–4.8)
LYMPHOCYTES NFR BLD: 33.3 % (ref 22–41)
MCH RBC QN AUTO: 30.6 PG (ref 27–33)
MCHC RBC AUTO-ENTMCNC: 31.9 G/DL (ref 33.6–35)
MCV RBC AUTO: 95.9 FL (ref 81.4–97.8)
MONOCYTES # BLD AUTO: 0.55 K/UL (ref 0–0.85)
MONOCYTES NFR BLD AUTO: 12.6 % (ref 0–13.4)
NEUTROPHILS # BLD AUTO: 2.23 K/UL (ref 2–7.15)
NEUTROPHILS NFR BLD: 51.1 % (ref 44–72)
NRBC # BLD AUTO: 0.02 K/UL
NRBC BLD-RTO: 0.5 /100 WBC
PLATELET # BLD AUTO: 103 K/UL (ref 164–446)
PMV BLD AUTO: 9.3 FL (ref 9–12.9)
POTASSIUM SERPL-SCNC: 3.7 MMOL/L (ref 3.6–5.5)
PROT SERPL-MCNC: 6.1 G/DL (ref 6–8.2)
RBC # BLD AUTO: 3.63 M/UL (ref 4.2–5.4)
SODIUM SERPL-SCNC: 139 MMOL/L (ref 135–145)
WBC # BLD AUTO: 4.4 K/UL (ref 4.8–10.8)

## 2018-08-03 PROCEDURE — 36415 COLL VENOUS BLD VENIPUNCTURE: CPT

## 2018-08-03 PROCEDURE — 85025 COMPLETE CBC W/AUTO DIFF WBC: CPT

## 2018-08-03 PROCEDURE — 80053 COMPREHEN METABOLIC PANEL: CPT

## 2018-08-05 NOTE — PROGRESS NOTES
"Pharmacy Chemotherapy Calculations Note:    Patient Name: Larissa Ramirez      Dx: Locally Advanced Pancreatic Cancer     Protocol: FOLFIRINOX (Fluorouracil/Leucovorin/Irinotecan/OXALIplatin)    *Dosing Reference*  OXALIplatin 85 mg/m2 IV over 2 hours on Day 1  Irinotecan 180 mg/m2 IV over 90 min on Day 1   06/11/18: dose reduced to 150 mg/m2 for anticipated tolerance   06/11/18: Leucovorin and 5-FU push are omitted from TP d/t anticipated tolerance.  Fluorouracil 1200 mg/m2 CIVI over 24 hours on Days 1-2 (2400 mg/m2 IV over 46 hours)  Q14 days x 4-12 cycles (unresectable or locally advanced)  NCCN Guidelines for Pancreatic Adenocarcinoma. V.2.2016.  Juan Pablo T, et al. N Engl J Med. 2011;364(19):181-25.      Alleriges: Levofloxacin; Nitrofurantoin; Amitriptyline; Sulfa drugs; and Lyrica    /42   Pulse 81   Temp 37.2 °C (99 °F)   Resp 18   Ht 1.651 m (5' 5\")   Wt 65.7 kg (144 lb 13.5 oz)   LMP 06/27/1986   SpO2 96%   BMI 24.10 kg/m²  Body surface area is 1.74 meters squared.     Labs 8/3/18  ANC~ 2230 Plt = 103k (OK if > 100K) Hgb = 11.1   SCr = 0.99 mg/dL CrCl ~ 52 mL/min (no renal adjustment recommended)  LFT's = 36/51/65 TBili = 0.3 K = 3.7     8/6/18: Magnesium = 1.7, repleted with 1 gm IVPB     Drug Order   (Drug name, dose, route, IV Fluid & volume, frequency, number of doses) Cycle 5  Previous treatment: 7/23-25/2018       Medication = OXALIplatin (Eloxatin)  Base Dose= 85 mg/m2  Calc Dose: Base Dose x 1.74 m2 = 148 mg  Final Dose = 147.9 mg  Route = IV  Fluid & Volume = D5W 250 mL  Admin Duration = Over 2 hours            <5% difference, ok to treat with final dose      Medication = Irinotecan (Camptosar)  Base Dose= 150 mg/m2  Calc Dose: Base Dose x 1.74 m2 = 261 mg  Final Dose = 261 mg  Route = IV  Fluid & Volume = D5W 500 mL  Admin Duration = Over 90 min             <5% difference, ok to treat with final dose      Medication = Fluorouracil (5-FU)   Base Dose = 2400 mg/m2  Calc Dose:Base Dose x " 1.74 m2 = 4176 mg  Final Dose = 4175 mg  Route = IVP  Conc = 50 mg/mL  Fluid & Volume = in CADD pump 83.5 mL + 3ml overfill  Admin Duration = Over 46 hours    Rate = 1.8 ml/hr             <5% difference, ok to treat with final dose         By my signature below, I confirm this process was performed independently with the BSA and all final chemotherapy dosing calculations congruent. I have reviewed the above chemotherapy order and that my calculation of the final dose and BSA (when applicable) corroborate those calculations of the  pharmacist. Discrepancies of 5% or greater in the written dose have been addressed and documented within the [facility] Progress notes.     Percy Ramirez, PharmD

## 2018-08-06 ENCOUNTER — OUTPATIENT INFUSION SERVICES (OUTPATIENT)
Dept: ONCOLOGY | Facility: MEDICAL CENTER | Age: 73
End: 2018-08-06
Attending: INTERNAL MEDICINE
Payer: MEDICARE

## 2018-08-06 ENCOUNTER — DOCUMENTATION (OUTPATIENT)
Dept: HEMATOLOGY ONCOLOGY | Facility: MEDICAL CENTER | Age: 73
End: 2018-08-06

## 2018-08-06 VITALS
HEIGHT: 65 IN | BODY MASS INDEX: 24.13 KG/M2 | OXYGEN SATURATION: 96 % | HEART RATE: 81 BPM | RESPIRATION RATE: 18 BRPM | SYSTOLIC BLOOD PRESSURE: 125 MMHG | WEIGHT: 144.84 LBS | DIASTOLIC BLOOD PRESSURE: 42 MMHG | TEMPERATURE: 99 F

## 2018-08-06 DIAGNOSIS — C25.1 MALIGNANT NEOPLASM OF BODY OF PANCREAS (HCC): ICD-10-CM

## 2018-08-06 LAB
CANCER AG19-9 SERPL-ACNC: 8.7 U/ML (ref 0–35)
MAGNESIUM SERPL-MCNC: 1.7 MG/DL (ref 1.5–2.5)

## 2018-08-06 PROCEDURE — 700105 HCHG RX REV CODE 258

## 2018-08-06 PROCEDURE — 700105 HCHG RX REV CODE 258: Performed by: INTERNAL MEDICINE

## 2018-08-06 PROCEDURE — 700111 HCHG RX REV CODE 636 W/ 250 OVERRIDE (IP): Performed by: INTERNAL MEDICINE

## 2018-08-06 PROCEDURE — 96417 CHEMO IV INFUS EACH ADDL SEQ: CPT

## 2018-08-06 PROCEDURE — 96413 CHEMO IV INFUSION 1 HR: CPT

## 2018-08-06 PROCEDURE — 96368 THER/DIAG CONCURRENT INF: CPT

## 2018-08-06 PROCEDURE — 83735 ASSAY OF MAGNESIUM: CPT

## 2018-08-06 PROCEDURE — 96415 CHEMO IV INFUSION ADDL HR: CPT

## 2018-08-06 PROCEDURE — 96375 TX/PRO/DX INJ NEW DRUG ADDON: CPT

## 2018-08-06 PROCEDURE — 700111 HCHG RX REV CODE 636 W/ 250 OVERRIDE (IP)

## 2018-08-06 PROCEDURE — G0498 CHEMO EXTEND IV INFUS W/PUMP: HCPCS

## 2018-08-06 PROCEDURE — A4212 NON CORING NEEDLE OR STYLET: HCPCS

## 2018-08-06 PROCEDURE — 86301 IMMUNOASSAY TUMOR CA 19-9: CPT

## 2018-08-06 RX ORDER — POTASSIUM CHLORIDE 20 MEQ/1
20 TABLET, EXTENDED RELEASE ORAL
Refills: 2 | COMMUNITY
Start: 2018-06-25 | End: 2018-10-29

## 2018-08-06 RX ORDER — PROCHLORPERAZINE MALEATE 10 MG
TABLET ORAL
Refills: 1 | COMMUNITY
Start: 2018-06-22 | End: 2018-10-29

## 2018-08-06 RX ORDER — MAGNESIUM SULFATE 1 G/100ML
1 INJECTION INTRAVENOUS ONCE
Status: COMPLETED | OUTPATIENT
Start: 2018-08-06 | End: 2018-08-06

## 2018-08-06 RX ORDER — LORAZEPAM 2 MG/ML
1 INJECTION INTRAMUSCULAR ONCE
Status: COMPLETED | OUTPATIENT
Start: 2018-08-06 | End: 2018-08-06

## 2018-08-06 RX ORDER — ONDANSETRON 8 MG/1
TABLET, ORALLY DISINTEGRATING ORAL
COMMUNITY
Start: 2018-05-29 | End: 2018-10-29

## 2018-08-06 RX ADMIN — LORAZEPAM 1 MG: 2 INJECTION INTRAMUSCULAR; INTRAVENOUS at 11:41

## 2018-08-06 RX ADMIN — MAGNESIUM SULFATE IN DEXTROSE 1 G: 10 INJECTION, SOLUTION INTRAVENOUS at 12:23

## 2018-08-06 RX ADMIN — ONDANSETRON HYDROCHLORIDE 16 MG: 2 SOLUTION INTRAMUSCULAR; INTRAVENOUS at 12:00

## 2018-08-06 RX ADMIN — FLUOROURACIL 4175 MG: 50 INJECTION, SOLUTION INTRAVENOUS at 16:59

## 2018-08-06 RX ADMIN — ATROPINE SULFATE 0.5 MG: 1 INJECTION, SOLUTION INTRAMUSCULAR; INTRAVENOUS; SUBCUTANEOUS at 15:00

## 2018-08-06 RX ADMIN — DEXTROSE MONOHYDRATE 261 MG: 50 INJECTION, SOLUTION INTRAVENOUS at 15:02

## 2018-08-06 RX ADMIN — DEXTROSE MONOHYDRATE 147.9 MG: 50 INJECTION, SOLUTION INTRAVENOUS at 12:29

## 2018-08-06 RX ADMIN — DEXAMETHASONE SODIUM PHOSPHATE 12 MG: 4 INJECTION, SOLUTION INTRAMUSCULAR; INTRAVENOUS at 11:48

## 2018-08-06 ASSESSMENT — PAIN SCALES - GENERAL: PAINLEVEL_OUTOF10: 0

## 2018-08-06 NOTE — PROGRESS NOTES
Pt ambulatory w/  to hospitals for C5D1 of Folfirinox.  Pt w/ no s/s of infection at this time, pt has no complaints.  Pt inquiring if her dr gave us an order for medication to relax her, reassured pt we received an order for Ativan for her, drug education provided to pt.  Pt PORT w/ EMLA cream in place, EMLA wiped off and PORT accessed in sterile fashion by Estelle RN, brisk blood return noted, labs drawn per MD orders, flushed per protocol.  Pt labs w/n parameters for tx, magnesium resulted @ 1.7, pt received 1Gm of magnesium per the electrolyte replacement protocol.  Pt received pre-meds w/ no adverse reactions.  Oxaliplatin infused through PORT w/ no adverse reactions.  Atropine given to pt prior to start of Irinotecan w/ no adverse reactions.  Irinotecan infused over 90 minutes w/ no adverse reactions.  Pt PORT w/ brisk blood return, flushed per protocol.  CADD pump connected to PORT, 84.5 mLs of 5FU to infuse, pump confirmed to be in RUN mode, pump placed in pt's carrol pack w/ extra batteries.  Pt left on foot w/  in NAD.  Confirmed her appt for pump disconnect on 8-8.

## 2018-08-06 NOTE — PROGRESS NOTES
"Pharmacy Chemotherapy Calculations Note:    Patient Name: Larissa Ramirez      Dx: Locally Advanced Pancreatic Cancer     Cycle 5    Previous treatment: C4- July 9-10, 2018     Protocol: FOLFIRINOX (Fluorouracil/Leucovorin/Irinotecan/OXALIplatin)  *Dosing Reference*  OXALIplatin 85 mg/m2 IV over 2 hours on Day 1  Irinotecan 180 mg/m2 IV over 90 min on Day 1   06/11/18: dose reduced to 150 mg/m2 for anticipated tolerance   06/11/18: Leucovorin and 5-FU push are omitted from TP d/t anticipated tolerance.  Fluorouracil 1200 mg/m2 CIVI over 24 hours on Days 1-2 (2400 mg/m2 IV over 46 hours)  Q14 days x 4-12 cycles (unresectable or locally advanced)  NCCN Guidelines for Pancreatic Adenocarcinoma. V.2.2016.  Juan Pablo T, et al. N Engl J Med. 2011;364(19):1815-25.      Alleriges: Levofloxacin; Nitrofurantoin; Amitriptyline; Sulfa drugs; and Lyrica  /42   Pulse 81   Temp 37.2 °C (99 °F)   Resp 18   Ht 1.651 m (5' 5\")   Wt 65.7 kg (144 lb 13.5 oz)   LMP 06/27/1986   SpO2 96%   BMI 24.10 kg/m²  Body surface area is 1.74 meters squared.    Labs:  8/3/18:  ANC~ 2230   Plt = 103 k (w/in parameters) Hgb = 11.1  SCr = 0.99 mg/dL  CrCl ~ 53 mL/min   AST/ALT/AP = 36/51/65 TBili = 0.3     8/6/18:  Mag = 1.7 (replaced)        OXALIplatin (Eloxatin) 85 mg/m² x 1.74 m² = 147.9 mg   <5% difference, ok to treat with final dose = 147.9 mg IV    Irinotecan (Camptosar) 150 mg/m² x 1.74 m² = 261 mg   <5% difference, ok to treat with final dose = 261 mg IV    Fluorouracil (5-FU) 2400 mg/m² x 1.74 m² = 4176 mg   <5% difference, ok to treat with final dose = 4175 mg    CIVI via CADD pump @ 1.8 mL/hr over 46 hours       Kennedi Lombardo, PharmD  "

## 2018-08-06 NOTE — PROGRESS NOTES
"Nutrition Services: Brief Update  Met w/ pt this date to follow-up on current intake and appetite. Pt reports that she is \"eating much better\" and her appetite has significantly improved. Therefore, pt presents w/ a ~4# wt gain since previous RD assessment. Pt states that she does have nausea with certain medications she used; so after permission from MD she decreased it by half and is able to tolerate the medication better. Pt denies c/o GI distress, taste changes, nor swallowing difficulty. Pt appears better nourished, will continue to monitor ongoing PO adequacy and wt status to leave further recommendations accordingly.   "

## 2018-08-06 NOTE — PROGRESS NOTES
Chemotherapy Verification - PRIMARY RN      Height = 1.651 m  Weight = 65.7 kg  BSA = 1.74 m2       Medication: oxaliplatin  Dose: 85 mg/m2  Calculated Dose: 147.9 mg                             (In mg/m2, AUC, mg/kg)     Medication: irinitocan  Dose: 150 mg/m2  Calculated Dose: 261 mg                             (In mg/m2, AUC, mg/kg)    Medication: fluorouracil  Dose: 2400 mg/m2  Calculated Dose: 4176 mg                             (In mg/m2, AUC, mg/kg)      I confirm this process was performed independently with the BSA and all final chemotherapy dosing calculations congruent.  Any discrepancies of 5% or greater have been addressed with the chemotherapy pharmacist. The resolution of the discrepancy has been documented in the EPIC progress notes.

## 2018-08-06 NOTE — PROGRESS NOTES
Chemotherapy Verification - SECONDARY RN       Height = 65in  Weight = 65.7kg  BSA = 1.73m2       Medication: Oxaliplatin  Dose: 85mg/m2  Calculated Dose: 147.05mg                             (In mg/m2, AUC, mg/kg)     Medication: Irinotecan  Dose: 150mg/m2  Calculated Dose: 259.5mg                             (In mg/m2, AUC, mg/kg)    Medication: Fluorouracil  Dose: 2400mg/m2  Calculated Dose: 4152mg                             (In mg/m2, AUC, mg/kg)        I confirm that this process was performed independently.

## 2018-08-08 ENCOUNTER — OUTPATIENT INFUSION SERVICES (OUTPATIENT)
Dept: ONCOLOGY | Facility: MEDICAL CENTER | Age: 73
End: 2018-08-08
Attending: INTERNAL MEDICINE
Payer: MEDICARE

## 2018-08-08 VITALS
DIASTOLIC BLOOD PRESSURE: 72 MMHG | HEIGHT: 65 IN | TEMPERATURE: 98.5 F | BODY MASS INDEX: 24.21 KG/M2 | RESPIRATION RATE: 18 BRPM | WEIGHT: 145.28 LBS | SYSTOLIC BLOOD PRESSURE: 99 MMHG | HEART RATE: 66 BPM | OXYGEN SATURATION: 96 %

## 2018-08-08 DIAGNOSIS — C25.1 MALIGNANT NEOPLASM OF BODY OF PANCREAS (HCC): ICD-10-CM

## 2018-08-08 PROCEDURE — 700111 HCHG RX REV CODE 636 W/ 250 OVERRIDE (IP)

## 2018-08-08 PROCEDURE — 96523 IRRIG DRUG DELIVERY DEVICE: CPT

## 2018-08-08 RX ADMIN — HEPARIN 500 UNITS: 100 SYRINGE at 16:19

## 2018-08-08 ASSESSMENT — PAIN SCALES - GENERAL: PAINLEVEL_OUTOF10: 0

## 2018-08-09 NOTE — PROGRESS NOTES
Pt returns for pump de-access. Pump turned off by pt. Pump turned on, settings reviewed. Cont rate 1.8 ml/hr, 2.5 ml remaining, 82.05 ml given. Pump cartridge vol to be infused 83.5 ml. Cartridge inspected and chemo clearly completed with air observed in line. YENIFER in pharmacy reviewed pump settings and cartridge to confirm chemo was fully completed. Port flushed, blood return observed, heparin instilled. Needle removed, gauze dressing applied. Pt knows when to return, time of next appt moved to 8 am in light of long treatment time. Pt verbalizing relief over time change and uses [facility] to monitor appts. Pt discharged home under self care in no apparent distress.

## 2018-08-15 ENCOUNTER — APPOINTMENT (OUTPATIENT)
Dept: ONCOLOGY | Facility: MEDICAL CENTER | Age: 73
End: 2018-08-15
Attending: INTERNAL MEDICINE
Payer: MEDICARE

## 2018-08-17 ENCOUNTER — HOSPITAL ENCOUNTER (OUTPATIENT)
Dept: LAB | Facility: MEDICAL CENTER | Age: 73
End: 2018-08-17
Attending: INTERNAL MEDICINE
Payer: MEDICARE

## 2018-08-17 LAB
ALBUMIN SERPL BCP-MCNC: 3.5 G/DL (ref 3.2–4.9)
ALBUMIN/GLOB SERPL: 1.3 G/DL
ALP SERPL-CCNC: 89 U/L (ref 30–99)
ALT SERPL-CCNC: 52 U/L (ref 2–50)
ANION GAP SERPL CALC-SCNC: 4 MMOL/L (ref 0–11.9)
AST SERPL-CCNC: 43 U/L (ref 12–45)
BASOPHILS # BLD AUTO: 0.6 % (ref 0–1.8)
BASOPHILS # BLD: 0.02 K/UL (ref 0–0.12)
BILIRUB SERPL-MCNC: 0.3 MG/DL (ref 0.1–1.5)
BUN SERPL-MCNC: 22 MG/DL (ref 8–22)
CALCIUM SERPL-MCNC: 10.1 MG/DL (ref 8.5–10.5)
CHLORIDE SERPL-SCNC: 102 MMOL/L (ref 96–112)
CO2 SERPL-SCNC: 31 MMOL/L (ref 20–33)
CREAT SERPL-MCNC: 0.95 MG/DL (ref 0.5–1.4)
EOSINOPHIL # BLD AUTO: 0.05 K/UL (ref 0–0.51)
EOSINOPHIL NFR BLD: 1.4 % (ref 0–6.9)
ERYTHROCYTE [DISTWIDTH] IN BLOOD BY AUTOMATED COUNT: 58.8 FL (ref 35.9–50)
GLOBULIN SER CALC-MCNC: 2.8 G/DL (ref 1.9–3.5)
GLUCOSE SERPL-MCNC: 100 MG/DL (ref 65–99)
HCT VFR BLD AUTO: 32 % (ref 37–47)
HGB BLD-MCNC: 10.3 G/DL (ref 12–16)
IMM GRANULOCYTES # BLD AUTO: 0.02 K/UL (ref 0–0.11)
IMM GRANULOCYTES NFR BLD AUTO: 0.6 % (ref 0–0.9)
LYMPHOCYTES # BLD AUTO: 1.04 K/UL (ref 1–4.8)
LYMPHOCYTES NFR BLD: 29.5 % (ref 22–41)
MCH RBC QN AUTO: 31.3 PG (ref 27–33)
MCHC RBC AUTO-ENTMCNC: 32.2 G/DL (ref 33.6–35)
MCV RBC AUTO: 97.3 FL (ref 81.4–97.8)
MONOCYTES # BLD AUTO: 0.58 K/UL (ref 0–0.85)
MONOCYTES NFR BLD AUTO: 16.4 % (ref 0–13.4)
NEUTROPHILS # BLD AUTO: 1.82 K/UL (ref 2–7.15)
NEUTROPHILS NFR BLD: 51.5 % (ref 44–72)
NRBC # BLD AUTO: 0 K/UL
NRBC BLD-RTO: 0 /100 WBC
PLATELET # BLD AUTO: 62 K/UL (ref 164–446)
PMV BLD AUTO: 9.5 FL (ref 9–12.9)
POTASSIUM SERPL-SCNC: 4.2 MMOL/L (ref 3.6–5.5)
PROT SERPL-MCNC: 6.3 G/DL (ref 6–8.2)
RBC # BLD AUTO: 3.29 M/UL (ref 4.2–5.4)
SODIUM SERPL-SCNC: 137 MMOL/L (ref 135–145)
WBC # BLD AUTO: 3.5 K/UL (ref 4.8–10.8)

## 2018-08-17 PROCEDURE — 85025 COMPLETE CBC W/AUTO DIFF WBC: CPT

## 2018-08-17 PROCEDURE — 36415 COLL VENOUS BLD VENIPUNCTURE: CPT

## 2018-08-17 PROCEDURE — 80053 COMPREHEN METABOLIC PANEL: CPT

## 2018-08-19 NOTE — PROGRESS NOTES
"Pharmacy Chemotherapy Calculations Note:    CHEMOTHERAPY DEFERRED FOR THROMBOCYTOPENIA    Patient Name: Larissa Ramirez      Dx: Locally Advanced Pancreatic Cancer         Previous treatment: C5- Aug 6-7, 2018     Protocol: FOLFIRINOX (Fluorouracil/Leucovorin/Irinotecan/OXALIplatin)  *Dosing Reference*  OXALIplatin 85 mg/m2 IV over 2 hours on Day 1  Irinotecan 180 mg/m2 IV over 90 min on Day 1   06/11/18: dose reduced to 150 mg/m2 for anticipated tolerance   06/11/18: Leucovorin and 5-FU push are omitted from TP d/t anticipated tolerance.  Fluorouracil 1200 mg/m2 CIVI over 24 hours on Days 1-2 (2400 mg/m2 IV over 46 hours)  Q14 days x 4-12 cycles (unresectable or locally advanced)  NCCN Guidelines for Pancreatic Adenocarcinoma. V.2.2016.  Juan Pablo T, et al. N Engl J Med. 2011;364(19):1811-25.      Alleriges: Levofloxacin; Nitrofurantoin; Amitriptyline; Sulfa drugs; and Lyrica  /43   Pulse 71   Temp 36.7 °C (98.1 °F)   Resp 18   Ht 1.651 m (5' 5\")   Wt 66.5 kg (146 lb 9.7 oz)   LMP 06/27/1986   SpO2 95%   BMI 24.40 kg/m²  Body surface area is 1.75 meters squared.    8/20/18-  ANC~ 1800 Plt = 62k   Hgb = 10.3    8/17/18-     SCr = 0.95mg/dL CrCl ~ 56mL/min   LFT's = 43/52/89 TBili = 0.3         = 0 mg IV     = 0 mg IV     = 0 mg    CIVI via CADD pump @ 0 mL/hr over 46 hours       Neal Ramirez, PharmD  "

## 2018-08-20 ENCOUNTER — OUTPATIENT INFUSION SERVICES (OUTPATIENT)
Dept: ONCOLOGY | Facility: MEDICAL CENTER | Age: 73
End: 2018-08-20
Attending: INTERNAL MEDICINE
Payer: MEDICARE

## 2018-08-20 VITALS
OXYGEN SATURATION: 95 % | RESPIRATION RATE: 18 BRPM | TEMPERATURE: 98.1 F | DIASTOLIC BLOOD PRESSURE: 43 MMHG | SYSTOLIC BLOOD PRESSURE: 134 MMHG | WEIGHT: 146.61 LBS | BODY MASS INDEX: 24.43 KG/M2 | HEIGHT: 65 IN | HEART RATE: 71 BPM

## 2018-08-20 DIAGNOSIS — C25.1 MALIGNANT NEOPLASM OF BODY OF PANCREAS (HCC): ICD-10-CM

## 2018-08-20 LAB
CANCER AG19-9 SERPL-ACNC: 11.8 U/ML (ref 0–35)
MAGNESIUM SERPL-MCNC: 1.9 MG/DL (ref 1.5–2.5)

## 2018-08-20 PROCEDURE — 36591 DRAW BLOOD OFF VENOUS DEVICE: CPT

## 2018-08-20 PROCEDURE — A4212 NON CORING NEEDLE OR STYLET: HCPCS

## 2018-08-20 PROCEDURE — 86301 IMMUNOASSAY TUMOR CA 19-9: CPT

## 2018-08-20 PROCEDURE — 83735 ASSAY OF MAGNESIUM: CPT

## 2018-08-20 PROCEDURE — 700111 HCHG RX REV CODE 636 W/ 250 OVERRIDE (IP)

## 2018-08-20 RX ADMIN — HEPARIN 500 UNITS: 100 SYRINGE at 08:28

## 2018-08-20 ASSESSMENT — PAIN SCALES - GENERAL: PAINLEVEL_OUTOF10: 0

## 2018-08-20 NOTE — PROGRESS NOTES
Pt is here for her chemotherapy. Port accessed in sterile fashion - Mg/CA 19-9 drawn (not drawn where patient goes to have her pre-chemotherapy labs drawn). Plt 62k - MD Eaton paged. Chemotherapy to be held for a week. Port heparin locked per protocol. Pt discharged home to self care.

## 2018-08-23 ENCOUNTER — HOSPITAL ENCOUNTER (OUTPATIENT)
Dept: LAB | Facility: MEDICAL CENTER | Age: 73
End: 2018-08-23
Attending: INTERNAL MEDICINE
Payer: MEDICARE

## 2018-08-23 LAB
BASOPHILS # BLD AUTO: 0.5 % (ref 0–1.8)
BASOPHILS # BLD: 0.02 K/UL (ref 0–0.12)
EOSINOPHIL # BLD AUTO: 0.07 K/UL (ref 0–0.51)
EOSINOPHIL NFR BLD: 1.8 % (ref 0–6.9)
ERYTHROCYTE [DISTWIDTH] IN BLOOD BY AUTOMATED COUNT: 64 FL (ref 35.9–50)
HCT VFR BLD AUTO: 32.1 % (ref 37–47)
HGB BLD-MCNC: 10.1 G/DL (ref 12–16)
IMM GRANULOCYTES # BLD AUTO: 0.01 K/UL (ref 0–0.11)
IMM GRANULOCYTES NFR BLD AUTO: 0.3 % (ref 0–0.9)
LYMPHOCYTES # BLD AUTO: 1.65 K/UL (ref 1–4.8)
LYMPHOCYTES NFR BLD: 43.5 % (ref 22–41)
MCH RBC QN AUTO: 31.1 PG (ref 27–33)
MCHC RBC AUTO-ENTMCNC: 31.5 G/DL (ref 33.6–35)
MCV RBC AUTO: 98.8 FL (ref 81.4–97.8)
MONOCYTES # BLD AUTO: 0.77 K/UL (ref 0–0.85)
MONOCYTES NFR BLD AUTO: 20.3 % (ref 0–13.4)
NEUTROPHILS # BLD AUTO: 1.27 K/UL (ref 2–7.15)
NEUTROPHILS NFR BLD: 33.6 % (ref 44–72)
NRBC # BLD AUTO: 0 K/UL
NRBC BLD-RTO: 0 /100 WBC
PLATELET # BLD AUTO: 87 K/UL (ref 164–446)
PMV BLD AUTO: 9.7 FL (ref 9–12.9)
RBC # BLD AUTO: 3.25 M/UL (ref 4.2–5.4)
WBC # BLD AUTO: 3.8 K/UL (ref 4.8–10.8)

## 2018-08-23 PROCEDURE — 85025 COMPLETE CBC W/AUTO DIFF WBC: CPT

## 2018-08-23 PROCEDURE — 36415 COLL VENOUS BLD VENIPUNCTURE: CPT

## 2018-08-23 NOTE — PROGRESS NOTES
"Pharmacy Chemotherapy Calculations Note:    Patient Name: Larissa Ramirez      Dx: Locally Advanced Pancreatic Cancer     Cycle 6    Previous treatment: C5- 8/6/18     Protocol: FOLFIRINOX (Fluorouracil/Leucovorin/Irinotecan/OXALIplatin)  *Dosing Reference*  OXALIplatin 85 mg/m2 IV over 2 hours on Day 1  Irinotecan 180 mg/m2 IV over 90 min on Day 1   06/11/18: dose reduced to 150 mg/m2 for anticipated tolerance   06/11/18: Leucovorin and 5-FU push are omitted from TP d/t anticipated tolerance.  Fluorouracil 1200 mg/m2 CIVI over 24 hours on Days 1-2 (2400 mg/m2 IV over 46 hours)  Q14 days x 4-12 cycles (unresectable or locally advanced)  NCCN Guidelines for Pancreatic Adenocarcinoma. V.2.2016.  Juan Pablo T, et al. N Engl J Med. 2011;364(19):1812-25.     8/20/18 - Chemotherapy deferred for thrombocytopenia  Alleriges: Levofloxacin; Nitrofurantoin; Amitriptyline; Sulfa drugs; and Lyrica  /48   Pulse 71   Temp 36.2 °C (97.2 °F)   Resp 18   Ht 1.651 m (5' 5\")   Wt 67.4 kg (148 lb 9.4 oz)   LMP 06/27/1986   SpO2 95%   BMI 24.73 kg/m²  Body surface area is 1.76 meters squared.    Labs 8/26/18   ANC~ 1990 Plt = 100k   Hgb = 10.9     SCr = 0.89mg/dL CrCl ~ 60.4 mL/min   LFT's = 31/42/109 TBili = 0.3  K+ = 4.3 Mg = 1.9         1. OXALIplatin (Eloxatin) 85 mg/m² x 1.76 m² = 149.6 mg   <5% difference, ok to treat with final dose = 149.6 mg IV    2. Irinotecan (Camptosar) 150 mg/m² x 1.76 m² = 264 mg   <5% difference, ok to treat with final dose = 264 mg IV    3. Fluorouracil (5-FU) 2400 mg/m² x 1.76 m² = 4224 mg   <5% difference, ok to treat with final dose = 4225 mg    CIVI via CADD pump @ 1.8 mL/hr over 46 hours       Christian Valenzuela PharmD  "

## 2018-08-26 ENCOUNTER — OUTPATIENT INFUSION SERVICES (OUTPATIENT)
Dept: ONCOLOGY | Facility: MEDICAL CENTER | Age: 73
End: 2018-08-26
Attending: INTERNAL MEDICINE
Payer: MEDICARE

## 2018-08-26 VITALS
SYSTOLIC BLOOD PRESSURE: 123 MMHG | RESPIRATION RATE: 18 BRPM | BODY MASS INDEX: 24.79 KG/M2 | HEART RATE: 65 BPM | OXYGEN SATURATION: 94 % | DIASTOLIC BLOOD PRESSURE: 57 MMHG | HEIGHT: 65 IN | WEIGHT: 148.81 LBS | TEMPERATURE: 98.1 F

## 2018-08-26 DIAGNOSIS — C25.1 MALIGNANT NEOPLASM OF BODY OF PANCREAS (HCC): ICD-10-CM

## 2018-08-26 LAB
ALBUMIN SERPL BCP-MCNC: 3.8 G/DL (ref 3.2–4.9)
ALBUMIN/GLOB SERPL: 1.2 G/DL
ALP SERPL-CCNC: 109 U/L (ref 30–99)
ALT SERPL-CCNC: 42 U/L (ref 2–50)
ANION GAP SERPL CALC-SCNC: 7 MMOL/L (ref 0–11.9)
AST SERPL-CCNC: 31 U/L (ref 12–45)
BASOPHILS # BLD AUTO: 0.7 % (ref 0–1.8)
BASOPHILS # BLD: 0.03 K/UL (ref 0–0.12)
BILIRUB SERPL-MCNC: 0.3 MG/DL (ref 0.1–1.5)
BUN SERPL-MCNC: 26 MG/DL (ref 8–22)
CALCIUM SERPL-MCNC: 10.4 MG/DL (ref 8.5–10.5)
CANCER AG19-9 SERPL-ACNC: 12 U/ML (ref 0–35)
CHLORIDE SERPL-SCNC: 99 MMOL/L (ref 96–112)
CO2 SERPL-SCNC: 29 MMOL/L (ref 20–33)
CREAT SERPL-MCNC: 0.89 MG/DL (ref 0.5–1.4)
EOSINOPHIL # BLD AUTO: 0.05 K/UL (ref 0–0.51)
EOSINOPHIL NFR BLD: 1.1 % (ref 0–6.9)
ERYTHROCYTE [DISTWIDTH] IN BLOOD BY AUTOMATED COUNT: 61.4 FL (ref 35.9–50)
GLOBULIN SER CALC-MCNC: 3.2 G/DL (ref 1.9–3.5)
GLUCOSE SERPL-MCNC: 97 MG/DL (ref 65–99)
HCT VFR BLD AUTO: 32.4 % (ref 37–47)
HGB BLD-MCNC: 10.9 G/DL (ref 12–16)
IMM GRANULOCYTES # BLD AUTO: 0.03 K/UL (ref 0–0.11)
IMM GRANULOCYTES NFR BLD AUTO: 0.7 % (ref 0–0.9)
LYMPHOCYTES # BLD AUTO: 1.58 K/UL (ref 1–4.8)
LYMPHOCYTES NFR BLD: 35.2 % (ref 22–41)
MAGNESIUM SERPL-MCNC: 1.9 MG/DL (ref 1.5–2.5)
MCH RBC QN AUTO: 32.2 PG (ref 27–33)
MCHC RBC AUTO-ENTMCNC: 33.6 G/DL (ref 33.6–35)
MCV RBC AUTO: 95.6 FL (ref 81.4–97.8)
MONOCYTES # BLD AUTO: 0.81 K/UL (ref 0–0.85)
MONOCYTES NFR BLD AUTO: 18 % (ref 0–13.4)
NEUTROPHILS # BLD AUTO: 1.99 K/UL (ref 2–7.15)
NEUTROPHILS NFR BLD: 44.3 % (ref 44–72)
NRBC # BLD AUTO: 0 K/UL
NRBC BLD-RTO: 0 /100 WBC
PLATELET # BLD AUTO: 100 K/UL (ref 164–446)
PMV BLD AUTO: 9.5 FL (ref 9–12.9)
POTASSIUM SERPL-SCNC: 4.3 MMOL/L (ref 3.6–5.5)
PROT SERPL-MCNC: 7 G/DL (ref 6–8.2)
RBC # BLD AUTO: 3.39 M/UL (ref 4.2–5.4)
SODIUM SERPL-SCNC: 135 MMOL/L (ref 135–145)
WBC # BLD AUTO: 4.5 K/UL (ref 4.8–10.8)

## 2018-08-26 PROCEDURE — 86301 IMMUNOASSAY TUMOR CA 19-9: CPT

## 2018-08-26 PROCEDURE — 80053 COMPREHEN METABOLIC PANEL: CPT

## 2018-08-26 PROCEDURE — 83735 ASSAY OF MAGNESIUM: CPT

## 2018-08-26 PROCEDURE — 85025 COMPLETE CBC W/AUTO DIFF WBC: CPT

## 2018-08-26 PROCEDURE — 36415 COLL VENOUS BLD VENIPUNCTURE: CPT

## 2018-08-26 ASSESSMENT — PAIN SCALES - GENERAL: PAINLEVEL_OUTOF10: 0

## 2018-08-26 NOTE — PROGRESS NOTES
"Pharmacy Chemotherapy Calculations Note:    Patient Name: Larissa Ramirez      Dx: Locally Advanced Pancreatic Cancer     Protocol: FOLFIRINOX (Fluorouracil/Leucovorin/Irinotecan/OXALIplatin)    *Dosing Reference*  OXALIplatin 85 mg/m2 IV over 2 hours on Day 1  Irinotecan 180 mg/m2 IV over 90 min on Day 1   06/11/18: dose reduced to 150 mg/m2 for anticipated tolerance   06/11/18: Leucovorin and 5-FU push are omitted from TP d/t anticipated tolerance.  Fluorouracil 1200 mg/m2 CIVI over 24 hours on Days 1-2 (2400 mg/m2 IV over 46 hours)  Q14 days x 4-12 cycles (unresectable or locally advanced)  NCCN Guidelines for Pancreatic Adenocarcinoma. V.2.2016.  Juan Pablo T, et al. N Engl J Med. 2011;364(19):1819-25.      Alleriges: Levofloxacin; Nitrofurantoin; Amitriptyline; Sulfa drugs; and Lyrica    /48   Pulse 71   Temp 36.2 °C (97.2 °F)   Resp 18   Ht 1.651 m (5' 5\")   Wt 67.4 kg (148 lb 9.4 oz)   LMP 06/27/1986   SpO2 95%   BMI 24.73 kg/m²  Body surface area is 1.76 meters squared.     Labs 8/26/18  ANC~ 1990 Plt = 100k (OK if > 100K) Hgb = 10.9   SCr = 0.89 mg/dL CrCl ~ 61 mL/min (no renal adjustment recommended)  LFT's = 31/42/109 TBili = 0.3 Mag = 1.9 K = 4.3     Drug Order   (Drug name, dose, route, IV Fluid & volume, frequency, number of doses) Cycle 6 delayed one week due to thrombocytopenia  Previous treatment: 8/6/18       Medication = OXALIplatin (Eloxatin)  Base Dose= 85 mg/m2  Calc Dose: Base Dose x 1.76 m2 = 150 mg  Final Dose = 149.6 mg  Route = IV  Fluid & Volume = D5W 250 mL  Admin Duration = Over 2 hours            <5% difference, ok to treat with final dose      Medication = Irinotecan (Camptosar)  Base Dose= 150 mg/m2  Calc Dose: Base Dose x 1.76 m2 = 264 mg  Final Dose = 264 mg  Route = IV  Fluid & Volume = D5W 500 mL  Admin Duration = Over 90 min             <5% difference, ok to treat with final dose      Medication = Fluorouracil (5-FU)   Base Dose = 2400 mg/m2  Calc Dose:Base Dose x 1.76 " m2 = 4224 mg  Final Dose = 4225 mg  Route = IVP  Conc = 50 mg/mL  Fluid & Volume = in CADD pump 84.5 mL + 3ml overfill  Admin Duration = Over 46 hours    Rate = 1.8 ml/hr             <5% difference, ok to treat with final dose         By my signature below, I confirm this process was performed independently with the BSA and all final chemotherapy dosing calculations congruent. I have reviewed the above chemotherapy order and that my calculation of the final dose and BSA (when applicable) corroborate those calculations of the  pharmacist. Discrepancies of 5% or greater in the written dose have been addressed and documented within the [facility] Progress notes.     Percy Ramirez, NarinderD

## 2018-08-26 NOTE — PROGRESS NOTES
Returns for labs prior to chemo.  States does not want port accessed, so labs drawn via #23 butterfly in LAC.  Pressure dssg post.  Denies any other issues except fatigue as has been for a walk and then cleaned the house.  DC to self care.

## 2018-08-27 ENCOUNTER — OUTPATIENT INFUSION SERVICES (OUTPATIENT)
Dept: ONCOLOGY | Facility: MEDICAL CENTER | Age: 73
End: 2018-08-27
Attending: INTERNAL MEDICINE
Payer: MEDICARE

## 2018-08-27 VITALS
WEIGHT: 148.59 LBS | BODY MASS INDEX: 24.76 KG/M2 | SYSTOLIC BLOOD PRESSURE: 124 MMHG | OXYGEN SATURATION: 95 % | DIASTOLIC BLOOD PRESSURE: 48 MMHG | RESPIRATION RATE: 18 BRPM | HEART RATE: 71 BPM | HEIGHT: 65 IN | TEMPERATURE: 97.2 F

## 2018-08-27 DIAGNOSIS — C25.1 MALIGNANT NEOPLASM OF BODY OF PANCREAS (HCC): ICD-10-CM

## 2018-08-27 PROCEDURE — 700111 HCHG RX REV CODE 636 W/ 250 OVERRIDE (IP): Mod: JG

## 2018-08-27 PROCEDURE — 96375 TX/PRO/DX INJ NEW DRUG ADDON: CPT

## 2018-08-27 PROCEDURE — 700105 HCHG RX REV CODE 258: Performed by: INTERNAL MEDICINE

## 2018-08-27 PROCEDURE — 700111 HCHG RX REV CODE 636 W/ 250 OVERRIDE (IP): Performed by: INTERNAL MEDICINE

## 2018-08-27 PROCEDURE — 96413 CHEMO IV INFUSION 1 HR: CPT

## 2018-08-27 PROCEDURE — G0498 CHEMO EXTEND IV INFUS W/PUMP: HCPCS

## 2018-08-27 PROCEDURE — 96415 CHEMO IV INFUSION ADDL HR: CPT

## 2018-08-27 PROCEDURE — A4212 NON CORING NEEDLE OR STYLET: HCPCS

## 2018-08-27 PROCEDURE — 96417 CHEMO IV INFUS EACH ADDL SEQ: CPT

## 2018-08-27 PROCEDURE — 700105 HCHG RX REV CODE 258

## 2018-08-27 RX ORDER — DEXTROSE MONOHYDRATE 50 MG/ML
INJECTION, SOLUTION INTRAVENOUS CONTINUOUS
Status: DISCONTINUED | OUTPATIENT
Start: 2018-08-27 | End: 2018-08-27 | Stop reason: HOSPADM

## 2018-08-27 RX ORDER — LORAZEPAM 2 MG/ML
1 INJECTION INTRAMUSCULAR ONCE
Status: COMPLETED | OUTPATIENT
Start: 2018-08-27 | End: 2018-08-27

## 2018-08-27 RX ADMIN — DEXAMETHASONE SODIUM PHOSPHATE 12 MG: 4 INJECTION, SOLUTION INTRAMUSCULAR; INTRAVENOUS at 11:34

## 2018-08-27 RX ADMIN — LORAZEPAM 1 MG: 2 INJECTION INTRAMUSCULAR; INTRAVENOUS at 12:22

## 2018-08-27 RX ADMIN — DEXTROSE MONOHYDRATE 264 MG: 50 INJECTION, SOLUTION INTRAVENOUS at 14:43

## 2018-08-27 RX ADMIN — ONDANSETRON HYDROCHLORIDE 16 MG: 2 SOLUTION INTRAMUSCULAR; INTRAVENOUS at 11:54

## 2018-08-27 RX ADMIN — FLUOROURACIL 4225 MG: 50 INJECTION, SOLUTION INTRAVENOUS at 16:31

## 2018-08-27 RX ADMIN — ATROPINE SULFATE 0.5 MG: 1 INJECTION, SOLUTION INTRAMUSCULAR; INTRAVENOUS; SUBCUTANEOUS at 14:40

## 2018-08-27 RX ADMIN — DEXTROSE MONOHYDRATE 149.6 MG: 50 INJECTION, SOLUTION INTRAVENOUS at 12:33

## 2018-08-27 RX ADMIN — DEXTROSE MONOHYDRATE: 50 INJECTION, SOLUTION INTRAVENOUS at 11:34

## 2018-08-27 ASSESSMENT — PAIN SCALES - GENERAL: PAINLEVEL_OUTOF10: 0

## 2018-08-27 NOTE — PROGRESS NOTES
Chemotherapy Verification - SECONDARY RN       Height = 1.651 m  Weight = 67.4 mg  BSA = 1.76 m2       Medication: oxaliplatin  Dose: 85 mg/m2  Calculated Dose: 149.6 mg                             (In mg/m2, AUC, mg/kg)     Medication: irinotecan  Dose: 150 mg/m2  Calculated Dose: 264 mg                             (In mg/m2, AUC, mg/kg)    Medication: fluorouracil  Dose: 2,400 mg/m2  Calculated Dose: 4,224 mg over 46 hours via CADD pump                             (In mg/m2, AUC, mg/kg)      I confirm that this process was performed independently.

## 2018-08-28 NOTE — PROGRESS NOTES
Patient arrived ambulatory to the [facility] for C6D1 of FOLFIRINOX. Reviewed vital signs, labs, and physician order. Patient denies S&S of infection, or bleeding. Pt reports bumps in and around shantell-area, noticed 6 weeks ago, has apt with OBJEFFN on 9/7/18, call placed to Dr Eaton to report bumps, and platelets of 100,000, per MD fabian to proceed with treatment. Pt arrives with Emla cream applied to right chest port, right chest port accessed with low profile needle, visualized brisk blood return. Pre-treatment medications administered. Oxaliplatin administered, no adverse reaction observed. Irinotecan administered, no adverse reaction observed. 5FU pump settings verified with Omid JOHNSON, blood return verified, and connected. Reviewed pump settings with pt, pt verbalized understanding. Pump with overfill volume 85.5ml at rate of 1.8ml/hr over 46 hours connected at 1630. Confirmed upcoming appointment date and time with patient. Patient left the [facility] ambulatory in no sign of distress.

## 2018-08-29 ENCOUNTER — OUTPATIENT INFUSION SERVICES (OUTPATIENT)
Dept: ONCOLOGY | Facility: MEDICAL CENTER | Age: 73
End: 2018-08-29
Attending: INTERNAL MEDICINE
Payer: MEDICARE

## 2018-08-29 VITALS
DIASTOLIC BLOOD PRESSURE: 53 MMHG | HEIGHT: 65 IN | WEIGHT: 147.27 LBS | OXYGEN SATURATION: 97 % | HEART RATE: 67 BPM | BODY MASS INDEX: 24.54 KG/M2 | RESPIRATION RATE: 18 BRPM | TEMPERATURE: 98.4 F | SYSTOLIC BLOOD PRESSURE: 115 MMHG

## 2018-08-29 DIAGNOSIS — C25.1 MALIGNANT NEOPLASM OF BODY OF PANCREAS (HCC): ICD-10-CM

## 2018-08-29 PROCEDURE — 96523 IRRIG DRUG DELIVERY DEVICE: CPT

## 2018-08-29 PROCEDURE — 700111 HCHG RX REV CODE 636 W/ 250 OVERRIDE (IP)

## 2018-08-29 RX ADMIN — HEPARIN 500 UNITS: 100 SYRINGE at 17:02

## 2018-08-29 ASSESSMENT — PAIN SCALES - GENERAL: PAINLEVEL_OUTOF10: 0

## 2018-08-30 NOTE — PROGRESS NOTES
Pt arrived ambulatory to [facility] for CADD removal and port deaccess. CADD pump off upon pt arrival to unit, after turning it on, it confirmed that there were 0.0mL left in the chamber and that 85.5mL had been administered over the 46 hours. No complaints or issues with pump. CADD disconnected, port flushed and heparinized then deaccessed. Gauze dressing placed. Follow up appointment scheduled and confirmed. No issues upon discharge, ambulatory off of unit.

## 2018-09-07 ENCOUNTER — GYNECOLOGY VISIT (OUTPATIENT)
Dept: OBGYN | Facility: CLINIC | Age: 73
End: 2018-09-07
Payer: MEDICARE

## 2018-09-07 VITALS
BODY MASS INDEX: 24.66 KG/M2 | WEIGHT: 148 LBS | HEIGHT: 65 IN | DIASTOLIC BLOOD PRESSURE: 66 MMHG | SYSTOLIC BLOOD PRESSURE: 118 MMHG

## 2018-09-07 DIAGNOSIS — L73.9 FOLLICULITIS OF PERINEUM: ICD-10-CM

## 2018-09-07 PROCEDURE — 99213 OFFICE O/P EST LOW 20 MIN: CPT | Performed by: OBSTETRICS & GYNECOLOGY

## 2018-09-08 ENCOUNTER — HOSPITAL ENCOUNTER (OUTPATIENT)
Dept: LAB | Facility: MEDICAL CENTER | Age: 73
End: 2018-09-08
Attending: INTERNAL MEDICINE
Payer: MEDICARE

## 2018-09-08 LAB
ALBUMIN SERPL BCP-MCNC: 3.7 G/DL (ref 3.2–4.9)
ALBUMIN/GLOB SERPL: 1.4 G/DL
ALP SERPL-CCNC: 90 U/L (ref 30–99)
ALT SERPL-CCNC: 28 U/L (ref 2–50)
ANION GAP SERPL CALC-SCNC: 3 MMOL/L (ref 0–11.9)
ANISOCYTOSIS BLD QL SMEAR: ABNORMAL
AST SERPL-CCNC: 30 U/L (ref 12–45)
BASOPHILS # BLD AUTO: 0.3 % (ref 0–1.8)
BASOPHILS # BLD: 0.01 K/UL (ref 0–0.12)
BILIRUB SERPL-MCNC: 0.3 MG/DL (ref 0.1–1.5)
BUN SERPL-MCNC: 22 MG/DL (ref 8–22)
CALCIUM SERPL-MCNC: 10.6 MG/DL (ref 8.5–10.5)
CHLORIDE SERPL-SCNC: 101 MMOL/L (ref 96–112)
CO2 SERPL-SCNC: 33 MMOL/L (ref 20–33)
COMMENT 1642: NORMAL
CREAT SERPL-MCNC: 0.99 MG/DL (ref 0.5–1.4)
EOSINOPHIL # BLD AUTO: 0.05 K/UL (ref 0–0.51)
EOSINOPHIL NFR BLD: 1.4 % (ref 0–6.9)
ERYTHROCYTE [DISTWIDTH] IN BLOOD BY AUTOMATED COUNT: 66.3 FL (ref 35.9–50)
GLOBULIN SER CALC-MCNC: 2.7 G/DL (ref 1.9–3.5)
GLUCOSE SERPL-MCNC: 103 MG/DL (ref 65–99)
HCT VFR BLD AUTO: 30.8 % (ref 37–47)
HGB BLD-MCNC: 9.9 G/DL (ref 12–16)
IMM GRANULOCYTES # BLD AUTO: 0.01 K/UL (ref 0–0.11)
IMM GRANULOCYTES NFR BLD AUTO: 0.3 % (ref 0–0.9)
LYMPHOCYTES # BLD AUTO: 1.15 K/UL (ref 1–4.8)
LYMPHOCYTES NFR BLD: 32.2 % (ref 22–41)
MACROCYTES BLD QL SMEAR: ABNORMAL
MCH RBC QN AUTO: 32.6 PG (ref 27–33)
MCHC RBC AUTO-ENTMCNC: 32.1 G/DL (ref 33.6–35)
MCV RBC AUTO: 101.3 FL (ref 81.4–97.8)
MICROCYTES BLD QL SMEAR: ABNORMAL
MONOCYTES # BLD AUTO: 0.47 K/UL (ref 0–0.85)
MONOCYTES NFR BLD AUTO: 13.2 % (ref 0–13.4)
MORPHOLOGY BLD-IMP: NORMAL
NEUTROPHILS # BLD AUTO: 1.88 K/UL (ref 2–7.15)
NEUTROPHILS NFR BLD: 52.6 % (ref 44–72)
NRBC # BLD AUTO: 0 K/UL
NRBC BLD-RTO: 0 /100 WBC
PLATELET # BLD AUTO: 74 K/UL (ref 164–446)
PLATELET BLD QL SMEAR: NORMAL
PMV BLD AUTO: 9.6 FL (ref 9–12.9)
POTASSIUM SERPL-SCNC: 4.4 MMOL/L (ref 3.6–5.5)
PROT SERPL-MCNC: 6.4 G/DL (ref 6–8.2)
RBC # BLD AUTO: 3.04 M/UL (ref 4.2–5.4)
RBC BLD AUTO: PRESENT
SODIUM SERPL-SCNC: 137 MMOL/L (ref 135–145)
WBC # BLD AUTO: 3.6 K/UL (ref 4.8–10.8)

## 2018-09-08 PROCEDURE — 80053 COMPREHEN METABOLIC PANEL: CPT

## 2018-09-08 PROCEDURE — 85025 COMPLETE CBC W/AUTO DIFF WBC: CPT

## 2018-09-08 PROCEDURE — 36415 COLL VENOUS BLD VENIPUNCTURE: CPT

## 2018-09-10 RX ORDER — 0.9 % SODIUM CHLORIDE 0.9 %
20 VIAL (ML) INJECTION ONCE
Status: CANCELLED | OUTPATIENT
Start: 2018-09-18 | End: 2018-09-18

## 2018-09-10 NOTE — PROGRESS NOTES
Triage RN reviewed platelets of 74,000 from 18 with Dr. Eaton via telephone. Order received to cancel platelet transfusion scheduled for 18 and re-check CBC in one week. RN contacted pt and confirmed  and full name. POC reviewed with pt and she verbalized understanding of cancelling appt for tomorrow and coming on 18 at 1500 for labs and possible platelet transfusion. Thrombocytopenia precautions reviewed with pt and she verbalized understanding.

## 2018-09-11 ENCOUNTER — APPOINTMENT (OUTPATIENT)
Dept: ONCOLOGY | Facility: MEDICAL CENTER | Age: 73
End: 2018-09-11
Attending: INTERNAL MEDICINE
Payer: MEDICARE

## 2018-09-13 ENCOUNTER — APPOINTMENT (OUTPATIENT)
Dept: ONCOLOGY | Facility: MEDICAL CENTER | Age: 73
End: 2018-09-13
Attending: INTERNAL MEDICINE
Payer: MEDICARE

## 2018-09-18 ENCOUNTER — OUTPATIENT INFUSION SERVICES (OUTPATIENT)
Dept: ONCOLOGY | Facility: MEDICAL CENTER | Age: 73
End: 2018-09-18
Attending: INTERNAL MEDICINE
Payer: MEDICARE

## 2018-09-18 VITALS
BODY MASS INDEX: 24.24 KG/M2 | DIASTOLIC BLOOD PRESSURE: 45 MMHG | SYSTOLIC BLOOD PRESSURE: 119 MMHG | OXYGEN SATURATION: 98 % | HEART RATE: 61 BPM | HEIGHT: 65 IN | TEMPERATURE: 98.2 F | RESPIRATION RATE: 18 BRPM | WEIGHT: 145.5 LBS

## 2018-09-18 DIAGNOSIS — C25.1 MALIGNANT NEOPLASM OF BODY OF PANCREAS (HCC): ICD-10-CM

## 2018-09-18 LAB
BASOPHILS # BLD AUTO: 0.5 % (ref 0–1.8)
BASOPHILS # BLD: 0.02 K/UL (ref 0–0.12)
EOSINOPHIL # BLD AUTO: 0.07 K/UL (ref 0–0.51)
EOSINOPHIL NFR BLD: 1.6 % (ref 0–6.9)
ERYTHROCYTE [DISTWIDTH] IN BLOOD BY AUTOMATED COUNT: 64.3 FL (ref 35.9–50)
HCT VFR BLD AUTO: 32.9 % (ref 37–47)
HGB BLD-MCNC: 10.8 G/DL (ref 12–16)
IMM GRANULOCYTES # BLD AUTO: 0.01 K/UL (ref 0–0.11)
IMM GRANULOCYTES NFR BLD AUTO: 0.2 % (ref 0–0.9)
LYMPHOCYTES # BLD AUTO: 1.52 K/UL (ref 1–4.8)
LYMPHOCYTES NFR BLD: 35.7 % (ref 22–41)
MCH RBC QN AUTO: 33 PG (ref 27–33)
MCHC RBC AUTO-ENTMCNC: 32.8 G/DL (ref 33.6–35)
MCV RBC AUTO: 100.6 FL (ref 81.4–97.8)
MONOCYTES # BLD AUTO: 1.03 K/UL (ref 0–0.85)
MONOCYTES NFR BLD AUTO: 24.2 % (ref 0–13.4)
NEUTROPHILS # BLD AUTO: 1.61 K/UL (ref 2–7.15)
NEUTROPHILS NFR BLD: 37.8 % (ref 44–72)
NRBC # BLD AUTO: 0 K/UL
NRBC BLD-RTO: 0 /100 WBC
PLATELET # BLD AUTO: 97 K/UL (ref 164–446)
PMV BLD AUTO: 9.4 FL (ref 9–12.9)
RBC # BLD AUTO: 3.27 M/UL (ref 4.2–5.4)
WBC # BLD AUTO: 4.3 K/UL (ref 4.8–10.8)

## 2018-09-18 PROCEDURE — 85025 COMPLETE CBC W/AUTO DIFF WBC: CPT

## 2018-09-18 PROCEDURE — 36415 COLL VENOUS BLD VENIPUNCTURE: CPT

## 2018-09-18 RX ORDER — IBUPROFEN 400 MG/1
400 TABLET ORAL EVERY 6 HOURS PRN
Status: ON HOLD | COMMUNITY
End: 2018-11-08

## 2018-09-18 ASSESSMENT — PAIN SCALES - GENERAL: PAINLEVEL_OUTOF10: 0

## 2018-09-18 NOTE — PROGRESS NOTES
Pt arrived ambulatory to [facility] for scheduled CBC. Pt states she does not have time to wait for results or any transfusions so she will leave after labs are drawn. Butterfly needle used to draw CBC, gauze dressing and coban wrap placed. Pt confirmed follow ups and will be called if critical values return requiring any transfusions. Pt ambulatory off of unit with no new complaints.

## 2018-09-18 NOTE — PROGRESS NOTES
"This RN spoke with Justyna JONES with Dr. Eaton's office. Update about CBC results from today. Justyna reported that pt cancelled her f/u appt with MD office this week and that the pt must be seen in MD office for chemo orders to be signed for 9/24/18. RN updated provider that pt reports a \"tooth ache\" she has been taking ibuprofen for at home. RN contacted pt at home and reviewed importance of rescheduling f/u appt with MD office this week prior to treatment on Monday, pt verbalized understanding and states she will call and get the appt scheduled. Education provided for pt to contact dentist for evaluation of sore tooth and to discuss this with the provider at her f/u appt, pt verbalized understanding.   "

## 2018-09-21 ENCOUNTER — HOSPITAL ENCOUNTER (OUTPATIENT)
Dept: LAB | Facility: MEDICAL CENTER | Age: 73
End: 2018-09-21
Attending: INTERNAL MEDICINE
Payer: MEDICARE

## 2018-09-21 LAB
BASOPHILS # BLD AUTO: 0.8 % (ref 0–1.8)
BASOPHILS # BLD: 0.04 K/UL (ref 0–0.12)
EOSINOPHIL # BLD AUTO: 0.03 K/UL (ref 0–0.51)
EOSINOPHIL NFR BLD: 0.6 % (ref 0–6.9)
ERYTHROCYTE [DISTWIDTH] IN BLOOD BY AUTOMATED COUNT: 62.3 FL (ref 35.9–50)
HCT VFR BLD AUTO: 33.1 % (ref 37–47)
HGB BLD-MCNC: 10.4 G/DL (ref 12–16)
IMM GRANULOCYTES # BLD AUTO: 0.02 K/UL (ref 0–0.11)
IMM GRANULOCYTES NFR BLD AUTO: 0.4 % (ref 0–0.9)
LYMPHOCYTES # BLD AUTO: 1.79 K/UL (ref 1–4.8)
LYMPHOCYTES NFR BLD: 34.4 % (ref 22–41)
MCH RBC QN AUTO: 31.4 PG (ref 27–33)
MCHC RBC AUTO-ENTMCNC: 31.4 G/DL (ref 33.6–35)
MCV RBC AUTO: 100 FL (ref 81.4–97.8)
MONOCYTES # BLD AUTO: 0.67 K/UL (ref 0–0.85)
MONOCYTES NFR BLD AUTO: 12.9 % (ref 0–13.4)
NEUTROPHILS # BLD AUTO: 2.65 K/UL (ref 2–7.15)
NEUTROPHILS NFR BLD: 50.9 % (ref 44–72)
NRBC # BLD AUTO: 0 K/UL
NRBC BLD-RTO: 0 /100 WBC
PLATELET # BLD AUTO: 127 K/UL (ref 164–446)
PMV BLD AUTO: 9.6 FL (ref 9–12.9)
RBC # BLD AUTO: 3.31 M/UL (ref 4.2–5.4)
WBC # BLD AUTO: 5.2 K/UL (ref 4.8–10.8)

## 2018-09-21 PROCEDURE — 36415 COLL VENOUS BLD VENIPUNCTURE: CPT

## 2018-09-21 PROCEDURE — 85025 COMPLETE CBC W/AUTO DIFF WBC: CPT

## 2018-09-21 PROCEDURE — 80053 COMPREHEN METABOLIC PANEL: CPT

## 2018-09-21 RX ORDER — 0.9 % SODIUM CHLORIDE 0.9 %
VIAL (ML) INJECTION PRN
Status: CANCELLED | OUTPATIENT
Start: 2018-10-08

## 2018-09-21 RX ORDER — LOPERAMIDE HYDROCHLORIDE 2 MG/1
2 CAPSULE ORAL PRN
Status: CANCELLED | OUTPATIENT
Start: 2018-10-08

## 2018-09-21 RX ORDER — ONDANSETRON 8 MG/1
8 TABLET, ORALLY DISINTEGRATING ORAL
Status: CANCELLED | OUTPATIENT
Start: 2018-10-08

## 2018-09-21 RX ORDER — LORAZEPAM 2 MG/ML
1 INJECTION INTRAMUSCULAR ONCE
Status: CANCELLED
Start: 2018-10-10 | End: 2018-10-11

## 2018-09-21 RX ORDER — 0.9 % SODIUM CHLORIDE 0.9 %
VIAL (ML) INJECTION PRN
Status: CANCELLED | OUTPATIENT
Start: 2018-09-25

## 2018-09-21 RX ORDER — PROCHLORPERAZINE MALEATE 10 MG
10 TABLET ORAL EVERY 6 HOURS PRN
Status: CANCELLED | OUTPATIENT
Start: 2018-09-25

## 2018-09-21 RX ORDER — ONDANSETRON 2 MG/ML
4 INJECTION INTRAMUSCULAR; INTRAVENOUS
Status: CANCELLED | OUTPATIENT
Start: 2018-10-08

## 2018-09-21 RX ORDER — 0.9 % SODIUM CHLORIDE 0.9 %
20 VIAL (ML) INJECTION PRN
Status: CANCELLED | OUTPATIENT
Start: 2018-09-26

## 2018-09-21 RX ORDER — LORAZEPAM 2 MG/ML
1 INJECTION INTRAMUSCULAR ONCE
Status: CANCELLED
Start: 2018-09-26 | End: 2018-09-27

## 2018-09-21 RX ORDER — PROCHLORPERAZINE MALEATE 10 MG
10 TABLET ORAL EVERY 6 HOURS PRN
Status: CANCELLED | OUTPATIENT
Start: 2018-10-08

## 2018-09-21 RX ORDER — 0.9 % SODIUM CHLORIDE 0.9 %
20 VIAL (ML) INJECTION PRN
Status: CANCELLED | OUTPATIENT
Start: 2018-10-10

## 2018-09-21 RX ORDER — DEXTROSE MONOHYDRATE 50 MG/ML
INJECTION, SOLUTION INTRAVENOUS CONTINUOUS
Status: CANCELLED | OUTPATIENT
Start: 2018-09-25

## 2018-09-21 RX ORDER — LORAZEPAM 2 MG/ML
1 INJECTION INTRAMUSCULAR ONCE
Status: CANCELLED
Start: 2018-10-08 | End: 2018-10-09

## 2018-09-21 RX ORDER — LOPERAMIDE HYDROCHLORIDE 2 MG/1
2 CAPSULE ORAL PRN
Status: CANCELLED | OUTPATIENT
Start: 2018-09-25

## 2018-09-21 RX ORDER — DEXTROSE MONOHYDRATE 50 MG/ML
INJECTION, SOLUTION INTRAVENOUS CONTINUOUS
Status: CANCELLED | OUTPATIENT
Start: 2018-10-08

## 2018-09-21 RX ORDER — LORAZEPAM 2 MG/ML
1 INJECTION INTRAMUSCULAR ONCE
Status: CANCELLED
Start: 2018-10-08 | End: 2018-10-08

## 2018-09-21 RX ORDER — 0.9 % SODIUM CHLORIDE 0.9 %
5 VIAL (ML) INJECTION PRN
Status: CANCELLED | OUTPATIENT
Start: 2018-09-25

## 2018-09-21 RX ORDER — LORAZEPAM 2 MG/ML
1 INJECTION INTRAMUSCULAR ONCE
Status: CANCELLED
Start: 2018-09-24 | End: 2018-09-24

## 2018-09-21 RX ORDER — ONDANSETRON 2 MG/ML
4 INJECTION INTRAMUSCULAR; INTRAVENOUS
Status: CANCELLED | OUTPATIENT
Start: 2018-09-25

## 2018-09-21 RX ORDER — LORAZEPAM 2 MG/ML
1 INJECTION INTRAMUSCULAR ONCE
Status: CANCELLED
Start: 2018-09-25 | End: 2018-09-25

## 2018-09-21 RX ORDER — ONDANSETRON 8 MG/1
8 TABLET, ORALLY DISINTEGRATING ORAL
Status: CANCELLED | OUTPATIENT
Start: 2018-09-25

## 2018-09-21 RX ORDER — 0.9 % SODIUM CHLORIDE 0.9 %
5 VIAL (ML) INJECTION PRN
Status: CANCELLED | OUTPATIENT
Start: 2018-10-08

## 2018-09-22 LAB
ALBUMIN SERPL BCP-MCNC: 3.7 G/DL (ref 3.2–4.9)
ALBUMIN/GLOB SERPL: 1.2 G/DL
ALP SERPL-CCNC: 90 U/L (ref 30–99)
ALT SERPL-CCNC: 18 U/L (ref 2–50)
ANION GAP SERPL CALC-SCNC: 7 MMOL/L (ref 0–11.9)
AST SERPL-CCNC: 21 U/L (ref 12–45)
BILIRUB SERPL-MCNC: 0.3 MG/DL (ref 0.1–1.5)
BUN SERPL-MCNC: 27 MG/DL (ref 8–22)
CALCIUM SERPL-MCNC: 10.2 MG/DL (ref 8.5–10.5)
CHLORIDE SERPL-SCNC: 102 MMOL/L (ref 96–112)
CO2 SERPL-SCNC: 28 MMOL/L (ref 20–33)
CREAT SERPL-MCNC: 0.84 MG/DL (ref 0.5–1.4)
FASTING STATUS PATIENT QL REPORTED: NORMAL
GLOBULIN SER CALC-MCNC: 3.1 G/DL (ref 1.9–3.5)
GLUCOSE SERPL-MCNC: 97 MG/DL (ref 65–99)
POTASSIUM SERPL-SCNC: 4 MMOL/L (ref 3.6–5.5)
PROT SERPL-MCNC: 6.8 G/DL (ref 6–8.2)
SODIUM SERPL-SCNC: 137 MMOL/L (ref 135–145)

## 2018-09-23 NOTE — PROGRESS NOTES
"Pharmacy Chemotherapy Calculations Note:    Patient Name: Larissa Ramirez      Dx: Locally Advanced Pancreatic Cancer     Cycle 7- delayed for thrombocytopenia   Previous treatment: C6- 8/27/18     Protocol: FOLFIRINOX (Fluorouracil/Leucovorin/Irinotecan/OXALIplatin)  *Dosing Reference*  OXALIplatin 85 mg/m2 IV over 2 hours on Day 1  Irinotecan 180 mg/m2 IV over 90 min on Day 1  06/11/18: dose reduced to 150 mg/m2 for anticipated tolerance  06/11/18: Leucovorin and 5-FU push discontinued d/t  anticipated tolerance.  Fluorouracil 1200 mg/m2 CIVI over 24 hours on Days 1-2 (2400 mg/m2 IV over 46 hours)  Q14 days x 4-12 cycles (unresectable or locally advanced)  NCCN Guidelines for Pancreatic Adenocarcinoma. V.2.2016.  Juan Pablo T, et al. N Engl J Med. 2011;364(19):1810-25.    Alleriges: Levofloxacin; Nitrofurantoin; Amitriptyline; Sulfa drugs; and Lyrica  /48   Pulse 65   Temp 36.2 °C (97.1 °F)   Resp 18   Ht 1.664 m (5' 5.5\")   Wt 67.2 kg (148 lb 2.4 oz)   LMP 06/27/1986   SpO2 99%   BMI 24.28 kg/m²  Body surface area is 1.76 meters squared.    9/21/18-  ANC~ 2650 Plt = 127k   Hgb = 10.4     SCr = 0.84mg/dL CrCl ~ 64mL/min   LFT's = WNL TBili = 0.3        OXALIplatin (Eloxatin) 85 mg/m² x 1.76m² = 149.6mg   <5% difference, ok to treat with final dose = 149.6mg IV    Irinotecan (Camptosar) 150 mg/m² x 1.76m² = 264mg   <5% difference, ok to treat with final dose = 264mg IV    Fluorouracil (5-FU) 2400 mg/m² x 1.76m² = 4224mg   <5% difference, ok to treat with final dose = 4225mg    CIVI via CADD pump @ 1.8mL/hr over 46 hours       Neal Ramirez, PharmD  "

## 2018-09-24 ENCOUNTER — OUTPATIENT INFUSION SERVICES (OUTPATIENT)
Dept: ONCOLOGY | Facility: MEDICAL CENTER | Age: 73
End: 2018-09-24
Attending: INTERNAL MEDICINE
Payer: MEDICARE

## 2018-09-24 VITALS
BODY MASS INDEX: 23.81 KG/M2 | RESPIRATION RATE: 18 BRPM | SYSTOLIC BLOOD PRESSURE: 130 MMHG | WEIGHT: 148.15 LBS | OXYGEN SATURATION: 99 % | HEART RATE: 65 BPM | TEMPERATURE: 97.1 F | HEIGHT: 66 IN | DIASTOLIC BLOOD PRESSURE: 48 MMHG

## 2018-09-24 DIAGNOSIS — C25.1 MALIGNANT NEOPLASM OF BODY OF PANCREAS (HCC): ICD-10-CM

## 2018-09-24 LAB — CANCER AG19-9 SERPL-ACNC: 8.1 U/ML (ref 0–35)

## 2018-09-24 PROCEDURE — 96415 CHEMO IV INFUSION ADDL HR: CPT

## 2018-09-24 PROCEDURE — 700105 HCHG RX REV CODE 258

## 2018-09-24 PROCEDURE — 96417 CHEMO IV INFUS EACH ADDL SEQ: CPT

## 2018-09-24 PROCEDURE — G0498 CHEMO EXTEND IV INFUS W/PUMP: HCPCS

## 2018-09-24 PROCEDURE — 700111 HCHG RX REV CODE 636 W/ 250 OVERRIDE (IP)

## 2018-09-24 PROCEDURE — 86301 IMMUNOASSAY TUMOR CA 19-9: CPT

## 2018-09-24 PROCEDURE — 96413 CHEMO IV INFUSION 1 HR: CPT

## 2018-09-24 PROCEDURE — 700111 HCHG RX REV CODE 636 W/ 250 OVERRIDE (IP): Performed by: INTERNAL MEDICINE

## 2018-09-24 PROCEDURE — A4212 NON CORING NEEDLE OR STYLET: HCPCS

## 2018-09-24 PROCEDURE — 700105 HCHG RX REV CODE 258: Performed by: INTERNAL MEDICINE

## 2018-09-24 PROCEDURE — 96375 TX/PRO/DX INJ NEW DRUG ADDON: CPT

## 2018-09-24 RX ORDER — LORAZEPAM 2 MG/ML
1 INJECTION INTRAMUSCULAR ONCE
Status: COMPLETED | OUTPATIENT
Start: 2018-09-24 | End: 2018-09-24

## 2018-09-24 RX ADMIN — OXALIPLATIN 149.6 MG: 5 INJECTION, SOLUTION, CONCENTRATE INTRAVENOUS at 10:02

## 2018-09-24 RX ADMIN — ATROPINE SULFATE 0.5 MG: 1 INJECTION, SOLUTION INTRAMUSCULAR; INTRAVENOUS; SUBCUTANEOUS at 12:11

## 2018-09-24 RX ADMIN — FLUOROURACIL 4225 MG: 50 INJECTION, SOLUTION INTRAVENOUS at 14:09

## 2018-09-24 RX ADMIN — DEXAMETHASONE SODIUM PHOSPHATE 12 MG: 4 INJECTION, SOLUTION INTRAMUSCULAR; INTRAVENOUS at 09:15

## 2018-09-24 RX ADMIN — LORAZEPAM 1 MG: 2 INJECTION INTRAMUSCULAR; INTRAVENOUS at 09:14

## 2018-09-24 RX ADMIN — ONDANSETRON HYDROCHLORIDE 16 MG: 2 SOLUTION INTRAMUSCULAR; INTRAVENOUS at 09:34

## 2018-09-24 RX ADMIN — DEXTROSE MONOHYDRATE 264 MG: 50 INJECTION, SOLUTION INTRAVENOUS at 12:11

## 2018-09-24 ASSESSMENT — PAIN SCALES - GENERAL: PAINLEVEL_OUTOF10: 2

## 2018-09-24 NOTE — PROGRESS NOTES
Chemotherapy Verification - SECONDARY RN       Height = 166.4 cm  Weight = 67.2 kg  BSA = 1.76 m2       Medication: Oxaliplatin  Dose: 85 mg/m2  Calculated Dose: 149.6 mg                             (In mg/m2, AUC, mg/kg)     Medication: Irinotecan  Dose: 150 mg/m2  Calculated Dose: 264 mg                             (In mg/m2, AUC, mg/kg)    Medication: Adrucil  Dose: 2,400 mg/m2 over 46 hours  Calculated Dose: 4,224 mg over 46 hours                             (In mg/m2, AUC, mg/kg)      I confirm that this process was performed independently.

## 2018-09-24 NOTE — PROGRESS NOTES
"Pharmacy Chemotherapy Verification Note:    Patient Name: Larissa Ramirez      Dx: Locally Advanced Pancreatic Cancer      Protocol: FOLFIRINOX (Fluorouracil/Leucovorin/Irinotecan/OXALIplatin)       *Dosing Reference*  OXALIplatin 85 mg/m² IV over 2 hours on Day 1  Irinotecan 180 mg/m² IV over 90 minutes on Day 1              06/11/18: dose reduced to 150 mg/m² for anticipated tolerance                  06/11/18: Leucovorin and 5-FU push are omitted from TP d/t anticipated tolerance.  Fluorouracil 1200 mg/m² CIVI over 24 hours on Days 1-2 (2400 mg/m² IV over 46 hours)  Every 14 days x4-12 cycles (unresectable or locally advanced)    NCCN Guidelines for Pancreatic Adenocarcinoma. V.2.2016.  Juan Pablo T, et al. N Engl J Med. 2011;364(19):1811-25.    Allergies:  Levofloxacin; Nitrofurantoin; Amitriptyline; Sulfa drugs; and Lyrica     /48   Pulse 65   Temp 36.2 °C (97.1 °F)   Resp 18   Ht 1.664 m (5' 5.5\")   Wt 67.2 kg (148 lb 2.4 oz)   LMP 06/27/1986   SpO2 99%   BMI 24.28 kg/m²  Body surface area is 1.76 meters squared.   Labs from 9/21/18:  ANC~ 2650  Plt = 127 k  SCr = 0.84 mg/dL  CrCl = 64 mL/min  LFT = WNL  TBili = 0.3     Drug Order   (Drug name, dose, route, IV Fluid & volume, frequency, number of doses) Cycle: 7 (deferred for low PLT)  Previous treatment: C6 = 8/27/18     Medication = OXALIplatin (Eloxitan)  Base Dose = 85 mg/m²  Calc Dose: Base Dose x 1.76 m² = 150 mg  Final Dose = 149.6 mg  Route = IV  Fluid & Volume = D5W 250 mL  Admin Duration = Over 2 hours          <5% difference, ok to treat with final written dose   Medication = Irinotecan (Camptosar)  Base Dose = 150 mg/m²  Calc Dose: Base Dose x 1.76 m² = 264 mg  Final Dose = 264 mg  Route = IV  Fluid & Volume = D5W 500 mL  Admin Duration = Over 90 minutes          <5% difference, ok to treat with final written dose   Medication = Fluorouracil (5-FU)  Base Dose = 2400 mg/m²/dose  Calc Dose:Base Dose x 1.76 m² = 4224 mg  Final Dose = 4225 " mg  Route = CIVI over 46 hours  Fluid & Volume = 84.5 mL (+3 mL overfill = 87.5 mL)  Con = 50 mg/mL  Admin Duration = to be given via CADD pump at 1.8 mL/hour          <5% difference, ok to treat with final written dose     By my signature below, I confirm this process was performed independently with the BSA and all final chemotherapy dosing calculations congruent. I have reviewed the above chemotherapy order and that my calculation of the final dose and BSA (when applicable) corroborate those calculations of the  pharmacist.     Codey Sanchez, PharmD, BCPS

## 2018-09-24 NOTE — PROGRESS NOTES
Chemotherapy Verification - PRIMARY RN      Height = 166.4 cm  Weight = 67.2 kg  BSA = 1.76 m2       Medication: oxaliplatin  Dose: 85 mg/m2  Calculated Dose: 149.8 mg                             (In mg/m2, AUC, mg/kg)     Medication: irinotecan  Dose: 150 mg/m2  Calculated Dose: 264 mg                             (In mg/m2, AUC, mg/kg)    Medication: fluorouracil via CADD pump  Dose: 2400 mg/m2  Calculated Dose: 4224 mg                             (In mg/m2, AUC, mg/kg)      I confirm this process was performed independently with the BSA and all final chemotherapy dosing calculations congruent.  Any discrepancies of 5% or greater have been addressed with the chemotherapy pharmacist. The resolution of the discrepancy has been documented in the EPIC progress notes.

## 2018-09-24 NOTE — PROGRESS NOTES
Pt scheduled for Folfirinox C7; arrived ambulatory, independent, accompanied by ; endorsed fatigue; denied pain/discomfort or other health changes. Labs drawn on 9/21/18; results w/i treatment parameters. R-chest port accessed using sterile technique; easily flushed, brisk blood return noted; pt tolerated well. Research coordinator met w/ pt chairside. CA-19 & research labs drawn per orders. Premeds given per orders. Infusions completed w/o adverse events. Port flushed, brisk blood return noted. 5FU via CADD pump hooked up, connections tightened; total volume 86.5 mL. Pump buttons & functions reviewed; pt verbalized knowledge on pump use & precautions. Take-down scheduled for 9/26/18. Pt discharged ambulatory, independent, NAD.

## 2018-09-25 ENCOUNTER — DOCUMENTATION (OUTPATIENT)
Dept: HEMATOLOGY ONCOLOGY | Facility: MEDICAL CENTER | Age: 73
End: 2018-09-25

## 2018-09-25 NOTE — PROGRESS NOTES
Nutrition Services: Update    Met with pt during chemo infusion to follow-up on current intake, appetite, and nutrition status. Pt's weight: 148#, increased by 4# since last encounter. Pt reports she is doing well, with good appetite and intake. She reports she is not experiencing any GI distress nor taste changes. She did reports some sensitivity of her teet. Discussed oral care tips and recommended pt see her dentist or speak with her MD. Advised pt to avoid too cold items to net exacerbate tooth discomfort. Pt did not express any further questions or concerns. Per pt's weight continually stable/ trending up and pt eating well, RD to sign off. Please consult should further needs arise. RD available PRN [phone].

## 2018-09-26 ENCOUNTER — OUTPATIENT INFUSION SERVICES (OUTPATIENT)
Dept: ONCOLOGY | Facility: MEDICAL CENTER | Age: 73
End: 2018-09-26
Attending: INTERNAL MEDICINE
Payer: MEDICARE

## 2018-09-26 VITALS
TEMPERATURE: 97.2 F | DIASTOLIC BLOOD PRESSURE: 46 MMHG | RESPIRATION RATE: 18 BRPM | SYSTOLIC BLOOD PRESSURE: 135 MMHG | BODY MASS INDEX: 23.17 KG/M2 | HEART RATE: 73 BPM | OXYGEN SATURATION: 95 % | WEIGHT: 144.18 LBS | HEIGHT: 66 IN

## 2018-09-26 PROCEDURE — 96523 IRRIG DRUG DELIVERY DEVICE: CPT

## 2018-09-26 PROCEDURE — 700111 HCHG RX REV CODE 636 W/ 250 OVERRIDE (IP)

## 2018-09-26 RX ADMIN — HEPARIN 500 UNITS: 100 SYRINGE at 15:57

## 2018-09-26 ASSESSMENT — PAIN SCALES - GENERAL
PAINLEVEL_OUTOF10: 3
PAINLEVEL: 3=SLIGHT PAIN

## 2018-09-27 NOTE — PROGRESS NOTES
Patient here for pump de-access. C/O nausea; states that she is taking her anti-nausea medication as directed. 86.50 ml given. Pump turned off and disconnected from patient. Brisk blood return noted. Heparin instilled prior to de-accessing port. Port de-accessed; gauze/tape applied over site.

## 2018-10-03 ENCOUNTER — HOSPITAL ENCOUNTER (OUTPATIENT)
Dept: RADIOLOGY | Facility: MEDICAL CENTER | Age: 73
End: 2018-10-03
Attending: INTERNAL MEDICINE
Payer: MEDICARE

## 2018-10-03 DIAGNOSIS — C25.2 MALIGNANT NEOPLASM OF TAIL OF PANCREAS (HCC): ICD-10-CM

## 2018-10-03 PROCEDURE — 74170 CT ABD WO CNTRST FLWD CNTRST: CPT

## 2018-10-03 PROCEDURE — 700117 HCHG RX CONTRAST REV CODE 255: Performed by: INTERNAL MEDICINE

## 2018-10-03 RX ADMIN — IOHEXOL 100 ML: 350 INJECTION, SOLUTION INTRAVENOUS at 09:06

## 2018-10-05 ENCOUNTER — HOSPITAL ENCOUNTER (OUTPATIENT)
Dept: LAB | Facility: MEDICAL CENTER | Age: 73
End: 2018-10-05
Attending: INTERNAL MEDICINE
Payer: MEDICARE

## 2018-10-05 LAB
ALBUMIN SERPL BCP-MCNC: 3.8 G/DL (ref 3.2–4.9)
ALBUMIN/GLOB SERPL: 1.2 G/DL
ALP SERPL-CCNC: 84 U/L (ref 30–99)
ALT SERPL-CCNC: 23 U/L (ref 2–50)
ANION GAP SERPL CALC-SCNC: 5 MMOL/L (ref 0–11.9)
AST SERPL-CCNC: 25 U/L (ref 12–45)
BASOPHILS # BLD AUTO: 0.5 % (ref 0–1.8)
BASOPHILS # BLD: 0.02 K/UL (ref 0–0.12)
BILIRUB SERPL-MCNC: 0.3 MG/DL (ref 0.1–1.5)
BUN SERPL-MCNC: 23 MG/DL (ref 8–22)
CALCIUM SERPL-MCNC: 10.4 MG/DL (ref 8.5–10.5)
CHLORIDE SERPL-SCNC: 101 MMOL/L (ref 96–112)
CO2 SERPL-SCNC: 30 MMOL/L (ref 20–33)
CREAT SERPL-MCNC: 0.94 MG/DL (ref 0.5–1.4)
EOSINOPHIL # BLD AUTO: 0.04 K/UL (ref 0–0.51)
EOSINOPHIL NFR BLD: 1 % (ref 0–6.9)
ERYTHROCYTE [DISTWIDTH] IN BLOOD BY AUTOMATED COUNT: 58.8 FL (ref 35.9–50)
GLOBULIN SER CALC-MCNC: 3.1 G/DL (ref 1.9–3.5)
GLUCOSE SERPL-MCNC: 111 MG/DL (ref 65–99)
HCT VFR BLD AUTO: 33 % (ref 37–47)
HGB BLD-MCNC: 10.4 G/DL (ref 12–16)
IMM GRANULOCYTES # BLD AUTO: 0.01 K/UL (ref 0–0.11)
IMM GRANULOCYTES NFR BLD AUTO: 0.3 % (ref 0–0.9)
LYMPHOCYTES # BLD AUTO: 1.08 K/UL (ref 1–4.8)
LYMPHOCYTES NFR BLD: 28.2 % (ref 22–41)
MCH RBC QN AUTO: 32.3 PG (ref 27–33)
MCHC RBC AUTO-ENTMCNC: 31.5 G/DL (ref 33.6–35)
MCV RBC AUTO: 102.5 FL (ref 81.4–97.8)
MONOCYTES # BLD AUTO: 0.44 K/UL (ref 0–0.85)
MONOCYTES NFR BLD AUTO: 11.5 % (ref 0–13.4)
NEUTROPHILS # BLD AUTO: 2.24 K/UL (ref 2–7.15)
NEUTROPHILS NFR BLD: 58.5 % (ref 44–72)
NRBC # BLD AUTO: 0 K/UL
NRBC BLD-RTO: 0 /100 WBC
PLATELET # BLD AUTO: 116 K/UL (ref 164–446)
PMV BLD AUTO: 9.2 FL (ref 9–12.9)
POTASSIUM SERPL-SCNC: 4 MMOL/L (ref 3.6–5.5)
PROT SERPL-MCNC: 6.9 G/DL (ref 6–8.2)
RBC # BLD AUTO: 3.22 M/UL (ref 4.2–5.4)
SODIUM SERPL-SCNC: 136 MMOL/L (ref 135–145)
WBC # BLD AUTO: 3.8 K/UL (ref 4.8–10.8)

## 2018-10-05 PROCEDURE — 80053 COMPREHEN METABOLIC PANEL: CPT

## 2018-10-05 PROCEDURE — 36415 COLL VENOUS BLD VENIPUNCTURE: CPT

## 2018-10-05 PROCEDURE — 85025 COMPLETE CBC W/AUTO DIFF WBC: CPT

## 2018-10-08 ENCOUNTER — OUTPATIENT INFUSION SERVICES (OUTPATIENT)
Dept: ONCOLOGY | Facility: MEDICAL CENTER | Age: 73
End: 2018-10-08
Attending: INTERNAL MEDICINE
Payer: MEDICARE

## 2018-10-08 VITALS
DIASTOLIC BLOOD PRESSURE: 50 MMHG | OXYGEN SATURATION: 97 % | SYSTOLIC BLOOD PRESSURE: 121 MMHG | WEIGHT: 145.5 LBS | HEIGHT: 66 IN | TEMPERATURE: 97.1 F | RESPIRATION RATE: 18 BRPM | BODY MASS INDEX: 23.38 KG/M2 | HEART RATE: 67 BPM

## 2018-10-08 DIAGNOSIS — C25.1 MALIGNANT NEOPLASM OF BODY OF PANCREAS (HCC): ICD-10-CM

## 2018-10-08 LAB — CANCER AG19-9 SERPL-ACNC: 11.7 U/ML (ref 0–35)

## 2018-10-08 PROCEDURE — A4212 NON CORING NEEDLE OR STYLET: HCPCS

## 2018-10-08 PROCEDURE — 96415 CHEMO IV INFUSION ADDL HR: CPT

## 2018-10-08 PROCEDURE — 700105 HCHG RX REV CODE 258

## 2018-10-08 PROCEDURE — 96417 CHEMO IV INFUS EACH ADDL SEQ: CPT

## 2018-10-08 PROCEDURE — G0498 CHEMO EXTEND IV INFUS W/PUMP: HCPCS

## 2018-10-08 PROCEDURE — 700105 HCHG RX REV CODE 258: Performed by: INTERNAL MEDICINE

## 2018-10-08 PROCEDURE — 96413 CHEMO IV INFUSION 1 HR: CPT

## 2018-10-08 PROCEDURE — 700111 HCHG RX REV CODE 636 W/ 250 OVERRIDE (IP): Mod: JG

## 2018-10-08 PROCEDURE — 700111 HCHG RX REV CODE 636 W/ 250 OVERRIDE (IP): Performed by: INTERNAL MEDICINE

## 2018-10-08 PROCEDURE — 86301 IMMUNOASSAY TUMOR CA 19-9: CPT

## 2018-10-08 PROCEDURE — 96375 TX/PRO/DX INJ NEW DRUG ADDON: CPT

## 2018-10-08 RX ORDER — LORAZEPAM 2 MG/ML
1 INJECTION INTRAMUSCULAR ONCE
Status: COMPLETED | OUTPATIENT
Start: 2018-10-08 | End: 2018-10-08

## 2018-10-08 RX ADMIN — DEXTROSE MONOHYDRATE 148.75 MG: 50 INJECTION, SOLUTION INTRAVENOUS at 09:59

## 2018-10-08 RX ADMIN — FLUOROURACIL 4200 MG: 50 INJECTION, SOLUTION INTRAVENOUS at 13:32

## 2018-10-08 RX ADMIN — LORAZEPAM 1 MG: 2 INJECTION INTRAMUSCULAR; INTRAVENOUS at 09:00

## 2018-10-08 RX ADMIN — DEXTROSE MONOHYDRATE 262.6 MG: 50 INJECTION, SOLUTION INTRAVENOUS at 11:57

## 2018-10-08 RX ADMIN — ONDANSETRON HYDROCHLORIDE 16 MG: 2 SOLUTION INTRAMUSCULAR; INTRAVENOUS at 09:24

## 2018-10-08 RX ADMIN — ATROPINE SULFATE 0.5 MG: 1 INJECTION, SOLUTION INTRAMUSCULAR; INTRAVENOUS; SUBCUTANEOUS at 11:57

## 2018-10-08 RX ADMIN — DEXAMETHASONE SODIUM PHOSPHATE 12 MG: 4 INJECTION, SOLUTION INTRAMUSCULAR; INTRAVENOUS at 09:12

## 2018-10-08 ASSESSMENT — PAIN SCALES - GENERAL: PAINLEVEL_OUTOF10: 3

## 2018-10-08 NOTE — PROGRESS NOTES
Chemotherapy Verification - PRIMARY RN      Height = 167 cm  Weight = 66 kg  BSA = 1.75 m2       Medication: Oxaliplatin  Dose: 85 mg/m2  Calculated Dose: 148.75 mg                             (In mg/m2, AUC, mg/kg)     Medication: Irinotecan  Dose: 150 mg/m2  Calculated Dose: 262.5 mg                             (In mg/m2, AUC, mg/kg)    Medication: Adrucil  Dose: 2,400 mg/m2 over 46 hours  Calculated Dose: 4,200 mg                             (In mg/m2, AUC, mg/kg)      I confirm this process was performed independently with the BSA and all final chemotherapy dosing calculations congruent.  Any discrepancies of 5% or greater have been addressed with the chemotherapy pharmacist. The resolution of the discrepancy has been documented in the EPIC progress notes.

## 2018-10-08 NOTE — PROGRESS NOTES
Chemotherapy Verification - SECONDARY RN       Height = 65.75 in  Weight = 66 kg  BSA = 1.75 m2       Medication: Oxaliplatin  Dose: 85 mg/m2  Calculated Dose: 148.75 mg                             (In mg/m2, AUC, mg/kg)     Medication: Irinotecan  Dose: 150 mg/m2  Calculated Dose: 262.5 mg                             (In mg/m2, AUC, mg/kg)    Medication: 5 FU pump  Dose: 2400 mg/m2 over 46 hours  Calculated Dose: 4200 mg over 46 hours                             (In mg/m2, AUC, mg/kg)    I confirm that this process was performed independently.

## 2018-10-08 NOTE — PROGRESS NOTES
"Pharmacy Chemotherapy Calculations Note:    Patient Name: Larissa Ramirez      Dx: Locally Advanced Pancreatic Cancer     Cycle 8   Previous treatment: C7- 9/24/18     Protocol: FOLFIRINOX (Fluorouracil/Leucovorin/Irinotecan/OXALIplatin)  *Dosing Reference*  OXALIplatin 85 mg/m2 IV over 2 hours on Day 1  Irinotecan 180 mg/m2 IV over 90 min on Day 1  06/11/18: dose reduced to 150 mg/m2 for anticipated tolerance  06/11/18: Leucovorin and 5-FU push discontinued d/t  anticipated tolerance.  Fluorouracil 1200 mg/m2 CIVI over 24 hours on Days 1-2 (2400 mg/m2 IV over 46 hours)  Q14 days x 4-12 cycles (unresectable or locally advanced)  NCCN Guidelines for Pancreatic Adenocarcinoma. V.2.2016.  Juan Pablo T, et al. N Engl J Med. 2011;364(19):181-53.    Alleriges: Levofloxacin; Nitrofurantoin; Amitriptyline; Sulfa drugs; and Lyrica  /50   Pulse 67   Temp 36.2 °C (97.1 °F)   Resp 18   Ht 1.67 m (5' 5.75\")   Wt 66 kg (145 lb 8.1 oz)   LMP 06/27/1986   SpO2 97%   BMI 23.67 kg/m²  Body surface area is 1.75 meters squared.    10/5/18-  ANC~ 2240 Plt = 116k   Hgb = 10.4     SCr = 0.94mg/dL CrCl ~ 56mL/min   LFT's = WNl TBili = 0.3        OXALIplatin (Eloxatin) 85 mg/m² x 1.75m² = 148.75mg   <5% difference, ok to treat with final dose = 148.75mg IV    Irinotecan (Camptosar) 150 mg/m² x 1.75m² = 262.5mg   <5% difference, ok to treat with final dose = 262.6mg IV    Fluorouracil (5-FU) 2400 mg/m² x 1.75m² = 4200mg   <5% difference, ok to treat with final dose = 4200mg    CIVI via CADD pump @ 1.8mL/hr over 46 hours       Neal Ramirez, PharmD  "

## 2018-10-08 NOTE — PROGRESS NOTES
Patient presents for day one cycle eight FOLFIRINOX chemotherapy. Port accessed, brisk blood return visualized. Infusions completed with no new patient complaints. Patient connected to home infusion pump. Patient returns Wednesday for pump disconnect and released in no acute distress with her .

## 2018-10-08 NOTE — PROGRESS NOTES
"Pharmacy Chemotherapy Verification Note:    Patient Name: Larissa Ramirez      Dx: Locally Advanced Pancreatic Cancer        Protocol: FOLFIRINOX (Fluorouracil/Leucovorin/Irinotecan/OXALIplatin)     OXALIplatin 85 mg/m² IV over 2 hours on Day 1  Irinotecan IV over 90 minutes on Day 1  --06/11/18: dose reduced to 150 mg/m² for anticipated tolerance      --06/11/18: Leucovorin and 5-FU push are omitted from TP d/t anticipated tolerance.  Fluorouracil 1200 mg/m² CIVI over 24 hours on Days 1-2 (2400 mg/m² IV over 46 hours)  Every 14 days x4-12 cycles (unresectable or locally advanced)  NCCN Guidelines for Pancreatic Adenocarcinoma. V.2.2016.  Juan Pablo T, et al. N Engl J Med. 2011;364(92):1813-98.    Allergies:  Levofloxacin; Nitrofurantoin; Amitriptyline; Sulfa drugs; and Lyrica     /50   Pulse 67   Temp 36.2 °C (97.1 °F)   Resp 18   Ht 1.67 m (5' 5.75\")   Wt 66 kg (145 lb 8.1 oz)   LMP 06/27/1986   SpO2 97%   BMI 23.67 kg/m²  Body surface area is 1.75 meters squared.     Labs from 10/5/18:  ANC~ 2240 Plt = 116k   Hgb = 10.4   SCr = 0.94mg/dL CrCl ~ 56mL/min   LFT's = WNL TBili = 0.3      Drug Order   (Drug name, dose, route, IV Fluid & volume, frequency, number of doses) Cycle: 8  Previous treatment: 9/24/18     Medication = OXALIplatin (Eloxitan)  Base Dose = 85 mg/m²  Calc Dose: Base Dose x 1.75 m² = 148.75 mg  Final Dose = 148.75 mg  Route = IV  Fluid & Volume = D5W 250 mL  Admin Duration = Over 2 hours          <5% difference, ok to treat with final written dose   Medication = Irinotecan (Camptosar)  Base Dose = 150 mg/m²  Calc Dose: Base Dose x 1.75 m² = 262.5 mg  Final Dose = 262.6 mg  Route = IV  Fluid & Volume = D5W 500 mL  Admin Duration = Over 90 minutes          <5% difference, ok to treat with final written dose   Medication = Fluorouracil (5-FU)  Base Dose = 2400 mg/m²/dose  Calc Dose:Base Dose x 1.75 m² = 4200 mg  Final Dose = 4200 mg  Route = CIVI over 46 hours  Fluid & Volume = 84 mL (+3 mL " overfill = 87 mL)  Con = 50 mg/mL  Admin Duration = to be given via CADD pump at 1.8 mL/hour          <5% difference, ok to treat with final written dose     By my signature below, I confirm this process was performed independently with the BSA and all final chemotherapy dosing calculations congruent. I have reviewed the above chemotherapy order and that my calculation of the final dose and BSA (when applicable) corroborate those calculations of the  pharmacist.     Ericka Bentley, PharmD, BCOP

## 2018-10-10 ENCOUNTER — OUTPATIENT INFUSION SERVICES (OUTPATIENT)
Dept: ONCOLOGY | Facility: MEDICAL CENTER | Age: 73
End: 2018-10-10
Attending: INTERNAL MEDICINE
Payer: MEDICARE

## 2018-10-10 VITALS
OXYGEN SATURATION: 98 % | DIASTOLIC BLOOD PRESSURE: 52 MMHG | RESPIRATION RATE: 18 BRPM | HEART RATE: 74 BPM | SYSTOLIC BLOOD PRESSURE: 111 MMHG | HEIGHT: 66 IN | WEIGHT: 142.86 LBS | TEMPERATURE: 98.5 F | BODY MASS INDEX: 22.96 KG/M2

## 2018-10-10 DIAGNOSIS — C25.1 MALIGNANT NEOPLASM OF BODY OF PANCREAS (HCC): ICD-10-CM

## 2018-10-10 PROCEDURE — 96523 IRRIG DRUG DELIVERY DEVICE: CPT

## 2018-10-10 PROCEDURE — 700111 HCHG RX REV CODE 636 W/ 250 OVERRIDE (IP)

## 2018-10-10 RX ADMIN — HEPARIN 500 UNITS: 100 SYRINGE at 15:36

## 2018-10-10 NOTE — PROGRESS NOTES
Patient arrived to [facility] for C8D3 5FU CADD pump de-access.  Pump stopped with 0ml remaining in reservoir, 0 mLs administered.  Disconnected pump, Port flushed per protocol with brisk blood return noted, heparinized, de-accessed and gauze dressing applied.  Pump cleaned and placed in pharmacy drawer.  Confirmed pt's next appt. Pt discharged home in [facility].

## 2018-10-11 NOTE — TELEPHONE ENCOUNTER
Patient is aware of CT scan results.  He is on an antibiotic for diverticulitis.  I need to see him in about 1-2 weeks for follow-up of diverticulitis.  I told him 1 week but if everything is booked up and if he is feeling better we can potentially do this the following week or so as long as he lets me know if there is any worsening or change.  Please help him schedule.   Tried to contact pt to complete the PVP but no answer. Left message for pt

## 2018-10-16 ENCOUNTER — OFFICE VISIT (OUTPATIENT)
Dept: HEMATOLOGY ONCOLOGY | Facility: MEDICAL CENTER | Age: 73
End: 2018-10-16
Payer: MEDICARE

## 2018-10-16 VITALS
OXYGEN SATURATION: 96 % | DIASTOLIC BLOOD PRESSURE: 62 MMHG | HEIGHT: 65 IN | SYSTOLIC BLOOD PRESSURE: 104 MMHG | HEART RATE: 71 BPM | BODY MASS INDEX: 24.61 KG/M2 | WEIGHT: 147.71 LBS | TEMPERATURE: 98 F

## 2018-10-16 DIAGNOSIS — C25.1 MALIGNANT NEOPLASM OF BODY OF PANCREAS (HCC): ICD-10-CM

## 2018-10-16 PROCEDURE — 99215 OFFICE O/P EST HI 40 MIN: CPT | Performed by: SURGERY

## 2018-10-16 ASSESSMENT — ENCOUNTER SYMPTOMS
BACK PAIN: 1
ABDOMINAL PAIN: 1

## 2018-10-16 NOTE — PATIENT INSTRUCTIONS
The patient will be scheduled for a resection distal pancreatectomy and possible vein resection and arterial reconstruction the first week of November.

## 2018-10-22 ENCOUNTER — APPOINTMENT (OUTPATIENT)
Dept: ONCOLOGY | Facility: MEDICAL CENTER | Age: 73
End: 2018-10-22
Attending: INTERNAL MEDICINE
Payer: MEDICARE

## 2018-10-24 ENCOUNTER — APPOINTMENT (OUTPATIENT)
Dept: ONCOLOGY | Facility: MEDICAL CENTER | Age: 73
End: 2018-10-24
Attending: INTERNAL MEDICINE
Payer: MEDICARE

## 2018-10-29 DIAGNOSIS — Z01.810 PRE-OPERATIVE CARDIOVASCULAR EXAMINATION: ICD-10-CM

## 2018-10-29 DIAGNOSIS — Z01.812 PRE-OPERATIVE LABORATORY EXAMINATION: ICD-10-CM

## 2018-10-29 LAB
ABO GROUP BLD: NORMAL
ALBUMIN SERPL BCP-MCNC: 3.9 G/DL (ref 3.2–4.9)
ALBUMIN/GLOB SERPL: 1.3 G/DL
ALP SERPL-CCNC: 113 U/L (ref 30–99)
ALT SERPL-CCNC: 29 U/L (ref 2–50)
ANION GAP SERPL CALC-SCNC: 5 MMOL/L (ref 0–11.9)
AST SERPL-CCNC: 27 U/L (ref 12–45)
BASOPHILS # BLD AUTO: 0.8 % (ref 0–1.8)
BASOPHILS # BLD: 0.03 K/UL (ref 0–0.12)
BILIRUB SERPL-MCNC: 0.3 MG/DL (ref 0.1–1.5)
BLD GP AB SCN SERPL QL: NORMAL
BUN SERPL-MCNC: 19 MG/DL (ref 8–22)
CALCIUM SERPL-MCNC: 10.4 MG/DL (ref 8.5–10.5)
CHLORIDE SERPL-SCNC: 103 MMOL/L (ref 96–112)
CO2 SERPL-SCNC: 32 MMOL/L (ref 20–33)
CREAT SERPL-MCNC: 0.78 MG/DL (ref 0.5–1.4)
EOSINOPHIL # BLD AUTO: 0.1 K/UL (ref 0–0.51)
EOSINOPHIL NFR BLD: 2.7 % (ref 0–6.9)
ERYTHROCYTE [DISTWIDTH] IN BLOOD BY AUTOMATED COUNT: 56 FL (ref 35.9–50)
GLOBULIN SER CALC-MCNC: 2.9 G/DL (ref 1.9–3.5)
GLUCOSE SERPL-MCNC: 105 MG/DL (ref 65–99)
HCT VFR BLD AUTO: 34.1 % (ref 37–47)
HGB BLD-MCNC: 10.8 G/DL (ref 12–16)
IMM GRANULOCYTES # BLD AUTO: 0.01 K/UL (ref 0–0.11)
IMM GRANULOCYTES NFR BLD AUTO: 0.3 % (ref 0–0.9)
INR PPP: 0.97 (ref 0.87–1.13)
LYMPHOCYTES # BLD AUTO: 1.36 K/UL (ref 1–4.8)
LYMPHOCYTES NFR BLD: 37 % (ref 22–41)
MCH RBC QN AUTO: 32.6 PG (ref 27–33)
MCHC RBC AUTO-ENTMCNC: 31.7 G/DL (ref 33.6–35)
MCV RBC AUTO: 103 FL (ref 81.4–97.8)
MONOCYTES # BLD AUTO: 0.6 K/UL (ref 0–0.85)
MONOCYTES NFR BLD AUTO: 16.3 % (ref 0–13.4)
NEUTROPHILS # BLD AUTO: 1.58 K/UL (ref 2–7.15)
NEUTROPHILS NFR BLD: 42.9 % (ref 44–72)
NRBC # BLD AUTO: 0 K/UL
NRBC BLD-RTO: 0 /100 WBC
PLATELET # BLD AUTO: 80 K/UL (ref 164–446)
PMV BLD AUTO: 9.7 FL (ref 9–12.9)
POTASSIUM SERPL-SCNC: 4.1 MMOL/L (ref 3.6–5.5)
PROT SERPL-MCNC: 6.8 G/DL (ref 6–8.2)
PROTHROMBIN TIME: 13 SEC (ref 12–14.6)
RBC # BLD AUTO: 3.31 M/UL (ref 4.2–5.4)
RH BLD: NORMAL
SODIUM SERPL-SCNC: 140 MMOL/L (ref 135–145)
WBC # BLD AUTO: 3.7 K/UL (ref 4.8–10.8)

## 2018-10-29 PROCEDURE — 85610 PROTHROMBIN TIME: CPT

## 2018-10-29 PROCEDURE — 86850 RBC ANTIBODY SCREEN: CPT

## 2018-10-29 PROCEDURE — 85025 COMPLETE CBC W/AUTO DIFF WBC: CPT

## 2018-10-29 PROCEDURE — 86900 BLOOD TYPING SEROLOGIC ABO: CPT

## 2018-10-29 PROCEDURE — 80053 COMPREHEN METABOLIC PANEL: CPT

## 2018-10-29 PROCEDURE — 86901 BLOOD TYPING SEROLOGIC RH(D): CPT

## 2018-10-29 PROCEDURE — 36415 COLL VENOUS BLD VENIPUNCTURE: CPT

## 2018-11-05 ENCOUNTER — HOSPITAL ENCOUNTER (INPATIENT)
Facility: MEDICAL CENTER | Age: 73
LOS: 3 days | DRG: 407 | End: 2018-11-08
Attending: SURGERY | Admitting: SURGERY
Payer: MEDICARE

## 2018-11-05 DIAGNOSIS — I10 HYPERTENSION GOAL BP (BLOOD PRESSURE) < 140/80: Chronic | ICD-10-CM

## 2018-11-05 DIAGNOSIS — Z00.6 RESEARCH STUDY PATIENT: ICD-10-CM

## 2018-11-05 DIAGNOSIS — C25.0 MALIGNANT NEOPLASM OF HEAD OF PANCREAS (HCC): ICD-10-CM

## 2018-11-05 DIAGNOSIS — F11.20 OPIATE DEPENDENCE, CONTINUOUS (HCC): ICD-10-CM

## 2018-11-05 LAB
ALBUMIN SERPL BCP-MCNC: 3.2 G/DL (ref 3.2–4.9)
ALBUMIN/GLOB SERPL: 1.6 G/DL
ALP SERPL-CCNC: 98 U/L (ref 30–99)
ALT SERPL-CCNC: 55 U/L (ref 2–50)
ANION GAP SERPL CALC-SCNC: 8 MMOL/L (ref 0–11.9)
AST SERPL-CCNC: 69 U/L (ref 12–45)
BASOPHILS # BLD AUTO: 0.6 % (ref 0–1.8)
BASOPHILS # BLD: 0.05 K/UL (ref 0–0.12)
BILIRUB SERPL-MCNC: 0.4 MG/DL (ref 0.1–1.5)
BUN SERPL-MCNC: 20 MG/DL (ref 8–22)
CALCIUM SERPL-MCNC: 9.3 MG/DL (ref 8.5–10.5)
CHLORIDE SERPL-SCNC: 104 MMOL/L (ref 96–112)
CO2 SERPL-SCNC: 25 MMOL/L (ref 20–33)
CREAT SERPL-MCNC: 0.67 MG/DL (ref 0.5–1.4)
EOSINOPHIL # BLD AUTO: 0.01 K/UL (ref 0–0.51)
EOSINOPHIL NFR BLD: 0.1 % (ref 0–6.9)
ERYTHROCYTE [DISTWIDTH] IN BLOOD BY AUTOMATED COUNT: 52.8 FL (ref 35.9–50)
GLOBULIN SER CALC-MCNC: 2 G/DL (ref 1.9–3.5)
GLUCOSE SERPL-MCNC: 201 MG/DL (ref 65–99)
HCT VFR BLD AUTO: 28.6 % (ref 37–47)
HCT VFR BLD AUTO: 30.7 % (ref 37–47)
HGB BLD-MCNC: 10 G/DL (ref 12–16)
HGB BLD-MCNC: 9.2 G/DL (ref 12–16)
IMM GRANULOCYTES # BLD AUTO: 0.05 K/UL (ref 0–0.11)
IMM GRANULOCYTES NFR BLD AUTO: 0.6 % (ref 0–0.9)
LYMPHOCYTES # BLD AUTO: 1.33 K/UL (ref 1–4.8)
LYMPHOCYTES NFR BLD: 15.1 % (ref 22–41)
MCH RBC QN AUTO: 32.8 PG (ref 27–33)
MCHC RBC AUTO-ENTMCNC: 32.6 G/DL (ref 33.6–35)
MCV RBC AUTO: 100.7 FL (ref 81.4–97.8)
MONOCYTES # BLD AUTO: 0.34 K/UL (ref 0–0.85)
MONOCYTES NFR BLD AUTO: 3.9 % (ref 0–13.4)
NEUTROPHILS # BLD AUTO: 7 K/UL (ref 2–7.15)
NEUTROPHILS NFR BLD: 79.7 % (ref 44–72)
NRBC # BLD AUTO: 0 K/UL
NRBC BLD-RTO: 0 /100 WBC
PATHOLOGY CONSULT NOTE: NORMAL
PLATELET # BLD AUTO: 127 K/UL (ref 164–446)
PMV BLD AUTO: 9.6 FL (ref 9–12.9)
POTASSIUM SERPL-SCNC: 3.3 MMOL/L (ref 3.6–5.5)
PROT SERPL-MCNC: 5.2 G/DL (ref 6–8.2)
RBC # BLD AUTO: 3.05 M/UL (ref 4.2–5.4)
SODIUM SERPL-SCNC: 137 MMOL/L (ref 135–145)
WBC # BLD AUTO: 8.8 K/UL (ref 4.8–10.8)

## 2018-11-05 PROCEDURE — C1765 ADHESION BARRIER: HCPCS | Performed by: SURGERY

## 2018-11-05 PROCEDURE — 500378 HCHG DRAIN, J-VAC ROUND 19FR: Performed by: SURGERY

## 2018-11-05 PROCEDURE — 160041 HCHG SURGERY MINUTES - EA ADDL 1 MIN LEVEL 4: Performed by: SURGERY

## 2018-11-05 PROCEDURE — 700102 HCHG RX REV CODE 250 W/ 637 OVERRIDE(OP): Performed by: SURGERY

## 2018-11-05 PROCEDURE — 85025 COMPLETE CBC W/AUTO DIFF WBC: CPT

## 2018-11-05 PROCEDURE — 0FBG0ZZ EXCISION OF PANCREAS, OPEN APPROACH: ICD-10-PCS | Performed by: SURGERY

## 2018-11-05 PROCEDURE — 80053 COMPREHEN METABOLIC PANEL: CPT

## 2018-11-05 PROCEDURE — 160048 HCHG OR STATISTICAL LEVEL 1-5: Performed by: SURGERY

## 2018-11-05 PROCEDURE — 76998 US GUIDE INTRAOP: CPT | Mod: 26 | Performed by: SURGERY

## 2018-11-05 PROCEDURE — 88304 TISSUE EXAM BY PATHOLOGIST: CPT

## 2018-11-05 PROCEDURE — 502704 HCHG DEVICE, LIGASURE IMPACT: Performed by: SURGERY

## 2018-11-05 PROCEDURE — 88307 TISSUE EXAM BY PATHOLOGIST: CPT

## 2018-11-05 PROCEDURE — 700105 HCHG RX REV CODE 258: Performed by: SURGERY

## 2018-11-05 PROCEDURE — 06U807Z SUPPLEMENT PORTAL VEIN WITH AUTOLOGOUS TISSUE SUBSTITUTE, OPEN APPROACH: ICD-10-PCS | Performed by: SURGERY

## 2018-11-05 PROCEDURE — 500380 HCHG DRAIN, PENROSE 1/4X12: Performed by: SURGERY

## 2018-11-05 PROCEDURE — 502652 HCHG STAPLES, ETHICON ECR60: Performed by: SURGERY

## 2018-11-05 PROCEDURE — 502240 HCHG MISC OR SUPPLY RC 0272: Performed by: SURGERY

## 2018-11-05 PROCEDURE — 85014 HEMATOCRIT: CPT

## 2018-11-05 PROCEDURE — 47600 CHOLECYSTECTOMY: CPT | Performed by: SURGERY

## 2018-11-05 PROCEDURE — 501443 HCHG STAPLER, ROTIC. FLEX: Performed by: SURGERY

## 2018-11-05 PROCEDURE — 160022 HCHG BLOCK: Performed by: SURGERY

## 2018-11-05 PROCEDURE — 501450 HCHG STAPLES, ENDO MULTIFIRE: Performed by: SURGERY

## 2018-11-05 PROCEDURE — 06B80ZZ EXCISION OF PORTAL VEIN, OPEN APPROACH: ICD-10-PCS | Performed by: SURGERY

## 2018-11-05 PROCEDURE — P9045 ALBUMIN (HUMAN), 5%, 250 ML: HCPCS | Mod: JG

## 2018-11-05 PROCEDURE — 700101 HCHG RX REV CODE 250: Performed by: ANESTHESIOLOGY

## 2018-11-05 PROCEDURE — 160009 HCHG ANES TIME/MIN: Performed by: SURGERY

## 2018-11-05 PROCEDURE — A9270 NON-COVERED ITEM OR SERVICE: HCPCS | Performed by: ANESTHESIOLOGY

## 2018-11-05 PROCEDURE — 160036 HCHG PACU - EA ADDL 30 MINS PHASE I: Performed by: SURGERY

## 2018-11-05 PROCEDURE — 770022 HCHG ROOM/CARE - ICU (200)

## 2018-11-05 PROCEDURE — 700102 HCHG RX REV CODE 250 W/ 637 OVERRIDE(OP): Performed by: ANESTHESIOLOGY

## 2018-11-05 PROCEDURE — 501838 HCHG SUTURE GENERAL: Performed by: SURGERY

## 2018-11-05 PROCEDURE — 700101 HCHG RX REV CODE 250

## 2018-11-05 PROCEDURE — 700105 HCHG RX REV CODE 258: Performed by: ANESTHESIOLOGY

## 2018-11-05 PROCEDURE — 160002 HCHG RECOVERY MINUTES (STAT): Performed by: SURGERY

## 2018-11-05 PROCEDURE — 700111 HCHG RX REV CODE 636 W/ 250 OVERRIDE (IP): Performed by: ANESTHESIOLOGY

## 2018-11-05 PROCEDURE — 700111 HCHG RX REV CODE 636 W/ 250 OVERRIDE (IP): Performed by: SURGERY

## 2018-11-05 PROCEDURE — 38747 REMOVE ABDOMINAL LYMPH NODES: CPT | Mod: 59 | Performed by: SURGERY

## 2018-11-05 PROCEDURE — A6404 STERILE GAUZE > 48 SQ IN: HCPCS | Performed by: SURGERY

## 2018-11-05 PROCEDURE — 501445 HCHG STAPLER, SKIN DISP: Performed by: SURGERY

## 2018-11-05 PROCEDURE — 48140 PARTIAL REMOVAL OF PANCREAS: CPT | Performed by: SURGERY

## 2018-11-05 PROCEDURE — C1725 CATH, TRANSLUMIN NON-LASER: HCPCS | Performed by: SURGERY

## 2018-11-05 PROCEDURE — 85018 HEMOGLOBIN: CPT

## 2018-11-05 PROCEDURE — 0DBW0ZZ EXCISION OF PERITONEUM, OPEN APPROACH: ICD-10-PCS | Performed by: SURGERY

## 2018-11-05 PROCEDURE — 88309 TISSUE EXAM BY PATHOLOGIST: CPT | Mod: 59

## 2018-11-05 PROCEDURE — 160035 HCHG PACU - 1ST 60 MINS PHASE I: Performed by: SURGERY

## 2018-11-05 PROCEDURE — A9270 NON-COVERED ITEM OR SERVICE: HCPCS | Performed by: SURGERY

## 2018-11-05 PROCEDURE — 07BD0ZX EXCISION OF AORTIC LYMPHATIC, OPEN APPROACH, DIAGNOSTIC: ICD-10-PCS | Performed by: SURGERY

## 2018-11-05 PROCEDURE — 160029 HCHG SURGERY MINUTES - 1ST 30 MINS LEVEL 4: Performed by: SURGERY

## 2018-11-05 PROCEDURE — 700111 HCHG RX REV CODE 636 W/ 250 OVERRIDE (IP)

## 2018-11-05 PROCEDURE — 88305 TISSUE EXAM BY PATHOLOGIST: CPT | Mod: 59

## 2018-11-05 PROCEDURE — 07TP0ZZ RESECTION OF SPLEEN, OPEN APPROACH: ICD-10-PCS | Performed by: SURGERY

## 2018-11-05 PROCEDURE — 49905 OMENTAL FLAP INTRA-ABDOM: CPT | Performed by: SURGERY

## 2018-11-05 PROCEDURE — 500389 HCHG DRAIN, RESERVOIR SUCT JP 100CC: Performed by: SURGERY

## 2018-11-05 PROCEDURE — 0FT40ZZ RESECTION OF GALLBLADDER, OPEN APPROACH: ICD-10-PCS | Performed by: SURGERY

## 2018-11-05 RX ORDER — GABAPENTIN 300 MG/1
300 CAPSULE ORAL ONCE
Status: COMPLETED | OUTPATIENT
Start: 2018-11-05 | End: 2018-11-05

## 2018-11-05 RX ORDER — MEPERIDINE HYDROCHLORIDE 25 MG/ML
12.5 INJECTION INTRAMUSCULAR; INTRAVENOUS; SUBCUTANEOUS
Status: DISCONTINUED | OUTPATIENT
Start: 2018-11-05 | End: 2018-11-05 | Stop reason: HOSPADM

## 2018-11-05 RX ORDER — CELECOXIB 200 MG/1
200 CAPSULE ORAL 2 TIMES DAILY WITH MEALS
Status: DISCONTINUED | OUTPATIENT
Start: 2018-11-05 | End: 2018-11-08 | Stop reason: HOSPADM

## 2018-11-05 RX ORDER — LISINOPRIL AND HYDROCHLOROTHIAZIDE 25; 20 MG/1; MG/1
1 TABLET ORAL
Status: DISCONTINUED | OUTPATIENT
Start: 2018-11-05 | End: 2018-11-05

## 2018-11-05 RX ORDER — SODIUM CHLORIDE, SODIUM LACTATE, POTASSIUM CHLORIDE, CALCIUM CHLORIDE 600; 310; 30; 20 MG/100ML; MG/100ML; MG/100ML; MG/100ML
INJECTION, SOLUTION INTRAVENOUS CONTINUOUS
Status: DISCONTINUED | OUTPATIENT
Start: 2018-11-05 | End: 2018-11-06

## 2018-11-05 RX ORDER — ACETAMINOPHEN 650 MG/1
975 SUPPOSITORY RECTAL EVERY 6 HOURS
Status: DISCONTINUED | OUTPATIENT
Start: 2018-11-05 | End: 2018-11-05

## 2018-11-05 RX ORDER — CEFAZOLIN SODIUM 2 G/100ML
2 INJECTION, SOLUTION INTRAVENOUS EVERY 8 HOURS
Status: COMPLETED | OUTPATIENT
Start: 2018-11-05 | End: 2018-11-06

## 2018-11-05 RX ORDER — SCOLOPAMINE TRANSDERMAL SYSTEM 1 MG/1
1 PATCH, EXTENDED RELEASE TRANSDERMAL
Status: DISCONTINUED | OUTPATIENT
Start: 2018-11-05 | End: 2018-11-06

## 2018-11-05 RX ORDER — HYDROMORPHONE HYDROCHLORIDE 2 MG/ML
0.4 INJECTION, SOLUTION INTRAMUSCULAR; INTRAVENOUS; SUBCUTANEOUS
Status: DISCONTINUED | OUTPATIENT
Start: 2018-11-05 | End: 2018-11-05 | Stop reason: HOSPADM

## 2018-11-05 RX ORDER — HALOPERIDOL 5 MG/ML
1 INJECTION INTRAMUSCULAR EVERY 6 HOURS PRN
Status: DISCONTINUED | OUTPATIENT
Start: 2018-11-05 | End: 2018-11-08 | Stop reason: HOSPADM

## 2018-11-05 RX ORDER — SODIUM CHLORIDE, SODIUM LACTATE, POTASSIUM CHLORIDE, CALCIUM CHLORIDE 600; 310; 30; 20 MG/100ML; MG/100ML; MG/100ML; MG/100ML
500 INJECTION, SOLUTION INTRAVENOUS ONCE
Status: COMPLETED | OUTPATIENT
Start: 2018-11-05 | End: 2018-11-06

## 2018-11-05 RX ORDER — SODIUM CHLORIDE, SODIUM LACTATE, POTASSIUM CHLORIDE, AND CALCIUM CHLORIDE .6; .31; .03; .02 G/100ML; G/100ML; G/100ML; G/100ML
250 INJECTION, SOLUTION INTRAVENOUS PRN
Status: COMPLETED | OUTPATIENT
Start: 2018-11-05 | End: 2018-11-05

## 2018-11-05 RX ORDER — ACETAMINOPHEN 500 MG
1000 TABLET ORAL EVERY 6 HOURS
Status: DISCONTINUED | OUTPATIENT
Start: 2018-11-05 | End: 2018-11-05

## 2018-11-05 RX ORDER — HYDROCHLOROTHIAZIDE 25 MG/1
25 TABLET ORAL
Status: DISCONTINUED | OUTPATIENT
Start: 2018-11-05 | End: 2018-11-08 | Stop reason: HOSPADM

## 2018-11-05 RX ORDER — HYDROMORPHONE HYDROCHLORIDE 2 MG/ML
0.1 INJECTION, SOLUTION INTRAMUSCULAR; INTRAVENOUS; SUBCUTANEOUS
Status: DISCONTINUED | OUTPATIENT
Start: 2018-11-05 | End: 2018-11-05 | Stop reason: HOSPADM

## 2018-11-05 RX ORDER — LISINOPRIL 20 MG/1
20 TABLET ORAL
Status: DISCONTINUED | OUTPATIENT
Start: 2018-11-05 | End: 2018-11-08 | Stop reason: HOSPADM

## 2018-11-05 RX ORDER — DIPHENHYDRAMINE HYDROCHLORIDE 50 MG/ML
12.5 INJECTION INTRAMUSCULAR; INTRAVENOUS EVERY 6 HOURS PRN
Status: DISCONTINUED | OUTPATIENT
Start: 2018-11-05 | End: 2018-11-08 | Stop reason: HOSPADM

## 2018-11-05 RX ORDER — DIPHENHYDRAMINE HYDROCHLORIDE 50 MG/ML
25 INJECTION INTRAMUSCULAR; INTRAVENOUS EVERY 6 HOURS PRN
Status: DISCONTINUED | OUTPATIENT
Start: 2018-11-05 | End: 2018-11-08 | Stop reason: HOSPADM

## 2018-11-05 RX ORDER — HALOPERIDOL 5 MG/ML
1 INJECTION INTRAMUSCULAR
Status: DISCONTINUED | OUTPATIENT
Start: 2018-11-05 | End: 2018-11-05 | Stop reason: HOSPADM

## 2018-11-05 RX ORDER — OMEPRAZOLE 20 MG/1
20 CAPSULE, DELAYED RELEASE ORAL DAILY
Status: DISCONTINUED | OUTPATIENT
Start: 2018-11-06 | End: 2018-11-08 | Stop reason: HOSPADM

## 2018-11-05 RX ORDER — SODIUM CHLORIDE, SODIUM LACTATE, POTASSIUM CHLORIDE, CALCIUM CHLORIDE 600; 310; 30; 20 MG/100ML; MG/100ML; MG/100ML; MG/100ML
1000 INJECTION, SOLUTION INTRAVENOUS
Status: COMPLETED | OUTPATIENT
Start: 2018-11-05 | End: 2018-11-05

## 2018-11-05 RX ORDER — HYDROCODONE BITARTRATE AND ACETAMINOPHEN 10; 325 MG/1; MG/1
1 TABLET ORAL EVERY 4 HOURS PRN
Status: ON HOLD | COMMUNITY
End: 2018-11-08

## 2018-11-05 RX ORDER — DEXAMETHASONE SODIUM PHOSPHATE 4 MG/ML
4 INJECTION, SOLUTION INTRA-ARTICULAR; INTRALESIONAL; INTRAMUSCULAR; INTRAVENOUS; SOFT TISSUE
Status: DISCONTINUED | OUTPATIENT
Start: 2018-11-05 | End: 2018-11-08 | Stop reason: HOSPADM

## 2018-11-05 RX ORDER — DIPHENHYDRAMINE HCL 25 MG
12.5 TABLET ORAL EVERY 6 HOURS PRN
Status: DISCONTINUED | OUTPATIENT
Start: 2018-11-05 | End: 2018-11-08 | Stop reason: HOSPADM

## 2018-11-05 RX ORDER — ONDANSETRON 2 MG/ML
4 INJECTION INTRAMUSCULAR; INTRAVENOUS EVERY 4 HOURS PRN
Status: DISCONTINUED | OUTPATIENT
Start: 2018-11-05 | End: 2018-11-08 | Stop reason: HOSPADM

## 2018-11-05 RX ORDER — OXYCODONE HCL 10 MG/1
10 TABLET, FILM COATED, EXTENDED RELEASE ORAL ONCE
Status: DISCONTINUED | OUTPATIENT
Start: 2018-11-05 | End: 2018-11-05 | Stop reason: HOSPADM

## 2018-11-05 RX ORDER — MAGNESIUM HYDROXIDE 1200 MG/15ML
LIQUID ORAL
Status: DISCONTINUED | OUTPATIENT
Start: 2018-11-05 | End: 2018-11-05 | Stop reason: HOSPADM

## 2018-11-05 RX ORDER — HYDROMORPHONE HYDROCHLORIDE 2 MG/ML
0.2 INJECTION, SOLUTION INTRAMUSCULAR; INTRAVENOUS; SUBCUTANEOUS
Status: DISCONTINUED | OUTPATIENT
Start: 2018-11-05 | End: 2018-11-05 | Stop reason: HOSPADM

## 2018-11-05 RX ORDER — ACETAMINOPHEN 500 MG
1000 TABLET ORAL ONCE
Status: COMPLETED | OUTPATIENT
Start: 2018-11-05 | End: 2018-11-05

## 2018-11-05 RX ORDER — CELECOXIB 200 MG/1
200 CAPSULE ORAL ONCE
Status: COMPLETED | OUTPATIENT
Start: 2018-11-05 | End: 2018-11-05

## 2018-11-05 RX ORDER — METOCLOPRAMIDE HYDROCHLORIDE 5 MG/ML
10 INJECTION INTRAMUSCULAR; INTRAVENOUS EVERY 6 HOURS
Status: DISCONTINUED | OUTPATIENT
Start: 2018-11-05 | End: 2018-11-08 | Stop reason: HOSPADM

## 2018-11-05 RX ORDER — ONDANSETRON 2 MG/ML
4 INJECTION INTRAMUSCULAR; INTRAVENOUS
Status: DISCONTINUED | OUTPATIENT
Start: 2018-11-05 | End: 2018-11-05 | Stop reason: HOSPADM

## 2018-11-05 RX ORDER — SODIUM CHLORIDE, SODIUM LACTATE, POTASSIUM CHLORIDE, CALCIUM CHLORIDE 600; 310; 30; 20 MG/100ML; MG/100ML; MG/100ML; MG/100ML
INJECTION, SOLUTION INTRAVENOUS CONTINUOUS
Status: DISCONTINUED | OUTPATIENT
Start: 2018-11-05 | End: 2018-11-05 | Stop reason: HOSPADM

## 2018-11-05 RX ADMIN — CELECOXIB 200 MG: 200 CAPSULE ORAL at 18:21

## 2018-11-05 RX ADMIN — SODIUM CHLORIDE, POTASSIUM CHLORIDE, SODIUM LACTATE AND CALCIUM CHLORIDE 250 ML: 600; 310; 30; 20 INJECTION, SOLUTION INTRAVENOUS at 21:15

## 2018-11-05 RX ADMIN — EPHEDRINE SULFATE 5 MG: 50 INJECTION INTRAVENOUS at 21:44

## 2018-11-05 RX ADMIN — SODIUM CHLORIDE, POTASSIUM CHLORIDE, SODIUM LACTATE AND CALCIUM CHLORIDE: 600; 310; 30; 20 INJECTION, SOLUTION INTRAVENOUS at 17:38

## 2018-11-05 RX ADMIN — ROPIVACAINE HYDROCHLORIDE: 10 INJECTION, SOLUTION EPIDURAL at 16:03

## 2018-11-05 RX ADMIN — LIDOCAINE HYDROCHLORIDE 0.5 ML: 10 INJECTION, SOLUTION EPIDURAL; INFILTRATION; INTRACAUDAL; PERINEURAL at 09:58

## 2018-11-05 RX ADMIN — LISINOPRIL 20 MG: 20 TABLET ORAL at 18:20

## 2018-11-05 RX ADMIN — SODIUM CHLORIDE, POTASSIUM CHLORIDE, SODIUM LACTATE AND CALCIUM CHLORIDE 500 ML: 600; 310; 30; 20 INJECTION, SOLUTION INTRAVENOUS at 22:10

## 2018-11-05 RX ADMIN — METOCLOPRAMIDE 10 MG: 5 INJECTION, SOLUTION INTRAMUSCULAR; INTRAVENOUS at 18:21

## 2018-11-05 RX ADMIN — ACETAMINOPHEN 1000 MG: 500 TABLET, FILM COATED ORAL at 09:58

## 2018-11-05 RX ADMIN — GABAPENTIN 300 MG: 300 CAPSULE ORAL at 09:58

## 2018-11-05 RX ADMIN — HYDROCHLOROTHIAZIDE 25 MG: 25 TABLET ORAL at 18:20

## 2018-11-05 RX ADMIN — SODIUM CHLORIDE, SODIUM LACTATE, POTASSIUM CHLORIDE, CALCIUM CHLORIDE 1000 ML: 600; 310; 30; 20 INJECTION, SOLUTION INTRAVENOUS at 09:57

## 2018-11-05 RX ADMIN — CELECOXIB 200 MG: 200 CAPSULE ORAL at 09:58

## 2018-11-05 RX ADMIN — SODIUM CHLORIDE, POTASSIUM CHLORIDE, SODIUM LACTATE AND CALCIUM CHLORIDE 250 ML: 600; 310; 30; 20 INJECTION, SOLUTION INTRAVENOUS at 19:49

## 2018-11-05 RX ADMIN — CEFAZOLIN SODIUM 2 G: 2 INJECTION, SOLUTION INTRAVENOUS at 20:30

## 2018-11-05 ASSESSMENT — PAIN SCALES - GENERAL
PAINLEVEL_OUTOF10: 0
PAINLEVEL_OUTOF10: 2
PAINLEVEL_OUTOF10: 1

## 2018-11-05 ASSESSMENT — COPD QUESTIONNAIRES
DO YOU EVER COUGH UP ANY MUCUS OR PHLEGM?: NO/ONLY WITH OCCASIONAL COLDS OR INFECTIONS
HAVE YOU SMOKED AT LEAST 100 CIGARETTES IN YOUR ENTIRE LIFE: NO/DON'T KNOW
COPD SCREENING SCORE: 2
DURING THE PAST 4 WEEKS HOW MUCH DID YOU FEEL SHORT OF BREATH: NONE/LITTLE OF THE TIME

## 2018-11-05 ASSESSMENT — PATIENT HEALTH QUESTIONNAIRE - PHQ9
SUM OF ALL RESPONSES TO PHQ9 QUESTIONS 1 AND 2: 0
1. LITTLE INTEREST OR PLEASURE IN DOING THINGS: NOT AT ALL

## 2018-11-05 ASSESSMENT — LIFESTYLE VARIABLES: EVER_SMOKED: NEVER

## 2018-11-06 PROBLEM — Z53.09 CONTRAINDICATION TO DEEP VEIN THROMBOSIS (DVT) PROPHYLAXIS: Status: ACTIVE | Noted: 2018-11-06

## 2018-11-06 LAB
ERYTHROCYTE [DISTWIDTH] IN BLOOD BY AUTOMATED COUNT: 54.3 FL (ref 35.9–50)
HCT VFR BLD AUTO: 25.6 % (ref 37–47)
HGB BLD-MCNC: 8 G/DL (ref 12–16)
MCH RBC QN AUTO: 32.3 PG (ref 27–33)
MCHC RBC AUTO-ENTMCNC: 31.3 G/DL (ref 33.6–35)
MCV RBC AUTO: 103.2 FL (ref 81.4–97.8)
PLATELET # BLD AUTO: 119 K/UL (ref 164–446)
PMV BLD AUTO: 9.4 FL (ref 9–12.9)
RBC # BLD AUTO: 2.48 M/UL (ref 4.2–5.4)
WBC # BLD AUTO: 7.4 K/UL (ref 4.8–10.8)

## 2018-11-06 PROCEDURE — 85027 COMPLETE CBC AUTOMATED: CPT

## 2018-11-06 PROCEDURE — 700111 HCHG RX REV CODE 636 W/ 250 OVERRIDE (IP): Performed by: SURGERY

## 2018-11-06 PROCEDURE — 700105 HCHG RX REV CODE 258: Performed by: SURGERY

## 2018-11-06 PROCEDURE — 94667 MNPJ CHEST WALL 1ST: CPT

## 2018-11-06 PROCEDURE — 99233 SBSQ HOSP IP/OBS HIGH 50: CPT | Performed by: SURGERY

## 2018-11-06 PROCEDURE — 80053 COMPREHEN METABOLIC PANEL: CPT

## 2018-11-06 PROCEDURE — 700102 HCHG RX REV CODE 250 W/ 637 OVERRIDE(OP): Performed by: SURGERY

## 2018-11-06 PROCEDURE — P9047 ALBUMIN (HUMAN), 25%, 50ML: HCPCS | Mod: JG | Performed by: SURGERY

## 2018-11-06 PROCEDURE — A9270 NON-COVERED ITEM OR SERVICE: HCPCS | Performed by: ANESTHESIOLOGY

## 2018-11-06 PROCEDURE — 770001 HCHG ROOM/CARE - MED/SURG/GYN PRIV*

## 2018-11-06 PROCEDURE — A9270 NON-COVERED ITEM OR SERVICE: HCPCS | Performed by: SURGERY

## 2018-11-06 PROCEDURE — 700102 HCHG RX REV CODE 250 W/ 637 OVERRIDE(OP): Performed by: ANESTHESIOLOGY

## 2018-11-06 RX ORDER — POTASSIUM CHLORIDE 20 MEQ/1
20 TABLET, EXTENDED RELEASE ORAL 2 TIMES DAILY
Status: DISCONTINUED | OUTPATIENT
Start: 2018-11-06 | End: 2018-11-08 | Stop reason: HOSPADM

## 2018-11-06 RX ORDER — ALBUMIN (HUMAN) 12.5 G/50ML
12.5 SOLUTION INTRAVENOUS ONCE
Status: COMPLETED | OUTPATIENT
Start: 2018-11-06 | End: 2018-11-06

## 2018-11-06 RX ORDER — POTASSIUM CHLORIDE 20 MEQ/1
20 TABLET, EXTENDED RELEASE ORAL
Refills: 2 | COMMUNITY
Start: 2018-09-17 | End: 2019-07-16

## 2018-11-06 RX ORDER — SODIUM CHLORIDE 9 MG/ML
500 INJECTION, SOLUTION INTRAVENOUS ONCE
Status: COMPLETED | OUTPATIENT
Start: 2018-11-06 | End: 2018-11-06

## 2018-11-06 RX ADMIN — ENOXAPARIN SODIUM 40 MG: 100 INJECTION SUBCUTANEOUS at 18:05

## 2018-11-06 RX ADMIN — METOCLOPRAMIDE 10 MG: 5 INJECTION, SOLUTION INTRAMUSCULAR; INTRAVENOUS at 18:05

## 2018-11-06 RX ADMIN — CEFAZOLIN SODIUM 2 G: 2 INJECTION, SOLUTION INTRAVENOUS at 04:15

## 2018-11-06 RX ADMIN — HYDROCHLOROTHIAZIDE 25 MG: 25 TABLET ORAL at 21:22

## 2018-11-06 RX ADMIN — CELECOXIB 200 MG: 200 CAPSULE ORAL at 08:12

## 2018-11-06 RX ADMIN — METOCLOPRAMIDE 10 MG: 5 INJECTION, SOLUTION INTRAMUSCULAR; INTRAVENOUS at 06:13

## 2018-11-06 RX ADMIN — OMEPRAZOLE 20 MG: 20 CAPSULE, DELAYED RELEASE ORAL at 06:13

## 2018-11-06 RX ADMIN — POTASSIUM CHLORIDE 20 MEQ: 1500 TABLET, EXTENDED RELEASE ORAL at 08:12

## 2018-11-06 RX ADMIN — CELECOXIB 200 MG: 200 CAPSULE ORAL at 18:05

## 2018-11-06 RX ADMIN — POTASSIUM CHLORIDE 20 MEQ: 1500 TABLET, EXTENDED RELEASE ORAL at 18:06

## 2018-11-06 RX ADMIN — METOCLOPRAMIDE 10 MG: 5 INJECTION, SOLUTION INTRAMUSCULAR; INTRAVENOUS at 13:39

## 2018-11-06 RX ADMIN — SODIUM CHLORIDE 500 ML: 9 INJECTION, SOLUTION INTRAVENOUS at 02:45

## 2018-11-06 RX ADMIN — METOCLOPRAMIDE 10 MG: 5 INJECTION, SOLUTION INTRAMUSCULAR; INTRAVENOUS at 00:30

## 2018-11-06 RX ADMIN — LISINOPRIL 20 MG: 20 TABLET ORAL at 18:06

## 2018-11-06 RX ADMIN — ALBUMIN (HUMAN) 12.5 G: 0.25 INJECTION, SOLUTION INTRAVENOUS at 03:45

## 2018-11-06 ASSESSMENT — ENCOUNTER SYMPTOMS
DEPRESSION: 0
HEADACHES: 0
DIZZINESS: 0
WEAKNESS: 1
EYES NEGATIVE: 1
ABDOMINAL PAIN: 1
VOMITING: 0
NAUSEA: 0
ORTHOPNEA: 0

## 2018-11-06 ASSESSMENT — COGNITIVE AND FUNCTIONAL STATUS - GENERAL
PERSONAL GROOMING: A LITTLE
MOVING FROM LYING ON BACK TO SITTING ON SIDE OF FLAT BED: A LITTLE
STANDING UP FROM CHAIR USING ARMS: A LITTLE
SUGGESTED CMS G CODE MODIFIER DAILY ACTIVITY: CJ
CLIMB 3 TO 5 STEPS WITH RAILING: A LOT
SUGGESTED CMS G CODE MODIFIER DAILY ACTIVITY: CK
DRESSING REGULAR UPPER BODY CLOTHING: A LITTLE
MOBILITY SCORE: 18
TOILETING: A LITTLE
TURNING FROM BACK TO SIDE WHILE IN FLAT BAD: A LITTLE
STANDING UP FROM CHAIR USING ARMS: A LITTLE
CLIMB 3 TO 5 STEPS WITH RAILING: A LITTLE
MOVING TO AND FROM BED TO CHAIR: A LITTLE
WALKING IN HOSPITAL ROOM: A LITTLE
MOVING TO AND FROM BED TO CHAIR: A LITTLE
DAILY ACTIVITIY SCORE: 18
HELP NEEDED FOR BATHING: A LITTLE
HELP NEEDED FOR BATHING: A LITTLE
TOILETING: A LITTLE
WALKING IN HOSPITAL ROOM: A LITTLE
DRESSING REGULAR UPPER BODY CLOTHING: A LITTLE
DRESSING REGULAR LOWER BODY CLOTHING: A LITTLE
DRESSING REGULAR LOWER BODY CLOTHING: A LITTLE
MOBILITY SCORE: 18
SUGGESTED CMS G CODE MODIFIER MOBILITY: CK
EATING MEALS: A LITTLE
DAILY ACTIVITIY SCORE: 20
SUGGESTED CMS G CODE MODIFIER MOBILITY: CK
MOVING FROM LYING ON BACK TO SITTING ON SIDE OF FLAT BED: A LITTLE

## 2018-11-06 ASSESSMENT — PAIN SCALES - GENERAL
PAINLEVEL_OUTOF10: 2
PAINLEVEL_OUTOF10: 0
PAINLEVEL_OUTOF10: 3
PAINLEVEL_OUTOF10: 2
PAINLEVEL_OUTOF10: 3
PAINLEVEL_OUTOF10: 0
PAINLEVEL_OUTOF10: 3
PAINLEVEL_OUTOF10: 4
PAINLEVEL_OUTOF10: 3

## 2018-11-06 ASSESSMENT — COPD QUESTIONNAIRES
HAVE YOU SMOKED AT LEAST 100 CIGARETTES IN YOUR ENTIRE LIFE: NO/DON'T KNOW
IN THE PAST 12 MONTHS DO YOU DO LESS THAN YOU USED TO BECAUSE OF YOUR BREATHING PROBLEMS: DISAGREE/UNSURE
DURING THE PAST 4 WEEKS HOW MUCH DID YOU FEEL SHORT OF BREATH: NONE/LITTLE OF THE TIME
DO YOU EVER COUGH UP ANY MUCUS OR PHLEGM?: NO/ONLY WITH OCCASIONAL COLDS OR INFECTIONS
COPD SCREENING SCORE: 2

## 2018-11-06 ASSESSMENT — LIFESTYLE VARIABLES
ALCOHOL_USE: NO
EVER_SMOKED: NEVER

## 2018-11-06 ASSESSMENT — PATIENT HEALTH QUESTIONNAIRE - PHQ9
2. FEELING DOWN, DEPRESSED, IRRITABLE, OR HOPELESS: NOT AT ALL
SUM OF ALL RESPONSES TO PHQ9 QUESTIONS 1 AND 2: 0
1. LITTLE INTEREST OR PLEASURE IN DOING THINGS: NOT AT ALL

## 2018-11-06 NOTE — PROGRESS NOTES
Pt arrived to unit with PACU RN, VSS, 10L Oxymask. epidural in place with 11marks. Pt AOx4, lethargic.

## 2018-11-06 NOTE — PROGRESS NOTES
"Subjective:      Larissa Ramirez is a 73 y.o. female who presents with No chief complaint on file.            Postop day 1 status post extended distal pancreatectomy with splenectomy, portal vein resection, repair of celiac trunk.  She is doing extremely well.  Low urine output due to epidural, epidural turned down and now has a systolic pressure in the 100-110.  LESIA drain serosanguineous.  She is very comfortable and tolerated full liquid diet.        Review of Systems   Neurological: Positive for weakness.   All other systems reviewed and are negative.         Objective:     Pulse 81   Temp (!) 35 °C (95 °F)   Resp 16   Ht 1.651 m (5' 5\")   Wt 74.9 kg (165 lb 2 oz)   LMP 06/27/1986   SpO2 98%   BMI 27.48 kg/m²      Physical Exam   Constitutional: She is oriented to person, place, and time. She appears well-developed and well-nourished.   Eyes: Pupils are equal, round, and reactive to light. EOM are normal.   Neck: Normal range of motion. Neck supple.   Cardiovascular: Normal rate and regular rhythm.    Pulmonary/Chest: Effort normal and breath sounds normal.   Abdominal: Soft. Bowel sounds are normal. There is tenderness.   LESIA drain put out serosanguineous fluid and Pravena is on her skin incision   Musculoskeletal: Normal range of motion.   Neurological: She is alert and oriented to person, place, and time.   Skin: Skin is warm and dry.   Psychiatric: She has a normal mood and affect. Her behavior is normal. Judgment and thought content normal.   Nursing note and vitals reviewed.              Assessment/Plan:   In light of the present findings, the patient will be advanced to a general diet, epidural turned down to keep systolic pressure above 110.  She has an automatic bolus order written.  She will be seen by the critical care team and possibly transfer to the floor today.  We will Hep-Lock her IV.  Ambulate.  Lovenox to start later today.  P.o. pain medications to start today.      "

## 2018-11-06 NOTE — OR NURSING
Transported to SICU.Pt. verbalized minimal pain level.ERAS protocol implemented from 1530 to 2130.STAT lab. results called to

## 2018-11-06 NOTE — ASSESSMENT & PLAN NOTE
Chronic condition treated with lisinopril and hydrochlorothiazide.  Resume maintenance medication when tolerating oral alimentation.

## 2018-11-06 NOTE — OP REPORT
DATE OF SERVICE:  11/05/2018    SURGEON:  Ryan Rosales MD    ASSISTANT:  Rory.    TYPE OF ANESTHESIA:  General anesthesia.    PREOPERATIVE DIAGNOSIS:  Pancreatic cancer with involvement of the portal   vein.    POSTOPERATIVE DIAGNOSIS:  Pancreatic cancer with involvement of the portal   vein.    PROCEDURE: Partial resection of the portal vein and reconstruction with splenic vein   patch angioplasty.    INDICATION FOR PROCEDURE:  The patient is a pleasant lady who is undergoing  resection of pancreatic cancer.  The cancer was found to involve part of the   portal vein.  Partial resection of the portal vein with reconstruction using splenic   vein patch angioplasty was performed by me with Dr. Valadez as the   assistant.    The remaining of the pancreatic resection and splenectomy procedure will be dictated separately by Dr. Valadez.    The splenic vein was ligated with 2-0 silk ties and divided.  A segment of the   splenic vein was resected and used as a vein patch angioplasty to reconstruct   the portal vein.  Vascular control of the superior mesenteric vein and distal   portal vein was obtained with vascular clamps.  The tumor was resected along   with a lip of the portal vein.  Splenic vein patch was brought into the   operative field and patch angioplasty was performed over the portal vein to   reconstruct the portal vein using running 6-0 Prolene suturea.  Prior to   completing the patch angioplasty, backbleeding and flushing were obtained.  The   patch angioplasty was completed.  Flow was restored distally.  Excellent   hemostasis was seen.    ESTIMATED BLOOD LOSS:  For this part of the procedure was 5 mL.    COMPLICATIONS:  None.       ____________________________________     MD FLORENCE CONNORS / CHARLIE    DD:  11/05/2018 21:28:12  DT:  11/05/2018 22:27:12    D#:  [id number]  Job#:  [id number]    cc: Carl Valadez MD

## 2018-11-06 NOTE — CARE PLAN
Problem: Urinary Elimination:  Goal: Ability to reestablish a normal urinary elimination pattern will improve  Pt has low UOP through the night. RN calls MD. 1.5L fluids administered. Pt established normal elimination pattern following.    Problem: Pain Management  Goal: Pain level will decrease to patient's comfort goal  Continuous epidural infusion running with PRN pushes by pt. Pt reports her pain is well controlled. RN repositions to comfort.

## 2018-11-06 NOTE — NON-PROVIDER
Patient Summary: Ms. Ramirez is a 72 yo female who is hospital day one after distal pancreatic resection, portal vein resection, and repair of celiac trunk by Dr. Valadez.    24 Hour Events: Pt presented to the unit last night hypotensive with SBP in the 80's. She was given NS boluses and epidural was titrated down to 3ml/hr. Her pressures have since stabilized. Her urine output has increased to 90cc/hr. She reports good pain control with her epidural. She is not nauseous.     SUBJECTIVE/OBJECTIVE:  NEURO:  GCS  24hr: 15   Current: 15   Exam A&Ox4, moving all extremities spontaneously   Meds/Pain Dilaudid 20 mcg/ml, Ropivacaine 0.1% in  mL epidural infusion   Labs None   Imaging None     RESP:  RR, SpO2 (25-27) 16, (91%-100%) 98% RA   Vent None   Exam Clear to auscultation bilaterally, symmetric chest expansion, no wheezes, rales, or rhonchi   Meds None   Labs CO2 25   Imaging None     CV:  HR/BP/MAP/  CVP HR (68-89) 81; BP (/40-60) 95/47   Exam RRR, no murmurs, rubs, or gallops   Meds 250 ml NS Bolus IV, Ephedrine 5 mg IV, Lisinopril 20 mg po, Hydrochlorothiazide 25 mg po   Labs None   Imagine None     GI:  Diet/Bowel Function Advance to normal diet as tolerated, no BM this morning   Exam Tenderness to palpation, bowel sounds present, LESIA drain putting out serosanguinous fluid, no evidence of erythema or purulent drainage around incision site   Labs WBC 7.4, Glucose 201, AST 69, ALT 55, Alk Phos 98, T Bili 0.4   Imaging None     F/E/N-:  I/O Overnight urine output was low at 12.5 cc/hr at 0200  Since then urine output has been 90 cc/hr   Exam Cano catheter in place   Meds None   Labs BUN 20, Cr 0.67   Nutrition Advance to normal diet as tolerated     HEME:  Labs RBC 2.48, Hemoglobin 8, HCT 25.6, .2, Platelets 119   Transfusions None   Imaging None     ID:  Temp  24hr: 35.4 - 37.1   Tmax: 37.1   Labs WBC 7.4, Neutrophils 79.70   Micro None   ABX None     ENDOCRINE:  Blood Sugar 201   Meds None      MUSCULOSKELETAL:  Imaging Full ROM of all extremities   WB Status Full weightbearing on all extremities     SKIN:  Exam Warm, dry, no evidence of rash   Interventions None     ASSESSMENT/PLAN:  NEURO: Patient is neurologically intact. Continue to do neurological checks each shift.    RESP: Patient has no increased work of breathing. She denies any shortness of breath. She has an O2 sat in the high 90's on RA. Continue incentive spirometry.    CV: Patient was hypotensive over night, most likely due to her epidural and low volume. It was turned down to 3 ml/hr and she was given two NS Bolus IV. She responded well and since has had a stable blood pressure in the 90's-110's systolic. Hold off on patient's daily blood pressure medication of Lisinopril and HCTZ for now. Give NS fluid bolus if SBP drops below 80 systolic.     GI: Patient has wound vac in place that is draining serosanguinous fluid. There is no evidence of infection of the incision site. Continue GI prophylaxis and epidural at 3 ml/hr for pain control.     FEN-: Per Dr. Valadez pt may resume normal diet as tolerated. K+ likely low due to catecholamine response and dilution from IV fluids. Get another CMP tomorrow to follow K+.    HEME: H&H dropped to 8.0/25.6 this morning from 9.2/28.6 last night. This may be dilutional given the amount of fluid boluses the patient has received overnight. Follow with CBC in 6-8 hours.    ID: Patient has been afebrile and exhibits no signs of infection.     ENDOCRINE: Patient has had an increased blood sugar overnight. Likely a result of surgery.    MUSCULOSKELETAL: Patient is moving all extremities well and denies any pain at this time. She is able to ambulate as tolerated with assistance.    SKIN: Skin is warm and dry with no rashes. Continue daily skin checks for pressure wound formation.    PROPHYLAXIS:  DVT Start Lovenox 40 mg IM daily   GI Continue GI Prophylaxis    Restraints  None      LINES/TUBES/CATHETERS:  Peripheral IV  Cano Catheter

## 2018-11-06 NOTE — PROGRESS NOTES
2 RN skin check completed    Epidural site on back, clean dry and intact  Midline incision with wound vac dressing in place  LESIA drain site in left upper quadrant  No other notable signs of skin breakdown observed

## 2018-11-06 NOTE — DISCHARGE PLANNING
Care Transition Team Assessment    In the case of an emergency, pt's legal NOK is Guillermo Ramirez () [phone]    LSW met with pt at bedside and obtained the information used in this assessment. Pt verified accuracy of facesheet. Pt lives in a two story home with her .  Pt uses [facility] pharmacy on [address]. Prior to current hospitalization, pt was completely independent in ADLS/IADLS. Pt stated that recently her  has been driving due to an increase in her pain medication but prior to that pt was able to drive. Pt has no financial concerns. Pt has a good support system. Pt denies any hx of substance use and denies any dx of mh.         Information Source  Orientation : Oriented x 4  Information Given By: Patient  Informant's Name:  (Larissa)  Who is responsible for making decisions for patient? : Patient    Readmission Evaluation  Is this a readmission?: No    Elopement Risk  Legal Hold: No  Ambulatory or Self Mobile in Wheelchair: Yes  Disoriented: No  Psychiatric Symptoms: None  History of Wandering: No  Elopement this Admit: No  Vocalizing Wanting to Leave: No  Displays Behaviors, Body Language Wanting to Leave: No-Not at Risk for Elopement  Elopement Risk: Not at Risk for Elopement     Discharge Preparedness  What is your plan after discharge?: Uncertain - pending medical team collaboration  What are your discharge supports?: Spouse  Prior Functional Level: Ambulatory, Drives Self, Independent with Activities of Daily Living, Independent with Medication Management  Difficulity with ADLs: Bathing, Dressing, Walking  Difficulity with IADLs: Cooking, Driving, Laundry, Shopping    Functional Assesment  Prior Functional Level: Ambulatory, Drives Self, Independent with Activities of Daily Living, Independent with Medication Management    Finances  Financial Barriers to Discharge: No  Prescription Coverage: Yes    Vision / Hearing Impairment  Right Eye Vision: Wears Glasses  Left Eye Vision: Wears  Glasses    Values / Beliefs / Concerns  Spiritual Requests During Hospitalization: No    Advance Directive  Advance Directive?: None    Domestic Abuse  Have you ever been the victim of abuse or violence?: No  Physical Abuse or Sexual Abuse: No  Verbal Abuse or Emotional Abuse: No  Possible Abuse Reported to:: Not Applicable    Psychological Assessment  History of Substance Abuse: None  History of Psychiatric Problems: No  Non-compliant with Treatment: No  Newly Diagnosed Illness: Yes    Discharge Risks or Barriers  Discharge risks or barriers?: No    Anticipated Discharge Information  Anticipated discharge disposition: Acute rehab, Discharge needs currently unknown, DME, HHC, Home, SNF

## 2018-11-06 NOTE — CARE PLAN
Problem: Discharge Barriers/Planning  Goal: Patient's continuum of care needs will be met    Intervention: Involve patient and significant other/support system in setting and prioritizing goals for hospital stay and discharge  Patient and family educated about plan of care and expected transfer timeline, as well as unit policies after transfer.       Problem: Pain Management  Goal: Pain level will decrease to patient's comfort goal    Intervention: Follow pain managment plan developed in collaboration with patient and Interdisciplinary Team  Reeducation provided regarding epidural PCA, side effects and positional comfort. Will continue to monitor and report uncontrolled pain.

## 2018-11-06 NOTE — PROGRESS NOTES
Pt's UOP decreased to 25 mL over the last 2 hours. This RN flushes singleton catheter with 30 mL sterile water, catheter is patent. Pt's BP has decreased to 90s/40s. This RN calls Dr. Valdaez for update at 0215.     Dr. Valadez orders :  - 500 mL bolus NS and 25% albumin/50mL  - titrate epidural down to 3 mL/hr  - repeat bolus of NS if UOP <120mL/4 hours.

## 2018-11-06 NOTE — PROGRESS NOTES
Trauma / Surgical Daily Progress Note    Date of Service  11/6/2018    Chief Complaint  73 y.o. female admitted 11/5/2018 with PANCREATIC CA-   Postop day 1 status post extended distal pancreatectomy with splenectomy, portal vein resection, repair of celiac trunk.     Interval Events  Education on IS at bedside  Cleared for Yeager, Dr. Valadez aware and will resume primary care of patient.  Ok for Lovenox per Dr. Valadez this afternoon  Epidural in place for pain, singleton  Advancing diet     Review of Systems  Review of Systems   Constitutional: Positive for malaise/fatigue.   Eyes: Negative.    Cardiovascular: Negative for chest pain and orthopnea.   Gastrointestinal: Positive for abdominal pain (post surgical). Negative for nausea and vomiting.   Genitourinary: Negative for dysuria and urgency.   Neurological: Positive for weakness. Negative for dizziness and headaches.   Psychiatric/Behavioral: Negative for depression.   All other systems reviewed and are negative.       Vital Signs  Temp:  [35 °C (95 °F)-36.8 °C (98.2 °F)] 35 °C (95 °F)  Pulse:  [72-89] 83  Resp:  [9-125] 13    Physical Exam  Physical Exam   Constitutional: She is oriented to person, place, and time. She appears well-developed and well-nourished. No distress.   HENT:   Head: Atraumatic.   Cardiovascular: Normal rate and regular rhythm.    Pulmonary/Chest: Effort normal.   Supplemental oxygen     Abdominal: Soft. Bowel sounds are normal. There is tenderness.   LESIA drain put out serosanguineous fluid and Pravena is on her skin incision.   Genitourinary:   Genitourinary Comments: Singleton to gravity drain   Musculoskeletal: Normal range of motion.   Neurological: She is alert and oriented to person, place, and time.   Skin: Skin is warm and dry.   Psychiatric: She has a normal mood and affect. Her behavior is normal.   Nursing note and vitals reviewed.      Laboratory  Recent Results (from the past 24 hour(s))   HISTOLOGY REQUEST     Collection Time: 11/05/18  3:40 PM   Result Value Ref Range    Pathology Request Sent to Histo    CBC with Differential    Collection Time: 11/05/18  3:40 PM   Result Value Ref Range    WBC 8.8 4.8 - 10.8 K/uL    RBC 3.05 (L) 4.20 - 5.40 M/uL    Hemoglobin 10.0 (L) 12.0 - 16.0 g/dL    Hematocrit 30.7 (L) 37.0 - 47.0 %    .7 (H) 81.4 - 97.8 fL    MCH 32.8 27.0 - 33.0 pg    MCHC 32.6 (L) 33.6 - 35.0 g/dL    RDW 52.8 (H) 35.9 - 50.0 fL    Platelet Count 127 (L) 164 - 446 K/uL    MPV 9.6 9.0 - 12.9 fL    Neutrophils-Polys 79.70 (H) 44.00 - 72.00 %    Lymphocytes 15.10 (L) 22.00 - 41.00 %    Monocytes 3.90 0.00 - 13.40 %    Eosinophils 0.10 0.00 - 6.90 %    Basophils 0.60 0.00 - 1.80 %    Immature Granulocytes 0.60 0.00 - 0.90 %    Nucleated RBC 0.00 /100 WBC    Neutrophils (Absolute) 7.00 2.00 - 7.15 K/uL    Lymphs (Absolute) 1.33 1.00 - 4.80 K/uL    Monos (Absolute) 0.34 0.00 - 0.85 K/uL    Eos (Absolute) 0.01 0.00 - 0.51 K/uL    Baso (Absolute) 0.05 0.00 - 0.12 K/uL    Immature Granulocytes (abs) 0.05 0.00 - 0.11 K/uL    NRBC (Absolute) 0.00 K/uL   CMP    Collection Time: 11/05/18  4:05 PM   Result Value Ref Range    Sodium 137 135 - 145 mmol/L    Potassium 3.3 (L) 3.6 - 5.5 mmol/L    Chloride 104 96 - 112 mmol/L    Co2 25 20 - 33 mmol/L    Anion Gap 8.0 0.0 - 11.9    Glucose 201 (H) 65 - 99 mg/dL    Bun 20 8 - 22 mg/dL    Creatinine 0.67 0.50 - 1.40 mg/dL    Calcium 9.3 8.5 - 10.5 mg/dL    AST(SGOT) 69 (H) 12 - 45 U/L    ALT(SGPT) 55 (H) 2 - 50 U/L    Alkaline Phosphatase 98 30 - 99 U/L    Total Bilirubin 0.4 0.1 - 1.5 mg/dL    Albumin 3.2 3.2 - 4.9 g/dL    Total Protein 5.2 (L) 6.0 - 8.2 g/dL    Globulin 2.0 1.9 - 3.5 g/dL    A-G Ratio 1.6 g/dL   ESTIMATED GFR    Collection Time: 11/05/18  4:05 PM   Result Value Ref Range    GFR If African American >60 >60 mL/min/1.73 m 2    GFR If Non African American >60 >60 mL/min/1.73 m 2   HEMOGLOBIN AND HEMATOCRIT    Collection Time: 11/05/18 10:15 PM   Result Value Ref  Range    Hemoglobin 9.2 (L) 12.0 - 16.0 g/dL    Hematocrit 28.6 (L) 37.0 - 47.0 %   CBC without Differential    Collection Time: 11/06/18  5:50 AM   Result Value Ref Range    WBC 7.4 4.8 - 10.8 K/uL    RBC 2.48 (L) 4.20 - 5.40 M/uL    Hemoglobin 8.0 (L) 12.0 - 16.0 g/dL    Hematocrit 25.6 (L) 37.0 - 47.0 %    .2 (H) 81.4 - 97.8 fL    MCH 32.3 27.0 - 33.0 pg    MCHC 31.3 (L) 33.6 - 35.0 g/dL    RDW 54.3 (H) 35.9 - 50.0 fL    Platelet Count 119 (L) 164 - 446 K/uL    MPV 9.4 9.0 - 12.9 fL       Fluids    Intake/Output Summary (Last 24 hours) at 11/06/18 1056  Last data filed at 11/06/18 0700   Gross per 24 hour   Intake          8025.07 ml   Output             2400 ml   Net          5625.07 ml       Core Measures & Quality Metrics  Medications reviewed, Radiology images reviewed and Labs reviewed  Cano catheter: Epidural Catheter / Drain      DVT Prophylaxis: Enoxaparin (Lovenox) (start this evening)  DVT prophylaxis - mechanical: SCDs          SANTOS Score  ETOH Screening    Assessment/Plan  Malignant neoplasm of head of pancreas (HCC)- (present on admission)   Assessment & Plan    Locally advanced pancreatic cancer initially with involvement of the celiac trunk, portal vein, SMA and hepatic artery. Neoadjuvant chemotherapy with FOLFOX with dramatic response.  11/5 Exploratory laparotomy with distal pancreatectomy and splenectomy. Open cholecystectomy. Omental flap. Celiac and portal node dissection separately.  11/6 Advancing frances Valadez MD. Hepatobiliary Surgery.     Contraindication to deep vein thrombosis (DVT) prophylaxis- (present on admission)   Assessment & Plan    Systemic anticoagulation contraindicated secondary to elevated bleeding risk.  11/6 Per Dr Rory cohen to start later today.      Hypertension goal BP (blood pressure) < 140/80- (present on admission)   Assessment & Plan    Chronic condition treated with lisinopril and hydrochlorothiazide.  Resume maintenance  medication when tolerating oral alimentation.           Discussed patient condition with Family, RN, RT and Patient.and Dr. Colindres

## 2018-11-06 NOTE — ASSESSMENT & PLAN NOTE
Locally advanced pancreatic cancer initially with involvement of the celiac trunk, portal vein, SMA and hepatic artery. Neoadjuvant chemotherapy with FOLFOX with dramatic response.  11/5 Exploratory laparotomy with distal pancreatectomy and splenectomy. Open cholecystectomy. Omental flap. Celiac and portal node dissection separately.  11/6 Advancing diet   Carl Valadez MD. Hepatobiliary Surgery.

## 2018-11-06 NOTE — ASSESSMENT & PLAN NOTE
Systemic anticoagulation contraindicated secondary to elevated bleeding risk.  11/6 Per Dr Rory fordx to start later today.

## 2018-11-06 NOTE — PROGRESS NOTES
Pt's BP drops to 80s/40s. 250 mL LR bolus given x 2. This RN gives one PRN dose of IV Ephedrine push. This RN pages Dr. Pena, Anesthesiologist for update. Dr. Pena calls back and gives orders and instruction:    - titrate epidural down to 4ml/hr  - administer 500 mL bolus of LR  - Stat H&H  - if hematocrit < 21% call Dr. Pena back and administer one unit of PRBCs  - will call Dr. Valadez for update pending lab results

## 2018-11-07 LAB
ALBUMIN SERPL BCP-MCNC: 3.1 G/DL (ref 3.2–4.9)
ALBUMIN SERPL BCP-MCNC: 3.1 G/DL (ref 3.2–4.9)
ALBUMIN/GLOB SERPL: 1.4 G/DL
ALBUMIN/GLOB SERPL: 1.8 G/DL
ALP SERPL-CCNC: 88 U/L (ref 30–99)
ALP SERPL-CCNC: 89 U/L (ref 30–99)
ALT SERPL-CCNC: 128 U/L (ref 2–50)
ALT SERPL-CCNC: 68 U/L (ref 2–50)
ANION GAP SERPL CALC-SCNC: 2 MMOL/L (ref 0–11.9)
ANION GAP SERPL CALC-SCNC: 7 MMOL/L (ref 0–11.9)
AST SERPL-CCNC: 128 U/L (ref 12–45)
AST SERPL-CCNC: 59 U/L (ref 12–45)
BASOPHILS # BLD AUTO: 0.4 % (ref 0–1.8)
BASOPHILS # BLD: 0.05 K/UL (ref 0–0.12)
BILIRUB SERPL-MCNC: 0.2 MG/DL (ref 0.1–1.5)
BILIRUB SERPL-MCNC: 0.3 MG/DL (ref 0.1–1.5)
BUN SERPL-MCNC: 13 MG/DL (ref 8–22)
BUN SERPL-MCNC: 22 MG/DL (ref 8–22)
CALCIUM SERPL-MCNC: 9.2 MG/DL (ref 8.5–10.5)
CALCIUM SERPL-MCNC: 9.9 MG/DL (ref 8.5–10.5)
CHLORIDE SERPL-SCNC: 104 MMOL/L (ref 96–112)
CHLORIDE SERPL-SCNC: 105 MMOL/L (ref 96–112)
CO2 SERPL-SCNC: 26 MMOL/L (ref 20–33)
CO2 SERPL-SCNC: 33 MMOL/L (ref 20–33)
CREAT SERPL-MCNC: 0.64 MG/DL (ref 0.5–1.4)
CREAT SERPL-MCNC: 0.89 MG/DL (ref 0.5–1.4)
EOSINOPHIL # BLD AUTO: 0.07 K/UL (ref 0–0.51)
EOSINOPHIL NFR BLD: 0.6 % (ref 0–6.9)
ERYTHROCYTE [DISTWIDTH] IN BLOOD BY AUTOMATED COUNT: 56.6 FL (ref 35.9–50)
GLOBULIN SER CALC-MCNC: 1.7 G/DL (ref 1.9–3.5)
GLOBULIN SER CALC-MCNC: 2.2 G/DL (ref 1.9–3.5)
GLUCOSE SERPL-MCNC: 141 MG/DL (ref 65–99)
GLUCOSE SERPL-MCNC: 179 MG/DL (ref 65–99)
HCT VFR BLD AUTO: 26.4 % (ref 37–47)
HGB BLD-MCNC: 8.2 G/DL (ref 12–16)
IMM GRANULOCYTES # BLD AUTO: 0.06 K/UL (ref 0–0.11)
IMM GRANULOCYTES NFR BLD AUTO: 0.5 % (ref 0–0.9)
LYMPHOCYTES # BLD AUTO: 0.97 K/UL (ref 1–4.8)
LYMPHOCYTES NFR BLD: 8.6 % (ref 22–41)
MCH RBC QN AUTO: 32.5 PG (ref 27–33)
MCHC RBC AUTO-ENTMCNC: 31.1 G/DL (ref 33.6–35)
MCV RBC AUTO: 104.8 FL (ref 81.4–97.8)
MONOCYTES # BLD AUTO: 1.24 K/UL (ref 0–0.85)
MONOCYTES NFR BLD AUTO: 11.1 % (ref 0–13.4)
NEUTROPHILS # BLD AUTO: 8.83 K/UL (ref 2–7.15)
NEUTROPHILS NFR BLD: 78.8 % (ref 44–72)
NRBC # BLD AUTO: 0 K/UL
NRBC BLD-RTO: 0 /100 WBC
PLATELET # BLD AUTO: 121 K/UL (ref 164–446)
PMV BLD AUTO: 9.1 FL (ref 9–12.9)
POTASSIUM SERPL-SCNC: 4.1 MMOL/L (ref 3.6–5.5)
POTASSIUM SERPL-SCNC: 4.3 MMOL/L (ref 3.6–5.5)
PROT SERPL-MCNC: 4.8 G/DL (ref 6–8.2)
PROT SERPL-MCNC: 5.3 G/DL (ref 6–8.2)
RBC # BLD AUTO: 2.52 M/UL (ref 4.2–5.4)
SODIUM SERPL-SCNC: 138 MMOL/L (ref 135–145)
SODIUM SERPL-SCNC: 139 MMOL/L (ref 135–145)
WBC # BLD AUTO: 11.2 K/UL (ref 4.8–10.8)

## 2018-11-07 PROCEDURE — G8978 MOBILITY CURRENT STATUS: HCPCS | Mod: CL

## 2018-11-07 PROCEDURE — 36415 COLL VENOUS BLD VENIPUNCTURE: CPT

## 2018-11-07 PROCEDURE — 80053 COMPREHEN METABOLIC PANEL: CPT

## 2018-11-07 PROCEDURE — G8988 SELF CARE GOAL STATUS: HCPCS | Mod: CI

## 2018-11-07 PROCEDURE — A9270 NON-COVERED ITEM OR SERVICE: HCPCS | Performed by: ANESTHESIOLOGY

## 2018-11-07 PROCEDURE — 700102 HCHG RX REV CODE 250 W/ 637 OVERRIDE(OP): Performed by: SURGERY

## 2018-11-07 PROCEDURE — 82150 ASSAY OF AMYLASE: CPT

## 2018-11-07 PROCEDURE — 302252 SYSTEM PREVENA CANISTER 45ML DISP VAC: Performed by: SURGERY

## 2018-11-07 PROCEDURE — 97162 PT EVAL MOD COMPLEX 30 MIN: CPT

## 2018-11-07 PROCEDURE — A9270 NON-COVERED ITEM OR SERVICE: HCPCS | Performed by: SURGERY

## 2018-11-07 PROCEDURE — G8987 SELF CARE CURRENT STATUS: HCPCS | Mod: CJ

## 2018-11-07 PROCEDURE — G8979 MOBILITY GOAL STATUS: HCPCS | Mod: CI

## 2018-11-07 PROCEDURE — 700111 HCHG RX REV CODE 636 W/ 250 OVERRIDE (IP): Performed by: SURGERY

## 2018-11-07 PROCEDURE — 85025 COMPLETE CBC W/AUTO DIFF WBC: CPT

## 2018-11-07 PROCEDURE — 94668 MNPJ CHEST WALL SBSQ: CPT

## 2018-11-07 PROCEDURE — 770001 HCHG ROOM/CARE - MED/SURG/GYN PRIV*

## 2018-11-07 PROCEDURE — 700102 HCHG RX REV CODE 250 W/ 637 OVERRIDE(OP): Performed by: ANESTHESIOLOGY

## 2018-11-07 PROCEDURE — 97165 OT EVAL LOW COMPLEX 30 MIN: CPT

## 2018-11-07 PROCEDURE — 700105 HCHG RX REV CODE 258: Performed by: SURGERY

## 2018-11-07 RX ORDER — TRAMADOL HYDROCHLORIDE 50 MG/1
50 TABLET ORAL EVERY 4 HOURS
Status: DISCONTINUED | OUTPATIENT
Start: 2018-11-07 | End: 2018-11-08

## 2018-11-07 RX ADMIN — METOCLOPRAMIDE 10 MG: 5 INJECTION, SOLUTION INTRAMUSCULAR; INTRAVENOUS at 18:36

## 2018-11-07 RX ADMIN — METOCLOPRAMIDE 10 MG: 5 INJECTION, SOLUTION INTRAMUSCULAR; INTRAVENOUS at 11:50

## 2018-11-07 RX ADMIN — CELECOXIB 200 MG: 200 CAPSULE ORAL at 18:35

## 2018-11-07 RX ADMIN — TRAMADOL HYDROCHLORIDE 50 MG: 50 TABLET, FILM COATED ORAL at 11:50

## 2018-11-07 RX ADMIN — LISINOPRIL 20 MG: 20 TABLET ORAL at 18:35

## 2018-11-07 RX ADMIN — METOCLOPRAMIDE 10 MG: 5 INJECTION, SOLUTION INTRAMUSCULAR; INTRAVENOUS at 06:22

## 2018-11-07 RX ADMIN — POTASSIUM CHLORIDE 20 MEQ: 1500 TABLET, EXTENDED RELEASE ORAL at 06:22

## 2018-11-07 RX ADMIN — TRAMADOL HYDROCHLORIDE 50 MG: 50 TABLET, FILM COATED ORAL at 08:16

## 2018-11-07 RX ADMIN — TRAMADOL HYDROCHLORIDE 50 MG: 50 TABLET, FILM COATED ORAL at 15:08

## 2018-11-07 RX ADMIN — HYDROCHLOROTHIAZIDE 25 MG: 25 TABLET ORAL at 18:35

## 2018-11-07 RX ADMIN — TRAMADOL HYDROCHLORIDE 50 MG: 50 TABLET, FILM COATED ORAL at 18:35

## 2018-11-07 RX ADMIN — OMEPRAZOLE 20 MG: 20 CAPSULE, DELAYED RELEASE ORAL at 06:21

## 2018-11-07 RX ADMIN — POTASSIUM CHLORIDE 20 MEQ: 1500 TABLET, EXTENDED RELEASE ORAL at 18:35

## 2018-11-07 RX ADMIN — METOCLOPRAMIDE 10 MG: 5 INJECTION, SOLUTION INTRAMUSCULAR; INTRAVENOUS at 00:53

## 2018-11-07 RX ADMIN — ROPIVACAINE HYDROCHLORIDE: 10 INJECTION, SOLUTION EPIDURAL at 06:54

## 2018-11-07 RX ADMIN — TRAMADOL HYDROCHLORIDE 50 MG: 50 TABLET, FILM COATED ORAL at 21:08

## 2018-11-07 RX ADMIN — CELECOXIB 200 MG: 200 CAPSULE ORAL at 06:21

## 2018-11-07 ASSESSMENT — ACTIVITIES OF DAILY LIVING (ADL): TOILETING: INDEPENDENT

## 2018-11-07 ASSESSMENT — COGNITIVE AND FUNCTIONAL STATUS - GENERAL
STANDING UP FROM CHAIR USING ARMS: A LITTLE
CLIMB 3 TO 5 STEPS WITH RAILING: A LITTLE
HELP NEEDED FOR BATHING: A LITTLE
TURNING FROM BACK TO SIDE WHILE IN FLAT BAD: UNABLE
SUGGESTED CMS G CODE MODIFIER MOBILITY: CL
MOVING TO AND FROM BED TO CHAIR: UNABLE
DAILY ACTIVITIY SCORE: 21
TOILETING: A LITTLE
MOVING FROM LYING ON BACK TO SITTING ON SIDE OF FLAT BED: UNABLE
WALKING IN HOSPITAL ROOM: A LITTLE
SUGGESTED CMS G CODE MODIFIER DAILY ACTIVITY: CJ
MOBILITY SCORE: 12
PERSONAL GROOMING: A LITTLE

## 2018-11-07 ASSESSMENT — GAIT ASSESSMENTS
DISTANCE (FEET): 8
DEVIATION: SHUFFLED GAIT
GAIT LEVEL OF ASSIST: STAND BY ASSIST
ASSISTIVE DEVICE: FRONT WHEEL WALKER

## 2018-11-07 ASSESSMENT — PAIN SCALES - GENERAL
PAINLEVEL_OUTOF10: 3
PAINLEVEL_OUTOF10: 1
PAINLEVEL_OUTOF10: 4
PAINLEVEL_OUTOF10: 5
PAINLEVEL_OUTOF10: ASSUMED PAIN PRESENT

## 2018-11-07 ASSESSMENT — ENCOUNTER SYMPTOMS: ABDOMINAL PAIN: 1

## 2018-11-07 NOTE — THERAPY
"Occupational Therapy Evaluation completed.   Functional Status:    74 y/o female postop day 1 status post extended distal pancreatectomy with splenectomy, portal vein resection, repair of celiac trunk. Hx of pancreatic cancer.   Pt in bed when received by OT. Completed sup>sit with min/mod A and VCs for sequence. C/o lightheadedness initially, which subsided fairly quickly. Pt currently on 3.5 L O2, quickly desaturating to high 70s without supplemental O2. Pt donned socks with supv and completed sit>stand with CGA. Currently using FWW for ambulation to bathroom, though she doesn't use AD at baseline. Once pt reached toilet she let go of FWW and had posterior LOB requiring min A for recovery. CGA for toilet xfer. Next, pt completed standing grooming with SBA. Pt returned to bed with SBA, declining to sit in chair 2/2 stomach pain. Pt set-up with breakfast, feeds self Mod I. Anticipate pt will make good progress in this setting and be able to D/C home.     Plan of Care: Will benefit from Occupational Therapy 3 times per week  Discharge Recommendations:  Equipment: Will Continue to Assess for Equipment Needs.     See \"Rehab Therapy-Acute\" Patient Summary Report for complete documentation.    "

## 2018-11-07 NOTE — PROGRESS NOTES
"Subjective:      Larissa Ramirez is a 73 y.o. female who presents with No chief complaint on file.            The patient is postoperative day 2 status post portal vein resection and repair, distal pancreatectomy and splenectomy with no dissection.  She is doing extremely well.  Her pain is well controlled with the epidural.  She is tolerating a general diet.  She is hep-locked but will have the epidural removed tomorrow morning.  She is ambulating well.  Pathology did return and she was a complete responder with no residual carcinoma and all her nodes negative.        Review of Systems   Gastrointestinal: Positive for abdominal pain.   All other systems reviewed and are negative.         Objective:     /46   Pulse 78   Temp 36.9 °C (98.4 °F)   Resp 16   Ht 1.651 m (5' 5\")   Wt 74.9 kg (165 lb 2 oz)   LMP 06/27/1986   SpO2 92%   Breastfeeding? No   BMI 27.48 kg/m²      Physical Exam   Constitutional: She is oriented to person, place, and time. She appears well-developed and well-nourished.   HENT:   Head: Normocephalic and atraumatic.   Eyes: Pupils are equal, round, and reactive to light.   Neck: Normal range of motion. Neck supple.   Cardiovascular: Normal rate and regular rhythm.    Pulmonary/Chest: Effort normal and breath sounds normal.   Abdominal: Soft. Bowel sounds are normal.   No pain but difficulty with the Provena pump malfunctioning.   Neurological: She is alert and oriented to person, place, and time.   Skin: Skin is warm and dry.   Nursing note and vitals reviewed.              Assessment/Plan:     The patient in light of the present findings, the patient is doing extremely well.  Her pathology is terrific, she is recovering easily in the there are will be removed tomorrow.  Her hope that she can go home tomorrow after removal of epidural.    "

## 2018-11-07 NOTE — PROGRESS NOTES
A/Ox 4.  VSS.  Reports minimal abdominal pain 3 /10, medicated per MAR, epidural in place, ice applied, splinting education provided.    Midline prevena in place, functioning appropriately.  LLQ LESIA, dressing CDI, moderate serosanguineous output.  Abdomen soft, tender, no distention or discoloration noted.  +hypoactive BSx4, denies flatus, LBM pta, denies n/v.    + MAY, +bilat toe and finger numbness and tingling, generalized weakness.  4L NC, satting >90%, denies SOB or difficulty breathing, IS pulling 750 appropriately.  SCDs in place.  Tolerated GI soft diet well.  + void, singleton, QS.  Refusing ambulation, education provided, states she will get OOB tomorrow for breakfast.  Repositions self frequently.  Epidural running, site assessed and appropriate  PIV S/L, PO intake encouraged.    Call light within reach, calls appropriately.  Bed in lowest locked position, bed alarm in place.

## 2018-11-07 NOTE — PROGRESS NOTES
"Collected LESIA fluid for amylase lab testing. Notified lab that there was no way to \"collect\" it on the work list.  "

## 2018-11-07 NOTE — PROGRESS NOTES
2 RN skin check completed.    Blanchable redness in sacral area.  Midline prevena.  Left abdominal LESIA, dressing in place, CDI.  Epidural site assessed and appropriate.    No other signs of skin breakdown noted.    Mepilex placed.

## 2018-11-07 NOTE — CARE PLAN
Problem: Oxygenation:  Goal: Maintain adequate oxygenation dependent on patient condition    Intervention: Manage oxygen therapy by monitoring pulse oximetry and/or ABG values  Pt is on 3LPM  SPO2 is 95%   Will continue to titrate as tolerated      Problem: Hyperinflation:  Goal: Prevent or improve atelectasis  Outcome: PROGRESSING AS EXPECTED  PEP QID  60% of IS value is 1200  Best IS value for today is 750

## 2018-11-07 NOTE — WOUND TEAM
Notified by nursing that Prevena pump alarming since early AM.  Found dressing compressed and intact.  Snapped cannister (which was empty) out and re snapped into place and no further alarms.

## 2018-11-07 NOTE — THERAPY
"Physical Therapy Evaluation completed.   Bed Mobility:  Supine to Sit: Contact Guard Assist  Transfers: Sit to Stand: Contact Guard Assist  Gait: Level Of Assist: Stand by Assist with Front-Wheel Walker       Plan of Care: Will benefit from Physical Therapy 2 times per week  Discharge Recommendations: Equipment: Will Continue to Assess for Equipment Needs.     Pt is 73 yoF who presents s/p pancreactectomy, splenectomy, port vein resection and celiac trunk repairs d/t pancreatic cancer. Pt resides w/ spouse who assists 24/7. She exhibits decreased functional mobility, activity tolerance and balance. Pt would benefit from skilled therapuetic interventions while in house to enhance functional mobility and ensure optimal participation in functional activities. Pt requires trial of stairs while in house prior to d/c. Recommend d/c home once medically appropriate and may benefit from  PT to improve function.    See \"Rehab Therapy-Acute\" Patient Summary Report for complete documentation.     "

## 2018-11-08 VITALS
RESPIRATION RATE: 17 BRPM | OXYGEN SATURATION: 95 % | BODY MASS INDEX: 27.51 KG/M2 | TEMPERATURE: 97.4 F | HEART RATE: 84 BPM | DIASTOLIC BLOOD PRESSURE: 63 MMHG | WEIGHT: 165.12 LBS | HEIGHT: 65 IN | SYSTOLIC BLOOD PRESSURE: 132 MMHG

## 2018-11-08 LAB
ALBUMIN SERPL BCP-MCNC: 3 G/DL (ref 3.2–4.9)
ALBUMIN/GLOB SERPL: 1.3 G/DL
ALP SERPL-CCNC: 111 U/L (ref 30–99)
ALT SERPL-CCNC: 49 U/L (ref 2–50)
AMYLASE FLD-CCNC: 32 U/L
ANION GAP SERPL CALC-SCNC: 3 MMOL/L (ref 0–11.9)
AST SERPL-CCNC: 35 U/L (ref 12–45)
BILIRUB SERPL-MCNC: 0.3 MG/DL (ref 0.1–1.5)
BODY FLD TYPE: NORMAL
BUN SERPL-MCNC: 14 MG/DL (ref 8–22)
CALCIUM SERPL-MCNC: 9.9 MG/DL (ref 8.5–10.5)
CHLORIDE SERPL-SCNC: 103 MMOL/L (ref 96–112)
CO2 SERPL-SCNC: 33 MMOL/L (ref 20–33)
CREAT SERPL-MCNC: 0.63 MG/DL (ref 0.5–1.4)
ERYTHROCYTE [DISTWIDTH] IN BLOOD BY AUTOMATED COUNT: 55.9 FL (ref 35.9–50)
GLOBULIN SER CALC-MCNC: 2.4 G/DL (ref 1.9–3.5)
GLUCOSE SERPL-MCNC: 140 MG/DL (ref 65–99)
HCT VFR BLD AUTO: 25.6 % (ref 37–47)
HGB BLD-MCNC: 7.9 G/DL (ref 12–16)
MCH RBC QN AUTO: 32.8 PG (ref 27–33)
MCHC RBC AUTO-ENTMCNC: 30.9 G/DL (ref 33.6–35)
MCV RBC AUTO: 106.2 FL (ref 81.4–97.8)
PLATELET # BLD AUTO: 134 K/UL (ref 164–446)
PMV BLD AUTO: 9.6 FL (ref 9–12.9)
POTASSIUM SERPL-SCNC: 4 MMOL/L (ref 3.6–5.5)
PROT SERPL-MCNC: 5.4 G/DL (ref 6–8.2)
RBC # BLD AUTO: 2.41 M/UL (ref 4.2–5.4)
SODIUM SERPL-SCNC: 139 MMOL/L (ref 135–145)
WBC # BLD AUTO: 11.6 K/UL (ref 4.8–10.8)

## 2018-11-08 PROCEDURE — 94668 MNPJ CHEST WALL SBSQ: CPT

## 2018-11-08 PROCEDURE — A9270 NON-COVERED ITEM OR SERVICE: HCPCS | Performed by: ANESTHESIOLOGY

## 2018-11-08 PROCEDURE — 36415 COLL VENOUS BLD VENIPUNCTURE: CPT

## 2018-11-08 PROCEDURE — 700111 HCHG RX REV CODE 636 W/ 250 OVERRIDE (IP): Performed by: SURGERY

## 2018-11-08 PROCEDURE — A9270 NON-COVERED ITEM OR SERVICE: HCPCS | Performed by: SURGERY

## 2018-11-08 PROCEDURE — 85027 COMPLETE CBC AUTOMATED: CPT

## 2018-11-08 PROCEDURE — 80053 COMPREHEN METABOLIC PANEL: CPT

## 2018-11-08 PROCEDURE — 700102 HCHG RX REV CODE 250 W/ 637 OVERRIDE(OP): Performed by: SURGERY

## 2018-11-08 PROCEDURE — 700102 HCHG RX REV CODE 250 W/ 637 OVERRIDE(OP): Performed by: ANESTHESIOLOGY

## 2018-11-08 RX ORDER — CELECOXIB 200 MG/1
200 CAPSULE ORAL 2 TIMES DAILY WITH MEALS
Qty: 60 CAP | Refills: 1 | Status: SHIPPED | OUTPATIENT
Start: 2018-11-08 | End: 2018-11-08

## 2018-11-08 RX ORDER — HYDROCODONE BITARTRATE AND ACETAMINOPHEN 10; 325 MG/1; MG/1
1 TABLET ORAL EVERY 4 HOURS PRN
Qty: 40 TAB | Refills: 0 | Status: SHIPPED | OUTPATIENT
Start: 2018-11-08 | End: 2018-11-22

## 2018-11-08 RX ORDER — HYDROMORPHONE HYDROCHLORIDE 1 MG/ML
1 INJECTION, SOLUTION INTRAMUSCULAR; INTRAVENOUS; SUBCUTANEOUS
Status: DISCONTINUED | OUTPATIENT
Start: 2018-11-08 | End: 2018-11-08 | Stop reason: HOSPADM

## 2018-11-08 RX ORDER — CELECOXIB 100 MG/1
100 CAPSULE ORAL 2 TIMES DAILY WITH MEALS
Qty: 60 CAP | Refills: 1 | Status: SHIPPED | OUTPATIENT
Start: 2018-11-08 | End: 2019-07-16

## 2018-11-08 RX ORDER — POLYETHYLENE GLYCOL 3350 17 G/17G
17 POWDER, FOR SOLUTION ORAL DAILY
Qty: 300 G | Refills: 3 | Status: SHIPPED | OUTPATIENT
Start: 2018-11-08 | End: 2019-07-16

## 2018-11-08 RX ORDER — HYDROCODONE BITARTRATE AND ACETAMINOPHEN 5; 325 MG/1; MG/1
1-2 TABLET ORAL EVERY 4 HOURS PRN
Status: DISCONTINUED | OUTPATIENT
Start: 2018-11-08 | End: 2018-11-08 | Stop reason: HOSPADM

## 2018-11-08 RX ADMIN — METOCLOPRAMIDE 10 MG: 5 INJECTION, SOLUTION INTRAMUSCULAR; INTRAVENOUS at 05:00

## 2018-11-08 RX ADMIN — OMEPRAZOLE 20 MG: 20 CAPSULE, DELAYED RELEASE ORAL at 05:01

## 2018-11-08 RX ADMIN — TRAMADOL HYDROCHLORIDE 50 MG: 50 TABLET, FILM COATED ORAL at 01:18

## 2018-11-08 RX ADMIN — METOCLOPRAMIDE 10 MG: 5 INJECTION, SOLUTION INTRAMUSCULAR; INTRAVENOUS at 11:44

## 2018-11-08 RX ADMIN — CELECOXIB 200 MG: 200 CAPSULE ORAL at 08:42

## 2018-11-08 RX ADMIN — HYDROCODONE BITARTRATE AND ACETAMINOPHEN 2 TABLET: 5; 325 TABLET ORAL at 14:10

## 2018-11-08 RX ADMIN — TRAMADOL HYDROCHLORIDE 50 MG: 50 TABLET, FILM COATED ORAL at 10:28

## 2018-11-08 RX ADMIN — TRAMADOL HYDROCHLORIDE 50 MG: 50 TABLET, FILM COATED ORAL at 05:01

## 2018-11-08 RX ADMIN — METOCLOPRAMIDE 10 MG: 5 INJECTION, SOLUTION INTRAMUSCULAR; INTRAVENOUS at 01:17

## 2018-11-08 RX ADMIN — POTASSIUM CHLORIDE 20 MEQ: 1500 TABLET, EXTENDED RELEASE ORAL at 06:00

## 2018-11-08 RX ADMIN — HYDROCODONE BITARTRATE AND ACETAMINOPHEN 1 TABLET: 5; 325 TABLET ORAL at 18:21

## 2018-11-08 ASSESSMENT — PAIN SCALES - GENERAL
PAINLEVEL_OUTOF10: 3
PAINLEVEL_OUTOF10: 5
PAINLEVEL_OUTOF10: 4
PAINLEVEL_OUTOF10: 5
PAINLEVEL_OUTOF10: ASSUMED PAIN PRESENT
PAINLEVEL_OUTOF10: 4
PAINLEVEL_OUTOF10: 4
PAINLEVEL_OUTOF10: 5

## 2018-11-08 NOTE — PROGRESS NOTES
Patient's epidural was removed this AM.  Patient states she takes 10 mg NORCO Q4 hours at home and tramadol for chronic pain.  Patient would like to continue her chronic pain management regimen.  RN spoke with Dr. Hunter.  Orders received for norco 5-10 Q4 hours PRN.  1mg dilaudid !V q1 hour PRN severe pain, and discontinue scheduled tramadol at this time.

## 2018-11-08 NOTE — FACE TO FACE
Face to Face Note  -  Durable Medical Equipment    Carl Valadez M.D. - NPI: [id number]  I certify that this patient is under my care and that they had a durable medical equipment(DME)face to face encounter by myself that meets the physician DME face-to-face encounter requirements with this patient on:    Date of encounter:   Patient:                    MRN:                       YOB: 2017  Larissa Ramirez  [id number]  1945     The encounter with the patient was in whole, or in part, for the following medical condition, which is the primary reason for durable medical equipment:  Post-Op Surgery    I certify that, based on my findings, the following durable medical equipment is medically necessary:  Oxygen.    HOME O2 Saturation Measurements:(Values must be present for Home Oxygen orders)         ,     ,         My Clinical findings support the need for the above equipment due to:  Hypoxia    Supporting Symptoms: sats on Room air of 79%

## 2018-11-08 NOTE — DISCHARGE PLANNING
Agency/Facility Name: [facility]  Spoke To: Leydi @ [phone]  Outcome: Confirmed referral was received, currently in review and being processed.

## 2018-11-08 NOTE — DISCHARGE PLANNING
Received Choice form at 1350 from CHRISTI Jin  Agency/Facility Name: [facility]  Referral sent per Choice form @ 8147  For DME 02

## 2018-11-08 NOTE — PROGRESS NOTES
Received report from evening RN.  Assumed care of patient.  Patient A&Ox4, slightly drowsy.  C/O pain 3/10, controlled with epidural.  Plan to remove epidural this AM, awaiting anesthesia. Epidural site CDI.   Cano to gravity with clear yellow urine.  Midline abdominal incision, dressing intact.  LESIA drain with serosanguinous output, slightly yellow tinged.  ON GI soft diet, tolerating well without nausea or vomiting.  No BM, No flatus.  MAY, strength 5/5.  No numbness/tingling.  Plan of care discussed.  Call light within reach.

## 2018-11-08 NOTE — PROGRESS NOTES
Report Received from Day RN, assumed care @1900   and bed alarm in place  A/O x 4  VSS  Pain- denies, epidural in place, site CDI with biopatch  O2- 4L NC  IV-R fa saline locked, L FA saline locked  Diet- gi soft, denies nausea  Void-pos singleton in place  BM-neg 11/4  Wound- MLI with staples, LESIA LLQ      SCDs-on  Bed Locked in Lowest Position  Call light in reach

## 2018-11-08 NOTE — CARE PLAN
Problem: Infection  Goal: Will remain free from infection  Outcome: PROGRESSING AS EXPECTED  Standard precautions in place    Problem: Pain Management  Goal: Pain level will decrease to patient's comfort goal  Outcome: PROGRESSING AS EXPECTED  Pt states she is remaining comfortable with the epidural in place

## 2018-11-08 NOTE — PROGRESS NOTES
Per Dr. Hunter, order to remove epidural this AM.  Ok per [facility] (Dr. Richter) to pull epidural.  NO lovenox administered in last 12 hours.

## 2018-11-08 NOTE — DISCHARGE PLANNING
Anticipated Discharge Disposition: Home with home O2 today    Action: This RN CM met with pt at bedside, provided choice form, pt chose [facility] as the sole provider who works with her medical insurance.  Pt notified that it will take some hours for the order to go through.    Barriers to Discharge: O2 authorization and delivery    Plan: This RN CM will follow up to facilitate O2.

## 2018-11-08 NOTE — FACE TO FACE
Face to Face Note  -  Durable Medical Equipment    Carl Valadez M.D. - NPI: [id number]  I certify that this patient is under my care and that they had a durable medical equipment(DME)face to face encounter by myself that meets the physician DME face-to-face encounter requirements with this patient on:    Date of encounter:   Patient:                    MRN:                       YOB: 2017  Larissa Ramirez  [id number]  1945     The encounter with the patient was in whole, or in part, for the following medical condition, which is the primary reason for durable medical equipment:  Post-Op Surgery    I certify that, based on my findings, the following durable medical equipment is medically necessary:  Oxygen.    HOME O2 Saturation Measurements:(Values must be present for Home Oxygen orders)  Room air sat at rest: 79  Room air sat with amb: 79  With liters of O2: 3, O2 sat at rest with O2: 92  With Liters of O2: 3, O2 sat with amb with O2 : 92  Is the patient mobile?: Yes    My Clinical findings support the need for the above equipment due to:  Hypoxia    Supporting Symptoms: room air sats less than 90%

## 2018-11-08 NOTE — CARE PLAN
Problem: Oxygenation:  Goal: Maintain adequate oxygenation dependent on patient condition  Outcome: PROGRESSING AS EXPECTED    Intervention: Manage oxygen therapy by monitoring pulse oximetry and/or ABG values  Pt on 2 LPM NC sating 93%; RA is baseline; will continue to monitor and titrate as needed      Problem: Hyperinflation:  Goal: Prevent or improve atelectasis  Outcome: PROGRESSING AS EXPECTED    Intervention: Instruct incentive spirometry usage  Pt doing PEP QID pulling 1000; 60% 1200; encourage use of IS on own

## 2018-11-09 ENCOUNTER — PATIENT OUTREACH (OUTPATIENT)
Dept: HEALTH INFORMATION MANAGEMENT | Facility: OTHER | Age: 73
End: 2018-11-09

## 2018-11-09 NOTE — CARE PLAN
Problem: Urinary Elimination:  Goal: Ability to reestablish a normal urinary elimination pattern will improve  Outcome: PROGRESSING AS EXPECTED  Cano removed per order.  Patient voided 250 mL.      Problem: Pain Management  Goal: Pain level will decrease to patient's comfort goal  Outcome: PROGRESSING AS EXPECTED  Epidural discontinued.  Patient's pain well controlled with PO pain medications.

## 2018-11-09 NOTE — DISCHARGE SUMMARY
HISTORY AND HOSPITAL COURSE:  Patient is a 73-year-old female patient who is 3   days postop from a distal pancreatectomy, splenectomy, portal vein resection   and repair.  Fortunately, her pathology returned as complete responder.  The   patient is tolerating a general diet.  She is ambulating.  Epidural was   removed this morning and her pain is well controlled with p.o. pain   medications.  The patient is going to be discharged today with oxygen,   unfortunately her saturations staying above 90 only with 1-2 liters of oxygen.    When she is on room air, she drops her sats down to 80%.  In any event, the   patient is excited, her drains can be removed here in the hospital due to no   sign of pancreatic leak.  So, the patient will be able to shower tomorrow   morning and will remove the LESIA drain.  She is going home with prescription for   her Norco and Celebrex along with MiraLax, and she is instructed to call me   if she develops any fever, chills, nausea, or vomiting, and that she is to   keep her oxygen on follow up with her primary care physician to manage her   oxygen.  She agreed to the above plan and we will get her discharged.       ____________________________________     MD CORTEZ Ott / CHARLIE    DD:  11/08/2018 13:43:00  DT:  11/08/2018 21:01:11    D#:  [id number]  Job#:  [id number]

## 2018-11-09 NOTE — DISCHARGE PLANNING
Anticipated Discharge Disposition: Home with O2    Action: LSW called [facility] [phone] who stated that pt's O2 is on it's way and should be here shortly.    Barriers to Discharge: none    Plan: As above, pt to d/c home with O2 from [facility].

## 2018-11-09 NOTE — PROGRESS NOTES
SCP post discharge med rec completed. No clinically significant medication issues noted. Unable to reach patient for f/u. Left  for patient to call [phone].

## 2018-11-09 NOTE — DISCHARGE PLANNING
Notified PM REYNALDO Lorenzo re need for O2 in process, she will follow up.  Pt and bedside nurse notified.

## 2018-11-09 NOTE — PROGRESS NOTES
Patient voided post singleton removal.  Awaiting oxygen delivery.  LESIA drain removed per order.  PIVs removed.  Patient and  given discharge instructions and prescriptions. Patient demonstrated understanding.

## 2018-11-14 ENCOUNTER — PATIENT OUTREACH (OUTPATIENT)
Dept: HEALTH INFORMATION MANAGEMENT | Facility: OTHER | Age: 73
End: 2018-11-14

## 2018-11-21 ENCOUNTER — NON-PROVIDER VISIT (OUTPATIENT)
Dept: HEMATOLOGY ONCOLOGY | Facility: MEDICAL CENTER | Age: 73
End: 2018-11-21

## 2018-11-21 NOTE — NON-PROVIDER
Larissa Ramirez is a 73 y.o. female here for a Non-Provider Visit for Staple Removal.    Staples were placed by Dr. Valadez on date: 11/5/18  Skin is healed: Yes  Provider notified if skin is not healed, or if there is redness, heat, pain, or drainage from incision: yes  Staples removed.   Steristips are placed: Yes    Advised to use keep clean and dry. Avoid any creams or lotions.

## 2018-11-27 ENCOUNTER — OFFICE VISIT (OUTPATIENT)
Dept: HEMATOLOGY ONCOLOGY | Facility: MEDICAL CENTER | Age: 73
End: 2018-11-27
Payer: MEDICARE

## 2018-11-27 VITALS
HEART RATE: 69 BPM | OXYGEN SATURATION: 95 % | SYSTOLIC BLOOD PRESSURE: 128 MMHG | BODY MASS INDEX: 25.38 KG/M2 | HEIGHT: 65 IN | WEIGHT: 152.34 LBS | TEMPERATURE: 99.1 F | DIASTOLIC BLOOD PRESSURE: 76 MMHG

## 2018-11-27 DIAGNOSIS — C25.0 MALIGNANT NEOPLASM OF HEAD OF PANCREAS (HCC): ICD-10-CM

## 2018-11-27 RX ORDER — TOPIRAMATE 50 MG/1
50 TABLET, FILM COATED ORAL DAILY
Refills: 2 | COMMUNITY
Start: 2018-11-19 | End: 2021-09-08

## 2018-11-27 ASSESSMENT — ENCOUNTER SYMPTOMS
ABDOMINAL PAIN: 1
TINGLING: 1
BACK PAIN: 1

## 2018-11-27 NOTE — PROGRESS NOTES
Subjective:   11/27/2018  8:07 AM  Primary care physician:Kaitlyn Long M.D.  Radiation oncology:NA  Surgery:Carl Valadez    1. Malignant neoplasm of head of pancreas (HCC)           History of presenting illness:  Ms. Ramirez  is a pleasant 73 y.o. year old female who presented with postop distal pancreatectomy involving the neck as well as a portal vein resection.  The patient did have locally advanced cancer and underwent neoadjuvant chemotherapy.  She underwent surgery on November 5, 2018 and thankfully she was a complete responder.  There is no residual carcinoma and all her nodes were negative.  We did do a portal vein resection and had the peel the tumor off of the common hepatic artery.  She presently denies any fever or chills, nausea or vomiting.  She does complain of chronic fatigue which is improving slowly and her appetite is slowly getting back to normal.  She has a little bit of abdominal pain.  She is extremely happy with the results of her surgery and she has improved since discharge.      Past Medical History:   Diagnosis Date   • Bowel habit changes 05/17/2018    constipation related to pain meds   • Bronchitis 2005   • Cancer (HCC) 05/2018    adenocarcinoma pancreatic tail   • Chronic low back pain     followed by [facility] Dr. Garcia, Dr. Mobley   • Chronic neck pain     followed by [facility] Dr. Itz Lopez   • Dental disorder     upper partial   • Hypertension 2015    controlled on meds   • Other specified symptom associated with female genital organs    • Pain 05/17/2018    Right shoulder, right arm, low back, right leg.   • Pre-diabetes    • Snoring      Past Surgical History:   Procedure Laterality Date   • PANCREATECTOMY  11/5/2018    Procedure: PANCREATECTOMY-  DISTAL;  Surgeon: Carl Valadez M.D.;  Location: [facility];  Service: General   • SPLENECTOMY  11/5/2018    Procedure: SPLENECTOMY;  Surgeon: Carl Valadez M.D.;  Location: [facility]  [facility];  Service: General   • NODE DISSECTION  11/5/2018    Procedure: NODE DISSECTION;  Surgeon: Carl Valadez M.D.;  Location: [facility];  Service: General   • OMENTECTOMY  11/5/2018    Procedure: OMENTECTOMY-  OMENTAL FLAP, INTRAOPERATIVE ULTRASOUND, PORTAL VEIN RESECTION AND REPAIR, CELIAC TRUNK PARTIAL RESECTION AND REPAIR;  Surgeon: Carl Valadez M.D.;  Location: [facility];  Service: General   • CATH PLACEMENT Right 5/30/2018    Procedure: CATH PLACEMENT - CEPHALIC POWER PORT;  Surgeon: Carl Hunter M.D.;  Location: SURGERY SAME DAY [facility];  Service: General   • GASTROSCOPY  5/18/2018    Procedure: GASTROSCOPY;  Surgeon: Geovanni Romero M.D.;  Location: [facility];  Service: EUS   • EGD W/ENDOSCOPIC ULTRASOUND  5/18/2018    Procedure: EGD W/ENDOSCOPIC ULTRASOUND-  UPPER CURVILLINEAR;  Surgeon: Geovanni Romero M.D.;  Location: [facility];  Service: EUS   • EGD WITH ASP/BX  5/18/2018    Procedure: EGD WITH ASP/BX-  GASTRIC BIOPSIES,  FNA BIOPSIES OF PANCREAS HEAD MASS AND OR GALLBLADDER;  Surgeon: Geovanni Romero M.D.;  Location: [facility];  Service: EUS   • DENTAL EXTRACTION(S)  12/2017    AND hx of other dental extractions with sedation   • HYSTERECTOMY ROBOTIC XI  7/9/2015    Procedure: HYSTERECTOMY ROBOTIC XI W/ BILATERAL SALPINGO-OOPHORECTOMY, MCCALLS PROCEDURES;  Surgeon: Brian Parks M.D.;  Location: [facility];  Service:    • COLONOSCOPY  2008   • CERVICAL DISK AND FUSION ANTERIOR  2006    neck cage/fusion   • DENTAL EXTRACTION(S)  1966    [place] teeth   • OTHER      Sedation for several MRI's   • OTHER NEUROLOGICAL SURG      cervical 2007     Allergies   Allergen Reactions   • Levofloxacin Swelling     Swelling of tongue and neck   • Nitrofurantoin Swelling     Swelling of lips, tongue, face   • Amitriptyline Swelling     Facial swelling    • Sulfa Drugs Nausea   • Lyrica  Prem     .     Outpatient Encounter Prescriptions as of 2018   Medication Sig Dispense Refill   • polyethylene glycol 3350 (MIRALAX) Powder Take 17 g by mouth every day. 300 g 3   • celecoxib (CELEBREX) 100 MG Cap Take 1 Cap by mouth 2 times a day, with meals. 60 Cap 1   • potassium chloride SA (KDUR) 20 MEQ Tab CR Take 20 mEq by mouth every day.  2   • conjugated estrogen (PREMARIN) 0.625 MG/GM Cream Insert 0.5 g in vagina every 72 hours as needed.     • lisinopril-hydrochlorothiazide (PRINZIDE, ZESTORETIC) 20-25 MG per tablet TAKE 1 TAB BY MOUTH EVERY DAY. 90 Tab 3   • Menthol, Topical Analgesic, (BIOFREEZE COLORLESS) 4 % Gel 1 Each by Apply externally route 2 times a day as needed.     • Calcium Carb-Cholecalciferol (CALCIUM 1000 + D PO) Take 1 Tab by mouth every day.     • Multiple Vitamins-Iron (MULTIVITAMIN/IRON PO) Take 1 Tab by mouth every day.     • diazepam (VALIUM) 2 MG TABS Take 4-6 mg by mouth at bedtime as needed for Sleep. Take two to three tabs by mouth at bedtime as needed        No facility-administered encounter medications on file as of 2018.      @[facility]@  Social History     Social History   • Marital status:      Spouse name: N/A   • Number of children: 3   • Years of education: N/A     Occupational History   • retired      Social History Main Topics   • Smoking status: Never Smoker   • Smokeless tobacco: Never Used   • Alcohol use No   • Drug use: No   • Sexual activity: Yes     Partners: Male     Other Topics Concern   • Not on file     Social History Narrative   • No narrative on file      History   Smoking Status   • Never Smoker   Smokeless Tobacco   • Never Used     History   Alcohol Use No     History   Drug Use No      OB History    Para Term  AB Living   4 3           SAB TAB Ectopic Molar Multiple Live Births                    # Outcome Date GA Lbr Cory/2nd Weight Sex Delivery Anes PTL Lv   4 Para            3 Para            2 Para            1                    Patient's last menstrual period was 06/27/1986.    MATT P A L     Family History   Problem Relation Age of Onset   • No Known Problems Mother    • No Known Problems Father        Review of Systems   Gastrointestinal: Positive for abdominal pain.   Musculoskeletal: Positive for back pain and joint pain.        Muscle aches   Neurological: Positive for tingling.   All other systems reviewed and are negative.       Objective:   LMP 06/27/1986     Physical Exam   Abdominal: Soft. Bowel sounds are normal. There is tenderness.   Her incision is clean, dry, and intact.       Assessment:   Labs:  Results for COLTEN AMANDA (MRN [id number]) as of 11/27/2018 08:09   Ref. Range 11/8/2018 03:07   WBC Latest Ref Range: 4.8 - 10.8 K/uL 11.6 (H)   RBC Latest Ref Range: 4.20 - 5.40 M/uL 2.41 (L)   Hemoglobin Latest Ref Range: 12.0 - 16.0 g/dL 7.9 (L)   Hematocrit Latest Ref Range: 37.0 - 47.0 % 25.6 (L)   MCV Latest Ref Range: 81.4 - 97.8 fL 106.2 (H)   MCH Latest Ref Range: 27.0 - 33.0 pg 32.8   MCHC Latest Ref Range: 33.6 - 35.0 g/dL 30.9 (L)   RDW Latest Ref Range: 35.9 - 50.0 fL 55.9 (H)   Platelet Count Latest Ref Range: 164 - 446 K/uL 134 (L)   MPV Latest Ref Range: 9.0 - 12.9 fL 9.6   Sodium Latest Ref Range: 135 - 145 mmol/L 139   Potassium Latest Ref Range: 3.6 - 5.5 mmol/L 4.0   Chloride Latest Ref Range: 96 - 112 mmol/L 103   Co2 Latest Ref Range: 20 - 33 mmol/L 33   Anion Gap Latest Ref Range: 0.0 - 11.9  3.0   Glucose Latest Ref Range: 65 - 99 mg/dL 140 (H)   Bun Latest Ref Range: 8 - 22 mg/dL 14   Creatinine Latest Ref Range: 0.50 - 1.40 mg/dL 0.63   GFR If  Latest Ref Range: >60 mL/min/1.73 m 2 >60   GFR If Non  Latest Ref Range: >60 mL/min/1.73 m 2 >60   Calcium Latest Ref Range: 8.5 - 10.5 mg/dL 9.9   AST(SGOT) Latest Ref Range: 12 - 45 U/L 35   ALT(SGPT) Latest Ref Range: 2 - 50 U/L 49   Alkaline Phosphatase Latest Ref Range: 30 - 99 U/L 111 (H)   Total Bilirubin Latest Ref  Range: 0.1 - 1.5 mg/dL 0.3   Albumin Latest Ref Range: 3.2 - 4.9 g/dL 3.0 (L)   Total Protein Latest Ref Range: 6.0 - 8.2 g/dL 5.4 (L)   Globulin Latest Ref Range: 1.9 - 3.5 g/dL 2.4   A-G Ratio Latest Units: g/dL 1.3       Imaging:    Impression 10/3/18 CT        1. Further interval decrease in size of the small mass in the pancreatic head neck junction.    2. No metastatic disease in the abdomen.         Pathology:  FINAL DIAGNOSIS:    A. Falciform ligament:         Fibroadipose tissue with no evidence of metastatic carcinoma          identified.  B. Gallbladder:         Acute and chronic cholecystitis with cholelithiasis.  C. Celiac node:         Two lymph nodes negative for metastatic carcinoma (0/2).  D. Portal node:         One lymph node negative for metastatic carcinoma (0/1).  E. Spleen:         Benign spleen with no metastatic carcinoma identified.  F. Pancreas:         Pancreas partial resection demonstrating pancreatic          intraepithelial neoplasia with focal low to moderate dysplasia          (Pan-IN-2) but no invasive carcinoma present and dysplasia is          not present on margins. Background mild fibrosis and chronic          pancreatitis.         Ten lymph nodes negative for metastatic carcinoma (0/10).  G. Pancreatic tumor:         Pancreas partial resection demonstrating pancreatic          intraepithelial neoplasia with low to moderate dysplasia          (Pan-IN-2) but no residual invasive carcinoma present.          Background mild fibrosis and chronic pancreatitis.         Eight lymph nodes negative for metastatic carcinoma (0/8).    Procedures:11/6/18    1.  Exploratory laparotomy.  2.  Distal pancreatectomy and splenectomy.  3.  Open cholecystectomy.  4.  Omental flap.  5.  Celiac and portal node dissection separately.  6.  Intraoperative ultrasound staging and assistance in locating tumor.    Medical Decision Making:  Today's Assessment / Status / Plan:     In light of the present  findings, the patient will follow up with Dr. Eaton to see if she is in need of any adjuvant chemotherapy.  The only recommendation I could see is that the patient would benefit from chemoradiotherapy due to the fact that we knew she had borderline resectable pancreatic cancer involving the vessels.  I will await to see what her medical oncologist decides.  I will also contact the team at [facility] to get their opinion.    1. Malignant neoplasm of head of pancreas (HCC)         She agreed and verbalized her agreement and understanding with the current plan. I answered all questions and concerns she has at this time and advised her to call at any time in there interim with questions or concerns in regards to her care.    Thank you for allowing me to participate in her care, I will continue to follow closely.       Please note that this dictation was created using voice recognition software. I have made every reasonable attempt to correct obvious errors, but I expect that there are errors of grammar and possibly content that I did not discover before finalizing the note.     Thank you for this consultation and allowing me to participate in your patient's care. If I can be of further service please contact my office.

## 2018-11-30 DIAGNOSIS — Z01.812 PRE-PROCEDURAL LABORATORY EXAMINATIONS: ICD-10-CM

## 2018-11-30 DIAGNOSIS — C25.9 MALIGNANT NEOPLASM OF PANCREAS, UNSPECIFIED LOCATION OF MALIGNANCY (HCC): ICD-10-CM

## 2018-12-07 DIAGNOSIS — B37.31 YEAST VAGINITIS: ICD-10-CM

## 2018-12-07 RX ORDER — FLUCONAZOLE 150 MG/1
TABLET ORAL
Qty: 1 TAB | Refills: 0 | Status: SHIPPED | OUTPATIENT
Start: 2018-12-07 | End: 2019-07-16

## 2018-12-08 ENCOUNTER — PATIENT OUTREACH (OUTPATIENT)
Dept: HEALTH INFORMATION MANAGEMENT | Facility: OTHER | Age: 73
End: 2018-12-08

## 2018-12-10 DIAGNOSIS — N95.2 VAGINAL ATROPHY: ICD-10-CM

## 2018-12-10 RX ORDER — CONJUGATED ESTROGENS 0.62 MG/G
0.5 CREAM VAGINAL DAILY
Qty: 30 G | Refills: 2 | Status: SHIPPED | OUTPATIENT
Start: 2018-12-10 | End: 2019-07-16

## 2018-12-10 NOTE — PROGRESS NOTES
Larissa Ramirez was admitted to [facility] on 11/5/18 for a distal pancreatomy, splenectomy, portal vein resection and repair. Patient was discharged on 11/8/18. [facility] patient advocate was able to successfully engage with patient post-discharge and many times throughout the life cycle. Per discharge orders, patient had 2 follow-up appointments with Dr. Valadez (general surgery) on 11/21/18 and on 11/27/18. Patient declined following up with her PCP. Patient has a future appointment with Dr. Valadez on 1/2/19. [facility]PS screening was not conducted secondary to low LACE score upon hospital discharge.

## 2018-12-29 ENCOUNTER — HOSPITAL ENCOUNTER (OUTPATIENT)
Dept: RADIOLOGY | Facility: MEDICAL CENTER | Age: 73
End: 2018-12-29
Attending: INTERNAL MEDICINE
Payer: MEDICARE

## 2018-12-29 DIAGNOSIS — C25.2 MALIGNANT NEOPLASM OF TAIL OF PANCREAS (HCC): ICD-10-CM

## 2018-12-29 PROCEDURE — 700117 HCHG RX CONTRAST REV CODE 255: Performed by: INTERNAL MEDICINE

## 2018-12-29 PROCEDURE — 71260 CT THORAX DX C+: CPT

## 2018-12-29 RX ADMIN — IOHEXOL 100 ML: 350 INJECTION, SOLUTION INTRAVENOUS at 13:48

## 2018-12-31 NOTE — PROGRESS NOTES
Subjective:      Primary care physician:Kaitlyn Long M.D.  Medical Oncologist: Paty Eaton MD    Chief Complaint:   1. Malignant neoplasm of head of pancreas (HCC)         History of presenting illness:  Ms. Ramirez  is a pleasant 73 y.o. year old female who presented with her last postoperative visit.  She denies any fever or chills, nausea or vomiting.  I think the patient may have misunderstood me previously at the last visit in that I do recommend she undergo adjuvant chemotherapy.  Her response which was a complete responder is very rare and it is unclear whether the patient does or does not need chemotherapy.  On the safe side, I am recommending she proceed with adjuvant chemotherapy as if though she were positive.      Past Medical History:   Diagnosis Date   • Bowel habit changes 05/17/2018    constipation related to pain meds   • Bronchitis 2005   • Cancer (HCC) 05/2018    adenocarcinoma pancreatic tail   • Chronic low back pain     followed by [facility] Dr. Garcia, Dr. Mobley   • Chronic neck pain     followed by [facility] Dr. Garcia, Dr. Mobley   • Dental disorder     upper partial   • Hypertension 2015    controlled on meds   • Other specified symptom associated with female genital organs    • Pain 05/17/2018    Right shoulder, right arm, low back, right leg.   • Pre-diabetes    • Snoring      Past Surgical History:   Procedure Laterality Date   • PANCREATECTOMY  11/5/2018    Procedure: PANCREATECTOMY-  DISTAL;  Surgeon: Carl Valadez M.D.;  Location: [facility];  Service: General   • SPLENECTOMY  11/5/2018    Procedure: SPLENECTOMY;  Surgeon: Carl Valadez M.D.;  Location: [facility];  Service: General   • NODE DISSECTION  11/5/2018    Procedure: NODE DISSECTION;  Surgeon: Carl Valadez M.D.;  Location: [facility];  Service: General   • OMENTECTOMY  11/5/2018    Procedure: OMENTECTOMY-  OMENTAL FLAP, INTRAOPERATIVE ULTRASOUND, PORTAL  VEIN RESECTION AND REPAIR, CELIAC TRUNK PARTIAL RESECTION AND REPAIR;  Surgeon: Carl Valadez M.D.;  Location: SURGERY [facility];  Service: General   • CATH PLACEMENT Right 5/30/2018    Procedure: CATH PLACEMENT - CEPHALIC POWER PORT;  Surgeon: Carl Hunter M.D.;  Location: SURGERY SAME DAY [facility];  Service: General   • GASTROSCOPY  5/18/2018    Procedure: GASTROSCOPY;  Surgeon: Geovanni Romero M.D.;  Location: [facility];  Service: EUS   • EGD W/ENDOSCOPIC ULTRASOUND  5/18/2018    Procedure: EGD W/ENDOSCOPIC ULTRASOUND-  UPPER CURVILLINEAR;  Surgeon: Geovanni Romero M.D.;  Location: [facility];  Service: EUS   • EGD WITH ASP/BX  5/18/2018    Procedure: EGD WITH ASP/BX-  GASTRIC BIOPSIES,  FNA BIOPSIES OF PANCREAS HEAD MASS AND OR GALLBLADDER;  Surgeon: Geovanni Romero M.D.;  Location: [facility];  Service: EUS   • DENTAL EXTRACTION(S)  12/2017    AND hx of other dental extractions with sedation   • HYSTERECTOMY ROBOTIC XI  7/9/2015    Procedure: HYSTERECTOMY ROBOTIC XI W/ BILATERAL SALPINGO-OOPHORECTOMY, MCCALLS PROCEDURES;  Surgeon: Brian Parks M.D.;  Location: [facility];  Service:    • COLONOSCOPY  2008   • CERVICAL DISK AND FUSION ANTERIOR  2006    neck cage/fusion   • DENTAL EXTRACTION(S)  1966    [place] teeth   • OTHER      Sedation for several MRI's   • OTHER NEUROLOGICAL SURG      cervical 2007     Allergies   Allergen Reactions   • Levofloxacin Swelling     Swelling of tongue and neck   • Nitrofurantoin Swelling     Swelling of lips, tongue, face   • Amitriptyline Swelling     Facial swelling    • Sulfa Drugs Nausea   • Lyrica Rash     .     Outpatient Encounter Prescriptions as of 1/2/2019   Medication Sig Dispense Refill   • PREMARIN 0.625 MG/GM Cream INSERT 0.5 G IN VAGINA EVERY DAY. 30 g 2   • fluconazole (DIFLUCAN) 150 MG tablet Take 1 tablet by mouth as a single dose. 1 Tab 0   • topiramate (TOPAMAX) 50 MG  tablet TAKE ONE TAB BY MOUTH TWICE DAILY  2   • polyethylene glycol 3350 (MIRALAX) Powder Take 17 g by mouth every day. 300 g 3   • celecoxib (CELEBREX) 100 MG Cap Take 1 Cap by mouth 2 times a day, with meals. 60 Cap 1   • potassium chloride SA (KDUR) 20 MEQ Tab CR Take 20 mEq by mouth every day.  2   • conjugated estrogen (PREMARIN) 0.625 MG/GM Cream Insert 0.5 g in vagina every 72 hours as needed.     • lisinopril-hydrochlorothiazide (PRINZIDE, ZESTORETIC) 20-25 MG per tablet TAKE 1 TAB BY MOUTH EVERY DAY. 90 Tab 3   • Menthol, Topical Analgesic, (BIOFREEZE COLORLESS) 4 % Gel 1 Each by Apply externally route 2 times a day as needed.     • Calcium Carb-Cholecalciferol (CALCIUM 1000 + D PO) Take 1 Tab by mouth every day.     • Multiple Vitamins-Iron (MULTIVITAMIN/IRON PO) Take 1 Tab by mouth every day.     • diazepam (VALIUM) 2 MG TABS Take 4-6 mg by mouth at bedtime as needed for Sleep. Take two to three tabs by mouth at bedtime as needed        No facility-administered encounter medications on file as of 2019.      Social History     Social History   • Marital status:      Spouse name: N/A   • Number of children: 3   • Years of education: N/A     Occupational History   • retired      Social History Main Topics   • Smoking status: Never Smoker   • Smokeless tobacco: Never Used   • Alcohol use No   • Drug use: No   • Sexual activity: Yes     Partners: Male     Other Topics Concern   • Not on file     Social History Narrative   • No narrative on file      History   Smoking Status   • Never Smoker   Smokeless Tobacco   • Never Used     History   Alcohol Use No     History   Drug Use No      OB History    Para Term  AB Living   4 3           SAB TAB Ectopic Molar Multiple Live Births                    # Outcome Date GA Lbr Cory/2nd Weight Sex Delivery Anes PTL Lv   4 Para            3 Para            2 Para            1                   Patient's last menstrual period was  "06/27/1986.    Family History   Problem Relation Age of Onset   • No Known Problems Mother    • No Known Problems Father        Review of Systems   Musculoskeletal: Positive for back pain and joint pain.        Muscle aches   Neurological: Positive for tingling.   All other systems reviewed and are negative.       Objective:   /64 (BP Location: Left arm, Patient Position: Sitting)   Pulse 67   Temp 36.4 °C (97.6 °F)   Ht 1.651 m (5' 5\")   Wt 67.4 kg (148 lb 9.4 oz)   LMP 06/27/1986   SpO2 96%   BMI 24.73 kg/m²       Physical Exam   Abdominal: Soft. Bowel sounds are normal.   Her incision is clean, dry, and intact.       Results for COLTEN AMANDA (MRN [id number]) as of 11/27/2018 08:09    Ref. Range 11/8/2018 03:07   WBC Latest Ref Range: 4.8 - 10.8 K/uL 11.6 (H)   RBC Latest Ref Range: 4.20 - 5.40 M/uL 2.41 (L)   Hemoglobin Latest Ref Range: 12.0 - 16.0 g/dL 7.9 (L)   Hematocrit Latest Ref Range: 37.0 - 47.0 % 25.6 (L)   MCV Latest Ref Range: 81.4 - 97.8 fL 106.2 (H)   MCH Latest Ref Range: 27.0 - 33.0 pg 32.8   MCHC Latest Ref Range: 33.6 - 35.0 g/dL 30.9 (L)   RDW Latest Ref Range: 35.9 - 50.0 fL 55.9 (H)   Platelet Count Latest Ref Range: 164 - 446 K/uL 134 (L)   MPV Latest Ref Range: 9.0 - 12.9 fL 9.6   Sodium Latest Ref Range: 135 - 145 mmol/L 139   Potassium Latest Ref Range: 3.6 - 5.5 mmol/L 4.0   Chloride Latest Ref Range: 96 - 112 mmol/L 103   Co2 Latest Ref Range: 20 - 33 mmol/L 33   Anion Gap Latest Ref Range: 0.0 - 11.9  3.0   Glucose Latest Ref Range: 65 - 99 mg/dL 140 (H)   Bun Latest Ref Range: 8 - 22 mg/dL 14   Creatinine Latest Ref Range: 0.50 - 1.40 mg/dL 0.63   GFR If  Latest Ref Range: >60 mL/min/1.73 m 2 >60   GFR If Non  Latest Ref Range: >60 mL/min/1.73 m 2 >60   Calcium Latest Ref Range: 8.5 - 10.5 mg/dL 9.9   AST(SGOT) Latest Ref Range: 12 - 45 U/L 35   ALT(SGPT) Latest Ref Range: 2 - 50 U/L 49   Alkaline Phosphatase Latest Ref Range: 30 - 99 U/L " 111 (H)   Total Bilirubin Latest Ref Range: 0.1 - 1.5 mg/dL 0.3   Albumin Latest Ref Range: 3.2 - 4.9 g/dL 3.0 (L)   Total Protein Latest Ref Range: 6.0 - 8.2 g/dL 5.4 (L)   Globulin Latest Ref Range: 1.9 - 3.5 g/dL 2.4   A-G Ratio Latest Units: g/dL 1.3         Imaging:     Impression CT 12/29/18      1.  Prior distal pancreatectomy and cholecystectomy.  2.  No mass demonstrated in the remaining pancreatic head.  3.  Minimal hazy soft tissue density adjacent the proximal celiac trunk and SMA which may postoperative or posttreatment fibrosis, however residual tumor is a consideration.  4.  No other evidence for metastatic disease in the chest, abdomen or pelvis.        Pathology:  FINAL DIAGNOSIS:    A. Falciform ligament:         Fibroadipose tissue with no evidence of metastatic carcinoma          identified.  B. Gallbladder:         Acute and chronic cholecystitis with cholelithiasis.  C. Celiac node:         Two lymph nodes negative for metastatic carcinoma (0/2).  D. Portal node:         One lymph node negative for metastatic carcinoma (0/1).  E. Spleen:         Benign spleen with no metastatic carcinoma identified.  F. Pancreas:         Pancreas partial resection demonstrating pancreatic          intraepithelial neoplasia with focal low to moderate dysplasia          (Pan-IN-2) but no invasive carcinoma present and dysplasia is          not present on margins. Background mild fibrosis and chronic          pancreatitis.         Ten lymph nodes negative for metastatic carcinoma (0/10).  G. Pancreatic tumor:         Pancreas partial resection demonstrating pancreatic          intraepithelial neoplasia with low to moderate dysplasia          (Pan-IN-2) but no residual invasive carcinoma present.          Background mild fibrosis and chronic pancreatitis.         Eight lymph nodes negative for metastatic carcinoma (0/8).     Procedures:11/6/18     1.  Exploratory laparotomy.  2.  Distal pancreatectomy and  splenectomy.  3.  Open cholecystectomy.  4.  Omental flap.  5.  Celiac and portal node dissection separately.  6.  Intraoperative ultrasound staging and assistance in locating tumor.         Assessment:     1. Malignant neoplasm of head of pancreas (HCC)        Medical Decision Making:  Today's Assessment / Status / Plan:     In light of the present findings, the patient is being discharged from my clinic and will follow-up at her next surveillance imaging which would be February 2019.  She will follow-up with her medical oncologist to start her adjuvant chemotherapy.  I did spend some time describing the need for it and the reasons behind it because of her atrophic response and the aggressiveness of pancreatic cancer.      She agreed and verbalized her agreement and understanding with the current plan. I answered all questions and concerns she has at this time and advised her to call at any time in there interim with questions or concerns in regards to her care.    Thank you for allowing me to participate in her care, I will continue to follow closely.       Please note that this dictation was created using voice recognition software. I have made every reasonable attempt to correct obvious errors, but I expect that there are errors of grammar and possibly content that I did not discover before finalizing the note.     Thank you for this consultation and allowing me to participate in your patient's care. If I can be of further service please contact my office.

## 2019-01-02 ENCOUNTER — OFFICE VISIT (OUTPATIENT)
Dept: HEMATOLOGY ONCOLOGY | Facility: MEDICAL CENTER | Age: 74
End: 2019-01-02
Payer: MEDICARE

## 2019-01-02 VITALS
HEART RATE: 67 BPM | OXYGEN SATURATION: 96 % | BODY MASS INDEX: 24.76 KG/M2 | DIASTOLIC BLOOD PRESSURE: 64 MMHG | TEMPERATURE: 97.6 F | SYSTOLIC BLOOD PRESSURE: 112 MMHG | WEIGHT: 148.59 LBS | HEIGHT: 65 IN

## 2019-01-02 DIAGNOSIS — C25.0 MALIGNANT NEOPLASM OF HEAD OF PANCREAS (HCC): ICD-10-CM

## 2019-01-02 RX ORDER — HYDROCODONE BITARTRATE AND ACETAMINOPHEN 10; 325 MG/1; MG/1
TABLET ORAL
Refills: 0 | COMMUNITY
Start: 2018-12-24 | End: 2020-08-24

## 2019-01-02 RX ORDER — TRAMADOL HYDROCHLORIDE 50 MG/1
50 TABLET ORAL EVERY 8 HOURS PRN
Refills: 3 | COMMUNITY
Start: 2018-12-08 | End: 2020-09-21

## 2019-01-02 ASSESSMENT — ENCOUNTER SYMPTOMS
TINGLING: 1
BACK PAIN: 1

## 2019-01-02 NOTE — PATIENT INSTRUCTIONS
The patient will follow up with me at her next surveillance imaging and is being discharged from my clinic for her last postoperative visit.

## 2019-01-04 ENCOUNTER — APPOINTMENT (OUTPATIENT)
Dept: MEDICAL GROUP | Facility: PHYSICIAN GROUP | Age: 74
End: 2019-01-04
Payer: MEDICARE

## 2019-01-04 DIAGNOSIS — Z23 IMMUNIZATION DUE: ICD-10-CM

## 2019-01-07 RX ORDER — ONDANSETRON 8 MG/1
8 TABLET, ORALLY DISINTEGRATING ORAL
Status: CANCELLED | OUTPATIENT
Start: 2019-01-22

## 2019-01-07 RX ORDER — 0.9 % SODIUM CHLORIDE 0.9 %
VIAL (ML) INJECTION PRN
Status: CANCELLED | OUTPATIENT
Start: 2019-01-22

## 2019-01-07 RX ORDER — LOPERAMIDE HYDROCHLORIDE 2 MG/1
2 CAPSULE ORAL PRN
Status: CANCELLED | OUTPATIENT
Start: 2019-01-22

## 2019-01-07 RX ORDER — LORAZEPAM 2 MG/ML
1 INJECTION INTRAMUSCULAR ONCE
Status: CANCELLED
Start: 2019-01-23 | End: 2019-01-17

## 2019-01-07 RX ORDER — ONDANSETRON 2 MG/ML
4 INJECTION INTRAMUSCULAR; INTRAVENOUS
Status: CANCELLED | OUTPATIENT
Start: 2019-01-22

## 2019-01-07 RX ORDER — DEXTROSE MONOHYDRATE 50 MG/ML
INJECTION, SOLUTION INTRAVENOUS CONTINUOUS
Status: CANCELLED | OUTPATIENT
Start: 2019-01-22

## 2019-01-07 RX ORDER — 0.9 % SODIUM CHLORIDE 0.9 %
20 VIAL (ML) INJECTION PRN
Status: CANCELLED | OUTPATIENT
Start: 2019-01-23

## 2019-01-07 RX ORDER — 0.9 % SODIUM CHLORIDE 0.9 %
5 VIAL (ML) INJECTION PRN
Status: CANCELLED | OUTPATIENT
Start: 2019-01-22

## 2019-01-07 RX ORDER — LORAZEPAM 2 MG/ML
1 INJECTION INTRAMUSCULAR ONCE
Status: CANCELLED
Start: 2019-01-22 | End: 2019-01-15

## 2019-01-07 RX ORDER — LORAZEPAM 2 MG/ML
1 INJECTION INTRAMUSCULAR ONCE
Status: CANCELLED
Start: 2019-01-20 | End: 2019-01-14

## 2019-01-07 RX ORDER — PROCHLORPERAZINE MALEATE 10 MG
10 TABLET ORAL EVERY 6 HOURS PRN
Status: CANCELLED | OUTPATIENT
Start: 2019-01-22

## 2019-01-08 ENCOUNTER — APPOINTMENT (OUTPATIENT)
Dept: RADIOLOGY | Facility: MEDICAL CENTER | Age: 74
End: 2019-01-08
Attending: FAMILY MEDICINE
Payer: MEDICARE

## 2019-01-15 ENCOUNTER — HOSPITAL ENCOUNTER (OUTPATIENT)
Dept: RADIOLOGY | Facility: MEDICAL CENTER | Age: 74
End: 2019-01-15
Attending: FAMILY MEDICINE
Payer: MEDICARE

## 2019-01-15 DIAGNOSIS — Z12.31 VISIT FOR SCREENING MAMMOGRAM: ICD-10-CM

## 2019-01-15 PROCEDURE — 77063 BREAST TOMOSYNTHESIS BI: CPT

## 2019-01-16 DIAGNOSIS — Z23 NEED FOR VACCINATION FOR PNEUMOCOCCUS: ICD-10-CM

## 2019-01-17 ENCOUNTER — NON-PROVIDER VISIT (OUTPATIENT)
Dept: MEDICAL GROUP | Facility: PHYSICIAN GROUP | Age: 74
End: 2019-01-17
Payer: MEDICARE

## 2019-01-17 PROCEDURE — 90670 PCV13 VACCINE IM: CPT | Performed by: FAMILY MEDICINE

## 2019-01-17 PROCEDURE — G0009 ADMIN PNEUMOCOCCAL VACCINE: HCPCS | Performed by: FAMILY MEDICINE

## 2019-01-17 NOTE — PROGRESS NOTES
"Larissa Ramirez is a 73 y.o. female here for a non-provider visit for:   PREVNAR (PCV13) 1 of 1    Reason for immunization: Overdue/Provider Recommended  Immunization records indicate need for vaccine: Yes, confirmed with Epic  Minimum interval has been met for this vaccine: Yes  ABN completed: Yes    Order and dose verified by: SIOMARA  VIS Dated  11/5/15 was given to patient: Yes  All IAC Questionnaire questions were answered \"No.\"    Patient tolerated injection and no adverse effects were observed or reported: Yes    Pt scheduled for next dose in series: Not Indicated  "

## 2019-01-20 ENCOUNTER — OUTPATIENT INFUSION SERVICES (OUTPATIENT)
Dept: ONCOLOGY | Facility: MEDICAL CENTER | Age: 74
End: 2019-01-20
Attending: INTERNAL MEDICINE
Payer: MEDICARE

## 2019-01-20 VITALS
HEIGHT: 66 IN | SYSTOLIC BLOOD PRESSURE: 116 MMHG | RESPIRATION RATE: 18 BRPM | DIASTOLIC BLOOD PRESSURE: 55 MMHG | WEIGHT: 148.81 LBS | BODY MASS INDEX: 23.92 KG/M2 | HEART RATE: 69 BPM | TEMPERATURE: 97.4 F

## 2019-01-20 DIAGNOSIS — C25.0 MALIGNANT NEOPLASM OF HEAD OF PANCREAS (HCC): ICD-10-CM

## 2019-01-20 LAB
ALBUMIN SERPL BCP-MCNC: 4.1 G/DL (ref 3.2–4.9)
ALBUMIN/GLOB SERPL: 1.3 G/DL
ALP SERPL-CCNC: 89 U/L (ref 30–99)
ALT SERPL-CCNC: 18 U/L (ref 2–50)
ANION GAP SERPL CALC-SCNC: 6 MMOL/L (ref 0–11.9)
AST SERPL-CCNC: 19 U/L (ref 12–45)
BASOPHILS # BLD AUTO: 1.1 % (ref 0–1.8)
BASOPHILS # BLD: 0.06 K/UL (ref 0–0.12)
BILIRUB SERPL-MCNC: 0.2 MG/DL (ref 0.1–1.5)
BUN SERPL-MCNC: 23 MG/DL (ref 8–22)
CALCIUM SERPL-MCNC: 10.6 MG/DL (ref 8.5–10.5)
CANCER AG19-9 SERPL-ACNC: 6.6 U/ML (ref 0–35)
CHLORIDE SERPL-SCNC: 105 MMOL/L (ref 96–112)
CO2 SERPL-SCNC: 28 MMOL/L (ref 20–33)
CREAT SERPL-MCNC: 1.04 MG/DL (ref 0.5–1.4)
EOSINOPHIL # BLD AUTO: 0.18 K/UL (ref 0–0.51)
EOSINOPHIL NFR BLD: 3.4 % (ref 0–6.9)
ERYTHROCYTE [DISTWIDTH] IN BLOOD BY AUTOMATED COUNT: 49.4 FL (ref 35.9–50)
GLOBULIN SER CALC-MCNC: 3.1 G/DL (ref 1.9–3.5)
GLUCOSE SERPL-MCNC: 112 MG/DL (ref 65–99)
HCT VFR BLD AUTO: 38.3 % (ref 37–47)
HGB BLD-MCNC: 12.5 G/DL (ref 12–16)
IMM GRANULOCYTES # BLD AUTO: 0.01 K/UL (ref 0–0.11)
IMM GRANULOCYTES NFR BLD AUTO: 0.2 % (ref 0–0.9)
LYMPHOCYTES # BLD AUTO: 2.08 K/UL (ref 1–4.8)
LYMPHOCYTES NFR BLD: 39.2 % (ref 22–41)
MAGNESIUM SERPL-MCNC: 2 MG/DL (ref 1.5–2.5)
MCH RBC QN AUTO: 31.1 PG (ref 27–33)
MCHC RBC AUTO-ENTMCNC: 32.6 G/DL (ref 33.6–35)
MCV RBC AUTO: 95.3 FL (ref 81.4–97.8)
MONOCYTES # BLD AUTO: 0.69 K/UL (ref 0–0.85)
MONOCYTES NFR BLD AUTO: 13 % (ref 0–13.4)
NEUTROPHILS # BLD AUTO: 2.28 K/UL (ref 2–7.15)
NEUTROPHILS NFR BLD: 43.1 % (ref 44–72)
NRBC # BLD AUTO: 0 K/UL
NRBC BLD-RTO: 0 /100 WBC
PLATELET # BLD AUTO: 292 K/UL (ref 164–446)
PMV BLD AUTO: 8.7 FL (ref 9–12.9)
POTASSIUM SERPL-SCNC: 3.9 MMOL/L (ref 3.6–5.5)
PROT SERPL-MCNC: 7.2 G/DL (ref 6–8.2)
RBC # BLD AUTO: 4.02 M/UL (ref 4.2–5.4)
SODIUM SERPL-SCNC: 139 MMOL/L (ref 135–145)
WBC # BLD AUTO: 5.3 K/UL (ref 4.8–10.8)

## 2019-01-20 PROCEDURE — 83735 ASSAY OF MAGNESIUM: CPT

## 2019-01-20 PROCEDURE — 86301 IMMUNOASSAY TUMOR CA 19-9: CPT

## 2019-01-20 PROCEDURE — 80053 COMPREHEN METABOLIC PANEL: CPT

## 2019-01-20 PROCEDURE — 85025 COMPLETE CBC W/AUTO DIFF WBC: CPT

## 2019-01-20 PROCEDURE — 36415 COLL VENOUS BLD VENIPUNCTURE: CPT

## 2019-01-20 ASSESSMENT — PAIN SCALES - GENERAL: PAINLEVEL_OUTOF10: 3

## 2019-01-20 NOTE — PROGRESS NOTES
Patient arrived ambulatory to the [facility] for pre-chemo labs. Reviewed vital signs, and physician order. Patient denies S&S of infection, or bleeding. Labs collected via IV 24g butterfly to left AC access, visualized brisk blood return, labs collected per MD order, gauze and coban dressing placed per MD order. Confirmed upcoming appointment date and time with patient. Patient left the [facility] ambulatory in no sign of distress.

## 2019-01-21 NOTE — PROGRESS NOTES
"Pharmacy Chemotherapy Verification Note:    Patient Name: Larissa Ramirez      Dx: Locally Advanced Pancreatic Cancer        Protocol: FOLFIRINOX (Fluorouracil/Leucovorin/Irinotecan/OXALIplatin)     OXALIplatin 85 mg/m² IV over 2 hours on Day 1  Irinotecan IV over 90 minutes on Day 1  --06/11/18: dose reduced to 150 mg/m² for anticipated tolerance      --06/11/18: Leucovorin and 5-FU push are omitted from TP d/t anticipated tolerance.  Fluorouracil 1200 mg/m² CIVI over 24 hours on Days 1-2 (2400 mg/m² IV over 46 hours)  Every 14 days x4-12 cycles (unresectable or locally advanced)  NCCN Guidelines for Pancreatic Adenocarcinoma. V.2.2016.  Juan Pablo T, et al. N Engl J Med. 2011;364(79):1814-47.    Allergies:  Levofloxacin; Nitrofurantoin; Amitriptyline; Sulfa drugs; and Lyrica     /41   Pulse 69   Temp 37.1 °C (98.7 °F) (Temporal)   Resp 18   Ht 1.67 m (5' 5.75\")   Wt 68.4 kg (150 lb 12.7 oz)   LMP 06/27/1986   SpO2 97%   BMI 24.53 kg/m²  Body surface area is 1.78 meters squared.     All lab results within treatment parameters.     Drug Order   (Drug name, dose, route, IV Fluid & volume, frequency, number of doses) Cycle: 9 - delayed for surgical resection  Previous treatment: 10/8/18     Medication = OXALIplatin (Eloxitan)  Base Dose = 85 mg/m²  Calc Dose: Base Dose x 1.78m² = 151.3mg  Final Dose = 151.3mg  Route = IV  Fluid & Volume = D5W 250 mL  Admin Duration = Over 2 hours          <5% difference, ok to treat with final written dose   Medication = Irinotecan (Camptosar)  Base Dose = 150 mg/m²  Calc Dose: Base Dose x 1.78m² = 267mg  Final Dose = 267mg  Route = IV  Fluid & Volume = D5W 500 mL  Admin Duration = Over 90 minutes          <5% difference, ok to treat with final written dose   Medication = Fluorouracil (5-FU)  Base Dose = 2400 mg/m²/dose  Calc Dose:Base Dose x 1.79m² = 4272mg  Final Dose = 4270mg  Route = CIVI over 46 hours  Fluid & Volume = 85.4mL (+3 mL overfill)  Con = 50 mg/mL  Admin " Duration = to be given via CADD pump at 1.9mL/hour          <5% difference, ok to treat with final written dose     By my signature below, I confirm this process was performed independently with the BSA and all final chemotherapy dosing calculations congruent. I have reviewed the above chemotherapy order and that my calculation of the final dose and BSA (when applicable) corroborate those calculations of the  pharmacist.     Renetta LaneD.

## 2019-01-22 ENCOUNTER — OUTPATIENT INFUSION SERVICES (OUTPATIENT)
Dept: ONCOLOGY | Facility: MEDICAL CENTER | Age: 74
End: 2019-01-22
Attending: INTERNAL MEDICINE
Payer: MEDICARE

## 2019-01-22 VITALS
SYSTOLIC BLOOD PRESSURE: 113 MMHG | DIASTOLIC BLOOD PRESSURE: 41 MMHG | TEMPERATURE: 98.7 F | RESPIRATION RATE: 18 BRPM | HEIGHT: 66 IN | OXYGEN SATURATION: 97 % | HEART RATE: 69 BPM | BODY MASS INDEX: 24.23 KG/M2 | WEIGHT: 150.79 LBS

## 2019-01-22 DIAGNOSIS — C25.0 MALIGNANT NEOPLASM OF HEAD OF PANCREAS (HCC): ICD-10-CM

## 2019-01-22 PROCEDURE — 700105 HCHG RX REV CODE 258: Performed by: INTERNAL MEDICINE

## 2019-01-22 PROCEDURE — 96413 CHEMO IV INFUSION 1 HR: CPT

## 2019-01-22 PROCEDURE — A4212 NON CORING NEEDLE OR STYLET: HCPCS

## 2019-01-22 PROCEDURE — 96417 CHEMO IV INFUS EACH ADDL SEQ: CPT

## 2019-01-22 PROCEDURE — 700105 HCHG RX REV CODE 258

## 2019-01-22 PROCEDURE — 700111 HCHG RX REV CODE 636 W/ 250 OVERRIDE (IP)

## 2019-01-22 PROCEDURE — 96375 TX/PRO/DX INJ NEW DRUG ADDON: CPT

## 2019-01-22 PROCEDURE — G0498 CHEMO EXTEND IV INFUS W/PUMP: HCPCS

## 2019-01-22 PROCEDURE — 96415 CHEMO IV INFUSION ADDL HR: CPT

## 2019-01-22 PROCEDURE — 700111 HCHG RX REV CODE 636 W/ 250 OVERRIDE (IP): Performed by: INTERNAL MEDICINE

## 2019-01-22 RX ORDER — 0.9 % SODIUM CHLORIDE 0.9 %
VIAL (ML) INJECTION PRN
Status: DISCONTINUED | OUTPATIENT
Start: 2019-01-22 | End: 2019-01-22 | Stop reason: HOSPADM

## 2019-01-22 RX ORDER — LORAZEPAM 2 MG/ML
1 INJECTION INTRAMUSCULAR ONCE
Status: COMPLETED | OUTPATIENT
Start: 2019-01-22 | End: 2019-01-22

## 2019-01-22 RX ORDER — DEXTROSE MONOHYDRATE 50 MG/ML
INJECTION, SOLUTION INTRAVENOUS CONTINUOUS
Status: DISCONTINUED | OUTPATIENT
Start: 2019-01-22 | End: 2019-01-22 | Stop reason: HOSPADM

## 2019-01-22 RX ADMIN — ONDANSETRON HYDROCHLORIDE 16 MG: 2 SOLUTION INTRAMUSCULAR; INTRAVENOUS at 10:24

## 2019-01-22 RX ADMIN — DEXAMETHASONE SODIUM PHOSPHATE 12 MG: 4 INJECTION, SOLUTION INTRAMUSCULAR; INTRAVENOUS at 10:08

## 2019-01-22 RX ADMIN — DEXTROSE MONOHYDRATE 151.3 MG: 50 INJECTION, SOLUTION INTRAVENOUS at 11:03

## 2019-01-22 RX ADMIN — DEXTROSE MONOHYDRATE: 50 INJECTION, SOLUTION INTRAVENOUS at 10:08

## 2019-01-22 RX ADMIN — FLUOROURACIL 4270 MG: 50 INJECTION, SOLUTION INTRAVENOUS at 14:59

## 2019-01-22 RX ADMIN — DEXTROSE MONOHYDRATE 267 MG: 50 INJECTION, SOLUTION INTRAVENOUS at 13:16

## 2019-01-22 RX ADMIN — LORAZEPAM 1 MG: 2 INJECTION INTRAMUSCULAR; INTRAVENOUS at 10:50

## 2019-01-22 RX ADMIN — ATROPINE SULFATE 0.5 MG: 1 INJECTION, SOLUTION INTRAMUSCULAR; INTRAVENOUS; SUBCUTANEOUS at 13:16

## 2019-01-22 ASSESSMENT — PAIN SCALES - GENERAL
PAINLEVEL_OUTOF10: 3
PAINLEVEL: 3=SLIGHT PAIN

## 2019-01-22 NOTE — PROGRESS NOTES
Chemotherapy Verification - SECONDARY RN       Height = 167cm  Weight = 68.4kg  BSA = 1.78m2       Medication: Oxaliplatin  Dose: 85mg/m2  Calculated Dose: 151.3mg                             (In mg/m2, AUC, mg/kg)     Medication: Irinotecan  Dose: 150mg/m2  Calculated Dose: 267mg                             (In mg/m2, AUC, mg/kg)    Medication: 5FU CADD pump  Dose: 2400mg/m2  Calculated Dose: 4272mg over 46 hours                             (In mg/m2, AUC, mg/kg)  I confirm that this process was performed independently.

## 2019-01-22 NOTE — PROGRESS NOTES
Chemotherapy Verification - PRIMARY RN      Height = 167 cm  Weight = 68.4 kg  BSA = 1.78 m^2       Medication: Oxaliplatin  Dose: 85 mg/m^2  Calculated Dose: 151.3 mg                             (In mg/m2, AUC, mg/kg)     Medication: Irinotecan  Dose: 150 mg/m^2  Calculated Dose: 267 mg                             (In mg/m2, AUC, mg/kg)    Medication: Fluorouracil  Dose: 2400 mg/m^2  Calculated Dose: 4272 mg over 46 hours                              (In mg/m2, AUC, mg/kg)    I confirm this process was performed independently with the BSA and all final chemotherapy dosing calculations congruent.  Any discrepancies of 5% or greater have been addressed with the chemotherapy pharmacist. The resolution of the discrepancy has been documented in the EPIC progress notes.

## 2019-01-23 NOTE — PROGRESS NOTES
Patient here for FOLFIRINOX. Labs drawn previously and reviewed. Right chest port accessed using sterile technique; brisk blood return noted. Pre-medications given per MAR. Ativan given per MAR. Atropine given per MAR prior to Irinotecan. Oxaliplatin given per MAR. Irinotecan given per MAR. Patient connected to home infusion pump. Home chemotherapy spill kit given to patient. Next appointment scheduled. Discharged to self care; no apparent distress noted.

## 2019-01-24 ENCOUNTER — OUTPATIENT INFUSION SERVICES (OUTPATIENT)
Dept: ONCOLOGY | Facility: MEDICAL CENTER | Age: 74
End: 2019-01-24
Attending: INTERNAL MEDICINE
Payer: MEDICARE

## 2019-01-24 VITALS
TEMPERATURE: 97 F | HEART RATE: 74 BPM | OXYGEN SATURATION: 96 % | DIASTOLIC BLOOD PRESSURE: 61 MMHG | SYSTOLIC BLOOD PRESSURE: 119 MMHG | BODY MASS INDEX: 23.81 KG/M2 | WEIGHT: 148.15 LBS | HEIGHT: 66 IN | RESPIRATION RATE: 18 BRPM

## 2019-01-24 DIAGNOSIS — C25.0 MALIGNANT NEOPLASM OF HEAD OF PANCREAS (HCC): ICD-10-CM

## 2019-01-24 PROCEDURE — 96523 IRRIG DRUG DELIVERY DEVICE: CPT

## 2019-01-24 PROCEDURE — 700111 HCHG RX REV CODE 636 W/ 250 OVERRIDE (IP)

## 2019-01-24 RX ADMIN — SODIUM CHLORIDE, PRESERVATIVE FREE 500 UNITS: 5 INJECTION INTRAVENOUS at 17:02

## 2019-01-25 NOTE — PROGRESS NOTES
Pt scheduled for 5FU pump disconnect. Arrived ambulatory, independent; denied pain/discomfort, s/sx infection, or recent health changes. Pump volume verified @0 mL. Pt stated that pump had been alarming for some time as find R-chest port flushed, brisk blood return; hep locked, de-accessed. Treatment plan reviewed; pt verbalized knowledge of next appt; denied any unmet needs. Pt discharged ambulatory, independent, NAD.

## 2019-02-03 ENCOUNTER — OUTPATIENT INFUSION SERVICES (OUTPATIENT)
Dept: ONCOLOGY | Facility: MEDICAL CENTER | Age: 74
End: 2019-02-03
Attending: INTERNAL MEDICINE
Payer: MEDICARE

## 2019-02-03 VITALS
WEIGHT: 149.91 LBS | SYSTOLIC BLOOD PRESSURE: 125 MMHG | TEMPERATURE: 97.5 F | RESPIRATION RATE: 18 BRPM | HEART RATE: 66 BPM | OXYGEN SATURATION: 96 % | DIASTOLIC BLOOD PRESSURE: 40 MMHG | HEIGHT: 65 IN | BODY MASS INDEX: 24.98 KG/M2

## 2019-02-03 DIAGNOSIS — C25.2 MALIGNANT NEOPLASM OF TAIL OF PANCREAS (HCC): ICD-10-CM

## 2019-02-03 LAB
ALBUMIN SERPL BCP-MCNC: 3.9 G/DL (ref 3.2–4.9)
ALBUMIN/GLOB SERPL: 1.5 G/DL
ALP SERPL-CCNC: 63 U/L (ref 30–99)
ALT SERPL-CCNC: 14 U/L (ref 2–50)
ANION GAP SERPL CALC-SCNC: 5 MMOL/L (ref 0–11.9)
AST SERPL-CCNC: 17 U/L (ref 12–45)
BASOPHILS # BLD AUTO: 0.7 % (ref 0–1.8)
BASOPHILS # BLD: 0.04 K/UL (ref 0–0.12)
BILIRUB SERPL-MCNC: 0.2 MG/DL (ref 0.1–1.5)
BUN SERPL-MCNC: 25 MG/DL (ref 8–22)
CALCIUM SERPL-MCNC: 10.1 MG/DL (ref 8.5–10.5)
CANCER AG19-9 SERPL-ACNC: 11.6 U/ML (ref 0–35)
CHLORIDE SERPL-SCNC: 102 MMOL/L (ref 96–112)
CO2 SERPL-SCNC: 31 MMOL/L (ref 20–33)
CREAT SERPL-MCNC: 0.81 MG/DL (ref 0.5–1.4)
EOSINOPHIL # BLD AUTO: 0.15 K/UL (ref 0–0.51)
EOSINOPHIL NFR BLD: 2.6 % (ref 0–6.9)
ERYTHROCYTE [DISTWIDTH] IN BLOOD BY AUTOMATED COUNT: 48.2 FL (ref 35.9–50)
GLOBULIN SER CALC-MCNC: 2.6 G/DL (ref 1.9–3.5)
GLUCOSE SERPL-MCNC: 124 MG/DL (ref 65–99)
HCT VFR BLD AUTO: 36.3 % (ref 37–47)
HGB BLD-MCNC: 11.8 G/DL (ref 12–16)
IMM GRANULOCYTES # BLD AUTO: 0.01 K/UL (ref 0–0.11)
IMM GRANULOCYTES NFR BLD AUTO: 0.2 % (ref 0–0.9)
LYMPHOCYTES # BLD AUTO: 2.04 K/UL (ref 1–4.8)
LYMPHOCYTES NFR BLD: 34.7 % (ref 22–41)
MAGNESIUM SERPL-MCNC: 1.9 MG/DL (ref 1.5–2.5)
MCH RBC QN AUTO: 31 PG (ref 27–33)
MCHC RBC AUTO-ENTMCNC: 32.5 G/DL (ref 33.6–35)
MCV RBC AUTO: 95.3 FL (ref 81.4–97.8)
MONOCYTES # BLD AUTO: 0.75 K/UL (ref 0–0.85)
MONOCYTES NFR BLD AUTO: 12.8 % (ref 0–13.4)
NEUTROPHILS # BLD AUTO: 2.89 K/UL (ref 2–7.15)
NEUTROPHILS NFR BLD: 49 % (ref 44–72)
NRBC # BLD AUTO: 0 K/UL
NRBC BLD-RTO: 0 /100 WBC
PLATELET # BLD AUTO: 251 K/UL (ref 164–446)
PMV BLD AUTO: 8.9 FL (ref 9–12.9)
POTASSIUM SERPL-SCNC: 4.5 MMOL/L (ref 3.6–5.5)
PROT SERPL-MCNC: 6.5 G/DL (ref 6–8.2)
RBC # BLD AUTO: 3.81 M/UL (ref 4.2–5.4)
SODIUM SERPL-SCNC: 138 MMOL/L (ref 135–145)
WBC # BLD AUTO: 5.9 K/UL (ref 4.8–10.8)

## 2019-02-03 PROCEDURE — 83735 ASSAY OF MAGNESIUM: CPT

## 2019-02-03 PROCEDURE — 86301 IMMUNOASSAY TUMOR CA 19-9: CPT

## 2019-02-03 PROCEDURE — 36415 COLL VENOUS BLD VENIPUNCTURE: CPT

## 2019-02-03 PROCEDURE — 85025 COMPLETE CBC W/AUTO DIFF WBC: CPT

## 2019-02-03 PROCEDURE — 80053 COMPREHEN METABOLIC PANEL: CPT

## 2019-02-03 NOTE — PROGRESS NOTES
Patient seen today for pre-chemo labs.  Blood drawn from LAC with 23g butterfly for CBC, CMP, Mag, and .  Tolerated well. Returns Tuesday for chemo.

## 2019-02-04 RX ORDER — 0.9 % SODIUM CHLORIDE 0.9 %
VIAL (ML) INJECTION PRN
Status: CANCELLED | OUTPATIENT
Start: 2019-02-05

## 2019-02-04 RX ORDER — 0.9 % SODIUM CHLORIDE 0.9 %
5 VIAL (ML) INJECTION PRN
Status: CANCELLED | OUTPATIENT
Start: 2019-02-05

## 2019-02-04 RX ORDER — PROCHLORPERAZINE MALEATE 10 MG
10 TABLET ORAL EVERY 6 HOURS PRN
Status: CANCELLED | OUTPATIENT
Start: 2019-02-05

## 2019-02-04 RX ORDER — ONDANSETRON 8 MG/1
8 TABLET, ORALLY DISINTEGRATING ORAL
Status: CANCELLED | OUTPATIENT
Start: 2019-02-05

## 2019-02-04 RX ORDER — DEXTROSE MONOHYDRATE 50 MG/ML
INJECTION, SOLUTION INTRAVENOUS CONTINUOUS
Status: CANCELLED | OUTPATIENT
Start: 2019-02-05

## 2019-02-04 RX ORDER — LOPERAMIDE HYDROCHLORIDE 2 MG/1
2 CAPSULE ORAL PRN
Status: CANCELLED | OUTPATIENT
Start: 2019-02-05

## 2019-02-04 RX ORDER — LORAZEPAM 2 MG/ML
1 INJECTION INTRAMUSCULAR ONCE
Status: CANCELLED
Start: 2019-02-05 | End: 2019-02-04

## 2019-02-04 RX ORDER — 0.9 % SODIUM CHLORIDE 0.9 %
20 VIAL (ML) INJECTION PRN
Status: CANCELLED | OUTPATIENT
Start: 2019-02-06

## 2019-02-04 RX ORDER — ONDANSETRON 2 MG/ML
4 INJECTION INTRAMUSCULAR; INTRAVENOUS
Status: CANCELLED | OUTPATIENT
Start: 2019-02-05

## 2019-02-04 NOTE — PROGRESS NOTES
"Pharmacy Chemotherapy Calculations Note:    Patient Name: Larissa Ramirez      Dx: Locally Advanced Pancreatic Cancer     Cycle 10   Previous treatment: 1/22/2019     Protocol: FOLFIRINOX (Fluorouracil/Leucovorin/Irinotecan/OXALIplatin)      OXALIplatin 85 mg/m2 IV over 2 hours on Day 1  Irinotecan 180 mg/m2 IV over 90 min on Day 1  06/11/18: dose reduced to 150 mg/m2 for anticipated tolerance  06/11/18: Leucovorin and 5-FU push discontinued d/t  anticipated tolerance.  Fluorouracil 1200 mg/m2 CIVI over 24 hours on Days 1-2 (2400 mg/m2 IV over 46 hours)  Q14 days x 4-12 cycles (unresectable or locally advanced)    *Dosing Reference*    NCCN Guidelines for Pancreatic Adenocarcinoma. V.2.2016.  Juan Pablo T, et al. N Engl J Med. 2011;364(19):1811-18.    Alleriges: Levofloxacin; Nitrofurantoin; Amitriptyline; Sulfa drugs; and Lyrica  /48   Pulse 70   Temp 37 °C (98.6 °F) (Temporal)   Resp 18   Ht 1.651 m (5' 5\")   Wt 68.3 kg (150 lb 9.2 oz)   LMP 06/27/1986   SpO2 93%   BMI 25.06 kg/m²  Body surface area is 1.77 meters squared.     LABS 2/3/2019  ANC~ 2890 Plt = 251k   Hgb = 11.8  SCr = 0.81 mg/dL CrCl ~ 67 mL/min   LFT's = WNl TBili = 0.2  Mag = 1.9 K = 4.5     OXALIplatin (Eloxatin) 85 mg/m² x Body surface area is 1.77 meters squared. m² = 150.45 mg   <5% difference, ok to treat with final dose = 150.45 mg IV    Irinotecan (Camptosar) 150 mg/m² x Body surface area is 1.77 meters squared. m² = 265.5 mg   <5% difference, ok to treat with final dose = 265.6 mg IV    Fluorouracil (5-FU) 2400 mg/m² x Body surface area is 1.77 meters squared. m² = 4248 mg   <5% difference, ok to treat with final dose = 4250 mg    CIVI via CADD pump @ 1.8 mL/hr over 46 hours     YENIFER Topol, PharmD  "

## 2019-02-05 ENCOUNTER — OUTPATIENT INFUSION SERVICES (OUTPATIENT)
Dept: ONCOLOGY | Facility: MEDICAL CENTER | Age: 74
End: 2019-02-05
Attending: INTERNAL MEDICINE
Payer: MEDICARE

## 2019-02-05 VITALS
BODY MASS INDEX: 25.09 KG/M2 | RESPIRATION RATE: 18 BRPM | WEIGHT: 150.57 LBS | SYSTOLIC BLOOD PRESSURE: 128 MMHG | DIASTOLIC BLOOD PRESSURE: 48 MMHG | HEIGHT: 65 IN | TEMPERATURE: 98.6 F | HEART RATE: 70 BPM | OXYGEN SATURATION: 93 %

## 2019-02-05 DIAGNOSIS — C25.0 MALIGNANT NEOPLASM OF HEAD OF PANCREAS (HCC): ICD-10-CM

## 2019-02-05 PROCEDURE — 700105 HCHG RX REV CODE 258: Performed by: INTERNAL MEDICINE

## 2019-02-05 PROCEDURE — 700111 HCHG RX REV CODE 636 W/ 250 OVERRIDE (IP)

## 2019-02-05 PROCEDURE — 700105 HCHG RX REV CODE 258

## 2019-02-05 PROCEDURE — 96415 CHEMO IV INFUSION ADDL HR: CPT

## 2019-02-05 PROCEDURE — G0498 CHEMO EXTEND IV INFUS W/PUMP: HCPCS

## 2019-02-05 PROCEDURE — 700111 HCHG RX REV CODE 636 W/ 250 OVERRIDE (IP): Performed by: INTERNAL MEDICINE

## 2019-02-05 PROCEDURE — A4212 NON CORING NEEDLE OR STYLET: HCPCS

## 2019-02-05 PROCEDURE — 96413 CHEMO IV INFUSION 1 HR: CPT

## 2019-02-05 PROCEDURE — 96375 TX/PRO/DX INJ NEW DRUG ADDON: CPT

## 2019-02-05 PROCEDURE — 96417 CHEMO IV INFUS EACH ADDL SEQ: CPT

## 2019-02-05 RX ORDER — 0.9 % SODIUM CHLORIDE 0.9 %
VIAL (ML) INJECTION PRN
Status: DISCONTINUED | OUTPATIENT
Start: 2019-02-05 | End: 2019-02-05 | Stop reason: HOSPADM

## 2019-02-05 RX ORDER — LORAZEPAM 2 MG/ML
1 INJECTION INTRAMUSCULAR ONCE
Status: COMPLETED | OUTPATIENT
Start: 2019-02-05 | End: 2019-02-05

## 2019-02-05 RX ORDER — DEXTROSE MONOHYDRATE 50 MG/ML
INJECTION, SOLUTION INTRAVENOUS CONTINUOUS
Status: DISCONTINUED | OUTPATIENT
Start: 2019-02-05 | End: 2019-02-05 | Stop reason: HOSPADM

## 2019-02-05 RX ORDER — LIDOCAINE HYDROCHLORIDE 10 MG/ML
INJECTION, SOLUTION EPIDURAL; INFILTRATION; INTRACAUDAL; PERINEURAL
Status: COMPLETED
Start: 2019-02-05 | End: 2019-02-05

## 2019-02-05 RX ADMIN — LORAZEPAM 1 MG: 2 INJECTION INTRAMUSCULAR; INTRAVENOUS at 10:41

## 2019-02-05 RX ADMIN — DEXTROSE MONOHYDRATE 265.6 MG: 50 INJECTION, SOLUTION INTRAVENOUS at 13:01

## 2019-02-05 RX ADMIN — DEXTROSE MONOHYDRATE: 50 INJECTION, SOLUTION INTRAVENOUS at 09:56

## 2019-02-05 RX ADMIN — LIDOCAINE HYDROCHLORIDE 2 ML: 10 INJECTION, SOLUTION EPIDURAL; INFILTRATION; INTRACAUDAL; PERINEURAL at 15:07

## 2019-02-05 RX ADMIN — FLUOROURACIL 4250 MG: 50 INJECTION, SOLUTION INTRAVENOUS at 15:09

## 2019-02-05 RX ADMIN — ONDANSETRON HYDROCHLORIDE 16 MG: 2 SOLUTION INTRAMUSCULAR; INTRAVENOUS at 10:16

## 2019-02-05 RX ADMIN — DEXTROSE MONOHYDRATE 150.45 MG: 50 INJECTION, SOLUTION INTRAVENOUS at 10:48

## 2019-02-05 RX ADMIN — DEXAMETHASONE SODIUM PHOSPHATE 12 MG: 4 INJECTION, SOLUTION INTRAMUSCULAR; INTRAVENOUS at 09:57

## 2019-02-05 RX ADMIN — ATROPINE SULFATE 0.5 MG: 1 INJECTION, SOLUTION INTRAMUSCULAR; INTRAVENOUS; SUBCUTANEOUS at 13:00

## 2019-02-05 ASSESSMENT — PAIN SCALES - GENERAL: PAINLEVEL: 3=SLIGHT PAIN

## 2019-02-05 NOTE — PROGRESS NOTES
Chemotherapy Verification - SECONDARY RN     D1C10    Height = 165.1 cm  Weight = 68.3 kg  BSA = 1.77 m2       Medication: Oxaliplatin  Dose: 85 mg/m2  Calculated Dose: 150.45 mg                                 Medication: Irinotecan  Dose: 150 mg/m2  Calculated Dose: 265.5 mg                                 Medication: Fluorouracil (5FU) CADD pump  Dose: 2400 mg/m2  Calculated Dose: 4248 mg over 46 hours                              I confirm that this process was performed independently.

## 2019-02-05 NOTE — PROGRESS NOTES
Patient here for FOLFIRINOX. Labs drawn previously and reviewed. Right chest port accessed using sterile technique; brisk blood return noted. Pre-medications given per MAR. Ativan given per MAR. Atropine given per MAR prior to Irinotecan. Oxaliplatin given per MAR. Irinotecan given per MAR. Positive for nausea. Declined additional anti-nausea medications. Emesis x 1. No blood return noted prior to connecting home infusion pump. Port de-accessed. Lidocaine used prior to accessing port. Port re-accessed; brisk blood return noted. Patient connected to home infusion pump. Next appointment scheduled. Discharged to self care; no apparent distress noted.

## 2019-02-05 NOTE — PROGRESS NOTES
Chemotherapy Verification - PRIMARY RN      Height = 165.1 cm  Weight = 68.3 kg  BSA = 1.77 m^2       Medication: Oxaliplatin  Dose: 85 mg/m^2  Calculated Dose: 150.45 mg                             (In mg/m2, AUC, mg/kg)     Medication: Irinotecan  Dose: 150 mg/m^2  Calculated Dose: 265.5 mg                             (In mg/m2, AUC, mg/kg)    Medication: Fluorouracil  Dose: 2400 mg/m^2  Calculated Dose: 4248 mg over 46 hours                              (In mg/m2, AUC, mg/kg)    I confirm this process was performed independently with the BSA and all final chemotherapy dosing calculations congruent.  Any discrepancies of 5% or greater have been addressed with the chemotherapy pharmacist. The resolution of the discrepancy has been documented in the EPIC progress notes.

## 2019-02-05 NOTE — PROGRESS NOTES
"Pharmacy Chemotherapy Verification Note:    Patient Name: Larissa Ramirez      Dx: Locally Advanced Pancreatic Cancer        Protocol: FOLFIRINOX (Fluorouracil/Leucovorin/Irinotecan/OXALIplatin)     OXALIplatin 85 mg/m² IV over 2 hours on Day 1  Irinotecan IV over 90 minutes on Day 1  --06/11/18: dose reduced to 150 mg/m² for anticipated tolerance      --06/11/18: Leucovorin and 5-FU push are omitted from TP d/t anticipated tolerance.  Fluorouracil 1200 mg/m² CIVI over 24 hours on Days 1-2 (2400 mg/m² IV over 46 hours)  Every 14 days x4-12 cycles (unresectable or locally advanced)  NCCN Guidelines for Pancreatic Adenocarcinoma. V.2.2016.  Juan Pablo T, et al. N Engl J Med. 2011;364(54):181-38.    Allergies:  Levofloxacin; Nitrofurantoin; Amitriptyline; Sulfa drugs; and Lyrica       /48   Pulse 70   Temp 37 °C (98.6 °F) (Temporal)   Resp 18   Ht 1.651 m (5' 5\")   Wt 68.3 kg (150 lb 9.2 oz)   LMP 06/27/1986   SpO2 93%   BMI 25.06 kg/m²  Body surface area is 1.77 meters squared.    Labs 02/03/19:  ANC ~ 2900   Plt = 251k Hgb = 11.8 SCr = 0.81 mg/dL CrCl ~ 68 mL/min LFTs = WNL  Tbili = 0.2 Mg = 1.9 K = 4.5     Drug Order   (Drug name, dose, route, IV Fluid & volume, frequency, number of doses) Cycle 10   Previous treatment: 01/21/19   Medication = OXALIplatin (Eloxatin)  Base Dose = 85 mg/m²  Calc Dose: Base Dose x 1.77m² = 150.45mg  Final Dose = 150.45 mg  Route = IV  Fluid & Volume = D5W 250 mL  Admin Duration = Over 2 hours          <5% difference, ok to treat with final written dose   Medication = Irinotecan (Camptosar)  Base Dose = 150 mg/m²  Calc Dose: Base Dose x 1.77m² = 265.5mg  Final Dose = 265.6mg  Route = IV  Fluid & Volume = D5W 500 mL  Admin Duration = Over 90 minutes          <5% difference, ok to treat with final written dose   Medication = Fluorouracil (5-FU)  Base Dose = 2400 mg/m²/dose  Calc Dose:Base Dose x 1.77m² = 4248mg  Final Dose = 4250mg  Route = CIVI over 46 hours  Fluid & Volume = 85 " mL (+3 mL overfill)  Con = 50 mg/mL  Admin Duration = to be given via CADD pump at 1.8mL/hour          <5% difference, ok to treat with final written dose     By my signature below, I confirm this process was performed independently with the BSA and all final chemotherapy dosing calculations congruent. I have reviewed the above chemotherapy order and that my calculation of the final dose and BSA (when applicable) corroborate those calculations of the  pharmacist.       Dung Perez, PharmD, BCOP

## 2019-02-07 ENCOUNTER — OUTPATIENT INFUSION SERVICES (OUTPATIENT)
Dept: ONCOLOGY | Facility: MEDICAL CENTER | Age: 74
End: 2019-02-07
Attending: INTERNAL MEDICINE
Payer: MEDICARE

## 2019-02-07 VITALS
DIASTOLIC BLOOD PRESSURE: 47 MMHG | WEIGHT: 148.81 LBS | RESPIRATION RATE: 18 BRPM | OXYGEN SATURATION: 94 % | BODY MASS INDEX: 24.79 KG/M2 | HEIGHT: 65 IN | HEART RATE: 70 BPM | TEMPERATURE: 98 F | SYSTOLIC BLOOD PRESSURE: 119 MMHG

## 2019-02-07 PROCEDURE — 700111 HCHG RX REV CODE 636 W/ 250 OVERRIDE (IP)

## 2019-02-07 PROCEDURE — 96523 IRRIG DRUG DELIVERY DEVICE: CPT

## 2019-02-07 RX ADMIN — HEPARIN 500 UNITS: 100 SYRINGE at 16:50

## 2019-02-11 ENCOUNTER — APPOINTMENT (OUTPATIENT)
Dept: RADIOLOGY | Facility: MEDICAL CENTER | Age: 74
End: 2019-02-11
Attending: SURGERY
Payer: MEDICARE

## 2019-02-14 RX ORDER — 0.9 % SODIUM CHLORIDE 0.9 %
VIAL (ML) INJECTION PRN
Status: CANCELLED | OUTPATIENT
Start: 2019-03-05

## 2019-02-14 RX ORDER — ONDANSETRON 2 MG/ML
4 INJECTION INTRAMUSCULAR; INTRAVENOUS
Status: CANCELLED | OUTPATIENT
Start: 2019-03-05

## 2019-02-14 RX ORDER — DEXTROSE MONOHYDRATE 50 MG/ML
INJECTION, SOLUTION INTRAVENOUS CONTINUOUS
Status: CANCELLED | OUTPATIENT
Start: 2019-03-05

## 2019-02-14 RX ORDER — 0.9 % SODIUM CHLORIDE 0.9 %
5 VIAL (ML) INJECTION PRN
Status: CANCELLED | OUTPATIENT
Start: 2019-03-05

## 2019-02-14 RX ORDER — ONDANSETRON 8 MG/1
8 TABLET, ORALLY DISINTEGRATING ORAL
Status: CANCELLED | OUTPATIENT
Start: 2019-03-05

## 2019-02-14 RX ORDER — 0.9 % SODIUM CHLORIDE 0.9 %
20 VIAL (ML) INJECTION PRN
Status: CANCELLED | OUTPATIENT
Start: 2019-03-07

## 2019-02-14 RX ORDER — LOPERAMIDE HYDROCHLORIDE 2 MG/1
2 CAPSULE ORAL PRN
Status: CANCELLED | OUTPATIENT
Start: 2019-03-05

## 2019-02-14 RX ORDER — PROCHLORPERAZINE MALEATE 10 MG
10 TABLET ORAL EVERY 6 HOURS PRN
Status: CANCELLED | OUTPATIENT
Start: 2019-03-05

## 2019-02-14 RX ORDER — LORAZEPAM 2 MG/ML
1 INJECTION INTRAMUSCULAR ONCE
Status: CANCELLED
Start: 2019-03-05 | End: 2019-02-18

## 2019-02-14 RX ORDER — HEPARIN SODIUM (PORCINE) LOCK FLUSH IV SOLN 100 UNIT/ML 100 UNIT/ML
500 SOLUTION INTRAVENOUS PRN
Status: CANCELLED | OUTPATIENT
Start: 2019-03-07

## 2019-02-14 RX ORDER — HEPARIN SODIUM (PORCINE) LOCK FLUSH IV SOLN 100 UNIT/ML 100 UNIT/ML
500 SOLUTION INTRAVENOUS PRN
Status: CANCELLED | OUTPATIENT
Start: 2019-03-05

## 2019-02-19 ENCOUNTER — APPOINTMENT (OUTPATIENT)
Dept: ONCOLOGY | Facility: MEDICAL CENTER | Age: 74
End: 2019-02-19
Attending: INTERNAL MEDICINE
Payer: MEDICARE

## 2019-02-21 ENCOUNTER — APPOINTMENT (OUTPATIENT)
Dept: ONCOLOGY | Facility: MEDICAL CENTER | Age: 74
End: 2019-02-21
Attending: INTERNAL MEDICINE
Payer: MEDICARE

## 2019-02-22 ENCOUNTER — APPOINTMENT (OUTPATIENT)
Dept: RADIOLOGY | Facility: MEDICAL CENTER | Age: 74
End: 2019-02-22
Attending: SURGERY
Payer: MEDICARE

## 2019-03-02 ENCOUNTER — HOSPITAL ENCOUNTER (OUTPATIENT)
Facility: MEDICAL CENTER | Age: 74
End: 2019-03-02
Attending: NURSE PRACTITIONER
Payer: MEDICARE

## 2019-03-02 ENCOUNTER — OFFICE VISIT (OUTPATIENT)
Dept: URGENT CARE | Facility: CLINIC | Age: 74
End: 2019-03-02
Payer: MEDICARE

## 2019-03-02 VITALS
TEMPERATURE: 97.3 F | RESPIRATION RATE: 16 BRPM | OXYGEN SATURATION: 97 % | DIASTOLIC BLOOD PRESSURE: 76 MMHG | SYSTOLIC BLOOD PRESSURE: 130 MMHG | HEART RATE: 71 BPM | BODY MASS INDEX: 24.01 KG/M2 | WEIGHT: 149.4 LBS | HEIGHT: 66 IN

## 2019-03-02 DIAGNOSIS — N39.0 URINARY TRACT INFECTION WITH HEMATURIA, SITE UNSPECIFIED: ICD-10-CM

## 2019-03-02 DIAGNOSIS — R30.0 DYSURIA: ICD-10-CM

## 2019-03-02 DIAGNOSIS — R31.9 URINARY TRACT INFECTION WITH HEMATURIA, SITE UNSPECIFIED: ICD-10-CM

## 2019-03-02 LAB
APPEARANCE UR: NORMAL
BILIRUB UR STRIP-MCNC: NORMAL MG/DL
COLOR UR AUTO: YELLOW
GLUCOSE UR STRIP.AUTO-MCNC: NORMAL MG/DL
KETONES UR STRIP.AUTO-MCNC: NORMAL MG/DL
LEUKOCYTE ESTERASE UR QL STRIP.AUTO: NORMAL
NITRITE UR QL STRIP.AUTO: NORMAL
PH UR STRIP.AUTO: 7 [PH] (ref 5–8)
PROT UR QL STRIP: NORMAL MG/DL
RBC UR QL AUTO: NORMAL
SP GR UR STRIP.AUTO: 1.02
UROBILINOGEN UR STRIP-MCNC: 0.2 MG/DL

## 2019-03-02 PROCEDURE — 99000 SPECIMEN HANDLING OFFICE-LAB: CPT | Performed by: NURSE PRACTITIONER

## 2019-03-02 PROCEDURE — 99214 OFFICE O/P EST MOD 30 MIN: CPT | Performed by: NURSE PRACTITIONER

## 2019-03-02 PROCEDURE — 81002 URINALYSIS NONAUTO W/O SCOPE: CPT | Performed by: NURSE PRACTITIONER

## 2019-03-02 PROCEDURE — 87086 URINE CULTURE/COLONY COUNT: CPT

## 2019-03-02 PROCEDURE — 87077 CULTURE AEROBIC IDENTIFY: CPT

## 2019-03-02 PROCEDURE — 87186 SC STD MICRODIL/AGAR DIL: CPT

## 2019-03-02 RX ORDER — CEPHALEXIN 500 MG/1
500 CAPSULE ORAL 2 TIMES DAILY
Qty: 14 CAP | Refills: 0 | Status: SHIPPED | OUTPATIENT
Start: 2019-03-02 | End: 2019-04-19

## 2019-03-02 ASSESSMENT — ENCOUNTER SYMPTOMS
CHILLS: 0
FEVER: 0
DIZZINESS: 0
ORTHOPNEA: 0
VOMITING: 0
FLANK PAIN: 0
HEADACHES: 0
BACK PAIN: 0
ABDOMINAL PAIN: 1
NAUSEA: 0
DIARRHEA: 0

## 2019-03-02 NOTE — PROGRESS NOTES
Subjective:      Larissa Ramirez is a 73 y.o. female who presents with Urinary Frequency (pain started today)            HPI New problem. 73 year old female with one day history of burning with urination and urinary frequency. She denies fever, chills, nausea, diarrhea. She has no blood in urine or back pain. She has not taken any medication for this issue.  Levofloxacin; Nitrofurantoin; Amitriptyline; Sulfa drugs; and Lyrica  Current Outpatient Prescriptions on File Prior to Visit   Medication Sig Dispense Refill   • PREMARIN 0.625 MG/GM Cream INSERT 0.5 G IN VAGINA EVERY DAY. 30 g 2   • polyethylene glycol 3350 (MIRALAX) Powder Take 17 g by mouth every day. 300 g 3   • celecoxib (CELEBREX) 100 MG Cap Take 1 Cap by mouth 2 times a day, with meals. 60 Cap 1   • lisinopril-hydrochlorothiazide (PRINZIDE, ZESTORETIC) 20-25 MG per tablet TAKE 1 TAB BY MOUTH EVERY DAY. 90 Tab 3   • HYDROcodone/acetaminophen (NORCO)  MG Tab TAKE 1 TABLEY BY MOUTH EVERY 4-6HRS LIMIT 5/DAY M54.12 DNF 12/23  0   • tramadol (ULTRAM) 50 MG Tab ICD10 C25.9 TAKE ONE TAB BY MOUTH EVERY 8 HRS AS NEEDED FOR PAIN  3   • fluconazole (DIFLUCAN) 150 MG tablet Take 1 tablet by mouth as a single dose. (Patient not taking: Reported on 2/5/2019) 1 Tab 0   • topiramate (TOPAMAX) 50 MG tablet TAKE ONE TAB BY MOUTH TWICE DAILY  2   • potassium chloride SA (KDUR) 20 MEQ Tab CR Take 20 mEq by mouth every day.  2   • conjugated estrogen (PREMARIN) 0.625 MG/GM Cream Insert 0.5 g in vagina every 72 hours as needed.     • Menthol, Topical Analgesic, (BIOFREEZE COLORLESS) 4 % Gel 1 Each by Apply externally route 2 times a day as needed.     • Calcium Carb-Cholecalciferol (CALCIUM 1000 + D PO) Take 1 Tab by mouth every day.     • Multiple Vitamins-Iron (MULTIVITAMIN/IRON PO) Take 1 Tab by mouth every day.     • diazepam (VALIUM) 2 MG TABS Take 4-6 mg by mouth at bedtime as needed for Sleep. Take two to three tabs by mouth at bedtime as needed        No current  "facility-administered medications on file prior to visit.      Social History     Social History   • Marital status:      Spouse name: N/A   • Number of children: 3   • Years of education: N/A     Occupational History   • retired      Social History Main Topics   • Smoking status: Never Smoker   • Smokeless tobacco: Never Used   • Alcohol use No   • Drug use: No   • Sexual activity: Yes     Partners: Male     Other Topics Concern   • Not on file     Social History Narrative   • No narrative on file     family history includes No Known Problems in her father and mother.      Review of Systems   Constitutional: Negative for chills and fever.   Cardiovascular: Negative for chest pain and orthopnea.   Gastrointestinal: Positive for abdominal pain. Negative for diarrhea, nausea and vomiting.   Genitourinary: Positive for dysuria and frequency. Negative for flank pain, hematuria and urgency.   Musculoskeletal: Negative for back pain.   Neurological: Negative for dizziness and headaches.          Objective:     /76 (BP Location: Left arm, Patient Position: Sitting, BP Cuff Size: Adult)   Pulse 71   Temp 36.3 °C (97.3 °F)   Resp 16   Ht 1.676 m (5' 6\")   Wt 67.8 kg (149 lb 6.4 oz)   LMP 06/27/1986   SpO2 97%   BMI 24.11 kg/m²      Physical Exam   Constitutional: She is oriented to person, place, and time. She appears well-developed and well-nourished. No distress.   Cardiovascular: Normal rate, regular rhythm and normal heart sounds.    No murmur heard.  Pulmonary/Chest: Effort normal and breath sounds normal. No respiratory distress.   Abdominal: Soft. Bowel sounds are normal. There is tenderness in the suprapubic area. There is no CVA tenderness.   Musculoskeletal: Normal range of motion.   Moves all 4 extremities normally   Neurological: She is alert and oriented to person, place, and time.   Skin: Skin is warm and dry.   Psychiatric: She has a normal mood and affect. Her behavior is normal. Thought " content normal.   Vitals reviewed.              Assessment/Plan:     1. Dysuria  POCT Urinalysis   2. Urinary tract infection with hematuria, site unspecified  cephALEXin (KEFLEX) 500 MG Cap    URINE CULTURE(NEW)       Differential diagnosis, natural history, supportive care, and indications for immediate follow-up discussed at length.

## 2019-03-04 ENCOUNTER — TELEPHONE (OUTPATIENT)
Dept: MEDICAL GROUP | Facility: PHYSICIAN GROUP | Age: 74
End: 2019-03-04

## 2019-03-04 DIAGNOSIS — N39.0 URINARY TRACT INFECTION WITHOUT HEMATURIA, SITE UNSPECIFIED: ICD-10-CM

## 2019-03-04 LAB
BACTERIA UR CULT: ABNORMAL
BACTERIA UR CULT: ABNORMAL
SIGNIFICANT IND 70042: ABNORMAL
SITE SITE: ABNORMAL
SOURCE SOURCE: ABNORMAL

## 2019-03-04 RX ORDER — CEFDINIR 300 MG/1
300 CAPSULE ORAL 2 TIMES DAILY
Qty: 10 CAP | Refills: 0 | Status: SHIPPED | OUTPATIENT
Start: 2019-03-04 | End: 2019-04-19 | Stop reason: SDUPTHER

## 2019-03-04 NOTE — TELEPHONE ENCOUNTER
Patient's infection is resistant to amoxicillin so we cannot use this antibiotic.  The antibiotic she got from urgent care which is cephalexin will not also work because the bacteria are also resistant to it.  I will put her on a different medication and hopefully this will not cause any problem.  Prescription sent to pharmacy.

## 2019-03-04 NOTE — TELEPHONE ENCOUNTER
Pt came in states she is having side effects nausea and stomach cramping wants to know if you can change to amoxicillin. Saw urgent care over the weekend and they prescribed the Cephalexin and its making her very sick.

## 2019-03-04 NOTE — TELEPHONE ENCOUNTER
Phone Number Called: [phone] (home)     Message: Pt notified of message below no other questions at this time.     Left Message for patient to call back: N\A

## 2019-03-05 ENCOUNTER — APPOINTMENT (OUTPATIENT)
Dept: ONCOLOGY | Facility: MEDICAL CENTER | Age: 74
End: 2019-03-05
Attending: INTERNAL MEDICINE
Payer: MEDICARE

## 2019-03-05 NOTE — PROGRESS NOTES
Pt called schedulers to cancel apt for today. Pt was seen in urgent care over the weekend for UTI and currently taking abx. Will attempt to reschedule next week and attempting to contact MD to alert them. Will update when MD responds

## 2019-03-06 ENCOUNTER — APPOINTMENT (OUTPATIENT)
Dept: OBGYN | Facility: CLINIC | Age: 74
End: 2019-03-06
Payer: MEDICARE

## 2019-03-07 ENCOUNTER — APPOINTMENT (OUTPATIENT)
Dept: ONCOLOGY | Facility: MEDICAL CENTER | Age: 74
End: 2019-03-07
Attending: INTERNAL MEDICINE
Payer: MEDICARE

## 2019-03-15 ENCOUNTER — HOSPITAL ENCOUNTER (OUTPATIENT)
Dept: RADIOLOGY | Facility: MEDICAL CENTER | Age: 74
End: 2019-03-15
Attending: SURGERY
Payer: MEDICARE

## 2019-03-15 DIAGNOSIS — C25.0 MALIGNANT NEOPLASM OF HEAD OF PANCREAS (HCC): ICD-10-CM

## 2019-03-15 PROCEDURE — 700117 HCHG RX CONTRAST REV CODE 255: Performed by: SURGERY

## 2019-03-15 PROCEDURE — 74170 CT ABD WO CNTRST FLWD CNTRST: CPT

## 2019-03-15 RX ADMIN — IOHEXOL 100 ML: 350 INJECTION, SOLUTION INTRAVENOUS at 15:20

## 2019-03-25 NOTE — PROGRESS NOTES
Subjective:   3/26/2019  9:41 AM  Primary care physician:Kaitlyn Long M.D.  Medical Oncologist:Paty Eaton MD     Chief Complaint:   Chief Complaint   Patient presents with   • Follow-Up     FV REVIEW CT PANCREATIC CA      Diagnosis:   1. Malignant neoplasm of head of pancreas (HCC)         History of presenting illness:  Larissa Ramirez  is a pleasant 74 y.o. year old female who presented with repeat imaging following resection of a distal pancreatectomy and splenectomy with vascular resection of partial celiac trunk due to direct involvement.  This was done in November 2018 and the patient's pathology showed she was a complete responder.  There is no residual tumor in the tumor itself nor in the lymph nodes.  She did return to chemotherapy for 2 cycles but suffered significant complications and decided not to do any more chemotherapy.  The patient states she is feeling better since stopping chemotherapy though she did suffer to severe UTIs post treatment.  In any event, the patient is here with her surveillance imaging which was done on March 15, 2019.  It showed some thickening around the celiac trunk, S isolated node measuring 1 cm but no sign of metastatic disease or new disease or recurrent disease.  Most likely the area around the celiac trunk is related to postoperative healing since we did a partial resection.  In any event, outside of her UTI she has no new complaints.  She feels much better stopping chemotherapy.  I have personally reviewed this imaging studies.      Past Medical History:   Diagnosis Date   • Bowel habit changes 05/17/2018    constipation related to pain meds   • Bronchitis 2005   • Cancer (HCC) 05/2018    adenocarcinoma pancreatic tail   • Chronic low back pain     followed by [facility] Dr. Itz Lopez   • Chronic neck pain     followed by [facility] Dr. Itz Lopez   • Dental disorder     upper partial   • Hypertension 2015    controlled on meds   • Other specified symptom  associated with female genital organs    • Pain 05/17/2018    Right shoulder, right arm, low back, right leg.   • Pre-diabetes    • Snoring      Past Surgical History:   Procedure Laterality Date   • PANCREATECTOMY  11/5/2018    Procedure: PANCREATECTOMY-  DISTAL;  Surgeon: Carl Valadez M.D.;  Location: [facility];  Service: General   • SPLENECTOMY  11/5/2018    Procedure: SPLENECTOMY;  Surgeon: Carl Valadez M.D.;  Location: [facility];  Service: General   • NODE DISSECTION  11/5/2018    Procedure: NODE DISSECTION;  Surgeon: Carl Valadez M.D.;  Location: [facility];  Service: General   • OMENTECTOMY  11/5/2018    Procedure: OMENTECTOMY-  OMENTAL FLAP, INTRAOPERATIVE ULTRASOUND, PORTAL VEIN RESECTION AND REPAIR, CELIAC TRUNK PARTIAL RESECTION AND REPAIR;  Surgeon: Carl Valadze M.D.;  Location: [facility];  Service: General   • CATH PLACEMENT Right 5/30/2018    Procedure: CATH PLACEMENT - CEPHALIC POWER PORT;  Surgeon: Carl Hunter M.D.;  Location: SURGERY SAME DAY [facility];  Service: General   • GASTROSCOPY  5/18/2018    Procedure: GASTROSCOPY;  Surgeon: Geovanni Romero M.D.;  Location: [facility];  Service: EUS   • EGD W/ENDOSCOPIC ULTRASOUND  5/18/2018    Procedure: EGD W/ENDOSCOPIC ULTRASOUND-  UPPER CURVILLINEAR;  Surgeon: Geovanni Romero M.D.;  Location: [facility];  Service: EUS   • EGD WITH ASP/BX  5/18/2018    Procedure: EGD WITH ASP/BX-  GASTRIC BIOPSIES,  FNA BIOPSIES OF PANCREAS HEAD MASS AND OR GALLBLADDER;  Surgeon: Geovanni Romero M.D.;  Location: [facility];  Service: EUS   • DENTAL EXTRACTION(S)  12/2017    AND hx of other dental extractions with sedation   • HYSTERECTOMY ROBOTIC XI  7/9/2015    Procedure: HYSTERECTOMY ROBOTIC XI W/ BILATERAL SALPINGO-OOPHORECTOMY, MCCALLS PROCEDURES;  Surgeon: Brian Parks M.D.;  Location: [facility]  [facility];  Service:    • COLONOSCOPY  2008   • CERVICAL DISK AND FUSION ANTERIOR  2006    neck cage/fusion   • DENTAL EXTRACTION(S)  1966    [place] teeth   • OTHER      Sedation for several MRI's   • OTHER NEUROLOGICAL SURG      cervical 2007     Allergies   Allergen Reactions   • Levofloxacin Swelling     Swelling of tongue and neck   • Nitrofurantoin Swelling     Swelling of lips, tongue, face   • Amitriptyline Swelling     Facial swelling    • Sulfa Drugs Nausea   • Lyrica Rash     .     Outpatient Encounter Prescriptions as of 3/26/2019   Medication Sig Dispense Refill   • cefdinir (OMNICEF) 300 MG Cap Take 1 Cap by mouth 2 times a day. 10 Cap 0   • cephALEXin (KEFLEX) 500 MG Cap Take 1 Cap by mouth 2 times a day. 14 Cap 0   • HYDROcodone/acetaminophen (NORCO)  MG Tab TAKE 1 TABLEY BY MOUTH EVERY 4-6HRS LIMIT 5/DAY M54.12 DNF 12/23  0   • tramadol (ULTRAM) 50 MG Tab ICD10 C25.9 TAKE ONE TAB BY MOUTH EVERY 8 HRS AS NEEDED FOR PAIN  3   • PREMARIN 0.625 MG/GM Cream INSERT 0.5 G IN VAGINA EVERY DAY. 30 g 2   • fluconazole (DIFLUCAN) 150 MG tablet Take 1 tablet by mouth as a single dose. (Patient not taking: Reported on 2/5/2019) 1 Tab 0   • topiramate (TOPAMAX) 50 MG tablet TAKE ONE TAB BY MOUTH TWICE DAILY  2   • polyethylene glycol 3350 (MIRALAX) Powder Take 17 g by mouth every day. 300 g 3   • celecoxib (CELEBREX) 100 MG Cap Take 1 Cap by mouth 2 times a day, with meals. 60 Cap 1   • potassium chloride SA (KDUR) 20 MEQ Tab CR Take 20 mEq by mouth every day.  2   • conjugated estrogen (PREMARIN) 0.625 MG/GM Cream Insert 0.5 g in vagina every 72 hours as needed.     • lisinopril-hydrochlorothiazide (PRINZIDE, ZESTORETIC) 20-25 MG per tablet TAKE 1 TAB BY MOUTH EVERY DAY. 90 Tab 3   • Menthol, Topical Analgesic, (BIOFREEZE COLORLESS) 4 % Gel 1 Each by Apply externally route 2 times a day as needed.     • Calcium Carb-Cholecalciferol (CALCIUM 1000 + D PO) Take 1 Tab by mouth every day.     • Multiple  "Vitamins-Iron (MULTIVITAMIN/IRON PO) Take 1 Tab by mouth every day.     • diazepam (VALIUM) 2 MG TABS Take 4-6 mg by mouth at bedtime as needed for Sleep. Take two to three tabs by mouth at bedtime as needed        No facility-administered encounter medications on file as of 3/26/2019.      Social History     Social History   • Marital status:      Spouse name: N/A   • Number of children: 3   • Years of education: N/A     Occupational History   • retired      Social History Main Topics   • Smoking status: Never Smoker   • Smokeless tobacco: Never Used   • Alcohol use No   • Drug use: No   • Sexual activity: Yes     Partners: Male     Other Topics Concern   • Not on file     Social History Narrative   • No narrative on file      History   Smoking Status   • Never Smoker   Smokeless Tobacco   • Never Used     History   Alcohol Use No     History   Drug Use No        Family History   Problem Relation Age of Onset   • No Known Problems Mother    • No Known Problems Father        Review of Systems   Gastrointestinal: Positive for abdominal pain.   Musculoskeletal: Positive for back pain and joint pain.   Neurological:        Numbness    All other systems reviewed and are negative.       Objective:   /64 (BP Location: Left arm, Patient Position: Sitting)   Pulse 61   Temp 36.8 °C (98.2 °F)   Ht 1.676 m (5' 6\")   Wt 70.3 kg (154 lb 15.7 oz)   LMP 06/27/1986   SpO2 95%   BMI 25.01 kg/m²     Physical Exam   Constitutional: She is oriented to person, place, and time. She appears well-developed and well-nourished.   HENT:   Head: Normocephalic and atraumatic.   Eyes: Pupils are equal, round, and reactive to light. Conjunctivae are normal.   Neck: Normal range of motion. Neck supple.   Cardiovascular: Normal rate and regular rhythm.    Pulmonary/Chest: Effort normal and breath sounds normal.   Abdominal: Soft. Bowel sounds are normal.   Intermittent subxiphoid pain but does not require pain medication.  No " sign of hernia   Musculoskeletal: Normal range of motion.   Neurological: She is alert and oriented to person, place, and time.   Skin: Skin is warm and dry.   Psychiatric: She has a normal mood and affect. Her behavior is normal. Judgment and thought content normal.   Nursing note and vitals reviewed.      Labs:  Results for COLTEN AMANDA (MRN [id number]) as of 3/25/2019 14:49   Ref. Range 2/3/2019 11:37   WBC Latest Ref Range: 4.8 - 10.8 K/uL 5.9   RBC Latest Ref Range: 4.20 - 5.40 M/uL 3.81 (L)   Hemoglobin Latest Ref Range: 12.0 - 16.0 g/dL 11.8 (L)   Hematocrit Latest Ref Range: 37.0 - 47.0 % 36.3 (L)   MCV Latest Ref Range: 81.4 - 97.8 fL 95.3   MCH Latest Ref Range: 27.0 - 33.0 pg 31.0   MCHC Latest Ref Range: 33.6 - 35.0 g/dL 32.5 (L)   RDW Latest Ref Range: 35.9 - 50.0 fL 48.2   Platelet Count Latest Ref Range: 164 - 446 K/uL 251   MPV Latest Ref Range: 9.0 - 12.9 fL 8.9 (L)   Neutrophils-Polys Latest Ref Range: 44.00 - 72.00 % 49.00   Neutrophils (Absolute) Latest Ref Range: 2.00 - 7.15 K/uL 2.89   Lymphocytes Latest Ref Range: 22.00 - 41.00 % 34.70   Lymphs (Absolute) Latest Ref Range: 1.00 - 4.80 K/uL 2.04   Monocytes Latest Ref Range: 0.00 - 13.40 % 12.80   Monos (Absolute) Latest Ref Range: 0.00 - 0.85 K/uL 0.75   Eosinophils Latest Ref Range: 0.00 - 6.90 % 2.60   Eos (Absolute) Latest Ref Range: 0.00 - 0.51 K/uL 0.15   Basophils Latest Ref Range: 0.00 - 1.80 % 0.70   Baso (Absolute) Latest Ref Range: 0.00 - 0.12 K/uL 0.04   Immature Granulocytes Latest Ref Range: 0.00 - 0.90 % 0.20   Immature Granulocytes (abs) Latest Ref Range: 0.00 - 0.11 K/uL 0.01   Nucleated RBC Latest Units: /100 WBC 0.00   NRBC (Absolute) Latest Units: K/uL 0.00   Sodium Latest Ref Range: 135 - 145 mmol/L 138   Potassium Latest Ref Range: 3.6 - 5.5 mmol/L 4.5   Chloride Latest Ref Range: 96 - 112 mmol/L 102   Co2 Latest Ref Range: 20 - 33 mmol/L 31   Anion Gap Latest Ref Range: 0.0 - 11.9  5.0   Glucose Latest Ref Range: 65 - 99  mg/dL 124 (H)   Bun Latest Ref Range: 8 - 22 mg/dL 25 (H)   Creatinine Latest Ref Range: 0.50 - 1.40 mg/dL 0.81   GFR If  Latest Ref Range: >60 mL/min/1.73 m 2 >60   GFR If Non  Latest Ref Range: >60 mL/min/1.73 m 2 >60   Calcium Latest Ref Range: 8.5 - 10.5 mg/dL 10.1   AST(SGOT) Latest Ref Range: 12 - 45 U/L 17   ALT(SGPT) Latest Ref Range: 2 - 50 U/L 14   Alkaline Phosphatase Latest Ref Range: 30 - 99 U/L 63   Total Bilirubin Latest Ref Range: 0.1 - 1.5 mg/dL 0.2   Albumin Latest Ref Range: 3.2 - 4.9 g/dL 3.9   Total Protein Latest Ref Range: 6.0 - 8.2 g/dL 6.5   Globulin Latest Ref Range: 1.9 - 3.5 g/dL 2.6   A-G Ratio Latest Units: g/dL 1.5   Magnesium Latest Ref Range: 1.5 - 2.5 mg/dL 1.9   Ca 19-9 Latest Ref Range: 0.0 - 35.0 U/mL 11.6       Imaging:  Per my read, CT 3/15/19  Impression       Distal pancreatectomy and splenectomy.  Haziness within the soft tissues adjacent to the celiac trunk and SMA appear similar to previous.  Question a 1.0 x 1.6 cm abnormal celiac lymph node on image 92.  Gallbladder surgically absent. The common duct is dilated up to 9 mm.  No evidence of bowel obstruction. There are colon diverticula. Large and small bowel incompletely imaged.  No free fluid within the mid and upper abdomen.  No hydronephrosis.         Pathology: 11/6/18    FINAL DIAGNOSIS:    A. Falciform ligament:         Fibroadipose tissue with no evidence of metastatic carcinoma          identified.  B. Gallbladder:         Acute and chronic cholecystitis with cholelithiasis.  C. Celiac node:         Two lymph nodes negative for metastatic carcinoma (0/2).  D. Portal node:         One lymph node negative for metastatic carcinoma (0/1).  E. Spleen:         Benign spleen with no metastatic carcinoma identified.  F. Pancreas:         Pancreas partial resection demonstrating pancreatic          intraepithelial neoplasia with focal low to moderate dysplasia          (Pan-IN-2) but no  invasive carcinoma present and dysplasia is          not present on margins. Background mild fibrosis and chronic          pancreatitis.         Ten lymph nodes negative for metastatic carcinoma (0/10).  G. Pancreatic tumor:         Pancreas partial resection demonstrating pancreatic          intraepithelial neoplasia with low to moderate dysplasia          (Pan-IN-2) but no residual invasive carcinoma present.          Background mild fibrosis and chronic pancreatitis.         Eight lymph nodes negative for metastatic carcinoma (0/8).     Procedures:11/6/18     1.  Exploratory laparotomy.  2.  Distal pancreatectomy and splenectomy.  3.  Open cholecystectomy.  4.  Omental flap.  5.  Celiac and portal node dissection separately.  6.  Intraoperative ultrasound staging and assistance in locating tumor.        Assessment:     1. Malignant neoplasm of head of pancreas (HCC)            Medical Decision Making:  Today's Assessment / Status / Plan:     In light of the present findings, the patient actually stopped taking chemotherapy in February 2019.  She states she did 2 rounds of chemotherapy and was extremely ill and then decided that she preferred a better quality of life.  She clearly understood that the goal of the chemotherapy was to act as an insurance policy since she appeared to be a complete responder on resection.  Her most recent CT did show some thickening around the celiac trunk but this area had to be resected partially in order to remove the tumor.  It would not be unusual to have thickening related to inflammatory changes.  Because of this, my recommendation is to proceed with repeat imaging in 3-4 months.  The patient has never seen radiation treatments thus we still have that option if there is a sign of recurrence.  Patient agrees the above plan.      She agreed and verbalized her agreement and understanding with the current plan. I answered all questions and concerns she has at this time and advised  her to call at any time in there interim with questions or concerns in regards to her care.    Thank you for allowing me to participate in her care, I will continue to follow closely.         Please note that this dictation was created using voice recognition software. I have made every reasonable attempt to correct obvious errors, but I expect that there are errors of grammar and possibly content that I did not discover before finalizing the note.     Thank you for this consultation and allowing me to participate in your patient's care. If I can be of further service please contact my office.

## 2019-03-26 ENCOUNTER — OFFICE VISIT (OUTPATIENT)
Dept: SURGERY | Facility: MEDICAL CENTER | Age: 74
End: 2019-03-26
Payer: MEDICARE

## 2019-03-26 VITALS
WEIGHT: 154.98 LBS | OXYGEN SATURATION: 95 % | BODY MASS INDEX: 24.91 KG/M2 | TEMPERATURE: 98.2 F | SYSTOLIC BLOOD PRESSURE: 102 MMHG | HEIGHT: 66 IN | DIASTOLIC BLOOD PRESSURE: 64 MMHG | HEART RATE: 61 BPM

## 2019-03-26 DIAGNOSIS — C25.0 MALIGNANT NEOPLASM OF HEAD OF PANCREAS (HCC): ICD-10-CM

## 2019-03-26 DIAGNOSIS — C25.8 OVERLAPPING MALIGNANT NEOPLASM OF PANCREAS (HCC): ICD-10-CM

## 2019-03-26 PROCEDURE — 99215 OFFICE O/P EST HI 40 MIN: CPT | Performed by: SURGERY

## 2019-03-26 ASSESSMENT — ENCOUNTER SYMPTOMS
BACK PAIN: 1
ABDOMINAL PAIN: 1

## 2019-03-26 NOTE — PATIENT INSTRUCTIONS
Because the patient is not undergoing any more chemotherapy we will arrange for repeat surveillance in 3-4 months to assess the area of the celiac trunk.

## 2019-03-28 NOTE — PROGRESS NOTES
Per Pennie with [facility] on 3/6/19 Pt called MD office and reported that she had decided to stop further treatments of chemotherapy at this time. Pennie reports that message was forwarded to Dr. Eaton. Pt does not have further appts with [facility] and infusion pump sent back at this time.

## 2019-04-11 DIAGNOSIS — I10 ESSENTIAL HYPERTENSION: ICD-10-CM

## 2019-04-11 RX ORDER — LISINOPRIL AND HYDROCHLOROTHIAZIDE 25; 20 MG/1; MG/1
1 TABLET ORAL
Qty: 100 TAB | Refills: 3 | Status: SHIPPED | OUTPATIENT
Start: 2019-04-11 | End: 2020-04-14

## 2019-04-19 ENCOUNTER — OFFICE VISIT (OUTPATIENT)
Dept: MEDICAL GROUP | Facility: PHYSICIAN GROUP | Age: 74
End: 2019-04-19
Payer: MEDICARE

## 2019-04-19 ENCOUNTER — HOSPITAL ENCOUNTER (OUTPATIENT)
Facility: MEDICAL CENTER | Age: 74
End: 2019-04-19
Attending: PHYSICIAN ASSISTANT
Payer: MEDICARE

## 2019-04-19 VITALS
HEIGHT: 66 IN | HEART RATE: 63 BPM | OXYGEN SATURATION: 98 % | BODY MASS INDEX: 24.75 KG/M2 | DIASTOLIC BLOOD PRESSURE: 78 MMHG | RESPIRATION RATE: 16 BRPM | WEIGHT: 154 LBS | TEMPERATURE: 97.6 F | SYSTOLIC BLOOD PRESSURE: 118 MMHG

## 2019-04-19 DIAGNOSIS — N30.01 ACUTE CYSTITIS WITH HEMATURIA: ICD-10-CM

## 2019-04-19 LAB
APPEARANCE UR: CLEAR
BILIRUB UR STRIP-MCNC: NEGATIVE MG/DL
COLOR UR AUTO: YELLOW
GLUCOSE UR STRIP.AUTO-MCNC: NEGATIVE MG/DL
KETONES UR STRIP.AUTO-MCNC: NEGATIVE MG/DL
LEUKOCYTE ESTERASE UR QL STRIP.AUTO: NORMAL
NITRITE UR QL STRIP.AUTO: NEGATIVE
PH UR STRIP.AUTO: 7 [PH] (ref 5–8)
PROT UR QL STRIP: NEGATIVE MG/DL
RBC UR QL AUTO: NORMAL
SP GR UR STRIP.AUTO: 1.02
UROBILINOGEN UR STRIP-MCNC: 0.2 MG/DL

## 2019-04-19 PROCEDURE — 81002 URINALYSIS NONAUTO W/O SCOPE: CPT | Performed by: PHYSICIAN ASSISTANT

## 2019-04-19 PROCEDURE — 87186 SC STD MICRODIL/AGAR DIL: CPT

## 2019-04-19 PROCEDURE — 87077 CULTURE AEROBIC IDENTIFY: CPT

## 2019-04-19 PROCEDURE — 87086 URINE CULTURE/COLONY COUNT: CPT

## 2019-04-19 PROCEDURE — 99214 OFFICE O/P EST MOD 30 MIN: CPT | Performed by: PHYSICIAN ASSISTANT

## 2019-04-19 RX ORDER — CEFDINIR 300 MG/1
300 CAPSULE ORAL 2 TIMES DAILY
Qty: 10 CAP | Refills: 0 | Status: SHIPPED | OUTPATIENT
Start: 2019-04-19 | End: 2019-10-14

## 2019-04-19 ASSESSMENT — PATIENT HEALTH QUESTIONNAIRE - PHQ9: CLINICAL INTERPRETATION OF PHQ2 SCORE: 0

## 2019-04-19 NOTE — PROGRESS NOTES
"  Chief Complaint   Patient presents with   • Dysuria     x 1 day       HPI:  Symptom onset: 2 days ago   Current symptoms: Painful voids. No blood noted in urine.  Since onset symptoms are: Worsening.  Treatments tried: Replens and liquid vitamin C.  Associated symptoms: Negative for urgent, frequent voids, fever, flank pain, nausea and vomiting, vaginal discharge, pelvic pain.  History is negative for frequent UTI.  Per chart review patient was diagnosed with a UTI on 3/2/2019.  She tells me that she was sexually active with her  for the first time in a long time 3 days ago.  States after sexual intercourse she showered.  She admits to wiping front to back.    ROS:  Denies fever, chills, vomiting or abdominal pain.     OBJECTIVE:  /78   Pulse 63   Temp 36.4 °C (97.6 °F)   Resp 16   Ht 1.676 m (5' 6\")   Wt 69.9 kg (154 lb)   SpO2 98%   Gen: Alert, NAD.  Chest: Lungs clear to auscultation, CV RRR.  Abdomen: Soft, tender in suprapubic region. No CVAT. Normal bowel sounds.     Lab Results   Component Value Date    POCCOLOR yellow 03/02/2019    POCAPPEAR cloudy 03/02/2019    POCLEUKEST small 03/02/2019    POCNITRITE neg 03/02/2019    POCUROBILIGE 0.2 03/02/2019    POCPROTEIN neg 03/02/2019    POCURPH 7.0 03/02/2019    POCBLOOD mod 03/02/2019    POCSPGRV 1.020 03/02/2019    POCKETONES neg 03/02/2019    POCBILIRUBIN neg 03/02/2019    POCGLUCUA neg 03/02/2019          ASSESSMENT/PLAN:     1. Acute cystitis with hematuria        Lab Results   Component Value Date/Time    POCCOLOR yellow 04/19/2019 10:05 AM    POCAPPEAR clear 04/19/2019 10:05 AM    POCLEUKEST large 04/19/2019 10:05 AM    POCNITRITE negative 04/19/2019 10:05 AM    POCUROBILIGE 0.2 04/19/2019 10:05 AM    POCPROTEIN negative 04/19/2019 10:05 AM    POCURPH 7.0 04/19/2019 10:05 AM    POCBLOOD small 04/19/2019 10:05 AM    POCSPGRV 1.020 04/19/2019 10:05 AM    POCKETONES negative 04/19/2019 10:05 AM    POCBILIRUBIN negative 04/19/2019 10:05 AM "    POCGLUCUA negative 04/19/2019 10:05 AM      Patient is symptomatic.  Will treat with antibiotics.     1. Urine sent for culture. Start antibiotics.  2. Provided education to drink plenty of fluids, wipe front to back every void and bowel movement.  Void before and after sex.  3. Return to clinic if symptoms not improving within 3-4 days or in case of vomiting, fever, increasing pain.

## 2019-04-24 ENCOUNTER — TELEPHONE (OUTPATIENT)
Dept: MEDICAL GROUP | Facility: PHYSICIAN GROUP | Age: 74
End: 2019-04-24

## 2019-04-24 NOTE — TELEPHONE ENCOUNTER
----- Message from Althea Neal P.A.-C. sent at 4/24/2019  9:44 AM PDT -----  Please call patient. I have reviewed labs and they are abnormal.  Urine culture positive for urinary tract infection.  Please ask patient if symptoms have improved.  If not make a follow-up appointment.    Thank you,    Angelique QUINN

## 2019-05-01 ENCOUNTER — TELEPHONE (OUTPATIENT)
Dept: MEDICAL GROUP | Facility: PHYSICIAN GROUP | Age: 74
End: 2019-05-01

## 2019-05-01 ENCOUNTER — APPOINTMENT (OUTPATIENT)
Dept: MEDICAL GROUP | Facility: PHYSICIAN GROUP | Age: 74
End: 2019-05-01
Payer: MEDICARE

## 2019-05-01 NOTE — TELEPHONE ENCOUNTER
----- Message from Kaitlyn Long M.D. sent at 4/30/2019  6:10 PM PDT -----  Regarding: FW:results labs  Contact: [phone]  I will need to see the patient.  We can use our backlog slot.  ----- Message -----  From: Bakari Esquivel Ass't  Sent: 4/30/2019   3:32 PM  To: Kaitlyn Long M.D.  Subject: FW:results labs                                  Please advise would you like pt to come in again?  ----- Message -----  From: Larissa Ramirez  Sent: 4/30/2019   3:25 PM  To: Bakari Esquivel Ass't  Subject: RE:results labs                                  Hi,  I have just started coming down with another Uti. Have to pee often and painful when I pee. If not better I will need to come in first thing in the am to get checked. I am going to send a email over to Dr Long office so they can look up to see what urologist I was referred to 3 years ago because I must have something going on that is causing me to get these uti infections really often after chemo.  Larissa      ----- Message -----  From: Bakari Esquivel Ass't  Sent: 4/24/2019 11:15 AM PDT  To: Larissa Ramirez  Subject: RE:results labs  Perfect Im glad you are feeling better. Have a great day.    Thank you,    Denice Gongora MA    ----- Message -----     From: Larissa Ramirez     Sent: 4/24/2019 11:10 AM PDT       To: Denice Gongora Med Ass't  Subject: RE:results labs    Hi,  Symptoms have improved and gone away. I took last antibiotic pill last night. If uti comes back I will message you.  Larissa  ----- Message -----  From: Bakari Esquivel Ass't  Sent: 4/24/2019 10:11 AM PDT  To: Larissa Ramirez  Subject: results labs  Tyra Dias,     Here are the results of your culture, please message or call if you have any questions:    Notes recorded by Althea Neal P.A.-C. on 4/24/2019 at 9:44 AM PDT  Please call patient. I have reviewed labs and they are abnormal.  Urine culture positive for urinary tract infection.  Please ask patient if symptoms have  improved.  If not make a follow-up appointment.    Thank you,    Angelique QUINN       Thank you,    Denice Gongora MA

## 2019-05-01 NOTE — TELEPHONE ENCOUNTER
Phone Number Called: [phone] (home)     Message: Left vm to notify pt we can get her in today at 4:20 pm. Sent PRX message to notify pt.     Left Message for patient to call back: yes    .

## 2019-05-02 DIAGNOSIS — R73.01 IMPAIRED FASTING BLOOD SUGAR: ICD-10-CM

## 2019-05-02 DIAGNOSIS — E78.5 DYSLIPIDEMIA: ICD-10-CM

## 2019-05-02 DIAGNOSIS — E55.9 VITAMIN D DEFICIENCY: ICD-10-CM

## 2019-05-14 ENCOUNTER — HOSPITAL ENCOUNTER (OUTPATIENT)
Dept: PAIN MANAGEMENT | Facility: REHABILITATION | Age: 74
End: 2019-05-14
Attending: SPECIALIST
Payer: MEDICARE

## 2019-05-14 ENCOUNTER — HOSPITAL ENCOUNTER (OUTPATIENT)
Dept: RADIOLOGY | Facility: REHABILITATION | Age: 74
End: 2019-05-14
Attending: SPECIALIST

## 2019-05-14 VITALS
TEMPERATURE: 98.3 F | BODY MASS INDEX: 24.57 KG/M2 | OXYGEN SATURATION: 95 % | HEART RATE: 54 BPM | DIASTOLIC BLOOD PRESSURE: 42 MMHG | WEIGHT: 156.53 LBS | SYSTOLIC BLOOD PRESSURE: 118 MMHG | RESPIRATION RATE: 16 BRPM | HEIGHT: 67 IN

## 2019-05-14 PROCEDURE — 700111 HCHG RX REV CODE 636 W/ 250 OVERRIDE (IP)

## 2019-05-14 PROCEDURE — 64494 INJ PARAVERT F JNT L/S 2 LEV: CPT

## 2019-05-14 PROCEDURE — 700117 HCHG RX CONTRAST REV CODE 255

## 2019-05-14 PROCEDURE — 64495 INJ PARAVERT F JNT L/S 3 LEV: CPT

## 2019-05-14 PROCEDURE — 64493 INJ PARAVERT F JNT L/S 1 LEV: CPT

## 2019-05-14 PROCEDURE — 700101 HCHG RX REV CODE 250

## 2019-05-14 RX ORDER — BUPIVACAINE HYDROCHLORIDE 2.5 MG/ML
INJECTION, SOLUTION EPIDURAL; INFILTRATION; INTRACAUDAL
Status: COMPLETED
Start: 2019-05-14 | End: 2019-05-14

## 2019-05-14 RX ORDER — BUPIVACAINE HYDROCHLORIDE 5 MG/ML
INJECTION, SOLUTION EPIDURAL; INTRACAUDAL
Status: COMPLETED
Start: 2019-05-14 | End: 2019-05-14

## 2019-05-14 RX ORDER — LIDOCAINE HYDROCHLORIDE 20 MG/ML
INJECTION, SOLUTION EPIDURAL; INFILTRATION; INTRACAUDAL; PERINEURAL
Status: COMPLETED
Start: 2019-05-14 | End: 2019-05-14

## 2019-05-14 RX ORDER — TRIAMCINOLONE ACETONIDE 40 MG/ML
INJECTION, SUSPENSION INTRA-ARTICULAR; INTRAMUSCULAR
Status: COMPLETED
Start: 2019-05-14 | End: 2019-05-14

## 2019-05-14 RX ORDER — MIDAZOLAM HYDROCHLORIDE 1 MG/ML
INJECTION INTRAMUSCULAR; INTRAVENOUS
Status: COMPLETED
Start: 2019-05-14 | End: 2019-05-14

## 2019-05-14 RX ADMIN — LIDOCAINE HYDROCHLORIDE 4 ML: 20 INJECTION, SOLUTION EPIDURAL; INFILTRATION; INTRACAUDAL; PERINEURAL at 07:38

## 2019-05-14 RX ADMIN — IOHEXOL 2 ML: 240 INJECTION, SOLUTION INTRATHECAL; INTRAVASCULAR; INTRAVENOUS; ORAL at 07:37

## 2019-05-14 RX ADMIN — MIDAZOLAM HYDROCHLORIDE 1 MG: 1 INJECTION, SOLUTION INTRAMUSCULAR; INTRAVENOUS at 07:35

## 2019-05-14 NOTE — NON-PROVIDER
0655 AM  Denied taking any blood thinners and  any anti- inflammatories medications. Home care education and verbal instruction given to patient and verbalized understanding.Patient had a  ( Guillermo / spouse ).Stop bang score # 3 .   Dr. Gonzales made aware . Hand off reported to SOBEIDA Lott RN.

## 2019-05-14 NOTE — NON-PROVIDER
0747 AM    .   Tolerated fluids well.  Ice pack applied to affected area. Patient able to  move all extremities without difficulty voluntarily and on command. Reviewed home care instructions  &  pain diary sheet information  and understood by patient. Dr. Gonzales evaluated patient.

## 2019-05-14 NOTE — PROCEDURES
DATE OF SERVICE:  05/14/2019    PROCEDURES:  1.  Right L2, L3, L4, L5 medial branch blocks.  2.  Fluoroscopy for needle guidance confirmation.  3.  IV sedation with Versed.    DIAGNOSES:  1.  Lumbar spondylosis with facet syndrome.  2.  Anxiety over procedure, patient requesting intravenous sedation.    DESCRIPTION OF PROCEDURE:  After informed consent with the patient prone, she   is monitored and sedated with 1 mg of Versed IV.  Fluoroscopy was utilized to   identify the junction of the transverse process with the superior articular   process on the right at L3, L4, L5 and the sacral alar depression.  The back   is prepped with Betadine, sterile draped and anesthetized.  Under intermittent   fluoroscopic guidance and local anesthesia, a 22-gauge spinal needle was   passed to the first target at L3 making contact with periosteum.  Contrast was   injected confirming appropriate spread.  I slowly injected 0.5 mL 2%   lidocaine.  Needle was removed.    L4 target was approached with the same technique, injected with the same   medication.  The needle was removed.    The L5 target was approached with same technique and injected with the same   medication.  The needle was removed.    The sacral alar depression target was approached with the same technique and   injected with the same medication.  The needle was removed.    She tolerated this well.    PLAN:  She will be discharged per criteria and sent home with a pain diary to   document pain relief, to screen for radiofrequency ablation.       ____________________________________     CELINA GROVES MD    [facility]    DD:  05/14/2019 07:50:03  DT:  05/14/2019 08:36:37    D#:  [id number]  Job#:  [id number]    cc: [facility]

## 2019-05-15 ENCOUNTER — GYNECOLOGY VISIT (OUTPATIENT)
Dept: OBGYN | Facility: CLINIC | Age: 74
End: 2019-05-15
Payer: MEDICARE

## 2019-05-15 VITALS
DIASTOLIC BLOOD PRESSURE: 74 MMHG | WEIGHT: 159 LBS | SYSTOLIC BLOOD PRESSURE: 120 MMHG | HEIGHT: 67 IN | BODY MASS INDEX: 24.96 KG/M2

## 2019-05-15 DIAGNOSIS — N95.2 VAGINAL ATROPHY: ICD-10-CM

## 2019-05-15 DIAGNOSIS — Z79.890 HORMONE REPLACEMENT THERAPY (HRT): ICD-10-CM

## 2019-05-15 PROCEDURE — 99214 OFFICE O/P EST MOD 30 MIN: CPT | Performed by: OBSTETRICS & GYNECOLOGY

## 2019-06-17 ENCOUNTER — HOSPITAL ENCOUNTER (OUTPATIENT)
Dept: LAB | Facility: MEDICAL CENTER | Age: 74
End: 2019-06-17
Attending: SURGERY
Payer: MEDICARE

## 2019-06-17 LAB
BUN SERPL-MCNC: 24 MG/DL (ref 8–22)
CREAT SERPL-MCNC: 1.09 MG/DL (ref 0.5–1.4)

## 2019-06-17 PROCEDURE — 84520 ASSAY OF UREA NITROGEN: CPT

## 2019-06-17 PROCEDURE — 82565 ASSAY OF CREATININE: CPT

## 2019-06-17 PROCEDURE — 36415 COLL VENOUS BLD VENIPUNCTURE: CPT

## 2019-06-21 ENCOUNTER — HOSPITAL ENCOUNTER (OUTPATIENT)
Dept: RADIOLOGY | Facility: MEDICAL CENTER | Age: 74
End: 2019-06-21
Attending: SURGERY
Payer: MEDICARE

## 2019-06-21 DIAGNOSIS — C25.8 OVERLAPPING MALIGNANT NEOPLASM OF PANCREAS (HCC): ICD-10-CM

## 2019-06-21 DIAGNOSIS — C25.0 MALIGNANT NEOPLASM OF HEAD OF PANCREAS (HCC): ICD-10-CM

## 2019-06-21 PROCEDURE — 74170 CT ABD WO CNTRST FLWD CNTRST: CPT

## 2019-06-21 PROCEDURE — 700117 HCHG RX CONTRAST REV CODE 255: Performed by: SURGERY

## 2019-06-21 RX ADMIN — IOHEXOL 100 ML: 350 INJECTION, SOLUTION INTRAVENOUS at 15:44

## 2019-06-27 ENCOUNTER — HOSPITAL ENCOUNTER (OUTPATIENT)
Dept: RADIOLOGY | Facility: REHABILITATION | Age: 74
End: 2019-06-27
Attending: SPECIALIST

## 2019-06-27 ENCOUNTER — HOSPITAL ENCOUNTER (OUTPATIENT)
Dept: PAIN MANAGEMENT | Facility: REHABILITATION | Age: 74
End: 2019-06-27
Attending: SPECIALIST
Payer: MEDICARE

## 2019-06-27 VITALS
WEIGHT: 157.85 LBS | DIASTOLIC BLOOD PRESSURE: 57 MMHG | OXYGEN SATURATION: 97 % | TEMPERATURE: 97.6 F | HEART RATE: 59 BPM | RESPIRATION RATE: 16 BRPM | HEIGHT: 66 IN | SYSTOLIC BLOOD PRESSURE: 137 MMHG | BODY MASS INDEX: 25.37 KG/M2

## 2019-06-27 PROCEDURE — 64635 DESTROY LUMB/SAC FACET JNT: CPT

## 2019-06-27 PROCEDURE — 700111 HCHG RX REV CODE 636 W/ 250 OVERRIDE (IP)

## 2019-06-27 PROCEDURE — 700101 HCHG RX REV CODE 250

## 2019-06-27 PROCEDURE — 64636 DESTROY L/S FACET JNT ADDL: CPT

## 2019-06-27 RX ORDER — MIDAZOLAM HYDROCHLORIDE 1 MG/ML
INJECTION INTRAMUSCULAR; INTRAVENOUS
Status: COMPLETED
Start: 2019-06-27 | End: 2019-06-27

## 2019-06-27 RX ORDER — DEXAMETHASONE SODIUM PHOSPHATE 10 MG/ML
INJECTION, SOLUTION INTRAMUSCULAR; INTRAVENOUS
Status: COMPLETED
Start: 2019-06-27 | End: 2019-06-27

## 2019-06-27 RX ORDER — LIDOCAINE HYDROCHLORIDE 20 MG/ML
INJECTION, SOLUTION EPIDURAL; INFILTRATION; INTRACAUDAL; PERINEURAL
Status: COMPLETED
Start: 2019-06-27 | End: 2019-06-27

## 2019-06-27 RX ADMIN — FENTANYL CITRATE 50 MCG: 50 INJECTION, SOLUTION INTRAMUSCULAR; INTRAVENOUS at 10:24

## 2019-06-27 RX ADMIN — MIDAZOLAM HYDROCHLORIDE 1 MG: 1 INJECTION, SOLUTION INTRAMUSCULAR; INTRAVENOUS at 10:24

## 2019-06-27 RX ADMIN — LIDOCAINE HYDROCHLORIDE 5 ML: 20 INJECTION, SOLUTION EPIDURAL; INFILTRATION; INTRACAUDAL; PERINEURAL at 11:38

## 2019-06-27 NOTE — NON-PROVIDER
1022 AM.  Denied taking any blood thinners and  any anti- inflammatories medications. Printed home care education and verbal instruction given to patient and verbalized understanding.Patient had a  ( Guillermo / spouse ). Stop bang score # 2  . Hand off reported to RUBEN Floyd RN.

## 2019-06-27 NOTE — NON-PROVIDER
1150 AM    . With RN from procedure room , ambulatory  Tolerated fluids well.  Ice pack applied to affected area. Patient able to  move all extremities without difficulty voluntarily and on command. .

## 2019-06-27 NOTE — NON-PROVIDER
Pre assessment complete, time out completed, including 2 pt identifiers, procedure, allergies, stop bang 2 , pt took norco  mg norco and ultram 50 mg this am at 0600 - MD aware . Pt positioned prone by CNA and XRT, ankles resting on pillow, hands resting on stool under head of bed. Grounding pad to left calf. Skin to left calf intact with no erythema post procedure.

## 2019-06-28 NOTE — PROCEDURES
DATE OF SERVICE:  06/27/2019    PROCEDURES:  1.  Radiofrequency ablation, right L2, L3, L4, L5 medial branch nerves.  2.  Fluoroscopy for needle guidance confirmation of placement.  3.  IV sedation per patient request.    DIAGNOSES:  1.  Lumbar spondylosis with mechanical back pain.  2.  Anxiety over procedure, patient requesting intravenous sedation.    DESCRIPTION OF PROCEDURE:  After informed consent with the patient prone, she   was monitored and sedated with Versed and fentanyl.  Fluoroscopy was utilized   to identify the junction of the transverse process with the superior articular   process on the right at L3, L4, L5, and the sacral alar depression.  Back was   prepped with Betadine, sterile draped and anesthetized.  Under intermittent   fluoroscopic guidance and local anesthesia, 18-gauge radiofrequency needles   were passed to the target at each level making contact with periosteum and   then advanced 1-2 mm over the superior edge.  A 2 Hz motor stimulation was   carried out at each level, confirming local motor activity without radicular   motor activity.  Following this, I injected 5 mL of 2% lidocaine in divided   doses and performed the following lesions:  1.  L2 underwent radiofrequency lesioning at 80 degrees centigrade.  2.  L3 underwent radiofrequency lesioning at 80 degrees centigrade.  3.  L4 underwent radiofrequency lesioning at 80 degrees centigrade.  4.  L5 underwent radiofrequency lesioning at 80 degrees centigrade.    Needles were removed.  She tolerated this well.    PLAN:  Follow up for ongoing pain management needs.       ____________________________________     MD TEZ CAMPOS / CHARLIE    DD:  06/27/2019 11:47:54  DT:  06/28/2019 08:02:58    D#:  [id number]  Job#:  [id number]    cc: [facility]

## 2019-07-03 PROBLEM — C25.0 MALIGNANT NEOPLASM OF HEAD OF PANCREAS (HCC): Chronic | Status: ACTIVE | Noted: 2018-05-30

## 2019-07-13 ENCOUNTER — HOSPITAL ENCOUNTER (OUTPATIENT)
Dept: LAB | Facility: MEDICAL CENTER | Age: 74
End: 2019-07-13
Attending: FAMILY MEDICINE
Payer: MEDICARE

## 2019-07-13 DIAGNOSIS — E78.5 DYSLIPIDEMIA: ICD-10-CM

## 2019-07-13 DIAGNOSIS — R73.01 IMPAIRED FASTING BLOOD SUGAR: ICD-10-CM

## 2019-07-13 DIAGNOSIS — E55.9 VITAMIN D DEFICIENCY: ICD-10-CM

## 2019-07-13 LAB
25(OH)D3 SERPL-MCNC: 38 NG/ML (ref 30–100)
ALBUMIN SERPL BCP-MCNC: 4 G/DL (ref 3.2–4.9)
ALBUMIN/GLOB SERPL: 1.3 G/DL
ALP SERPL-CCNC: 80 U/L (ref 30–99)
ALT SERPL-CCNC: 15 U/L (ref 2–50)
ANION GAP SERPL CALC-SCNC: 7 MMOL/L (ref 0–11.9)
AST SERPL-CCNC: 20 U/L (ref 12–45)
BILIRUB SERPL-MCNC: 0.3 MG/DL (ref 0.1–1.5)
BUN SERPL-MCNC: 30 MG/DL (ref 8–22)
CALCIUM SERPL-MCNC: 10.4 MG/DL (ref 8.5–10.5)
CHLORIDE SERPL-SCNC: 103 MMOL/L (ref 96–112)
CHOLEST SERPL-MCNC: 147 MG/DL (ref 100–199)
CO2 SERPL-SCNC: 29 MMOL/L (ref 20–33)
CREAT SERPL-MCNC: 1.07 MG/DL (ref 0.5–1.4)
EST. AVERAGE GLUCOSE BLD GHB EST-MCNC: 134 MG/DL
FASTING STATUS PATIENT QL REPORTED: NORMAL
GLOBULIN SER CALC-MCNC: 3.2 G/DL (ref 1.9–3.5)
GLUCOSE SERPL-MCNC: 115 MG/DL (ref 65–99)
HBA1C MFR BLD: 6.3 % (ref 0–5.6)
HDLC SERPL-MCNC: 52 MG/DL
LDLC SERPL CALC-MCNC: 74 MG/DL
POTASSIUM SERPL-SCNC: 3.7 MMOL/L (ref 3.6–5.5)
PROT SERPL-MCNC: 7.2 G/DL (ref 6–8.2)
SODIUM SERPL-SCNC: 139 MMOL/L (ref 135–145)
TRIGL SERPL-MCNC: 105 MG/DL (ref 0–149)

## 2019-07-13 PROCEDURE — 36415 COLL VENOUS BLD VENIPUNCTURE: CPT

## 2019-07-13 PROCEDURE — 82306 VITAMIN D 25 HYDROXY: CPT

## 2019-07-13 PROCEDURE — 80053 COMPREHEN METABOLIC PANEL: CPT

## 2019-07-13 PROCEDURE — 80061 LIPID PANEL: CPT

## 2019-07-13 PROCEDURE — 83036 HEMOGLOBIN GLYCOSYLATED A1C: CPT

## 2019-07-16 ENCOUNTER — OFFICE VISIT (OUTPATIENT)
Dept: MEDICAL GROUP | Facility: PHYSICIAN GROUP | Age: 74
End: 2019-07-16
Payer: MEDICARE

## 2019-07-16 VITALS
WEIGHT: 158.29 LBS | HEART RATE: 58 BPM | BODY MASS INDEX: 26.37 KG/M2 | TEMPERATURE: 98.2 F | DIASTOLIC BLOOD PRESSURE: 50 MMHG | RESPIRATION RATE: 16 BRPM | HEIGHT: 65 IN | OXYGEN SATURATION: 93 % | SYSTOLIC BLOOD PRESSURE: 100 MMHG

## 2019-07-16 DIAGNOSIS — M85.89 OSTEOPENIA OF MULTIPLE SITES: ICD-10-CM

## 2019-07-16 DIAGNOSIS — G89.29 CHRONIC BILATERAL LOW BACK PAIN WITH BILATERAL SCIATICA: ICD-10-CM

## 2019-07-16 DIAGNOSIS — M54.42 CHRONIC BILATERAL LOW BACK PAIN WITH BILATERAL SCIATICA: ICD-10-CM

## 2019-07-16 DIAGNOSIS — M54.41 CHRONIC BILATERAL LOW BACK PAIN WITH BILATERAL SCIATICA: ICD-10-CM

## 2019-07-16 DIAGNOSIS — F11.20 OPIATE DEPENDENCE, CONTINUOUS (HCC): ICD-10-CM

## 2019-07-16 DIAGNOSIS — Z79.890 HORMONE REPLACEMENT THERAPY (HRT): ICD-10-CM

## 2019-07-16 DIAGNOSIS — Z00.00 MEDICARE ANNUAL WELLNESS VISIT, SUBSEQUENT: ICD-10-CM

## 2019-07-16 DIAGNOSIS — M54.2 CHRONIC NECK PAIN: ICD-10-CM

## 2019-07-16 DIAGNOSIS — R73.01 IMPAIRED FASTING BLOOD SUGAR: ICD-10-CM

## 2019-07-16 DIAGNOSIS — E78.5 DYSLIPIDEMIA: ICD-10-CM

## 2019-07-16 DIAGNOSIS — C25.2 MALIGNANT NEOPLASM OF TAIL OF PANCREAS (HCC): ICD-10-CM

## 2019-07-16 DIAGNOSIS — I10 ESSENTIAL HYPERTENSION: ICD-10-CM

## 2019-07-16 DIAGNOSIS — G89.29 CHRONIC NECK PAIN: ICD-10-CM

## 2019-07-16 PROCEDURE — 8041 PR SCP AHA: Performed by: FAMILY MEDICINE

## 2019-07-16 PROCEDURE — G0439 PPPS, SUBSEQ VISIT: HCPCS | Performed by: FAMILY MEDICINE

## 2019-07-16 ASSESSMENT — PATIENT HEALTH QUESTIONNAIRE - PHQ9: CLINICAL INTERPRETATION OF PHQ2 SCORE: 0

## 2019-07-16 ASSESSMENT — ACTIVITIES OF DAILY LIVING (ADL): BATHING_REQUIRES_ASSISTANCE: 0

## 2019-07-16 ASSESSMENT — ENCOUNTER SYMPTOMS: GENERAL WELL-BEING: GOOD

## 2019-07-16 NOTE — PROGRESS NOTES
Chief Complaint   Patient presents with   • Annual Wellness Visit         HPI:  Larissa Burk is a 74 y.o. here for Medicare Annual Wellness Visit        Patient Active Problem List    Diagnosis Date Noted   • Malignant neoplasm of head of pancreas (HCC) 05/30/2018     Priority: High   • Contraindication to deep vein thrombosis (DVT) prophylaxis 11/06/2018     Priority: Medium   • Hypertension goal BP (blood pressure) < 140/80 04/20/2012     Priority: Medium   • Overlapping malignant neoplasm of pancreas (HCC) in body, neck, and tail 03/26/2019   • Research study patient 06/13/2018   • Opiate dependence, continuous (HCC) 04/12/2018   • Dyslipidemia 06/12/2017   • Impaired fasting blood sugar 06/12/2017   • Chronic bilateral low back pain with bilateral sciatica 06/12/2017   • Chronic neck pain 06/12/2017   • Acquired female bladder prolapse 05/19/2017   • DDD (degenerative disc disease), cervical 01/30/2017   • Atrophic vaginitis 01/30/2017   • Chronic sciatica of right side 10/13/2014   • DDD (degenerative disc disease), lumbar 10/13/2014   • Chronic shoulder pain 10/13/2014   • Vitamin D deficiency disease 02/25/2014   • Prediabetes 01/22/2014       Current Outpatient Prescriptions   Medication Sig Dispense Refill   • lisinopril-hydrochlorothiazide (PRINZIDE, ZESTORETIC) 20-25 MG per tablet Take 1 Tab by mouth every day. 100 Tab 3   • HYDROcodone/acetaminophen (NORCO)  MG Tab TAKE 1 TABLEY BY MOUTH EVERY 4-6HRS LIMIT 5/DAY M54.12 DNF 12/23  0   • tramadol (ULTRAM) 50 MG Tab ICD10 C25.9 TAKE ONE TAB BY MOUTH EVERY 8 HRS AS NEEDED FOR PAIN  3   • topiramate (TOPAMAX) 50 MG tablet TAKE ONE TAB BY MOUTH TWICE DAILY  2   • Menthol, Topical Analgesic, (BIOFREEZE COLORLESS) 4 % Gel 1 Each by Apply externally route 2 times a day as needed.     • Calcium Carb-Cholecalciferol (CALCIUM 1000 + D PO) Take 1 Tab by mouth every day.     • Multiple Vitamins-Iron (MULTIVITAMIN/IRON PO) Take 1 Tab by mouth every day.     •  diazepam (VALIUM) 2 MG TABS Take 4-6 mg by mouth at bedtime as needed for Sleep. Take two to three tabs by mouth at bedtime as needed      • cefdinir (OMNICEF) 300 MG Cap Take 1 Cap by mouth 2 times a day. (Patient not taking: Reported on 5/14/2019) 10 Cap 0   • PREMARIN 0.625 MG/GM Cream INSERT 0.5 G IN VAGINA EVERY DAY. (Patient not taking: Reported on 5/14/2019) 30 g 2   • fluconazole (DIFLUCAN) 150 MG tablet Take 1 tablet by mouth as a single dose. (Patient not taking: Reported on 2/5/2019) 1 Tab 0   • polyethylene glycol 3350 (MIRALAX) Powder Take 17 g by mouth every day. (Patient not taking: Reported on 5/14/2019) 300 g 3   • celecoxib (CELEBREX) 100 MG Cap Take 1 Cap by mouth 2 times a day, with meals. (Patient not taking: Reported on 5/14/2019) 60 Cap 1   • potassium chloride SA (KDUR) 20 MEQ Tab CR Take 20 mEq by mouth every day.  2   • conjugated estrogen (PREMARIN) 0.625 MG/GM Cream Insert 0.5 g in vagina every 72 hours as needed.       No current facility-administered medications for this visit.         Patient is taking medications as noted in medication list.  Current supplements as per medication list.     Allergies: Levofloxacin; Nitrofurantoin; Amitriptyline; Sulfa drugs; and Lyrica    Current social contact/activities: out with friends to dinner drives, [place]     Is patient current with immunizations? No, due for HEPATITIS B, TDAP and SHINGRIX (Shingles). Patient is interested in receiving NONE today.    She  reports that she has never smoked. She has never used smokeless tobacco. She reports that she does not drink alcohol or use drugs.  Counseling given: Not Answered        DPA/Advanced directive: Patient does not have an Advanced Directive.  A packet and workshop information was given on Advanced Directives.    ROS:    Gait: Uses no assistive device   Ostomy: No   Other tubes: No   Amputations: No   Chronic oxygen use No   Last eye exam 2017   Wears hearing aids: No   :  Denies any urinary leakage during the last 6 months      Screening:        Little interest or pleasure in doing things?  0 - not at all  Feeling down, depressed, or hopeless? 0 - not at all  Patient Health Questionnaire Score: 0    If depressive symptoms identified deferred to follow up visit unless specifically addressed in assessment and plan.    Interpretation of PHQ-9 Total Score   Score Severity   1-4 No Depression   5-9 Mild Depression   10-14 Moderate Depression   15-19 Moderately Severe Depression   20-27 Severe Depression    Screening for Cognitive Impairment    Three Minute Recall (village, kitchen, baby)  3/3 Village kitchen baby  Garcia clock face with all 12 numbers and set the hands to show 10 past 10.  Yes Time 10:10   5/5  If cognitive concerns identified, deferred for follow up unless specifically addressed in assessment and plan.    Fall Risk Assessment    Has the patient had two or more falls in the last year or any fall with injury in the last year?  No  If fall risk identified, deferred for follow up unless specifically addressed in assessment and plan.    Safety Assessment    Throw rugs on floor.  No  Handrails on all stairs.  Yes  Good lighting in all hallways.  Yes  Difficulty hearing.  No  Patient counseled about all safety risks that were identified.    Functional Assessment ADLs    Are there any barriers preventing you from cooking for yourself or meeting nutritional needs?  No.    Are there any barriers preventing you from driving safely or obtaining transportation?  No.    Are there any barriers preventing you from using a telephone or calling for help?  No.    Are there any barriers preventing you from shopping?  No.    Are there any barriers preventing you from taking care of your own finances?  No.    Are there any barriers preventing you from managing your medications?  No.    Are there any barriers preventing you from showering, bathing or dressing yourself?  No.    Are you currently  engaging in any exercise or physical activity?  Yes.  Walking to park 2-3 times a week 2 miles  What is your perception of your health?  Good.    Health Maintenance Summary                IMM HEP B VACCINE Overdue 3/5/1965     IMM DTaP/Tdap/Td Vaccine Overdue 3/5/1965     IMM ZOSTER VACCINES Overdue 3/5/1996     COLONOSCOPY Overdue 9/20/2017      Done 9/20/2012 REFERRAL TO GI FOR COLONOSCOPY    Annual Wellness Visit Overdue 6/13/2018      Done 6/12/2017 Visit Dx: Medicare annual wellness visit, subsequent     Patient has more history with this topic...    IMM INFLUENZA Next Due 9/1/2019     MAMMOGRAM Next Due 1/15/2020      Done 1/15/2019 MA-SCREENING MAMMO BILAT W/TOMOSYNTHESIS W/CAD     Patient has more history with this topic...    IMM PNEUMOCOCCAL 65+ (ADULT) LOW/MEDIUM RISK SERIES Next Due 1/17/2020      Done 1/17/2019 Imm Admin: Pneumococcal Conjugate Vaccine (Prevnar/PCV-13)    BONE DENSITY Next Due 6/5/2022      Done 6/5/2017 DS-BONE DENSITY STUDY (DEXA)     Patient has more history with this topic...          Patient Care Team:  Kaitlyn Long M.D. as PCP - General (Family Medicine)  Alhaji Nair P.A.-C. as Consulting Physician (Surgery)  Safia Mobley M.D. as Consulting Physician (Neurosurgery)  Will Naqvi O.D. as Consulting Physician (Optometry)  Jose Gonzales M.D. (Anesthesia)  Paty Eaton M.D. as Consulting Physician (Oncology)  Carl Valadez M.D. as Consulting Physician (Surgery)  [facility] as Consulting Physician    Social History   Substance Use Topics   • Smoking status: Never Smoker   • Smokeless tobacco: Never Used   • Alcohol use No     Family History   Problem Relation Age of Onset   • No Known Problems Mother    • No Known Problems Father      She  has a past medical history of Bowel habit changes (05/17/2018); Bronchitis (2005); Cancer (HCC) (05/2018); Chronic low back pain; Chronic neck pain; Dental disorder; Hypertension (2015);  Other specified symptom associated with female genital organs; Pain (05/17/2018); Pre-diabetes; and Snoring. She also has no past medical history of Breast cancer (HCC).   Past Surgical History:   Procedure Laterality Date   • PANCREATECTOMY  11/5/2018    Procedure: PANCREATECTOMY-  DISTAL;  Surgeon: Carl Valadez M.D.;  Location: [facility];  Service: General   • SPLENECTOMY  11/5/2018    Procedure: SPLENECTOMY;  Surgeon: Carl Valadez M.D.;  Location: [facility];  Service: General   • NODE DISSECTION  11/5/2018    Procedure: NODE DISSECTION;  Surgeon: Carl Valadez M.D.;  Location: [facility];  Service: General   • OMENTECTOMY  11/5/2018    Procedure: OMENTECTOMY-  OMENTAL FLAP, INTRAOPERATIVE ULTRASOUND, PORTAL VEIN RESECTION AND REPAIR, CELIAC TRUNK PARTIAL RESECTION AND REPAIR;  Surgeon: Carl Valadez M.D.;  Location: [facility];  Service: General   • CATH PLACEMENT Right 5/30/2018    Procedure: CATH PLACEMENT - CEPHALIC POWER PORT;  Surgeon: Carl Hunter M.D.;  Location: SURGERY SAME DAY [facility];  Service: General   • GASTROSCOPY  5/18/2018    Procedure: GASTROSCOPY;  Surgeon: Geovanni Romero M.D.;  Location: [facility];  Service: EUS   • EGD W/ENDOSCOPIC ULTRASOUND  5/18/2018    Procedure: EGD W/ENDOSCOPIC ULTRASOUND-  UPPER CURVILLINEAR;  Surgeon: Geovanni Romero M.D.;  Location: [facility];  Service: EUS   • EGD WITH ASP/BX  5/18/2018    Procedure: EGD WITH ASP/BX-  GASTRIC BIOPSIES,  FNA BIOPSIES OF PANCREAS HEAD MASS AND OR GALLBLADDER;  Surgeon: Geovanni Romero M.D.;  Location: [facility];  Service: EUS   • DENTAL EXTRACTION(S)  12/2017    AND hx of other dental extractions with sedation   • HYSTERECTOMY ROBOTIC XI  7/9/2015    Procedure: HYSTERECTOMY ROBOTIC XI W/ BILATERAL SALPINGO-OOPHORECTOMY, MCCALLS PROCEDURES;  Surgeon: Brian Parks M.D.;   "Location: SURGERY [facility];  Service:    • COLONOSCOPY  2008   • CERVICAL DISK AND FUSION ANTERIOR  2006    neck cage/fusion   • DENTAL EXTRACTION(S)  1966    [place] teeth   • OTHER      Sedation for several MRI's   • OTHER NEUROLOGICAL SURG      cervical 2007           Exam:     /50 (BP Location: Right arm, Patient Position: Sitting, BP Cuff Size: Adult)   Pulse (!) 58   Temp 36.8 °C (98.2 °F) (Temporal)   Resp 16   Ht 1.651 m (5' 5\")   Wt 71.8 kg (158 lb 4.6 oz)   SpO2 93%  Body mass index is 26.34 kg/m².    Hearing good.    Dentition fair  Alert, oriented in no acute distress.  Eye contact is good, speech goal directed, affect calm  Skin:                 Warm, no rashes in visible areas    Head:               Atraumatic, normocephalic    Eyes:               Pupils equal, round and reactive to light, extraocular muscles movement intact, clear conjunctivae    Ears:               Tympanic membranes intact without evidence of infection    Nose:              No nasal discharge, no obstruction    Mouth:             No oral lesions    Throat:            Tonsils not enlarged, no exudates    Neck:              Trachea midline, supple, no lymphadenopathy, no thyromegaly    Lungs:             Clear to auscultation, no rales, no wheezes, no rhonchi    Heart:              Regular rate and rhythm, no murmur    Abdomen:       Good bowel sounds, soft, nontender, no hepatosplenomegaly, no masses, midline vertical epigastric scar from previous Whipple's procedure    Extremities:    No edema, no clubbing, no cyanosis    Psych:            Alert and oriented x3, normal affect    Neuro:            Cranial nerves intact, Motor strength 5/5 upper and lower extremities, DTRs 2+, sensation intact to light touch, negative [facility] Outpatient Visit on 07/13/2019   Component Date Value Ref Range Status   • 25-Hydroxy   Vitamin D 25 07/13/2019 38  30 - 100 ng/mL Final    Comment: Adult Ranges:   <20 ng/mL - " Deficiency  20-29 ng/mL - Insufficiency   ng/mL - Sufficiency  The [employer] Centaur Vitamin D Assay is standardized to the  [employer] reference measurement procedures, a  reference method for the Vitamin D Standardization Program  (VDSP).  The VDSP aligns patient results among 25 (OH)  Vitamin D methods.     • Glycohemoglobin 07/13/2019 6.3* 0.0 - 5.6 % Final    Comment: Increased risk for diabetes:  5.7 -6.4%  Diabetes:  >6.4%  Glycemic control for adults with diabetes:  <7.0%  The above interpretations are per ADA guidelines.  Diagnosis  of diabetes mellitus on the basis of elevated Hemoglobin A1c  should be confirmed by repeating the Hb A1c test.     • Est Avg Glucose 07/13/2019 134  mg/dL Final    Comment: The eAG calculation is based on the A1c-Derived Daily Glucose  (ADAG) study.  See the ADA's website for additional information.     • Sodium 07/13/2019 139  135 - 145 mmol/L Final   • Potassium 07/13/2019 3.7  3.6 - 5.5 mmol/L Final   • Chloride 07/13/2019 103  96 - 112 mmol/L Final   • Co2 07/13/2019 29  20 - 33 mmol/L Final   • Anion Gap 07/13/2019 7.0  0.0 - 11.9 Final   • Glucose 07/13/2019 115* 65 - 99 mg/dL Final   • Bun 07/13/2019 30* 8 - 22 mg/dL Final   • Creatinine 07/13/2019 1.07  0.50 - 1.40 mg/dL Final   • Calcium 07/13/2019 10.4  8.5 - 10.5 mg/dL Final   • AST(SGOT) 07/13/2019 20  12 - 45 U/L Final   • ALT(SGPT) 07/13/2019 15  2 - 50 U/L Final   • Alkaline Phosphatase 07/13/2019 80  30 - 99 U/L Final   • Total Bilirubin 07/13/2019 0.3  0.1 - 1.5 mg/dL Final   • Albumin 07/13/2019 4.0  3.2 - 4.9 g/dL Final   • Total Protein 07/13/2019 7.2  6.0 - 8.2 g/dL Final   • Globulin 07/13/2019 3.2  1.9 - 3.5 g/dL Final   • A-G Ratio 07/13/2019 1.3  g/dL Final   • Cholesterol,Tot 07/13/2019 147  100 - 199 mg/dL Final   • Triglycerides 07/13/2019 105  0 - 149 mg/dL Final   • HDL 07/13/2019 52  >=40 mg/dL Final   • LDL 07/13/2019 74  <100 mg/dL Final   • Fasting Status 07/13/2019 Fasting   Final   •  GFR If  07/13/2019 >60  >60 mL/min/1.73 m 2 Final   • GFR If Non  07/13/2019 50* >60 mL/min/1.73 m 2 Final         Assessment and Plan. The following treatment and monitoring plan is recommended:      1. Medicare annual wellness visit, subsequent  Up-to-date with mammogram.  She had her Prevnar 13 in January 2019 and will need Pneumovax 23-year from that time.  We discussed Tdap but she wants to hold off on this.  She will get the flu shot in the fall.  We discussed Shingrix and we will give this when available.    2. 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 Valadez.  Follow-up CT scan of the abdomen have been done by her oncologist with the most recent one in June 2019 that did not show any evidence of any residual cancer or recurrence or metastatic cancer.    3. Impaired fasting blood sugar  We have been following her for impaired fasting blood sugar which she manages with her own efforts.  The most recent blood work came back borderline elevation of blood sugar at 115 with hemoglobin A1c of 6.3.  Advised to continue to avoid sweets and decrease the carbs, exercise regularly and keep a healthy weights.    4. Dyslipidemia  Patient has history of elevated triglycerides which she was able to get under control with the most recent lipid panel all at goal.The 10-year ASCVD risk score (Delaney DC Jr., et al., 2013) is: 10.3% we will continue to manage this with her own efforts by watching the diet, regular exercise and weight loss.  Recheck blood work in 6 months.    5. Essential hypertension  Controlled on lisinopril/HCTZ.  Continue medication.    6. Osteopenia of multiple sites  Last bone density scan 2017.  She is on calcium and vitamin D supplementation.   Consider another bone density scan for follow-up in 2020.    7. Hormone replacement therapy (HRT)  She is status post hysterectomy with bilateral salpingo-oophorectomy without malignancy.  She is followed by her GYN Dr. Franklin.  She is on estradiol vaginal tablets twice a week for hormone replacement.    8. Chronic bilateral low back pain with bilateral sciatica  Patient continues to see her pain specialist Dr. Garcia for pain management.  Patient is getting her hydrocodone/APAP, tramadol and diazepam from her pain specialist with good control of her pain.    9. Chronic neck pain  Stable and same as #8.    10. Opiate dependence, continuous (HCC)  Stable and same as #8.    11.  Atherosclerosis of aorta  This was an incidental finding when she had a PET/CT on 5/30/2018 as part of the work-up of her pancreatic cancer.  We will keep an eye on her lipid panel and consider statin if cholesterol worsens next visit.    12.  Thoracic aortic ectasia  This was found as an incidental finding on PET/CT on 5/30/2018 as part of the work-up of her pancreatic cancer showing mild dilatation of the ascending aorta.  Consider doing follow-up imaging next year.    Services suggested: No services needed at this time  Health Care Screening recommendations as per orders if indicated.  Referrals offered: PT/OT/Nutrition counseling/Behavioral Health/Smoking cessation as per orders if indicated.    Discussion today about general wellness and lifestyle habits:    · Prevent falls and reduce trip hazards; Cautioned about securing or removing rugs.  · Have a working fire alarm and carbon monoxide detector;   · Engage in regular physical activity and social activities       Follow-up: Follow-up in 6 months.      Please note that this dictation was created using voice recognition software. I have worked with consultants from the vendor as well as technical experts from [employer] to optimize the interface. I have made every reasonable attempt  to correct obvious errors, but I expect that there are errors of grammar and possibly content I did not discover before finalizing the note.

## 2019-09-10 ENCOUNTER — HOSPITAL ENCOUNTER (OUTPATIENT)
Dept: PAIN MANAGEMENT | Facility: REHABILITATION | Age: 74
End: 2019-09-10
Attending: SPECIALIST
Payer: MEDICARE

## 2019-10-08 ENCOUNTER — APPOINTMENT (OUTPATIENT)
Dept: PAIN MANAGEMENT | Facility: REHABILITATION | Age: 74
End: 2019-10-08
Attending: SPECIALIST
Payer: MEDICARE

## 2019-10-14 ENCOUNTER — HOSPITAL ENCOUNTER (OUTPATIENT)
Dept: PAIN MANAGEMENT | Facility: REHABILITATION | Age: 74
End: 2019-10-14
Attending: SPECIALIST
Payer: MEDICARE

## 2019-10-14 ENCOUNTER — HOSPITAL ENCOUNTER (OUTPATIENT)
Dept: RADIOLOGY | Facility: REHABILITATION | Age: 74
End: 2019-10-14
Attending: SPECIALIST

## 2019-10-14 VITALS
WEIGHT: 159.39 LBS | RESPIRATION RATE: 16 BRPM | TEMPERATURE: 98 F | HEART RATE: 56 BPM | OXYGEN SATURATION: 97 % | SYSTOLIC BLOOD PRESSURE: 116 MMHG | BODY MASS INDEX: 26.56 KG/M2 | HEIGHT: 65 IN | DIASTOLIC BLOOD PRESSURE: 50 MMHG

## 2019-10-14 PROCEDURE — 700101 HCHG RX REV CODE 250

## 2019-10-14 PROCEDURE — 700117 HCHG RX CONTRAST REV CODE 255

## 2019-10-14 PROCEDURE — 64493 INJ PARAVERT F JNT L/S 1 LEV: CPT

## 2019-10-14 PROCEDURE — 64495 INJ PARAVERT F JNT L/S 3 LEV: CPT

## 2019-10-14 PROCEDURE — 64494 INJ PARAVERT F JNT L/S 2 LEV: CPT

## 2019-10-14 PROCEDURE — 700111 HCHG RX REV CODE 636 W/ 250 OVERRIDE (IP)

## 2019-10-14 PROCEDURE — 99152 MOD SED SAME PHYS/QHP 5/>YRS: CPT

## 2019-10-14 RX ORDER — BUPIVACAINE HYDROCHLORIDE 2.5 MG/ML
INJECTION, SOLUTION EPIDURAL; INFILTRATION; INTRACAUDAL
Status: COMPLETED
Start: 2019-10-14 | End: 2019-10-14

## 2019-10-14 RX ORDER — LIDOCAINE HYDROCHLORIDE 20 MG/ML
INJECTION, SOLUTION EPIDURAL; INFILTRATION; INTRACAUDAL; PERINEURAL
Status: COMPLETED
Start: 2019-10-14 | End: 2019-10-14

## 2019-10-14 RX ORDER — MIDAZOLAM HYDROCHLORIDE 1 MG/ML
INJECTION INTRAMUSCULAR; INTRAVENOUS
Status: COMPLETED
Start: 2019-10-14 | End: 2019-10-14

## 2019-10-14 RX ORDER — TRIAMCINOLONE ACETONIDE 40 MG/ML
INJECTION, SUSPENSION INTRA-ARTICULAR; INTRAMUSCULAR
Status: COMPLETED
Start: 2019-10-14 | End: 2019-10-14

## 2019-10-14 RX ORDER — BUPIVACAINE HYDROCHLORIDE 5 MG/ML
INJECTION, SOLUTION EPIDURAL; INTRACAUDAL
Status: COMPLETED
Start: 2019-10-14 | End: 2019-10-14

## 2019-10-14 RX ADMIN — MIDAZOLAM HYDROCHLORIDE 2 MG: 1 INJECTION, SOLUTION INTRAMUSCULAR; INTRAVENOUS at 11:11

## 2019-10-14 RX ADMIN — LIDOCAINE HYDROCHLORIDE 5 ML: 20 INJECTION, SOLUTION EPIDURAL; INFILTRATION; INTRACAUDAL; PERINEURAL at 11:19

## 2019-10-14 RX ADMIN — IOHEXOL 4 ML: 240 INJECTION, SOLUTION INTRATHECAL; INTRAVASCULAR; INTRAVENOUS; ORAL at 11:18

## 2019-10-14 RX ADMIN — TRIAMCINOLONE ACETONIDE 40 MG: 40 INJECTION, SUSPENSION INTRA-ARTICULAR; INTRAMUSCULAR at 11:19

## 2019-10-14 NOTE — NON-PROVIDER
Pt given verbal and written d/c instructions and verbalizes understanding. denies nsaid use in last 3 days, denies blood thinners use in last 5 days, denies current infection or abx use. Med rec complete. Pt has post procedural ride home with  Guillermo.

## 2019-10-15 NOTE — PROCEDURES
DATE OF SERVICE:  10/14/2019    PROCEDURES:  1.  Left L2, L3, L4 and L5 medial branch blocks.  2.  Fluoroscopy for needle guidance, confirmation of placement.  3.  Mild IV sedation per patient request.    DIAGNOSES:  1.  Lumbar spondylosis.  2.  Anxiety over procedure, patient requesting intravenous sedation.    DESCRIPTION OF PROCEDURE:  After informed consent with the patient prone, she   is sedated mildly with IV Versed.  Fluoroscopy was utilized to identify the   junction of the transverse process with the superior articular process on the   left at L3, L4, L5, sacral alar depression.  Back was prepped with Betadine,   sterile draped and anesthetized.  Under intermittent fluoroscopic guidance and   local anesthesia, a 22-gauge spinal needle was passed to the first target at   L3 making contact with periosteum.  Small amount of contrast was injected   confirming appropriate placement followed by injecting 0.5 mL of 2% lidocaine,   5-10 mg Kenalog.  Needle was removed.    The left L4 target was approached with the same technique, injected with the   same medication.  The needle was removed.    The left L5 target was approached with the same technique, injected with the   same medication.  The needle was removed.    The left sacral alar depression target was approached with the same technique,   injected with the same medication.  The needle was removed.    She tolerated this well.    PLAN:  She will be discharged with a pain diary to document pain relief.  We   will discuss the results during her followup visit.  We will screen her for   radiofrequency ablation.       ____________________________________     MD TEZ CAMPOS / CHARLIE    DD:  10/14/2019 11:55:29  DT:  10/15/2019 04:29:55    D#:  [id number]  Job#:  [id number]    cc: [facility]

## 2019-11-27 ENCOUNTER — PATIENT OUTREACH (OUTPATIENT)
Dept: HEALTH INFORMATION MANAGEMENT | Facility: OTHER | Age: 74
End: 2019-11-27

## 2019-11-27 NOTE — PROGRESS NOTES
Outcome: Left Message- SCP intro needed     Please transfer to Patient Outreach Team at [phone] when patient returns call.      [facility] Verified: yes    Attempt # 1

## 2019-12-10 NOTE — PROGRESS NOTES
Outcome: Left Message- SCP intro needed & DPP    Please transfer to Patient Outreach Team at [phone] when patient returns call.      [facility] Verified: yes    Attempt # 2

## 2019-12-11 ENCOUNTER — HOSPITAL ENCOUNTER (OUTPATIENT)
Dept: PAIN MANAGEMENT | Facility: REHABILITATION | Age: 74
End: 2019-12-11
Attending: SPECIALIST
Payer: MEDICARE

## 2019-12-11 ENCOUNTER — HOSPITAL ENCOUNTER (OUTPATIENT)
Dept: RADIOLOGY | Facility: REHABILITATION | Age: 74
End: 2019-12-11
Attending: SPECIALIST

## 2019-12-11 VITALS
BODY MASS INDEX: 27.44 KG/M2 | HEART RATE: 60 BPM | DIASTOLIC BLOOD PRESSURE: 58 MMHG | HEIGHT: 65 IN | RESPIRATION RATE: 16 BRPM | TEMPERATURE: 98.2 F | WEIGHT: 164.68 LBS | SYSTOLIC BLOOD PRESSURE: 115 MMHG | OXYGEN SATURATION: 95 %

## 2019-12-11 PROCEDURE — 64494 INJ PARAVERT F JNT L/S 2 LEV: CPT

## 2019-12-11 PROCEDURE — 64495 INJ PARAVERT F JNT L/S 3 LEV: CPT

## 2019-12-11 PROCEDURE — 64493 INJ PARAVERT F JNT L/S 1 LEV: CPT

## 2019-12-11 PROCEDURE — 700117 HCHG RX CONTRAST REV CODE 255

## 2019-12-11 PROCEDURE — 700111 HCHG RX REV CODE 636 W/ 250 OVERRIDE (IP)

## 2019-12-11 PROCEDURE — 700101 HCHG RX REV CODE 250

## 2019-12-11 PROCEDURE — 99152 MOD SED SAME PHYS/QHP 5/>YRS: CPT

## 2019-12-11 RX ORDER — MIDAZOLAM HYDROCHLORIDE 1 MG/ML
INJECTION INTRAMUSCULAR; INTRAVENOUS
Status: COMPLETED
Start: 2019-12-11 | End: 2019-12-11

## 2019-12-11 RX ORDER — METHYLPREDNISOLONE ACETATE 80 MG/ML
INJECTION, SUSPENSION INTRA-ARTICULAR; INTRALESIONAL; INTRAMUSCULAR; SOFT TISSUE
Status: COMPLETED
Start: 2019-12-11 | End: 2019-12-11

## 2019-12-11 RX ORDER — TRIAMCINOLONE ACETONIDE 40 MG/ML
INJECTION, SUSPENSION INTRA-ARTICULAR; INTRAMUSCULAR
Status: COMPLETED
Start: 2019-12-11 | End: 2019-12-11

## 2019-12-11 RX ORDER — BUPIVACAINE HYDROCHLORIDE 5 MG/ML
INJECTION, SOLUTION EPIDURAL; INTRACAUDAL
Status: COMPLETED
Start: 2019-12-11 | End: 2019-12-11

## 2019-12-11 RX ORDER — LIDOCAINE HYDROCHLORIDE 20 MG/ML
INJECTION, SOLUTION EPIDURAL; INFILTRATION; INTRACAUDAL; PERINEURAL
Status: COMPLETED
Start: 2019-12-11 | End: 2019-12-11

## 2019-12-11 RX ADMIN — BUPIVACAINE HYDROCHLORIDE 4 ML: 5 INJECTION, SOLUTION EPIDURAL; INTRACAUDAL; PERINEURAL at 11:43

## 2019-12-11 RX ADMIN — MIDAZOLAM HYDROCHLORIDE 2 MG: 1 INJECTION, SOLUTION INTRAMUSCULAR; INTRAVENOUS at 11:37

## 2019-12-11 RX ADMIN — TRIAMCINOLONE ACETONIDE 40 MG: 40 INJECTION, SUSPENSION INTRA-ARTICULAR; INTRAMUSCULAR at 11:42

## 2019-12-11 RX ADMIN — IOHEXOL 3 ML: 240 INJECTION, SOLUTION INTRATHECAL; INTRAVASCULAR; INTRAVENOUS; ORAL at 11:42

## 2019-12-11 NOTE — NON-PROVIDER
Tolerated fluids well.  Ice pack applied to affected area.  Ambulatory without difficulty . Reviewed home care instructions  &  pain diary sheet information given  and understood by patient.

## 2019-12-11 NOTE — NON-PROVIDER
Printed home care education and prevention for infection pre and post education verbal instruction given to patient and verbalized understanding.Patient had a  ( Guillermo / spouse ).Stop bang score # 3 . Patient denied taking blood thinner and anti-inflammatory medication.Patient had Norco 1/2 tablet and Tramadol today at 0600 AM. Pre-assessment nursing checklist done and communicated to Dr. Gonzales and the nurse ( RUBEN Floyd RN) in the procedure room.

## 2019-12-11 NOTE — PROCEDURES
DATE OF SERVICE:  12/11/2019    PROCEDURES:  1.  Left L2, L3, L4, L5 medial branch blocks.  2.  Fluoroscopy for needle guidance confirmation of placement.  3.  IV sedation per patient request.    DIAGNOSES:  1.  Lumbar spondylosis with left lumbar facet syndrome.  2.  Anxiety over procedure, patient requesting intravenous sedation.    DESCRIPTION OF PROCEDURE:  After informed consent with the patient prone, she   was monitored and sedated with Versed.  Fluoroscopy was utilized to identify   the junction of the transverse process with the superior articular process on   the left at L3, L4, L5, and sacral alar depression.  Back was prepped with   Betadine, sterile draped and anesthetized.  Under intermittent fluoroscopic   guidance and local anesthesia, a 22-gauge spinal needle was passed to the   first target at L3 making contact with periosteum.  Contrast was injected   confirming appropriate spread along the pathway of the medial branch nerve.  I   slowly injected 0.5 mL of 0.375% bupivacaine with 5-10 mg Kenalog.  Needle   was removed.    The left L4 target was approached with the same technique, injected with the   same medication.  The needle was removed.    The left L5 target was approached with the same technique, injected with the   same medication.  The needle was removed.    The left sacral alar depression was approached with the same technique,   injected with the same medication.  The needle was removed.  She tolerated   this well.    PLAN:  She will be discharged with a pain diary to document pain relief to   screen for radiofrequency ablation.       ____________________________________     MD TEZ CAMPOS / CHARLIE    DD:  12/11/2019 12:09:58  DT:  12/11/2019 12:15:58    D#:  [id number]  Job#:  [id number]

## 2019-12-11 NOTE — NON-PROVIDER
Handoff received from pre-procedure RN. Pre assessment complete with pt input, time out completed, including 2 pt identifiers, procedure and site of procedure, allergies, stop bang = 3, pt history HTN, pt positioned prone by crt, rn, xrt, ankles resting on pillow, hands resting on stool under head of procedure table.

## 2020-01-03 NOTE — PROGRESS NOTES
Last visit: 09/16/2019  Last Med refill: 07/26/2019      Next Visit Date:  Future Appointments   Date Time Provider Donovan Peraza   1/22/2020  2:30 PM Nely Birmingham MD [facility]   3/23/2020 11:00 AM Royal Schofield APRN - [facility] [address] Maintenance   Topic Date Due    Shingles Vaccine (1 of 2) 11/06/2013    Annual Wellness Visit (AWV)  05/29/2019    A1C test (Diabetic or Prediabetic)  09/11/2020    Lipid screen  09/11/2020    Potassium monitoring  09/11/2020    Creatinine monitoring  09/11/2020    Colon cancer screen colonoscopy  10/11/2022    DTaP/Tdap/Td vaccine (2 - Td) 07/12/2026    Flu vaccine  Completed    Hepatitis C screen  Completed    HIV screen  Completed    Pneumococcal 0-64 years Vaccine  Aged Out       Hemoglobin A1C (%)   Date Value   09/11/2019 5.7   11/19/2018 5.8   04/10/2018 6.0             ( goal A1C is < 7)   No results found for: LABMICR  LDL Cholesterol (mg/dL)   Date Value   09/11/2019 68   04/10/2018 59       (goal LDL is <100)   AST (U/L)   Date Value   09/11/2019 18     ALT (U/L)   Date Value   09/11/2019 15     BUN (mg/dL)   Date Value   09/11/2019 12     BP Readings from Last 3 Encounters:   09/23/19 132/86   09/16/19 (!) 170/106   03/19/19 136/82          (goal 120/80)    All Future Testing planned in [facility]  Lab Frequency Next Occurrence               Patient Active Problem List:     Hyperlipidemia     Hypertension     Lumbago due to displacement of intervertebral disc     Major depressive disorder, recurrent episode, mild (HCC)     Impaired fasting blood sugar     Umbilical hernia without obstruction and without gangrene     Dysphagia     Gastroesophageal reflux disease without esophagitis     Diverticulosis of large intestine without hemorrhage     Obesity (BMI 30.0-34. 9) Outcome: Pt wants call back, I gave her our call back number as well     Please transfer to Patient Outreach Team at [phone] when patient returns call.    WebIZ Checked & Epic Updated:  no    HealthConnect Verified: no    Attempt # 1

## 2020-01-13 ENCOUNTER — HOSPITAL ENCOUNTER (OUTPATIENT)
Dept: RADIOLOGY | Facility: REHABILITATION | Age: 75
End: 2020-01-13
Attending: SPECIALIST

## 2020-01-13 ENCOUNTER — HOSPITAL ENCOUNTER (OUTPATIENT)
Dept: PAIN MANAGEMENT | Facility: REHABILITATION | Age: 75
End: 2020-01-13
Attending: SPECIALIST
Payer: MEDICARE

## 2020-01-13 VITALS
TEMPERATURE: 98.6 F | SYSTOLIC BLOOD PRESSURE: 124 MMHG | HEIGHT: 66 IN | HEART RATE: 61 BPM | WEIGHT: 167.55 LBS | DIASTOLIC BLOOD PRESSURE: 50 MMHG | BODY MASS INDEX: 26.93 KG/M2 | OXYGEN SATURATION: 96 % | RESPIRATION RATE: 15 BRPM

## 2020-01-13 PROCEDURE — 99152 MOD SED SAME PHYS/QHP 5/>YRS: CPT

## 2020-01-13 PROCEDURE — 700101 HCHG RX REV CODE 250

## 2020-01-13 PROCEDURE — 99153 MOD SED SAME PHYS/QHP EA: CPT

## 2020-01-13 PROCEDURE — 64635 DESTROY LUMB/SAC FACET JNT: CPT

## 2020-01-13 PROCEDURE — 64636 DESTROY L/S FACET JNT ADDL: CPT

## 2020-01-13 PROCEDURE — 700111 HCHG RX REV CODE 636 W/ 250 OVERRIDE (IP)

## 2020-01-13 RX ORDER — LIDOCAINE HYDROCHLORIDE 20 MG/ML
INJECTION, SOLUTION EPIDURAL; INFILTRATION; INTRACAUDAL; PERINEURAL
Status: COMPLETED
Start: 2020-01-13 | End: 2020-01-13

## 2020-01-13 RX ORDER — MIDAZOLAM HYDROCHLORIDE 1 MG/ML
INJECTION INTRAMUSCULAR; INTRAVENOUS
Status: COMPLETED
Start: 2020-01-13 | End: 2020-01-13

## 2020-01-13 RX ORDER — DEXAMETHASONE SODIUM PHOSPHATE 10 MG/ML
INJECTION, SOLUTION INTRAMUSCULAR; INTRAVENOUS
Status: COMPLETED
Start: 2020-01-13 | End: 2020-01-13

## 2020-01-13 RX ADMIN — MIDAZOLAM HYDROCHLORIDE 25 MG: 1 INJECTION, SOLUTION INTRAMUSCULAR; INTRAVENOUS at 15:13

## 2020-01-13 RX ADMIN — MIDAZOLAM HYDROCHLORIDE 1 MG: 1 INJECTION, SOLUTION INTRAMUSCULAR; INTRAVENOUS at 15:10

## 2020-01-13 RX ADMIN — FENTANYL CITRATE 25 MCG: 50 INJECTION, SOLUTION INTRAMUSCULAR; INTRAVENOUS at 15:11

## 2020-01-13 RX ADMIN — LIDOCAINE HYDROCHLORIDE 5 ML: 20 INJECTION, SOLUTION EPIDURAL; INFILTRATION; INTRACAUDAL; PERINEURAL at 15:23

## 2020-01-13 NOTE — NON-PROVIDER
Escorted ambulatory without difficulty.by RN from procedure room. Tolerated  Fluids well.Refused ice compress when offered.

## 2020-01-13 NOTE — NON-PROVIDER
Printed home care education ,  prevention for infection pre and post education verbal instruction given to patient and verbalized understanding.Patient had a  ( Guillermo / spouse ).Patient medical history reviewed in epic ( DVT, HTN )   Stop bang score # 3. Patient denied taking blood thinner and anti-inflammatory medication. Pre-assessment nursing checklist done and communicated to the procedure nurse ( SOBEIDA Lott RN ) .

## 2020-01-14 NOTE — PROCEDURES
DATE OF SERVICE:  01/13/2020    PROCEDURES:  1.  Right L2, L3, L4, L5 medial branch neurotomies.  2.  Fluoroscopy for needle guidance and confirmation of placement.  3.  IV sedation per patient request for tolerance.    DIAGNOSES:  1.  Lumbar facet syndrome with mechanical right low back pain.  2.  Anxiety over painful procedure, patient requesting intravenous sedation.    DESCRIPTION OF PROCEDURE:  After informed consent, patient was placed prone,   monitored and sedated with Versed and fentanyl.  Fluoroscopy was utilized to   identify the junction of the transverse process with the superior articular   process on the right at L3, L4, L5 and a sacral alar depression.  Back was   prepped with Betadine, sterile draped and anesthetized.  Under intermittent   fluoroscopic guidance and local anesthesia, a 10 cm 18-gauge radiofrequency   needles were passed under fluoroscopic guidance to the aforementioned targets   making contact with periosteum and then advanced 1-2 mm over the superior   edge.  A 2 Hz motor stimulation was carried out up to 2.5 volts at each level,   confirming local motor activity without radicular motor activity in the right   lower extremity.  Following this, I injected small amounts of 2% lidocaine   and performed the following lesions:  1.  L2 underwent radiofrequency lesioning at 80 degrees for 90 seconds.  2.  L3 underwent radiofrequency lesioning at 80 degrees centigrade for 90   seconds.  3.  L4 underwent radiofrequency lesioning at 80 degrees centigrade for 90   seconds.  4.  L5 underwent radiofrequency lesioning at 80 degrees centigrade for 90   seconds.  Needles were removed.  She tolerated this well.    PLAN:  Follow up for ongoing pain management needs.  I anticipate treating the   left side as well.       ____________________________________     MD TEZ CAMPOS / CHARLIE    DD:  01/13/2020 15:39:09  DT:  01/13/2020 22:59:21    D#:  [id number]  Job#:  [id number]    cc: [facility]

## 2020-01-14 NOTE — NON-PROVIDER
Pre-procedure assessment completed  and pertinent health information (pt had Norco and tramadol at 0700 today) communicated to physician and staff during timeout.  Pt positionned pre-procedure on table by RN,CNA and xray tech. Pillow placed under feet for support.Grounding pad applied to left flank by CNA and verified by RN.

## 2020-01-28 ENCOUNTER — TELEPHONE (OUTPATIENT)
Dept: MEDICAL GROUP | Facility: PHYSICIAN GROUP | Age: 75
End: 2020-01-28

## 2020-01-28 NOTE — TELEPHONE ENCOUNTER
ESTABLISHED PATIENT PRE-VISIT PLANNING     Patient was NOT contacted to complete PVP.     Note: Patient will not be contacted if there is no indication to call.     1.  Reviewed notes from the last few office visits within the medical group: Yes    2.  If any orders were placed at last visit or intended to be done for this visit (i.e. 6 mos follow-up), do we have Results/Consult Notes?        •  Labs - Labs ordered, NOT completed. Patient advised to complete prior to next appointment.   Note: If patient appointment is for lab review and patient did not complete labs, check with provider if OK to reschedule patient until labs completed.       •  Imaging - Imaging was not ordered at last office visit.       •  Referrals - No referrals were ordered at last office visit.    3. Is this appointment scheduled as a Hospital Follow-Up? No    4.  Immunizations were updated in Epic using WebIZ?: Epic matches WebIZ       •  Web Iz Recommendations: FLU, PNEUMOVAX (PPSV23), TD and SHINGRIX (Shingles)    5.  Patient is due for the following Health Maintenance Topics:   Health Maintenance Due   Topic Date Due   • IMM DTaP/Tdap/Td Vaccine (1 - Tdap) 03/05/1957   • IMM HEP B VACCINE (1 of 3 - Risk 3-dose series) 03/05/1965   • IMM ZOSTER VACCINES (1 of 2) 03/05/1996   • COLONOSCOPY  09/20/2017   • IMM INFLUENZA (1) 09/01/2019   • MAMMOGRAM  01/15/2020   • IMM PNEUMOCOCCAL VACCINE: 65+ Years (2 of 2 - PPSV23) 01/17/2020       - Patient has completed PREVNAR (PCV13)  Immunization(s) per WebIZ. Chart has been updated.    6. Orders for overdue Health Maintenance topics pended in Pre-Charting? NO    7.  AHA (MDX) form printed for Provider? YES    8.  Patient was NOT informed to arrive 15 min prior to their scheduled appointment and bring in their medication bottles.

## 2020-01-31 ENCOUNTER — HOSPITAL ENCOUNTER (OUTPATIENT)
Dept: PAIN MANAGEMENT | Facility: REHABILITATION | Age: 75
End: 2020-01-31
Attending: SPECIALIST
Payer: MEDICARE

## 2020-01-31 ENCOUNTER — HOSPITAL ENCOUNTER (OUTPATIENT)
Dept: RADIOLOGY | Facility: REHABILITATION | Age: 75
End: 2020-01-31
Attending: SPECIALIST

## 2020-01-31 VITALS
RESPIRATION RATE: 14 BRPM | TEMPERATURE: 98.2 F | WEIGHT: 169.97 LBS | HEIGHT: 65 IN | BODY MASS INDEX: 28.32 KG/M2 | OXYGEN SATURATION: 95 % | SYSTOLIC BLOOD PRESSURE: 125 MMHG | DIASTOLIC BLOOD PRESSURE: 46 MMHG | HEART RATE: 57 BPM

## 2020-01-31 PROCEDURE — 99153 MOD SED SAME PHYS/QHP EA: CPT

## 2020-01-31 PROCEDURE — 64636 DESTROY L/S FACET JNT ADDL: CPT

## 2020-01-31 PROCEDURE — 99152 MOD SED SAME PHYS/QHP 5/>YRS: CPT

## 2020-01-31 PROCEDURE — 700101 HCHG RX REV CODE 250

## 2020-01-31 PROCEDURE — 64635 DESTROY LUMB/SAC FACET JNT: CPT

## 2020-01-31 PROCEDURE — 700111 HCHG RX REV CODE 636 W/ 250 OVERRIDE (IP)

## 2020-01-31 RX ORDER — MIDAZOLAM HYDROCHLORIDE 1 MG/ML
INJECTION INTRAMUSCULAR; INTRAVENOUS
Status: COMPLETED
Start: 2020-01-31 | End: 2020-01-31

## 2020-01-31 RX ORDER — LIDOCAINE HYDROCHLORIDE 20 MG/ML
INJECTION, SOLUTION EPIDURAL; INFILTRATION; INTRACAUDAL; PERINEURAL
Status: COMPLETED
Start: 2020-01-31 | End: 2020-01-31

## 2020-01-31 RX ORDER — DEXAMETHASONE SODIUM PHOSPHATE 10 MG/ML
INJECTION, SOLUTION INTRAMUSCULAR; INTRAVENOUS
Status: COMPLETED
Start: 2020-01-31 | End: 2020-01-31

## 2020-01-31 RX ADMIN — MIDAZOLAM HYDROCHLORIDE 1 MG: 1 INJECTION, SOLUTION INTRAMUSCULAR; INTRAVENOUS at 11:37

## 2020-01-31 RX ADMIN — FENTANYL CITRATE 25 MCG: 50 INJECTION, SOLUTION INTRAMUSCULAR; INTRAVENOUS at 11:37

## 2020-01-31 RX ADMIN — LIDOCAINE HYDROCHLORIDE 5 ML: 20 INJECTION, SOLUTION EPIDURAL; INFILTRATION; INTRACAUDAL; PERINEURAL at 11:55

## 2020-01-31 NOTE — NON-PROVIDER
Current meds. See medication reconciliation form. Reviewed with pt. Pt denies taking ASA,other blood thinners or anti-inflammatories. Pre-procedure assessment completed and information  communicated to procedure room RN during handoff.(hx pancreatic CA) Pt has a ride post-procedure (, Guillermo is ). Printed and verbal discharge instructions as well as education regarding infection prevention given to pt/pt's family who verbalized understanding.

## 2020-01-31 NOTE — NON-PROVIDER
Preprocedure assessment completed.  Pertinent health information hx pancreatic cancer 1.5 years ago,no metal in extremities communicated to physician and staff during time out.  Patient positioned preprocedure by RN, CST, and XRAY.  Pillow under knees for support.

## 2020-02-01 NOTE — PROCEDURES
DATE OF SERVICE:  01/31/2020    PROCEDURES:  1.  Left L2, L3, L4, and L5 medial branch neurotomies.  2.  Fluoroscopy for needle guidance confirmation of placement.  3.  IV sedation per patient request.    DIAGNOSES:  1.  Lumbar spondylosis with left lumbar facet syndrome.  2.  Anxiety over painful procedure, patient requesting intravenous sedation.    DESCRIPTION OF PROCEDURE:  After informed consent with the patient prone, she   is monitored and sedated with Versed and fentanyl.  Fluoroscopy was utilized   to identify the junction of the transverse process with the superior articular   process on the left at L3, L4, L5, and the sacral alar depression.  Back was   prepped with Betadine, sterile draped and anesthetized.  Under intermittent   fluoroscopic guidance and local anesthesia, 10 cm 18-gauge radiofrequency   needles were passed to the aforementioned targets and making contact with   periosteum and advanced 1-2 mm over the superior edge at L3, L4, L5, and   sacral alar depression.  A 2 Hz motor stimulation was carried out at each   level, confirming local motor activity without radicular motor activity at 2.5   volts.  I then injected small amounts of 2% lidocaine and performed the   following lesions:  1.  L2 underwent radiofrequency lesioning at 80 degrees centigrade for 90   seconds.  2.  L3 underwent radiofrequency lesioning at 80 degrees centigrade for 90   seconds.  3.  L4 underwent radiofrequency lesioning at 80 degrees centigrade for 90   seconds.  4.  L5 underwent radiofrequency lesioning at 80 degrees centigrade for 90   seconds.    The needle was removed.  She tolerated this well.    PLAN:  Follow up for ongoing pain management needs.       ____________________________________     MD TEZ CAMPOS / CHARLIE    DD:  01/31/2020 12:04:56  DT:  02/01/2020 01:13:46    D#:  [id number]  Job#:  [id number]    cc: [facility]

## 2020-02-18 ENCOUNTER — HOSPITAL ENCOUNTER (OUTPATIENT)
Dept: LAB | Facility: MEDICAL CENTER | Age: 75
End: 2020-02-18
Attending: FAMILY MEDICINE
Payer: MEDICARE

## 2020-02-18 DIAGNOSIS — I10 ESSENTIAL HYPERTENSION: ICD-10-CM

## 2020-02-18 DIAGNOSIS — Z79.890 HORMONE REPLACEMENT THERAPY (HRT): ICD-10-CM

## 2020-02-18 DIAGNOSIS — M85.89 OSTEOPENIA OF MULTIPLE SITES: ICD-10-CM

## 2020-02-18 DIAGNOSIS — E78.5 DYSLIPIDEMIA: ICD-10-CM

## 2020-02-18 DIAGNOSIS — C25.2 MALIGNANT NEOPLASM OF TAIL OF PANCREAS (HCC): ICD-10-CM

## 2020-02-18 DIAGNOSIS — R73.01 IMPAIRED FASTING BLOOD SUGAR: ICD-10-CM

## 2020-02-18 LAB
ALBUMIN SERPL BCP-MCNC: 4.2 G/DL (ref 3.2–4.9)
ALBUMIN/GLOB SERPL: 1.3 G/DL
ALP SERPL-CCNC: 71 U/L (ref 30–99)
ALT SERPL-CCNC: 17 U/L (ref 2–50)
ANION GAP SERPL CALC-SCNC: 5 MMOL/L (ref 0–11.9)
AST SERPL-CCNC: 19 U/L (ref 12–45)
BASOPHILS # BLD AUTO: 1.3 % (ref 0–1.8)
BASOPHILS # BLD: 0.1 K/UL (ref 0–0.12)
BILIRUB SERPL-MCNC: 0.4 MG/DL (ref 0.1–1.5)
BUN SERPL-MCNC: 20 MG/DL (ref 8–22)
CALCIUM SERPL-MCNC: 10.4 MG/DL (ref 8.5–10.5)
CHLORIDE SERPL-SCNC: 103 MMOL/L (ref 96–112)
CHOLEST SERPL-MCNC: 152 MG/DL (ref 100–199)
CO2 SERPL-SCNC: 29 MMOL/L (ref 20–33)
CREAT SERPL-MCNC: 0.87 MG/DL (ref 0.5–1.4)
EOSINOPHIL # BLD AUTO: 0.17 K/UL (ref 0–0.51)
EOSINOPHIL NFR BLD: 2.2 % (ref 0–6.9)
ERYTHROCYTE [DISTWIDTH] IN BLOOD BY AUTOMATED COUNT: 49.4 FL (ref 35.9–50)
EST. AVERAGE GLUCOSE BLD GHB EST-MCNC: 143 MG/DL
FASTING STATUS PATIENT QL REPORTED: NORMAL
GLOBULIN SER CALC-MCNC: 3.2 G/DL (ref 1.9–3.5)
GLUCOSE SERPL-MCNC: 124 MG/DL (ref 65–99)
HBA1C MFR BLD: 6.6 % (ref 0–5.6)
HCT VFR BLD AUTO: 43.5 % (ref 37–47)
HDLC SERPL-MCNC: 49 MG/DL
HGB BLD-MCNC: 14.1 G/DL (ref 12–16)
IMM GRANULOCYTES # BLD AUTO: 0.02 K/UL (ref 0–0.11)
IMM GRANULOCYTES NFR BLD AUTO: 0.3 % (ref 0–0.9)
LDLC SERPL CALC-MCNC: 79 MG/DL
LYMPHOCYTES # BLD AUTO: 2.31 K/UL (ref 1–4.8)
LYMPHOCYTES NFR BLD: 30 % (ref 22–41)
MCH RBC QN AUTO: 31.6 PG (ref 27–33)
MCHC RBC AUTO-ENTMCNC: 32.4 G/DL (ref 33.6–35)
MCV RBC AUTO: 97.5 FL (ref 81.4–97.8)
MONOCYTES # BLD AUTO: 0.74 K/UL (ref 0–0.85)
MONOCYTES NFR BLD AUTO: 9.6 % (ref 0–13.4)
NEUTROPHILS # BLD AUTO: 4.36 K/UL (ref 2–7.15)
NEUTROPHILS NFR BLD: 56.6 % (ref 44–72)
NRBC # BLD AUTO: 0 K/UL
NRBC BLD-RTO: 0 /100 WBC
PLATELET # BLD AUTO: 336 K/UL (ref 164–446)
PMV BLD AUTO: 9.4 FL (ref 9–12.9)
POTASSIUM SERPL-SCNC: 3.9 MMOL/L (ref 3.6–5.5)
PROT SERPL-MCNC: 7.4 G/DL (ref 6–8.2)
RBC # BLD AUTO: 4.46 M/UL (ref 4.2–5.4)
SODIUM SERPL-SCNC: 137 MMOL/L (ref 135–145)
TRIGL SERPL-MCNC: 119 MG/DL (ref 0–149)
WBC # BLD AUTO: 7.7 K/UL (ref 4.8–10.8)

## 2020-02-18 PROCEDURE — 80053 COMPREHEN METABOLIC PANEL: CPT

## 2020-02-18 PROCEDURE — 83036 HEMOGLOBIN GLYCOSYLATED A1C: CPT

## 2020-02-18 PROCEDURE — 80061 LIPID PANEL: CPT

## 2020-02-18 PROCEDURE — 85025 COMPLETE CBC W/AUTO DIFF WBC: CPT

## 2020-02-18 PROCEDURE — 36415 COLL VENOUS BLD VENIPUNCTURE: CPT

## 2020-02-19 ENCOUNTER — OFFICE VISIT (OUTPATIENT)
Dept: MEDICAL GROUP | Facility: LAB | Age: 75
End: 2020-02-19
Payer: MEDICARE

## 2020-02-19 VITALS
OXYGEN SATURATION: 96 % | HEIGHT: 65 IN | WEIGHT: 172 LBS | HEART RATE: 70 BPM | BODY MASS INDEX: 28.66 KG/M2 | DIASTOLIC BLOOD PRESSURE: 54 MMHG | TEMPERATURE: 97.8 F | RESPIRATION RATE: 16 BRPM | SYSTOLIC BLOOD PRESSURE: 126 MMHG

## 2020-02-19 DIAGNOSIS — Z12.31 ENCOUNTER FOR SCREENING MAMMOGRAM FOR BREAST CANCER: ICD-10-CM

## 2020-02-19 DIAGNOSIS — Q89.01 ASPLENIA: ICD-10-CM

## 2020-02-19 DIAGNOSIS — M54.42 CHRONIC BILATERAL LOW BACK PAIN WITH BILATERAL SCIATICA: ICD-10-CM

## 2020-02-19 DIAGNOSIS — F11.20 OPIATE DEPENDENCE, CONTINUOUS (HCC): ICD-10-CM

## 2020-02-19 DIAGNOSIS — M51.36 DDD (DEGENERATIVE DISC DISEASE), LUMBAR: ICD-10-CM

## 2020-02-19 DIAGNOSIS — I70.90 ATHEROSCLEROSIS: ICD-10-CM

## 2020-02-19 DIAGNOSIS — C25.0 MALIGNANT NEOPLASM OF HEAD OF PANCREAS (HCC): Chronic | ICD-10-CM

## 2020-02-19 DIAGNOSIS — G89.29 CHRONIC BILATERAL LOW BACK PAIN WITH BILATERAL SCIATICA: ICD-10-CM

## 2020-02-19 DIAGNOSIS — R73.01 IMPAIRED FASTING BLOOD SUGAR: ICD-10-CM

## 2020-02-19 DIAGNOSIS — I10 HYPERTENSION GOAL BP (BLOOD PRESSURE) < 140/80: ICD-10-CM

## 2020-02-19 DIAGNOSIS — Z91.89 OTHER SPECIFIED PERSONAL RISK FACTORS, NOT ELSEWHERE CLASSIFIED: ICD-10-CM

## 2020-02-19 DIAGNOSIS — M54.41 CHRONIC BILATERAL LOW BACK PAIN WITH BILATERAL SCIATICA: ICD-10-CM

## 2020-02-19 PROBLEM — Z53.09 CONTRAINDICATION TO DEEP VEIN THROMBOSIS (DVT) PROPHYLAXIS: Status: RESOLVED | Noted: 2018-11-06 | Resolved: 2020-02-19

## 2020-02-19 PROBLEM — C25.8 OVERLAPPING MALIGNANT NEOPLASM OF PANCREAS (HCC): Status: RESOLVED | Noted: 2019-03-26 | Resolved: 2020-02-19

## 2020-02-19 PROCEDURE — 99215 OFFICE O/P EST HI 40 MIN: CPT | Mod: 25 | Performed by: NURSE PRACTITIONER

## 2020-02-19 PROCEDURE — 90734 MENACWYD/MENACWYCRM VACC IM: CPT | Performed by: NURSE PRACTITIONER

## 2020-02-19 PROCEDURE — 8041 PR SCP AHA: Performed by: NURSE PRACTITIONER

## 2020-02-19 PROCEDURE — 90471 IMMUNIZATION ADMIN: CPT | Performed by: NURSE PRACTITIONER

## 2020-02-19 RX ORDER — METFORMIN HYDROCHLORIDE 500 MG/1
500 TABLET, EXTENDED RELEASE ORAL DAILY
Qty: 90 TAB | Refills: 3 | Status: SHIPPED | OUTPATIENT
Start: 2020-02-19 | End: 2020-12-14 | Stop reason: SDUPTHER

## 2020-02-19 ASSESSMENT — PATIENT HEALTH QUESTIONNAIRE - PHQ9: CLINICAL INTERPRETATION OF PHQ2 SCORE: 0

## 2020-02-19 NOTE — ASSESSMENT & PLAN NOTE
Dx 1.5 yrs ago.  Has been through surgery and chemo, did not require radiation.  Found initially b/c of lower abdominal pain radiating around her back.  Tells me that she is no longer followed by GI or Oncology - Dr. Blakely.  Does CT scans with Dr. GUTIERREZ, her surgeon - last 6/2019.  Denies n/v/d, weight loss or abdominal pain.

## 2020-02-19 NOTE — ASSESSMENT & PLAN NOTE
Chronic issue.  Taking lisinopril / hctz 20/25 and states her BP has been doing great.  Denies CP or leg swelling.

## 2020-02-19 NOTE — ASSESSMENT & PLAN NOTE
Chronic issue.  Avoiding sugar intake.  a1c up to 6.6 as of yesterday - has been prediabetic for years.  Walking a lot for exercise.  Denies numbness / tingling in feet.  Off metformin x 2 years.

## 2020-02-19 NOTE — ASSESSMENT & PLAN NOTE
Chronic issue.  Seeing Dr. Garcia every 2 mo.  Taking hydrocodone / tramadol / diazepam / topamax for pain control - rx all through Dr. Garcia.

## 2020-02-19 NOTE — PROGRESS NOTES
Subjective:     Larissa is a 74 y.o. female here today to re-est care, go over chronic issues and Annual Health Assessment.  Overall feeling really well.  I saw her last in 2017.      #1-malignant neoplasm of head of pancreas (HCC)  Unfortunately dx 1.5 yrs ago.  Has been through surgery and chemo, did not require radiation.  Found initially b/c of lower abdominal pain radiating around her back.  Tells me that she is no longer followed by GI or Oncology - Dr. Blakely.  Does CT scans with Dr. GUTIERREZ, her surgeon - last 6/2019.  Denies n/v/d, weight loss or abdominal pain.  Overall feeling well.    #2-DDD (degenerative disc disease), lumbar  Chronic issue.  Seeing Dr. Garcia every 2 mo.  Taking hydrocodone / tramadol / diazepam / topamax for pain control - rx all through Dr. Garcia.     #3-hypertension goal BP (blood pressure) < 140/80  Chronic issue.  Taking lisinopril / hctz 20/25 and states her BP has been doing great.  Denies CP or leg swelling.     #4-impaired fasting blood sugar  Chronic issue.  Avoiding sugar intake.  a1c up to 6.6 as of yesterday - has been prediabetic for years.  Walking a lot for exercise.  Denies numbness / tingling in feet.  Off metformin x 2 years.    Health Maintenance Summary                IMM MENINGOCOCCAL VACCINE (MCV4) Overdue 1945     IMM MENINGOCOCCAL B VACCINE AT RISK PATIENTS AGED 10+ Overdue 3/5/1956     IMM DTaP/Tdap/Td Vaccine Overdue 3/5/1957     IMM HEP B VACCINE Overdue 3/5/1965     IMM ZOSTER VACCINES Overdue 3/5/1996     COLONOSCOPY Overdue 9/20/2017      Done 9/20/2012 REFERRAL TO GI FOR COLONOSCOPY    IMM INFLUENZA Overdue 9/1/2019     MAMMOGRAM Overdue 1/15/2020      Done 1/15/2019 MA-SCREENING MAMMO BILAT W/TOMOSYNTHESIS W/CAD     Patient has more history with this topic...    IMM PNEUMOCOCCAL VACCINE: 65+ Years Overdue 1/17/2020      Done 1/17/2019 Imm Admin: Pneumococcal Conjugate Vaccine (Prevnar/PCV-13)    Annual Wellness Visit Next Due 7/16/2020      Done  7/16/2019 Visit Dx: Medicare annual wellness visit, subsequent     Patient has more history with this topic...    BONE DENSITY Next Due 6/5/2022      Done 6/5/2017 DS-BONE DENSITY STUDY (DEXA)     Patient has more history with this topic...           Annual Health Assessment Questions:     1.  Are you currently engaging in any exercise or physical activity? Yes    2.  How would you describe your mood or emotional well-being today? good    3.  Have you had any falls in the last year? No    4.  Have you noticed any problems with your balance or had difficulty walking? No    5.  In the last six months have you experienced any leakage of urine? No    6. DPA/Advanced Directive: Patient does not have an Advanced Directive.  A packet and workshop information was given on Advanced Directives.    Current medicines (including changes today)  Current Outpatient Medications   Medication Sig Dispense Refill   • lisinopril-hydrochlorothiazide (PRINZIDE, ZESTORETIC) 20-25 MG per tablet Take 1 Tab by mouth every day. 100 Tab 3   • HYDROcodone/acetaminophen (NORCO)  MG Tab TAKE 1 TABLEY BY MOUTH EVERY 4-6HRS LIMIT 5/DAY M54.12 DNF 12/23  0   • tramadol (ULTRAM) 50 MG Tab ICD10 C25.9 TAKE ONE TAB BY MOUTH EVERY 8 HRS AS NEEDED FOR PAIN  3   • topiramate (TOPAMAX) 50 MG tablet TAKE ONE TAB BY MOUTH TWICE DAILY  2   • Calcium Carb-Cholecalciferol (CALCIUM 1000 + D PO) Take 1 Tab by mouth every day.     • Multiple Vitamins-Iron (MULTIVITAMIN/IRON PO) Take 1 Tab by mouth every day.     • diazepam (VALIUM) 2 MG TABS Take 4-6 mg by mouth at bedtime as needed for Sleep. Take two to three tabs by mouth at bedtime as needed        No current facility-administered medications for this visit.        She  has a past medical history of Bowel habit changes (05/17/2018), Bronchitis (2005), Cancer (HCC) (05/2018), Chronic low back pain, Chronic neck pain, Dental disorder, Hypertension (2015), Other specified symptom associated with female  "genital organs, Pain (05/17/2018), Pre-diabetes, and Snoring. She also has no past medical history of Breast cancer (HCC).    Levofloxacin; Nitrofurantoin; Amitriptyline; Sulfa drugs; and Lyrica    She  reports that she has never smoked. She has never used smokeless tobacco. She reports that she does not drink alcohol or use drugs.  Counseling given: Not Answered      ROS   No chest pain, no shortness of breath, no abdominal pain.     Objective:     Physical Exam:  /54 (BP Location: Right arm, Patient Position: Sitting, BP Cuff Size: Large adult)   Pulse 70   Temp 36.6 °C (97.8 °F)   Resp 16   Ht 1.651 m (5' 5\")   Wt 78 kg (172 lb)   SpO2 96%  Body mass index is 28.62 kg/m².   Constitutional: Alert, no distress.  Skin: Warm, dry, good turgor, no rashes in visible areas.  Eye: Equal, round and reactive, conjunctiva clear, lids normal.  ENMT: Lips without lesions, good dentition, oropharynx clear.  Neck: Trachea midline, no masses, no thyromegaly.  Respiratory: Unlabored respiratory effort, lungs clear to auscultation, no wheezes, no rhonchi.  Cardiovascular: Regular rate and rhythm  Abdomen: Soft, non-tender  Psych: Alert and oriented x3, normal affect and mood.    Assessment and Plan:   1. Malignant neoplasm of head of pancreas (HCC)  -Removed.  Followed every 6 months by Dr. GUTIERREZ, per patient.  Up-to-date with scanning but I encouraged her to call his office regarding advice on next CT scan.  Feeling well.    2. DDD (degenerative disc disease), lumbar  -Followed closely by Dr. Garcia.  Encouraged continued efforts at trunk strengthening, stretching and weight loss.    3. Chronic bilateral low back pain with bilateral sciatica  -As in #2.  Stable.  Followed closely by Dr. Garcia.    4. Hypertension goal BP (blood pressure) < 140/80  -Stable.  Any same.    5. Impaired fasting blood sugar  -Discussed her A1c of 6.6, this is the first time she has been over 6.5 in years.  She is frustrated that previous " primary care took her off metformin and would like to go back onto it.  I discussed the importance of a diabetic diet, portion control, daily physical exercise and restarting metformin.  Recommend a recheck of her A1c here in 3 months.  Discussed good foot care and I encouraged her to see her eye doctor yearly.  - metFORMIN ER (GLUCOPHAGE XR) 500 MG TABLET SR 24 HR; Take 1 Tab by mouth every day. With dinner  Dispense: 90 Tab; Refill: 3    6. Asplenia  -Spleen was removed during surgery for pancreatic cancer.  Recommend numerous vaccines including Trumenba, Menactra and pneumococcal.  She is willing to start with meningococcal today and have vaccines one at a time at each follow-up visit.  - Meningococcal Conjugate Vaccine 4-Valent IM (Menactra)    7. Encounter for screening mammogram for breast cancer  - MA-SCREENING MAMMO BILAT W/CAD; Future    8. Other specified personal risk factors, not elsewhere classified:  - CT-CARDIAC SCORING; Future    9. Atherosclerosis:  -Because of atherosclerosis seen on CT scan in 2018, recommend a CT cardiac scoring and potential consideration of statin therapy as well as a baby aspirin based on results.  Aorta was at 3.8 cm on CT scan 2018, if this is over 5 cm, will refer to vascular surgery.  - CT-CARDIAC SCORING; Future    10.  Chronic use of opiates in therapeutic purpose:  Followed by Dr. Garcia.     Discussion today about general wellness and lifestyle habits:    · Engage in regular physical activity and social activities.  · Prevent falls and reduce trip hazards; using ambulatory aides, hearing and vision testing if appropriate.  · Steps to improve urinary incontinence.  · Advanced care planning.    Follow-Up: No follow-ups on file.         PLEASE NOTE: This dictation was created using voice recognition software. I have made every reasonable attempt to correct obvious errors, but I expect that there are errors of grammar and possibly content that I did not discover before  finalizing the note.    Total face to face time 40 minutes of which over 50% of this visit is spent in counseling, education and outlining a plan of treatment and coordination of care for the above conditions. This included but was not limited to discussion of medication options and potential risks related to the medications, referral and specialty care options. All patient questions were answered      F/u here 3 months.

## 2020-04-14 DIAGNOSIS — I10 ESSENTIAL HYPERTENSION: ICD-10-CM

## 2020-04-14 RX ORDER — LISINOPRIL AND HYDROCHLOROTHIAZIDE 25; 20 MG/1; MG/1
TABLET ORAL
Qty: 100 TAB | Refills: 0 | Status: SHIPPED | OUTPATIENT
Start: 2020-04-14 | End: 2020-07-28 | Stop reason: SDUPTHER

## 2020-04-14 NOTE — TELEPHONE ENCOUNTER
Received request via: Pharmacy    Was the patient seen in the last year in this department? Yes  LAST SEEN 7/16/19    Does the patient have an active prescription (recently filled or refills available) for medication(s) requested? No

## 2020-05-26 ENCOUNTER — HOSPITAL ENCOUNTER (OUTPATIENT)
Dept: LAB | Facility: MEDICAL CENTER | Age: 75
End: 2020-05-26
Attending: DERMATOLOGY
Payer: MEDICARE

## 2020-05-26 PROCEDURE — 87075 CULTR BACTERIA EXCEPT BLOOD: CPT

## 2020-05-26 PROCEDURE — 87077 CULTURE AEROBIC IDENTIFY: CPT

## 2020-05-26 PROCEDURE — 87186 SC STD MICRODIL/AGAR DIL: CPT

## 2020-05-26 PROCEDURE — 87070 CULTURE OTHR SPECIMN AEROBIC: CPT

## 2020-05-26 PROCEDURE — 87205 SMEAR GRAM STAIN: CPT

## 2020-05-27 ENCOUNTER — APPOINTMENT (OUTPATIENT)
Dept: RADIOLOGY | Facility: MEDICAL CENTER | Age: 75
End: 2020-05-27
Attending: NURSE PRACTITIONER
Payer: MEDICARE

## 2020-05-27 LAB
GRAM STN SPEC: NORMAL
SIGNIFICANT IND 70042: NORMAL
SITE SITE: NORMAL
SOURCE SOURCE: NORMAL

## 2020-06-01 LAB
BACTERIA SPEC ANAEROBE CULT: NORMAL
BACTERIA WND AEROBE CULT: ABNORMAL
GRAM STN SPEC: ABNORMAL
SIGNIFICANT IND 70042: ABNORMAL
SIGNIFICANT IND 70042: NORMAL
SITE SITE: ABNORMAL
SITE SITE: NORMAL
SOURCE SOURCE: ABNORMAL
SOURCE SOURCE: NORMAL

## 2020-06-17 ENCOUNTER — APPOINTMENT (OUTPATIENT)
Dept: RADIOLOGY | Facility: MEDICAL CENTER | Age: 75
End: 2020-06-17
Attending: NURSE PRACTITIONER
Payer: MEDICARE

## 2020-06-27 ENCOUNTER — HOSPITAL ENCOUNTER (OUTPATIENT)
Dept: RADIOLOGY | Facility: MEDICAL CENTER | Age: 75
End: 2020-06-27
Attending: NURSE PRACTITIONER
Payer: MEDICARE

## 2020-06-27 DIAGNOSIS — Z12.31 ENCOUNTER FOR SCREENING MAMMOGRAM FOR BREAST CANCER: ICD-10-CM

## 2020-06-27 PROCEDURE — 77067 SCR MAMMO BI INCL CAD: CPT

## 2020-07-06 ENCOUNTER — HOSPITAL ENCOUNTER (OUTPATIENT)
Dept: RADIOLOGY | Facility: MEDICAL CENTER | Age: 75
End: 2020-07-06
Attending: NURSE PRACTITIONER
Payer: MEDICARE

## 2020-07-06 DIAGNOSIS — I70.90 ATHEROSCLEROSIS: ICD-10-CM

## 2020-07-06 DIAGNOSIS — Z91.89 OTHER SPECIFIED PERSONAL RISK FACTORS, NOT ELSEWHERE CLASSIFIED: ICD-10-CM

## 2020-07-06 PROCEDURE — 4410556 CT-CARDIAC SCORING

## 2020-07-13 DIAGNOSIS — R93.1 ABNORMAL SCREENING CARDIAC CT: ICD-10-CM

## 2020-07-13 DIAGNOSIS — E78.5 HYPERLIPIDEMIA, UNSPECIFIED HYPERLIPIDEMIA TYPE: ICD-10-CM

## 2020-07-13 RX ORDER — ATORVASTATIN CALCIUM 40 MG/1
40 TABLET, FILM COATED ORAL DAILY
Qty: 30 TAB | Refills: 5 | Status: SHIPPED | OUTPATIENT
Start: 2020-07-13 | End: 2020-07-14 | Stop reason: SDUPTHER

## 2020-07-14 DIAGNOSIS — E78.5 HYPERLIPIDEMIA, UNSPECIFIED HYPERLIPIDEMIA TYPE: ICD-10-CM

## 2020-07-14 RX ORDER — ATORVASTATIN CALCIUM 40 MG/1
40 TABLET, FILM COATED ORAL DAILY
Qty: 100 TAB | Refills: 3 | Status: SHIPPED | OUTPATIENT
Start: 2020-07-14 | End: 2020-08-24

## 2020-07-24 NOTE — NON-PROVIDER
PAT call made 07/24/2020 at 1058. No answer at number provided. Message with call back number provided. Pt was informed that it is necessary that she returns this call to review pre-procedure instructions and to do the pre-screening before 3 PM on Monday (07/27/2020), Failure to return this call will result in the appt being rescheduled.

## 2020-07-24 NOTE — NON-PROVIDER
PAT note: PAT return call received 07/24/2020 at 1331. Spoke with Larissa. Health history, allergies, medications and pre-procedure/sedation instructions reviewed with patient. Pre-procedure respiratory screening completed. Pt denies being sick/symptomatic(fever, cough, shortness of breath)  within 72 hours or having been in close contact with symptomatic individuals in the past 2 weeks.Pt was informed to contact her physician should she or any close personal contact become symptomatic prior to the procedure. Pt was told to bring a mask as she would be wearing it during the entire stay. It was recommended that the pt self isolate until the procedure and that her  would have to remain on site in vehicle til pt is d/susan. Pt verbalized understanding of instructions.

## 2020-07-28 ENCOUNTER — HOSPITAL ENCOUNTER (OUTPATIENT)
Dept: RADIOLOGY | Facility: REHABILITATION | Age: 75
End: 2020-07-28
Attending: SPECIALIST
Payer: MEDICARE

## 2020-07-28 ENCOUNTER — HOSPITAL ENCOUNTER (OUTPATIENT)
Dept: PAIN MANAGEMENT | Facility: REHABILITATION | Age: 75
End: 2020-07-28
Attending: SPECIALIST
Payer: MEDICARE

## 2020-07-28 VITALS
SYSTOLIC BLOOD PRESSURE: 133 MMHG | DIASTOLIC BLOOD PRESSURE: 52 MMHG | BODY MASS INDEX: 28.17 KG/M2 | RESPIRATION RATE: 18 BRPM | HEART RATE: 58 BPM | TEMPERATURE: 98.3 F | OXYGEN SATURATION: 95 % | WEIGHT: 175.27 LBS | HEIGHT: 66 IN

## 2020-07-28 DIAGNOSIS — I10 ESSENTIAL HYPERTENSION: ICD-10-CM

## 2020-07-28 PROCEDURE — 99152 MOD SED SAME PHYS/QHP 5/>YRS: CPT

## 2020-07-28 PROCEDURE — 700101 HCHG RX REV CODE 250

## 2020-07-28 PROCEDURE — 64635 DESTROY LUMB/SAC FACET JNT: CPT

## 2020-07-28 PROCEDURE — 99153 MOD SED SAME PHYS/QHP EA: CPT

## 2020-07-28 PROCEDURE — 700111 HCHG RX REV CODE 636 W/ 250 OVERRIDE (IP)

## 2020-07-28 PROCEDURE — 64636 DESTROY L/S FACET JNT ADDL: CPT

## 2020-07-28 RX ORDER — LIDOCAINE HYDROCHLORIDE 10 MG/ML
INJECTION, SOLUTION EPIDURAL; INFILTRATION; INTRACAUDAL; PERINEURAL
Status: COMPLETED
Start: 2020-07-28 | End: 2020-07-28

## 2020-07-28 RX ORDER — DEXAMETHASONE SODIUM PHOSPHATE 10 MG/ML
INJECTION, SOLUTION INTRAMUSCULAR; INTRAVENOUS
Status: COMPLETED
Start: 2020-07-28 | End: 2020-07-28

## 2020-07-28 RX ORDER — LISINOPRIL AND HYDROCHLOROTHIAZIDE 25; 20 MG/1; MG/1
1 TABLET ORAL
Qty: 100 TAB | Refills: 0 | Status: SHIPPED | OUTPATIENT
Start: 2020-07-28 | End: 2020-11-04

## 2020-07-28 RX ORDER — MIDAZOLAM HYDROCHLORIDE 1 MG/ML
INJECTION INTRAMUSCULAR; INTRAVENOUS
Status: COMPLETED
Start: 2020-07-28 | End: 2020-07-28

## 2020-07-28 RX ORDER — LIDOCAINE HYDROCHLORIDE 20 MG/ML
INJECTION, SOLUTION EPIDURAL; INFILTRATION; INTRACAUDAL; PERINEURAL
Status: COMPLETED
Start: 2020-07-28 | End: 2020-07-28

## 2020-07-28 RX ADMIN — LIDOCAINE HYDROCHLORIDE 5 ML: 20 INJECTION, SOLUTION EPIDURAL; INFILTRATION; INTRACAUDAL; PERINEURAL at 16:18

## 2020-07-28 RX ADMIN — MIDAZOLAM HYDROCHLORIDE 1 MG: 1 INJECTION, SOLUTION INTRAMUSCULAR; INTRAVENOUS at 16:05

## 2020-07-28 RX ADMIN — FENTANYL CITRATE 25 MCG: 50 INJECTION, SOLUTION INTRAMUSCULAR; INTRAVENOUS at 16:05

## 2020-07-28 ASSESSMENT — FIBROSIS 4 INDEX: FIB4 SCORE: 1.01

## 2020-07-28 NOTE — NON-PROVIDER
Verified patient name.Confirmed procedure, site and  consent signed. H&P updated. Reviewed medication allergies and current medication in epic. Printed home care discharged, pre and post including infection  prevention verbal instruction given to patient and verbalized understanding. Has  Guillermo / spouse.Pertinent medical health  information reviewed in epic  ( HTN, impaired fasting blood sugar, atherosclerosis ) .  Significant information  Stop bang score 3 . Patient denied taking blood thinner and anti-inflammatory medication. Hand off given to kelley Lott RN.

## 2020-07-28 NOTE — NON-PROVIDER
Pre-procedure assessment completed  and pertinent health information  communicated to physician and staff during timeout. Pt positionned pre-procedure on table by RN, CST,CNA and xray tech. Pillow placed under feet for support.Grounding pad applied to right buttock by CST and verified by RN.

## 2020-07-29 NOTE — PROCEDURES
DATE OF SERVICE:  07/28/2020    PROCEDURES:  1.  Right L2, L3, L4, L5 medial branch neurotomies.  2.  Fluoroscopy for needle guidance confirmation placement.  3.  IV sedation per patient request.    DIAGNOSES:  1.  Lumbar spondylosis with mechanical back pain and facet syndrome.  2.  Anxiety over painful procedure, patient requests intravenous sedation.    DESCRIPTION:  After informed consent, patient was placed prone, monitored and   sedated with Versed and fentanyl.  Fluoroscopy was utilized to identify the   junction of the transverse process with the superior articular process on the   right at L3, L4, L5 and the sacral alar depression.  Back was prepped with   Betadine, sterile draped and anesthetized.  Under intermittent fluoroscopic   guidance and local anesthesia, 10 cm 18-gauge radiofrequency needles were   passed to the aforementioned targets making contact with periosteum and   advanced 1-2 mm over the superior edge.  A 2 Hz motor stimulation was carried   out at each level, confirming local motor activity without radicular motor   activity.  Following this, I injected small amounts of 2% lidocaine and   performed the following lesions:  1.  L2 underwent radiofrequency lesioning at 80 degrees centigrade for 90   seconds x2.  2.  L3 underwent radiofrequency lesioning at 80 degrees centigrade for 90   seconds x2.  3.  L4 underwent radiofrequency lesioning at 80 degrees centigrade for 90   seconds x2.  4.  L5 underwent radiofrequency lesioning at 80 degrees centigrade for 90   seconds x2.    Needle was removed.  She tolerated this well.    PLAN:  Follow up for ongoing pain management needs.  Return for treatment of   her left side.       ____________________________________     MD TEZ CAMPOS / CHARLIE    DD:  07/28/2020 16:32:03  DT:  07/29/2020 04:42:46    D#:  [id number]  Job#:  [id number]

## 2020-08-10 NOTE — PREPROCEDURE INSTRUCTIONS
PAT call made 8/10/2020 at 11:20, spoke with pt after confirming 2 pt identifiers. Health hx, medications, allergies  reviewed with pt, as well as pre-procedure/sedation instructions. Respiratory screen completed. Pt denies being sick/symptomatic (fever, cough, SOB, diarrhea) w/in last 72 hrs, or having close contact w/ sick individuals in the past 2 wks. Pt education to notify his/her MD should he/she become symptomatic prior to procedure. Pt verbalizes understanding. Pt told to bring mask and the he/she will be wearing it entire stay. Informed pt it is recommended pt self isolate for 72 hrs or from time of this call until procedure, also the their  needs to remain on site in vehicle until pt is discharged. Pt verbalizes understanding.

## 2020-08-11 ENCOUNTER — HOSPITAL ENCOUNTER (OUTPATIENT)
Facility: REHABILITATION | Age: 75
End: 2020-08-11
Attending: SPECIALIST | Admitting: SPECIALIST
Payer: MEDICARE

## 2020-08-11 ENCOUNTER — APPOINTMENT (OUTPATIENT)
Dept: RADIOLOGY | Facility: REHABILITATION | Age: 75
End: 2020-08-11
Attending: SPECIALIST
Payer: MEDICARE

## 2020-08-11 VITALS
SYSTOLIC BLOOD PRESSURE: 122 MMHG | OXYGEN SATURATION: 93 % | WEIGHT: 174.16 LBS | BODY MASS INDEX: 27.99 KG/M2 | HEIGHT: 66 IN | RESPIRATION RATE: 18 BRPM | HEART RATE: 56 BPM | DIASTOLIC BLOOD PRESSURE: 59 MMHG | TEMPERATURE: 98.9 F

## 2020-08-11 PROCEDURE — 700101 HCHG RX REV CODE 250

## 2020-08-11 PROCEDURE — 64636 DESTROY L/S FACET JNT ADDL: CPT

## 2020-08-11 PROCEDURE — 99153 MOD SED SAME PHYS/QHP EA: CPT

## 2020-08-11 PROCEDURE — 700111 HCHG RX REV CODE 636 W/ 250 OVERRIDE (IP)

## 2020-08-11 PROCEDURE — 99152 MOD SED SAME PHYS/QHP 5/>YRS: CPT

## 2020-08-11 PROCEDURE — 64635 DESTROY LUMB/SAC FACET JNT: CPT

## 2020-08-11 RX ORDER — ONDANSETRON 2 MG/ML
4 INJECTION INTRAMUSCULAR; INTRAVENOUS
Status: DISCONTINUED | OUTPATIENT
Start: 2020-08-11 | End: 2020-08-11 | Stop reason: HOSPADM

## 2020-08-11 RX ORDER — MIDAZOLAM HYDROCHLORIDE 1 MG/ML
INJECTION INTRAMUSCULAR; INTRAVENOUS
Status: COMPLETED
Start: 2020-08-11 | End: 2020-08-11

## 2020-08-11 RX ORDER — LIDOCAINE HYDROCHLORIDE 20 MG/ML
INJECTION, SOLUTION EPIDURAL; INFILTRATION; INTRACAUDAL; PERINEURAL
Status: COMPLETED
Start: 2020-08-11 | End: 2020-08-11

## 2020-08-11 RX ORDER — DEXAMETHASONE SODIUM PHOSPHATE 10 MG/ML
INJECTION, SOLUTION INTRAMUSCULAR; INTRAVENOUS
Status: COMPLETED
Start: 2020-08-11 | End: 2020-08-11

## 2020-08-11 RX ADMIN — FENTANYL CITRATE 25 MCG: 50 INJECTION, SOLUTION INTRAMUSCULAR; INTRAVENOUS at 16:17

## 2020-08-11 RX ADMIN — MIDAZOLAM HYDROCHLORIDE 1 MG: 1 INJECTION, SOLUTION INTRAMUSCULAR; INTRAVENOUS at 16:17

## 2020-08-11 RX ADMIN — LIDOCAINE HYDROCHLORIDE 4 ML: 20 INJECTION, SOLUTION EPIDURAL; INFILTRATION; INTRACAUDAL; PERINEURAL at 16:32

## 2020-08-11 ASSESSMENT — FIBROSIS 4 INDEX: FIB4 SCORE: 1.01

## 2020-08-11 NOTE — OR SURGEON
Immediate Post OP Note    PreOp Diagnosis: Lumbar spondylosis    PostOp Diagnosis: Same    Procedure(s):  DESTRUCTION, NERVE, FACET JOINT, LUMBOSACRAL, USING NEUROLYTIC AGENT, WITH FLUOROSCOPIC GUIDANCE - Wound Class: Clean    Surgeon(s):  Jose Gonzales M.D.    Anesthesiologist/Type of Anesthesia:  No anesthesia staff entered./IV Sedation    Surgical Staff:  Circulator: Katherine Lott R.N.  Scrub Person: Kamini Dos Santos  Radiology Technologist: Cezar Alfonso    Specimens removed if any:  * No specimens in log *    Estimated Blood Loss: None    Findings: None    Complications: None        8/11/2020 4:48 PM Jose Gonzales M.D.

## 2020-08-11 NOTE — NON-PROVIDER
Pt given verbal and written d/c instructions including verbal infection prevention information and s/s of post procedure infection and verbalizes understanding. denies nsaid use in last 3 days, denies blood thinners use in last 5 days, denies current infection or abx use. Med rec complete. Pt moderate risk for falls due to neuropathy, medications including diuretic, sleep aide, b/p med, pain medication, sedative, and wears glasses - declines hand out on falls prevention. Implanted port in right chest and hardware in neck.Pt has post procedural ride home with .

## 2020-08-12 NOTE — NON-PROVIDER
Pre-procedure assessment completed  and pertinent health information (hx pancreatic CA, HTN,prediabetic) communicated to physician and staff during timeout. Pt positionned pre-procedure on table by RN, CST and xray tech. Pillow placed under feet for support.Grounding pad applied to right buttock by CST and verified by RN

## 2020-08-12 NOTE — PROCEDURES
DATE OF SERVICE:  08/11/2020    PROCEDURES:  1.  Radiofrequency ablation, left L2, L3, L4, L5 medial branch nerves.  2.  Fluoroscopy for needle guidance confirmation of placement.  3.  IV sedation per patient request.    DIAGNOSES:  1.  Lumbar spondylosis with facet syndrome.  2.  Anxiety over painful procedure, patient requests intravenous sedation.    DESCRIPTION:  After informed consent, with the patient prone, she was   monitored and sedated with Versed and fentanyl.  Fluoroscopy was utilized to   identify the junction of the transverse process with the superior articular   process on the left at L3, L4, L5 to the sacral alar depression.  Back was   prepped with Betadine, sterile draped and anesthetized.  Under intermittent   fluoroscopic guidance and local anesthesia, 10 cm 18-gauge radiofrequency   needles were passed to the aforementioned targets at each level making contact   with periosteum and advanced 1-2 mm over the superior edge.  A 2 Hz motor   stimulation was carried out at each level up to 2.5 volts confirming local   motor activity without radicular motor activity.  Following this, I injected   small amounts of 2% lidocaine at each level and performed the following   lesions:  1.  L2 underwent radiofrequency lesioning at 80 degrees centigrade for 90   seconds x2.  2.  L3 underwent radiofrequency lesioning at 80 degrees centigrade for 90   seconds x2.  3.  L4 underwent radiofrequency lesioning at 80 degrees centigrade for 90   seconds x2.  4.  L5 underwent radiofrequency lesioning at 80 degrees centigrade for 90   seconds x2.    Needles were removed.  She tolerated this well.    PLAN:  Follow up for ongoing pain management needs.       ____________________________________     MD TEZ CAMPOS / CHARLIE    DD:  08/11/2020 16:45:28  DT:  08/12/2020 06:08:00    D#:  [id number]  Job#:  [id number]

## 2020-08-24 ENCOUNTER — APPOINTMENT (OUTPATIENT)
Dept: RADIOLOGY | Facility: MEDICAL CENTER | Age: 75
DRG: 470 | End: 2020-08-24
Attending: EMERGENCY MEDICINE
Payer: MEDICARE

## 2020-08-24 ENCOUNTER — ANESTHESIA EVENT (OUTPATIENT)
Dept: SURGERY | Facility: MEDICAL CENTER | Age: 75
DRG: 470 | End: 2020-08-24
Payer: MEDICARE

## 2020-08-24 ENCOUNTER — ANESTHESIA (OUTPATIENT)
Dept: SURGERY | Facility: MEDICAL CENTER | Age: 75
DRG: 470 | End: 2020-08-24
Payer: MEDICARE

## 2020-08-24 ENCOUNTER — APPOINTMENT (OUTPATIENT)
Dept: RADIOLOGY | Facility: MEDICAL CENTER | Age: 75
DRG: 470 | End: 2020-08-24
Attending: ORTHOPAEDIC SURGERY
Payer: MEDICARE

## 2020-08-24 ENCOUNTER — HOSPITAL ENCOUNTER (INPATIENT)
Facility: MEDICAL CENTER | Age: 75
LOS: 2 days | DRG: 470 | End: 2020-08-26
Attending: EMERGENCY MEDICINE | Admitting: HOSPITALIST
Payer: MEDICARE

## 2020-08-24 DIAGNOSIS — R73.03 PREDIABETES: ICD-10-CM

## 2020-08-24 DIAGNOSIS — M54.42 CHRONIC BILATERAL LOW BACK PAIN WITH BILATERAL SCIATICA: ICD-10-CM

## 2020-08-24 DIAGNOSIS — G89.29 CHRONIC BILATERAL LOW BACK PAIN WITH BILATERAL SCIATICA: ICD-10-CM
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
## 2020-08-24 DIAGNOSIS — I10 HYPERTENSION GOAL BP (BLOOD PRESSURE) < 140/80: ICD-10-CM

## 2020-08-24 DIAGNOSIS — M54.41 CHRONIC BILATERAL LOW BACK PAIN WITH BILATERAL SCIATICA: ICD-10-CM

## 2020-08-24 DIAGNOSIS — S72.045A CLOSED NONDISPLACED BASICERVICAL FRACTURE OF LEFT FEMUR, INITIAL ENCOUNTER (HCC): ICD-10-CM

## 2020-08-24 DIAGNOSIS — S72.8X2A OTHER CLOSED FRACTURE OF LEFT FEMUR, UNSPECIFIED PORTION OF FEMUR, INITIAL ENCOUNTER (HCC): ICD-10-CM

## 2020-08-24 DIAGNOSIS — C25.0 MALIGNANT NEOPLASM OF HEAD OF PANCREAS (HCC): Chronic | ICD-10-CM

## 2020-08-24 PROBLEM — S72.90XA FEMUR FRACTURE (HCC): Status: ACTIVE | Noted: 2020-08-24

## 2020-08-24 PROBLEM — R93.1 ELEVATED CORONARY ARTERY CALCIUM SCORE: Status: ACTIVE | Noted: 2020-08-24

## 2020-08-24 LAB
ALBUMIN SERPL BCP-MCNC: 4 G/DL (ref 3.2–4.9)
ALBUMIN/GLOB SERPL: 1.3 G/DL
ALP SERPL-CCNC: 74 U/L (ref 30–99)
ALT SERPL-CCNC: 16 U/L (ref 2–50)
ANION GAP SERPL CALC-SCNC: 12 MMOL/L (ref 7–16)
AST SERPL-CCNC: 19 U/L (ref 12–45)
BASOPHILS # BLD AUTO: 0.5 % (ref 0–1.8)
BASOPHILS # BLD: 0.08 K/UL (ref 0–0.12)
BILIRUB SERPL-MCNC: 0.4 MG/DL (ref 0.1–1.5)
BUN SERPL-MCNC: 17 MG/DL (ref 8–22)
CALCIUM SERPL-MCNC: 10.5 MG/DL (ref 8.5–10.5)
CHLORIDE SERPL-SCNC: 102 MMOL/L (ref 96–112)
CO2 SERPL-SCNC: 25 MMOL/L (ref 20–33)
COVID ORDER STATUS COVID19: NORMAL
CREAT SERPL-MCNC: 0.78 MG/DL (ref 0.5–1.4)
EKG IMPRESSION: NORMAL
EOSINOPHIL # BLD AUTO: 0.01 K/UL (ref 0–0.51)
EOSINOPHIL NFR BLD: 0.1 % (ref 0–6.9)
ERYTHROCYTE [DISTWIDTH] IN BLOOD BY AUTOMATED COUNT: 47.3 FL (ref 35.9–50)
GLOBULIN SER CALC-MCNC: 3.1 G/DL (ref 1.9–3.5)
GLUCOSE SERPL-MCNC: 158 MG/DL (ref 65–99)
HCT VFR BLD AUTO: 44.5 % (ref 37–47)
HGB BLD-MCNC: 14.9 G/DL (ref 12–16)
IMM GRANULOCYTES # BLD AUTO: 0.08 K/UL (ref 0–0.11)
IMM GRANULOCYTES NFR BLD AUTO: 0.5 % (ref 0–0.9)
LYMPHOCYTES # BLD AUTO: 1.89 K/UL (ref 1–4.8)
LYMPHOCYTES NFR BLD: 12.7 % (ref 22–41)
MCH RBC QN AUTO: 31.5 PG (ref 27–33)
MCHC RBC AUTO-ENTMCNC: 33.5 G/DL (ref 33.6–35)
MCV RBC AUTO: 94.1 FL (ref 81.4–97.8)
MONOCYTES # BLD AUTO: 0.82 K/UL (ref 0–0.85)
MONOCYTES NFR BLD AUTO: 5.5 % (ref 0–13.4)
NEUTROPHILS # BLD AUTO: 11.97 K/UL (ref 2–7.15)
NEUTROPHILS NFR BLD: 80.7 % (ref 44–72)
NRBC # BLD AUTO: 0 K/UL
NRBC BLD-RTO: 0 /100 WBC
PLATELET # BLD AUTO: 309 K/UL (ref 164–446)
PMV BLD AUTO: 8.8 FL (ref 9–12.9)
POTASSIUM SERPL-SCNC: 4 MMOL/L (ref 3.6–5.5)
PROT SERPL-MCNC: 7.1 G/DL (ref 6–8.2)
RBC # BLD AUTO: 4.73 M/UL (ref 4.2–5.4)
SARS-COV-2 RNA RESP QL NAA+PROBE: NOTDETECTED
SODIUM SERPL-SCNC: 139 MMOL/L (ref 135–145)
SPECIMEN SOURCE: NORMAL
WBC # BLD AUTO: 14.9 K/UL (ref 4.8–10.8)

## 2020-08-24 PROCEDURE — 72170 X-RAY EXAM OF PELVIS: CPT

## 2020-08-24 PROCEDURE — 99223 1ST HOSP IP/OBS HIGH 75: CPT | Mod: AI | Performed by: HOSPITALIST

## 2020-08-24 PROCEDURE — 502000 HCHG MISC OR IMPLANTS RC 0278: Performed by: ORTHOPAEDIC SURGERY

## 2020-08-24 PROCEDURE — 71045 X-RAY EXAM CHEST 1 VIEW: CPT

## 2020-08-24 PROCEDURE — 96374 THER/PROPH/DIAG INJ IV PUSH: CPT

## 2020-08-24 PROCEDURE — 0SRB04A REPLACEMENT OF LEFT HIP JOINT WITH CERAMIC ON POLYETHYLENE SYNTHETIC SUBSTITUTE, UNCEMENTED, OPEN APPROACH: ICD-10-PCS | Performed by: ORTHOPAEDIC SURGERY

## 2020-08-24 PROCEDURE — 85025 COMPLETE CBC W/AUTO DIFF WBC: CPT

## 2020-08-24 PROCEDURE — 80053 COMPREHEN METABOLIC PANEL: CPT

## 2020-08-24 PROCEDURE — 93005 ELECTROCARDIOGRAM TRACING: CPT | Performed by: EMERGENCY MEDICINE

## 2020-08-24 PROCEDURE — 36415 COLL VENOUS BLD VENIPUNCTURE: CPT

## 2020-08-24 PROCEDURE — 160042 HCHG SURGERY MINUTES - EA ADDL 1 MIN LEVEL 5: Performed by: ORTHOPAEDIC SURGERY

## 2020-08-24 PROCEDURE — 160002 HCHG RECOVERY MINUTES (STAT): Performed by: ORTHOPAEDIC SURGERY

## 2020-08-24 PROCEDURE — A9270 NON-COVERED ITEM OR SERVICE: HCPCS | Performed by: ANESTHESIOLOGY

## 2020-08-24 PROCEDURE — 700105 HCHG RX REV CODE 258: Performed by: ANESTHESIOLOGY

## 2020-08-24 PROCEDURE — 502578 HCHG PACK, TOTAL HIP: Performed by: ORTHOPAEDIC SURGERY

## 2020-08-24 PROCEDURE — U0003 INFECTIOUS AGENT DETECTION BY NUCLEIC ACID (DNA OR RNA); SEVERE ACUTE RESPIRATORY SYNDROME CORONAVIRUS 2 (SARS-COV-2) (CORONAVIRUS DISEASE [COVID-19]), AMPLIFIED PROBE TECHNIQUE, MAKING USE OF HIGH THROUGHPUT TECHNOLOGIES AS DESCRIBED BY CMS-2020-01-R: HCPCS

## 2020-08-24 PROCEDURE — 700111 HCHG RX REV CODE 636 W/ 250 OVERRIDE (IP): Performed by: ANESTHESIOLOGY

## 2020-08-24 PROCEDURE — C9803 HOPD COVID-19 SPEC COLLECT: HCPCS | Performed by: EMERGENCY MEDICINE

## 2020-08-24 PROCEDURE — 99285 EMERGENCY DEPT VISIT HI MDM: CPT

## 2020-08-24 PROCEDURE — 94760 N-INVAS EAR/PLS OXIMETRY 1: CPT

## 2020-08-24 PROCEDURE — 160048 HCHG OR STATISTICAL LEVEL 1-5: Performed by: ORTHOPAEDIC SURGERY

## 2020-08-24 PROCEDURE — 160031 HCHG SURGERY MINUTES - 1ST 30 MINS LEVEL 5: Performed by: ORTHOPAEDIC SURGERY

## 2020-08-24 PROCEDURE — 700101 HCHG RX REV CODE 250: Performed by: ANESTHESIOLOGY

## 2020-08-24 PROCEDURE — 73552 X-RAY EXAM OF FEMUR 2/>: CPT | Mod: LT

## 2020-08-24 PROCEDURE — 160035 HCHG PACU - 1ST 60 MINS PHASE I: Performed by: ORTHOPAEDIC SURGERY

## 2020-08-24 PROCEDURE — C1713 ANCHOR/SCREW BN/BN,TIS/BN: HCPCS | Performed by: ORTHOPAEDIC SURGERY

## 2020-08-24 PROCEDURE — 160036 HCHG PACU - EA ADDL 30 MINS PHASE I: Performed by: ORTHOPAEDIC SURGERY

## 2020-08-24 PROCEDURE — 700102 HCHG RX REV CODE 250 W/ 637 OVERRIDE(OP): Performed by: HOSPITALIST

## 2020-08-24 PROCEDURE — 700102 HCHG RX REV CODE 250 W/ 637 OVERRIDE(OP): Performed by: ANESTHESIOLOGY

## 2020-08-24 PROCEDURE — 501838 HCHG SUTURE GENERAL: Performed by: ORTHOPAEDIC SURGERY

## 2020-08-24 PROCEDURE — A9270 NON-COVERED ITEM OR SERVICE: HCPCS | Performed by: HOSPITALIST

## 2020-08-24 PROCEDURE — 770006 HCHG ROOM/CARE - MED/SURG/GYN SEMI*

## 2020-08-24 PROCEDURE — 700105 HCHG RX REV CODE 258: Performed by: HOSPITALIST

## 2020-08-24 PROCEDURE — 160009 HCHG ANES TIME/MIN: Performed by: ORTHOPAEDIC SURGERY

## 2020-08-24 PROCEDURE — 700111 HCHG RX REV CODE 636 W/ 250 OVERRIDE (IP): Performed by: EMERGENCY MEDICINE

## 2020-08-24 DEVICE — IMPLANTABLE DEVICE: Type: IMPLANTABLE DEVICE | Site: HIP | Status: FUNCTIONAL

## 2020-08-24 DEVICE — HIP DALL MILES SET 2.0 V BEAD: Type: IMPLANTABLE DEVICE | Site: HIP | Status: FUNCTIONAL

## 2020-08-24 RX ORDER — ATORVASTATIN CALCIUM 40 MG/1
20 TABLET, FILM COATED ORAL NIGHTLY
COMMUNITY
End: 2020-08-27

## 2020-08-24 RX ORDER — PHENYLEPHRINE HCL IN 0.9% NACL 0.5 MG/5ML
SYRINGE (ML) INTRAVENOUS PRN
Status: DISCONTINUED | OUTPATIENT
Start: 2020-08-24 | End: 2020-08-24 | Stop reason: SURG

## 2020-08-24 RX ORDER — TRANEXAMIC ACID 100 MG/ML
INJECTION, SOLUTION INTRAVENOUS PRN
Status: DISCONTINUED | OUTPATIENT
Start: 2020-08-24 | End: 2020-08-24 | Stop reason: SURG

## 2020-08-24 RX ORDER — HYDROCODONE BITARTRATE AND ACETAMINOPHEN 10; 325 MG/1; MG/1
0.5 TABLET ORAL
Status: ON HOLD | COMMUNITY
End: 2020-08-26 | Stop reason: SDUPTHER

## 2020-08-24 RX ORDER — ACETAMINOPHEN 325 MG/1
650 TABLET ORAL EVERY 6 HOURS PRN
Status: DISCONTINUED | OUTPATIENT
Start: 2020-08-24 | End: 2020-08-26 | Stop reason: HOSPADM

## 2020-08-24 RX ORDER — HYDROMORPHONE HYDROCHLORIDE 1 MG/ML
0.1 INJECTION, SOLUTION INTRAMUSCULAR; INTRAVENOUS; SUBCUTANEOUS
Status: DISCONTINUED | OUTPATIENT
Start: 2020-08-24 | End: 2020-08-24 | Stop reason: HOSPADM

## 2020-08-24 RX ORDER — LABETALOL HYDROCHLORIDE 5 MG/ML
5 INJECTION, SOLUTION INTRAVENOUS
Status: DISCONTINUED | OUTPATIENT
Start: 2020-08-24 | End: 2020-08-24 | Stop reason: HOSPADM

## 2020-08-24 RX ORDER — LIDOCAINE HYDROCHLORIDE 20 MG/ML
INJECTION, SOLUTION EPIDURAL; INFILTRATION; INTRACAUDAL; PERINEURAL PRN
Status: DISCONTINUED | OUTPATIENT
Start: 2020-08-24 | End: 2020-08-24 | Stop reason: SURG

## 2020-08-24 RX ORDER — CEFAZOLIN SODIUM 2 G/100ML
2 INJECTION, SOLUTION INTRAVENOUS EVERY 8 HOURS
Status: COMPLETED | OUTPATIENT
Start: 2020-08-25 | End: 2020-08-25

## 2020-08-24 RX ORDER — HYDROMORPHONE HYDROCHLORIDE 1 MG/ML
0.4 INJECTION, SOLUTION INTRAMUSCULAR; INTRAVENOUS; SUBCUTANEOUS
Status: DISCONTINUED | OUTPATIENT
Start: 2020-08-24 | End: 2020-08-24 | Stop reason: HOSPADM

## 2020-08-24 RX ORDER — SODIUM CHLORIDE, SODIUM LACTATE, POTASSIUM CHLORIDE, CALCIUM CHLORIDE 600; 310; 30; 20 MG/100ML; MG/100ML; MG/100ML; MG/100ML
INJECTION, SOLUTION INTRAVENOUS CONTINUOUS
Status: DISCONTINUED | OUTPATIENT
Start: 2020-08-24 | End: 2020-08-26

## 2020-08-24 RX ORDER — HYDROMORPHONE HYDROCHLORIDE 1 MG/ML
1 INJECTION, SOLUTION INTRAMUSCULAR; INTRAVENOUS; SUBCUTANEOUS ONCE
Status: COMPLETED | OUTPATIENT
Start: 2020-08-24 | End: 2020-08-24

## 2020-08-24 RX ORDER — MEPERIDINE HYDROCHLORIDE 25 MG/ML
6.25 INJECTION INTRAMUSCULAR; INTRAVENOUS; SUBCUTANEOUS
Status: DISCONTINUED | OUTPATIENT
Start: 2020-08-24 | End: 2020-08-24 | Stop reason: HOSPADM

## 2020-08-24 RX ORDER — LISINOPRIL AND HYDROCHLOROTHIAZIDE 25; 20 MG/1; MG/1
1 TABLET ORAL
Status: DISCONTINUED | OUTPATIENT
Start: 2020-08-24 | End: 2020-08-24

## 2020-08-24 RX ORDER — ONDANSETRON 4 MG/1
4 TABLET, ORALLY DISINTEGRATING ORAL EVERY 4 HOURS PRN
Status: DISCONTINUED | OUTPATIENT
Start: 2020-08-24 | End: 2020-08-26 | Stop reason: HOSPADM

## 2020-08-24 RX ORDER — METFORMIN HYDROCHLORIDE 500 MG/1
500 TABLET, EXTENDED RELEASE ORAL DAILY
Status: DISCONTINUED | OUTPATIENT
Start: 2020-08-24 | End: 2020-08-26 | Stop reason: HOSPADM

## 2020-08-24 RX ORDER — DIAZEPAM 2 MG/1
4 TABLET ORAL
Status: DISCONTINUED | OUTPATIENT
Start: 2020-08-24 | End: 2020-08-26 | Stop reason: HOSPADM

## 2020-08-24 RX ORDER — OXYCODONE HYDROCHLORIDE 5 MG/1
5-10 TABLET ORAL
Status: DISCONTINUED | OUTPATIENT
Start: 2020-08-24 | End: 2020-08-26 | Stop reason: HOSPADM

## 2020-08-24 RX ORDER — TRAMADOL HYDROCHLORIDE 50 MG/1
50 TABLET ORAL EVERY 8 HOURS PRN
Status: DISCONTINUED | OUTPATIENT
Start: 2020-08-24 | End: 2020-08-26 | Stop reason: HOSPADM

## 2020-08-24 RX ORDER — ONDANSETRON 2 MG/ML
4 INJECTION INTRAMUSCULAR; INTRAVENOUS EVERY 4 HOURS PRN
Status: DISCONTINUED | OUTPATIENT
Start: 2020-08-24 | End: 2020-08-26 | Stop reason: HOSPADM

## 2020-08-24 RX ORDER — ATORVASTATIN CALCIUM 20 MG/1
20 TABLET, FILM COATED ORAL NIGHTLY
Status: DISCONTINUED | OUTPATIENT
Start: 2020-08-24 | End: 2020-08-26 | Stop reason: HOSPADM

## 2020-08-24 RX ORDER — BISACODYL 10 MG
10 SUPPOSITORY, RECTAL RECTAL
Status: DISCONTINUED | OUTPATIENT
Start: 2020-08-24 | End: 2020-08-26 | Stop reason: HOSPADM

## 2020-08-24 RX ORDER — POLYETHYLENE GLYCOL 3350 17 G/17G
1 POWDER, FOR SOLUTION ORAL
Status: DISCONTINUED | OUTPATIENT
Start: 2020-08-24 | End: 2020-08-26 | Stop reason: HOSPADM

## 2020-08-24 RX ORDER — HYDROMORPHONE HYDROCHLORIDE 1 MG/ML
0.2 INJECTION, SOLUTION INTRAMUSCULAR; INTRAVENOUS; SUBCUTANEOUS
Status: DISCONTINUED | OUTPATIENT
Start: 2020-08-24 | End: 2020-08-24 | Stop reason: HOSPADM

## 2020-08-24 RX ORDER — HYDROCHLOROTHIAZIDE 25 MG/1
25 TABLET ORAL
Status: DISCONTINUED | OUTPATIENT
Start: 2020-08-25 | End: 2020-08-26 | Stop reason: HOSPADM

## 2020-08-24 RX ORDER — AMOXICILLIN 250 MG
2 CAPSULE ORAL 2 TIMES DAILY
Status: DISCONTINUED | OUTPATIENT
Start: 2020-08-24 | End: 2020-08-26 | Stop reason: HOSPADM

## 2020-08-24 RX ORDER — CEFAZOLIN SODIUM 1 G/3ML
INJECTION, POWDER, FOR SOLUTION INTRAMUSCULAR; INTRAVENOUS PRN
Status: DISCONTINUED | OUTPATIENT
Start: 2020-08-24 | End: 2020-08-24 | Stop reason: SURG

## 2020-08-24 RX ORDER — ONDANSETRON 2 MG/ML
INJECTION INTRAMUSCULAR; INTRAVENOUS PRN
Status: DISCONTINUED | OUTPATIENT
Start: 2020-08-24 | End: 2020-08-24 | Stop reason: SURG

## 2020-08-24 RX ORDER — HALOPERIDOL 5 MG/ML
1 INJECTION INTRAMUSCULAR
Status: DISCONTINUED | OUTPATIENT
Start: 2020-08-24 | End: 2020-08-24 | Stop reason: HOSPADM

## 2020-08-24 RX ORDER — MORPHINE SULFATE 4 MG/ML
1-4 INJECTION, SOLUTION INTRAMUSCULAR; INTRAVENOUS
Status: DISCONTINUED | OUTPATIENT
Start: 2020-08-24 | End: 2020-08-26 | Stop reason: HOSPADM

## 2020-08-24 RX ORDER — SODIUM CHLORIDE, SODIUM LACTATE, POTASSIUM CHLORIDE, CALCIUM CHLORIDE 600; 310; 30; 20 MG/100ML; MG/100ML; MG/100ML; MG/100ML
INJECTION, SOLUTION INTRAVENOUS CONTINUOUS
Status: DISCONTINUED | OUTPATIENT
Start: 2020-08-24 | End: 2020-08-24 | Stop reason: HOSPADM

## 2020-08-24 RX ORDER — ONDANSETRON 2 MG/ML
4 INJECTION INTRAMUSCULAR; INTRAVENOUS
Status: COMPLETED | OUTPATIENT
Start: 2020-08-24 | End: 2020-08-24

## 2020-08-24 RX ORDER — LISINOPRIL 20 MG/1
20 TABLET ORAL
Status: DISCONTINUED | OUTPATIENT
Start: 2020-08-25 | End: 2020-08-26 | Stop reason: HOSPADM

## 2020-08-24 RX ORDER — DEXAMETHASONE SODIUM PHOSPHATE 4 MG/ML
INJECTION, SOLUTION INTRA-ARTICULAR; INTRALESIONAL; INTRAMUSCULAR; INTRAVENOUS; SOFT TISSUE PRN
Status: DISCONTINUED | OUTPATIENT
Start: 2020-08-24 | End: 2020-08-24 | Stop reason: SURG

## 2020-08-24 RX ORDER — HYDROMORPHONE HYDROCHLORIDE 2 MG/ML
INJECTION, SOLUTION INTRAMUSCULAR; INTRAVENOUS; SUBCUTANEOUS PRN
Status: DISCONTINUED | OUTPATIENT
Start: 2020-08-24 | End: 2020-08-24 | Stop reason: SURG

## 2020-08-24 RX ORDER — HYDRALAZINE HYDROCHLORIDE 20 MG/ML
5 INJECTION INTRAMUSCULAR; INTRAVENOUS
Status: DISCONTINUED | OUTPATIENT
Start: 2020-08-24 | End: 2020-08-24 | Stop reason: HOSPADM

## 2020-08-24 RX ORDER — OXYCODONE HCL 5 MG/5 ML
10 SOLUTION, ORAL ORAL
Status: COMPLETED | OUTPATIENT
Start: 2020-08-24 | End: 2020-08-24

## 2020-08-24 RX ORDER — TOPIRAMATE 25 MG/1
50 TABLET ORAL DAILY
Status: DISCONTINUED | OUTPATIENT
Start: 2020-08-24 | End: 2020-08-26 | Stop reason: HOSPADM

## 2020-08-24 RX ORDER — OXYCODONE HCL 5 MG/5 ML
5 SOLUTION, ORAL ORAL
Status: COMPLETED | OUTPATIENT
Start: 2020-08-24 | End: 2020-08-24

## 2020-08-24 RX ADMIN — ONDANSETRON 4 MG: 2 INJECTION INTRAMUSCULAR; INTRAVENOUS at 18:01

## 2020-08-24 RX ADMIN — TRANEXAMIC ACID 1000 MG: 100 INJECTION, SOLUTION INTRAVENOUS at 16:12

## 2020-08-24 RX ADMIN — DEXAMETHASONE SODIUM PHOSPHATE 8 MG: 4 INJECTION, SOLUTION INTRA-ARTICULAR; INTRALESIONAL; INTRAMUSCULAR; INTRAVENOUS; SOFT TISSUE at 16:19

## 2020-08-24 RX ADMIN — ONDANSETRON 4 MG: 2 INJECTION INTRAMUSCULAR; INTRAVENOUS at 18:43

## 2020-08-24 RX ADMIN — HYDROMORPHONE HYDROCHLORIDE 0.5 MG: 2 INJECTION, SOLUTION INTRAMUSCULAR; INTRAVENOUS; SUBCUTANEOUS at 18:17

## 2020-08-24 RX ADMIN — SODIUM CHLORIDE, POTASSIUM CHLORIDE, SODIUM LACTATE AND CALCIUM CHLORIDE: 600; 310; 30; 20 INJECTION, SOLUTION INTRAVENOUS at 16:07

## 2020-08-24 RX ADMIN — OXYCODONE HYDROCHLORIDE 10 MG: 5 SOLUTION ORAL at 18:43

## 2020-08-24 RX ADMIN — FENTANYL CITRATE 50 MCG: 50 INJECTION INTRAMUSCULAR; INTRAVENOUS at 19:37

## 2020-08-24 RX ADMIN — VANCOMYCIN HYDROCHLORIDE 1 G: 1 INJECTION, POWDER, LYOPHILIZED, FOR SOLUTION INTRAVENOUS at 16:14

## 2020-08-24 RX ADMIN — FENTANYL CITRATE 50 MCG: 50 INJECTION INTRAMUSCULAR; INTRAVENOUS at 18:47

## 2020-08-24 RX ADMIN — Medication 200 MCG: at 18:03

## 2020-08-24 RX ADMIN — HYDROMORPHONE HYDROCHLORIDE 0.5 MG: 2 INJECTION, SOLUTION INTRAMUSCULAR; INTRAVENOUS; SUBCUTANEOUS at 18:07

## 2020-08-24 RX ADMIN — TRAMADOL HYDROCHLORIDE 50 MG: 50 TABLET, FILM COATED ORAL at 20:32

## 2020-08-24 RX ADMIN — FENTANYL CITRATE 250 MCG: 50 INJECTION, SOLUTION INTRAMUSCULAR; INTRAVENOUS at 16:12

## 2020-08-24 RX ADMIN — PROPOFOL 150 MG: 10 INJECTION, EMULSION INTRAVENOUS at 16:12

## 2020-08-24 RX ADMIN — Medication 200 MCG: at 16:29

## 2020-08-24 RX ADMIN — Medication 200 MCG: at 16:33

## 2020-08-24 RX ADMIN — ROCURONIUM BROMIDE 50 MG: 10 INJECTION, SOLUTION INTRAVENOUS at 16:12

## 2020-08-24 RX ADMIN — HYDROMORPHONE HYDROCHLORIDE 1 MG: 2 INJECTION, SOLUTION INTRAMUSCULAR; INTRAVENOUS; SUBCUTANEOUS at 17:55

## 2020-08-24 RX ADMIN — TRANEXAMIC ACID 1000 MG: 100 INJECTION, SOLUTION INTRAVENOUS at 17:51

## 2020-08-24 RX ADMIN — HYDROMORPHONE HYDROCHLORIDE 1 MG: 1 INJECTION, SOLUTION INTRAMUSCULAR; INTRAVENOUS; SUBCUTANEOUS at 11:45

## 2020-08-24 RX ADMIN — CEFAZOLIN 2 G: 330 INJECTION, POWDER, FOR SOLUTION INTRAMUSCULAR; INTRAVENOUS at 16:12

## 2020-08-24 RX ADMIN — LIDOCAINE HYDROCHLORIDE 60 MG: 20 INJECTION, SOLUTION EPIDURAL; INFILTRATION; INTRACAUDAL at 16:12

## 2020-08-24 RX ADMIN — ATORVASTATIN CALCIUM 20 MG: 20 TABLET, FILM COATED ORAL at 21:00

## 2020-08-24 RX ADMIN — Medication 200 MCG: at 17:18

## 2020-08-24 RX ADMIN — Medication 100 MCG: at 17:41

## 2020-08-24 ASSESSMENT — LIFESTYLE VARIABLES
DOES PATIENT WANT TO STOP DRINKING: NO
HOW MANY TIMES IN THE PAST YEAR HAVE YOU HAD 5 OR MORE DRINKS IN A DAY: 0
EVER_SMOKED: NEVER
ALCOHOL_USE: NO
HAVE PEOPLE ANNOYED YOU BY CRITICIZING YOUR DRINKING: NO
TOTAL SCORE: 0
EVER HAD A DRINK FIRST THING IN THE MORNING TO STEADY YOUR NERVES TO GET RID OF A HANGOVER: NO
CONSUMPTION TOTAL: NEGATIVE
ON A TYPICAL DAY WHEN YOU DRINK ALCOHOL HOW MANY DRINKS DO YOU HAVE: 0
AVERAGE NUMBER OF DAYS PER WEEK YOU HAVE A DRINK CONTAINING ALCOHOL: 0
TOTAL SCORE: 0
DO YOU DRINK ALCOHOL: NO
TOTAL SCORE: 0
EVER FELT BAD OR GUILTY ABOUT YOUR DRINKING: NO
HAVE YOU EVER FELT YOU SHOULD CUT DOWN ON YOUR DRINKING: NO

## 2020-08-24 ASSESSMENT — COGNITIVE AND FUNCTIONAL STATUS - GENERAL
TOILETING: A LOT
EATING MEALS: A LITTLE
WALKING IN HOSPITAL ROOM: A LOT
MOBILITY SCORE: 15
HELP NEEDED FOR BATHING: A LOT
DRESSING REGULAR UPPER BODY CLOTHING: A LITTLE
STANDING UP FROM CHAIR USING ARMS: A LOT
MOVING TO AND FROM BED TO CHAIR: A LITTLE
DAILY ACTIVITIY SCORE: 16
PERSONAL GROOMING: A LITTLE
CLIMB 3 TO 5 STEPS WITH RAILING: A LOT
DRESSING REGULAR LOWER BODY CLOTHING: A LITTLE
TURNING FROM BACK TO SIDE WHILE IN FLAT BAD: A LITTLE
SUGGESTED CMS G CODE MODIFIER MOBILITY: CK
SUGGESTED CMS G CODE MODIFIER DAILY ACTIVITY: CK
MOVING FROM LYING ON BACK TO SITTING ON SIDE OF FLAT BED: A LITTLE

## 2020-08-24 ASSESSMENT — ENCOUNTER SYMPTOMS
SHORTNESS OF BREATH: 0
LOSS OF CONSCIOUSNESS: 0
FALLS: 1
FEVER: 0

## 2020-08-24 ASSESSMENT — PAIN SCALES - GENERAL: PAIN_LEVEL: 6

## 2020-08-24 ASSESSMENT — PATIENT HEALTH QUESTIONNAIRE - PHQ9
1. LITTLE INTEREST OR PLEASURE IN DOING THINGS: NOT AT ALL
2. FEELING DOWN, DEPRESSED, IRRITABLE, OR HOPELESS: NOT AT ALL
SUM OF ALL RESPONSES TO PHQ9 QUESTIONS 1 AND 2: 0

## 2020-08-24 ASSESSMENT — FIBROSIS 4 INDEX: FIB4 SCORE: 1.01

## 2020-08-24 NOTE — PROGRESS NOTES
Received report from ED RN and assumed pt care.  A&O x4.  Bed alarm and treaded socks on.  Call light and personal belongings within reach.  Bed locked and at lowest position.  TC BASHIR.  Purewick in use.

## 2020-08-24 NOTE — PROGRESS NOTES
76yo F that slipped in the bathroom and had left hip nondisplaced fracture.  Dr Venegas, ortho, to consult per discussion with ER physician.    Discussed with Dr Pereira  Hospitalist to admit.

## 2020-08-24 NOTE — H&P
Hospital Medicine History & Physical Note    Date of Service  8/24/2020    Primary Care Physician  Milagros Gupta, OLIVIER.    Consultants  I discussed with Dr. Venegas, orthopedics.    Code Status  Full Code    Chief Complaint  Chief Complaint   Patient presents with   • T-5000 GLF   • Hip Pain       History of Presenting Illness  75 y.o. female who presented 8/24/2020 with hip pain.  Ms. Ramirez has a past medical history of tonic back pain as well as hypertension recently diagnosed elevated cardiac calcium score by CT scan July 2020 their usual state of health until today went to the bathroom sat on the toilet heard her dog begin to vomit so she got up from the toilet and the dog then went back in to finish urinating and had the misfortune of slipping landing on her left hip.  She had severe pain and was unable to walk.  She was brought to the emergency room via paramedics was found to have a left femoral neck fracture.  Dr. Venegas, orthopedics, was consulted from the emergency room and plans on surgery today.  Patient states she has not had any chest pain, shortness of breath, palpitations, nor dizziness.  , Guillermo is at bedside.  Denies exposure to persons known to have COVID, she denies fevers and chills, denies shortness of breath or cough, denies body aches, denies changes in her taste or smell.  A stat COVID is been ordered for presurgical clearance.    Review of Systems  Review of Systems   Constitutional: Negative for fever.   Respiratory: Negative for shortness of breath.    Cardiovascular: Negative for chest pain.   Musculoskeletal: Positive for falls and joint pain.   Neurological: Negative for loss of consciousness.   All other systems reviewed and are negative.      Past Medical History   has a past medical history of Bowel habit changes (05/17/2018), Bronchitis (2005), Cancer ([facility]) (05/2018), Chronic low back pain, Chronic neck pain, Dental disorder, Hypertension (2015), Other specified  symptom associated with female genital organs, Pain (05/17/2018), Pre-diabetes, and Snoring.    Surgical History   has a past surgical history that includes hysterectomy robotic xi (7/9/2015); cervical disk and fusion anterior (2006); dental extraction(s) (1966); colonoscopy (2008); other; dental extraction(s) (12/2017); gastroscopy (5/18/2018); egd w/endoscopic ultrasound (5/18/2018); egd with asp/bx (5/18/2018); other neurological surg; cath placement (Right, 5/30/2018); pancreatectomy (11/5/2018); splenectomy (11/5/2018); node dissection (11/5/2018); omentectomy (11/5/2018); and neuro dest facet l/s w/ig sngl (Left, 8/11/2020).     Family History  No family hx of cardiac disease     Social History   reports that she has never smoked. She has never used smokeless tobacco. She reports that she does not drink alcohol or use drugs.she lives with her     Allergies  Allergies   Allergen Reactions   • Levofloxacin Swelling     Swelling of tongue and neck   • Nitrofurantoin Swelling     Swelling of lips, tongue, face   • Amitriptyline Swelling     Facial swelling    • Sulfa Drugs Nausea   • Lyrica Rash     .       Medications  Prior to Admission Medications   Prescriptions Last Dose Informant Patient Reported? Taking?   Calcium Carb-Cholecalciferol (CALCIUM 1000 + D PO) 8/23/2020 at AM Patient Yes No   Sig: Take 1 Tab by mouth every day.   HYDROcodone/acetaminophen (NORCO)  MG Tab 8/24/2020 at 0600 Patient Yes Yes   Sig: Take 0.5 Tabs by mouth every 3 hours as needed.   atorvastatin (LIPITOR) 40 MG Tab 8/23/2020 at HS Patient Yes Yes   Sig: Take 20 mg by mouth every evening.   diazepam (VALIUM) 2 MG TABS 8/24/2020 at 0530 Patient Yes No   Sig: Take 4-6 mg by mouth at bedtime as needed for Sleep.   lisinopril-hydrochlorothiazide (PRINZIDE) 20-25 MG per tablet 8/23/2020 at 0700 Patient No No   Sig: Take 1 Tab by mouth every day.   metFORMIN ER (GLUCOPHAGE XR) 500 MG TABLET SR 24 HR 8/23/2020 at 1800 Patient No  No   Sig: Take 1 Tab by mouth every day. With dinner   multivitamin (THERAGRAN) Tab 8/23/2020 at 0800 Patient Yes Yes   Sig: Take 1 Tab by mouth every day.   topiramate (TOPAMAX) 50 MG tablet 8/23/2020 at 0700 Patient Yes No   Sig: Take 50 mg by mouth every day.   tramadol (ULTRAM) 50 MG Tab 8/24/2020 at 0600 Patient Yes No   Sig: Take 50 mg by mouth every 8 hours as needed.      Facility-Administered Medications: None       Physical Exam  Temp:  [37.1 °C (98.7 °F)] 37.1 °C (98.7 °F)  Pulse:  [62-81] 66  Resp:  [15-54] 17  BP: (129-168)/(65-75) 139/65  SpO2:  [88 %-99 %] 96 %    Physical Exam  Vitals signs and nursing note reviewed.   Constitutional:       Appearance: Normal appearance. She is not ill-appearing or toxic-appearing.   HENT:      Head: Normocephalic and atraumatic.      Mouth/Throat:      Mouth: Mucous membranes are dry.      Pharynx: Oropharynx is clear.   Eyes:      General: No scleral icterus.     Conjunctiva/sclera: Conjunctivae normal.   Neck:      Musculoskeletal: Normal range of motion and neck supple.   Cardiovascular:      Rate and Rhythm: Normal rate and regular rhythm.      Heart sounds: No murmur.   Pulmonary:      Effort: Pulmonary effort is normal.      Breath sounds: Normal breath sounds.   Abdominal:      General: There is no distension.   Musculoskeletal:         General: No swelling.      Right lower leg: No edema.      Left lower leg: No edema.      Comments: Distal pulses intact   Skin:     General: Skin is warm and dry.      Findings: No rash.   Neurological:      General: No focal deficit present.      Mental Status: She is alert and oriented to person, place, and time.   Psychiatric:         Mood and Affect: Mood normal.         Behavior: Behavior normal.         Laboratory:  Recent Labs     08/24/20  0844   WBC 14.9*   RBC 4.73   HEMOGLOBIN 14.9   HEMATOCRIT 44.5   MCV 94.1   MCH 31.5   MCHC 33.5*   RDW 47.3   PLATELETCT 309   MPV 8.8*     Recent Labs     08/24/20  0844   SODIUM  139   POTASSIUM 4.0   CHLORIDE 102   CO2 25   GLUCOSE 158*   BUN 17   CREATININE 0.78   CALCIUM 10.5     Recent Labs     08/24/20  0844   ALTSGPT 16   ASTSGOT 19   ALKPHOSPHAT 74   TBILIRUBIN 0.4   GLUCOSE 158*         No results for input(s): NTPROBNP in the last 72 hours.      No results for input(s): TROPONINT in the last 72 hours.    Imaging:  DX-FEMUR-2+ LEFT   Final Result      Acute, nondisplaced fracture of the left femoral neck.      DX-PELVIS-1 OR 2 VIEWS   Final Result      Acute, nondisplaced fracture of the left femoral neck.      DX-CHEST-LIMITED (1 VIEW)   Final Result      No radiographic evidence of acute cardiopulmonary process.        EKG interpreted by me sinus rhythm flat T waves in aVF and 2 without any evidence of endemic ST or T wave changes.  QTC is 450    Assessment/Plan:  I anticipate this patient will require at least two midnights for appropriate medical management, necessitating inpatient admission.    * Femur fracture (HCC)- (present on admission)  Assessment & Plan  Left femoral neck fracture from ground level fall  Dr. Venegas, orthopedics consulted  NPO for likely surgery today  Stat COVID ordered for pre-surgical clearance.   She is medically optimized for surgery today.  Oral oxycodone ordered and IV morphine while NPO    Elevated coronary artery calcium score- (present on admission)  Assessment & Plan  CT from 7/20:     Total Calcium Score: 1360.4     Percentile: Calcium score is above the 90th percentile for the patient's age and sex    She denies anginal complaints  She has been started on lipitor 40 mg  EKG does not reveal dynamic changes    Malignant neoplasm of head of pancreas (HCC)- (present on admission)  Assessment & Plan  Status post resection and presumptive cure by Dr. Valadez    Hypertension goal BP (blood pressure) < 140/80- (present on admission)  Assessment & Plan  Continue lisinopril hctz with holding parameters    Chronic bilateral low back pain with  bilateral sciatica- (present on admission)  Assessment & Plan  Followed by Dr. Gonzales

## 2020-08-24 NOTE — ASSESSMENT & PLAN NOTE
CT from 7/20:     Total Calcium Score: 1360.4     Percentile: Calcium score is above the 90th percentile for the patient's age and sex    She denies anginal complaints  She has been started on lipitor 40 mg  EKG does not reveal dynamic changes

## 2020-08-24 NOTE — CONSULTS
DATE OF SERVICE:  08/24/2020    ORTHOPEDIC CONSULTATION    REQUESTING PHYSICIAN:  Edgar Pereira DO, emergency department    REASON FOR CONSULTATION:  Left femoral neck fracture.    CHIEF COMPLAINT:  Left hip pain.    HISTORY OF PRESENT ILLNESS:  Patient is a 75-year-old female.  She essentially   tripped and slipped and fell in the bathroom today injuring her left hip.    She was transferred to [facility], found to have a left femoral neck fracture with   some shortening and displacement.  She denies other injuries.  She denies   numbness or paresthesias in her extremities.  She is being evaluated for   admission to the hospitalist service and I was asked to consult to provide   treatment recommendations for left femoral neck fracture.    ALLERGIES:  LEVOFLOXACIN, NITROFURANTOIN, AMITRIPTYLINE, SULFA DRUGS, LYRICA.    OUTPATIENT MEDICATIONS:  Include lisinopril/hydrochlorothiazide, metformin,   tramadol, Topamax, calcium carbonate and cholecalciferol, diazepam, Norco,   atorvastatin, multivitamin.    PAST MEDICAL DIAGNOSES:  Include bronchitis, pancreatic cancer, chronic low   back pain, diabetes, hypertension.    PAST SURGICAL HISTORY:  Robotic hysterectomy in 2015, anterior cervical   diskectomy and fusion in 2006, dental extractions in 1966 and 2017,   splenectomy and pancreatectomy in 2018.    SOCIAL HISTORY:  Patient denies smoking, denies alcohol use, denies illicit   drug use.    FAMILY HISTORY:  There is no evidence of obvious medical problems according to   the medical record in her family.    REVIEW OF SYSTEMS:  She denies fevers, chills, nausea, vomiting, shortness of   breath, chest pain.  Otherwise, normal per AMA criteria other than that   already stated in the HPI.    PHYSICAL EXAMINATION:  VITAL SIGNS:  Temperature 98.7, heart rate 65, respiratory rate 20, blood   pressure 139/73, pulse oximetry 94% on room air.  GENERAL APPEARANCE:  Patient is alert, oriented, pleasant, cooperative, in no   acute  distress, lying supine in the [facility] [place].  HEAD, EYES, EARS, NOSE AND THROAT:  Normocephalic and atraumatic.  She has a   surgical mask in place.  PULMONARY:  Symmetric, unlabored breathing.  CARDIOVASCULAR:  Extremities well perfused.  ABDOMEN:  Obese, not obviously distended.  MUSCULOSKELETAL:  Left lower extremity is slightly shortened and axillary   rotated.  She is able to dorsi and plantarflex her foot and flex and extend   her toes.  She has palpable dorsalis pedis pulse.  She is nontender to   palpation of the left knee.  There is no evidence of obvious traumatic   deformity.  The right lower and bilateral upper extremities are grossly   neurovascularly intact.    DIAGNOSTIC IMAGING:  AP pelvis and 2 views of the left femur show a left   mildly displaced, shortened, vertically oriented femoral neck fracture.    ASSESSMENT:  A 75-year-old female with left mildly displaced femoral neck   fracture with shortening vertical orientation and some decreased femoral   offset.  She has a history of pancreatic cancer as well as diabetes.  She is   being evaluated for admission to the hospitalist service.    RECOMMENDATIONS:  1.  I discussed these findings with the patient and we discussed treatment   options including the option for reduction and surgical fixation versus hip   arthroplasty.  We discussed pros and cons of each as well as the potential for   nonunion and loss of fixation with surgical fixation, potentially requiring a   second surgery, specifically hip arthroplasty.  We discussed more reliable   outcome with hip arthroplasty for this particular injury.  We discussed   potential risks of arthroplasty such as iatrogenic or periprosthetic-related   fracture, possible limb length inequality, hip instability, infection,   potential for injury to neurovascular structures, blood loss, possibly   requiring transfusion, DVT, and pulmonary embolism as well as a potential need   for revision surgery in the  future.  She expressed understanding and feels   more comfortable with hip arthroplasty, which I feel is very reasonable and   reliable treatment option for this injury in her.  2.  Patient should continue to bed rest and n.p.o. and we will try to make   preparations to get her to the operating room today for left total hip   arthroplasty for her mildly displaced left femoral neck fracture.       ____________________________________     MD KELLIE Baird / CHARLIE    DD:  08/24/2020 13:31:50  DT:  08/24/2020 13:55:34    D#:  [id number]  Job#:  [id number]

## 2020-08-24 NOTE — ED TRIAGE NOTES
"PT BIB EMS per report pt slipped on her own urine on the bathroom floor last night.  PT reports that she landed on her buttocks.  PT states that her  had to help her off the floor and to the bed. PT reports that she is unable to stand  Chief Complaint   Patient presents with   • T-5000 GLF   • Hip Pain     BP (!) 168/67   Pulse 74   Temp 37.1 °C (98.7 °F) (Temporal)   Resp 16   Ht 1.549 m (5' 1\")   Wt 76.2 kg (168 lb)   SpO2 96%     "

## 2020-08-24 NOTE — ASSESSMENT & PLAN NOTE
Left femoral neck fracture from ground level fall  Dr. Venegas, orthopedics performed left KAMALA on 8/24/20.  Stat COVID negative  Oral oxycodone ordered and IV morphine PRN  8/25:  Check vitamin D level. PT/OT pending.

## 2020-08-24 NOTE — ED PROVIDER NOTES
"ED Provider  Scribed for Edgar Pereira D.O. by Dorina Veras. 8/24/2020  8:46 AM    Means of arrival:EMS  History obtained from:Patient  History limited by: None    CHIEF COMPLAINT  Chief Complaint   Patient presents with   • T-5000 GLF   • Hip Pain       HPI  Larissa Ramirez is a 75 y.o. female who presents for evaluation following a ground level fall today. Patient states that she was urinating, when she became concerned that her dog was dying, and got up from the toilet. She states when she went to check on her dog, she, \"forgot to shut off my urine,\" and urinated on the floor. After checking on her dog she adds that she went back into the restroom to finish urinating, and did not notice there was urine on the floor until she slipped in it. Upon follow she adds that she was on the floor for 25-20 minutes until she was able to call EMS. She endorses associated left hip pain. She denies any back or head pain. Patient reports she has chronic back pain, which she treats with Oxycodon daily. The patient is unsure if she is up to date on her TDAP.     REVIEW OF SYSTEMS  See HPI for further details. All other systems are negative.     PAST MEDICAL HISTORY   has a past medical history of Bowel habit changes (05/17/2018), Bronchitis (2005), Cancer (HCC) (05/2018), Chronic low back pain, Chronic neck pain, Dental disorder, Hypertension (2015), Other specified symptom associated with female genital organs, Pain (05/17/2018), Pre-diabetes, and Snoring.    SOCIAL HISTORY  Social History     Tobacco Use   • Smoking status: Never Smoker   • Smokeless tobacco: Never Used   Substance and Sexual Activity   • Alcohol use: No     Alcohol/week: 0.0 oz   • Drug use: No   • Sexual activity: Yes     Partners: Male       SURGICAL HISTORY   has a past surgical history that includes hysterectomy robotic xi (7/9/2015); cervical disk and fusion anterior (2006); dental extraction(s) (1966); colonoscopy (2008); other; dental extraction(s) " "(12/2017); gastroscopy (5/18/2018); egd w/endoscopic ultrasound (5/18/2018); egd with asp/bx (5/18/2018); other neurological surg; cath placement (Right, 5/30/2018); pancreatectomy (11/5/2018); splenectomy (11/5/2018); node dissection (11/5/2018); omentectomy (11/5/2018); and neuro dest facet l/s w/ig sngl (Left, 8/11/2020).    CURRENT MEDICATIONS  Home Medications     Reviewed by Jimmy Landin (Pharmacy Tech) on 08/24/20 at 1042  Med List Status: Complete   Medication Last Dose Status   atorvastatin (LIPITOR) 40 MG Tab 8/23/2020 Active   Calcium Carb-Cholecalciferol (CALCIUM 1000 + D PO) 8/23/2020 Active   diazepam (VALIUM) 2 MG TABS 8/24/2020 Active   HYDROcodone/acetaminophen (NORCO)  MG Tab 8/24/2020 Active   lisinopril-hydrochlorothiazide (PRINZIDE) 20-25 MG per tablet 8/23/2020 Active   metFORMIN ER (GLUCOPHAGE XR) 500 MG TABLET SR 24 HR 8/23/2020 Active   multivitamin (THERAGRAN) Tab 8/23/2020 Active   topiramate (TOPAMAX) 50 MG tablet 8/23/2020 Active   tramadol (ULTRAM) 50 MG Tab 8/24/2020 Active                ALLERGIES  Allergies   Allergen Reactions   • Levofloxacin Swelling     Swelling of tongue and neck   • Nitrofurantoin Swelling     Swelling of lips, tongue, face   • Amitriptyline Swelling     Facial swelling    • Sulfa Drugs Nausea   • Lyrica Rash     .       PHYSICAL EXAM  VITAL SIGNS: BP (!) 168/67   Pulse 74   Temp 37.1 °C (98.7 °F) (Temporal)   Resp 16   Ht 1.549 m (5' 1\")   Wt 76.2 kg (168 lb)   LMP 06/27/1986   SpO2 96%   BMI 31.74 kg/m²   Constitutional: Alert in no apparent distress.  HENT: No signs of trauma, mucous membranes are moist  Eyes: Conjunctiva normal, Non-icteric.   Neck: Normal range of motion, No tenderness, Supple.  Lymphatic: No lymphadenopathy noted.   Cardiovascular: Regular rate and rhythm, no murmurs.   Thorax & Lungs: Normal breath sounds, No respiratory distress, No wheezing, No chest tenderness.   Abdomen: Bowel sounds normal, Soft, No " tenderness, No masses, No pulsatile masses. No peritoneal signs.  Skin: Warm, Dry, normal color.   Back: No bony tenderness, No CVA tenderness.   Extremities: No edema, No tenderness, No cyanosis  Musculoskeletal: Left hip no deformities or discoloration,  Range of motion of hip elicits significant tenderness. No lowering or shortening or rotation   Neurologic: Alert and oriented x4, Normal motor function, Normal sensory function, No focal deficits noted.   Psychiatric: Affect normal, Judgment normal, Mood normal.       DIAGNOSTIC STUDIES / PROCEDURES    EKG  12 Lead EKG interpreted by me shown below.      LABS  Results for orders placed or performed during the hospital encounter of 08/24/20   COMP METABOLIC PANEL   Result Value Ref Range    Sodium 139 135 - 145 mmol/L    Potassium 4.0 3.6 - 5.5 mmol/L    Chloride 102 96 - 112 mmol/L    Co2 25 20 - 33 mmol/L    Anion Gap 12.0 7.0 - 16.0    Glucose 158 (H) 65 - 99 mg/dL    Bun 17 8 - 22 mg/dL    Creatinine 0.78 0.50 - 1.40 mg/dL    Calcium 10.5 8.5 - 10.5 mg/dL    AST(SGOT) 19 12 - 45 U/L    ALT(SGPT) 16 2 - 50 U/L    Alkaline Phosphatase 74 30 - 99 U/L    Total Bilirubin 0.4 0.1 - 1.5 mg/dL    Albumin 4.0 3.2 - 4.9 g/dL    Total Protein 7.1 6.0 - 8.2 g/dL    Globulin 3.1 1.9 - 3.5 g/dL    A-G Ratio 1.3 g/dL   CBC WITH DIFFERENTIAL   Result Value Ref Range    WBC 14.9 (H) 4.8 - 10.8 K/uL    RBC 4.73 4.20 - 5.40 M/uL    Hemoglobin 14.9 12.0 - 16.0 g/dL    Hematocrit 44.5 37.0 - 47.0 %    MCV 94.1 81.4 - 97.8 fL    MCH 31.5 27.0 - 33.0 pg    MCHC 33.5 (L) 33.6 - 35.0 g/dL    RDW 47.3 35.9 - 50.0 fL    Platelet Count 309 164 - 446 K/uL    MPV 8.8 (L) 9.0 - 12.9 fL    Neutrophils-Polys 80.70 (H) 44.00 - 72.00 %    Lymphocytes 12.70 (L) 22.00 - 41.00 %    Monocytes 5.50 0.00 - 13.40 %    Eosinophils 0.10 0.00 - 6.90 %    Basophils 0.50 0.00 - 1.80 %    Immature Granulocytes 0.50 0.00 - 0.90 %    Nucleated RBC 0.00 /100 WBC    Neutrophils (Absolute) 11.97 (H) 2.00 - 7.15  K/uL    Lymphs (Absolute) 1.89 1.00 - 4.80 K/uL    Monos (Absolute) 0.82 0.00 - 0.85 K/uL    Eos (Absolute) 0.01 0.00 - 0.51 K/uL    Baso (Absolute) 0.08 0.00 - 0.12 K/uL    Immature Granulocytes (abs) 0.08 0.00 - 0.11 K/uL    NRBC (Absolute) 0.00 K/uL   ESTIMATED GFR   Result Value Ref Range    GFR If African American >60 >60 mL/min/1.73 m 2    GFR If Non African American >60 >60 mL/min/1.73 m 2   COVID/SARS CoV-2 PCR    Specimen: Nasopharyngeal; Respirate   Result Value Ref Range    COVID Order Status Received    SARS-CoV-2, PCR (In-House)   Result Value Ref Range    SARS-CoV-2 Source NP Swab     SARS-CoV-2 by PCR NotDetected    EKG (NOW)   Result Value Ref Range    Report       [facility] Emergency Dept.    Test Date:  2020  Pt Name:    COLTEN AMANDA                Department: ER  MRN:        [id number]                      Room:       [facility]  Gender:     Female                       Technician: [id number]  :        [id number]                   Requested By:JOCE SANCHES  Order #:    [id number]                    Reading MD: JOCE SANCHES D.O.    Measurements  Intervals                                Axis  Rate:       78                           P:          18  GA:         192                          QRS:        -24  QRSD:       96                           T:          -4  QT:         396  QTc:        452    Interpretive Statements  SINUS RHYTHM  BORDERLINE LEFT AXIS DEVIATION  BORDERLINE T ABNORMALITIES, INFERIOR LEADS  Compared to ECG 2018 09:42:51  No significant changes  Electronically Signed On 2020 10:28:35 PDT by JOCE SANCHES D.O.       All labs reviewed by me.    RADIOLOGY  DX-FEMUR-2+ LEFT   Final Result      Acute, nondisplaced fracture of the left femoral neck.      DX-PELVIS-1 OR 2 VIEWS   Final Result      Acute, nondisplaced fracture of the left femoral neck.      DX-CHEST-LIMITED (1 VIEW)   Final Result      No radiographic evidence of acute  cardiopulmonary process.        The radiologist's interpretations of all radiological studies have been reviewed by me.    Films have been independently by me      COURSE  Pertinent Labs & Imaging studies reviewed. (See chart for details)    8:46 AM - Patient seen and examined at bedside. Patient is in mild pain. Discussed plan of care. Ordered for Dx-hip, CMP, CBC and EKG to evaluate her symptoms.     10:28 AM I discussed the patient's case and the above findings with Dr. Venegas  (Ortho) who recommends admission.    10:29 AM - Patient was seen at bedside. I updated her on all diagnostic results, including an acute non-displaced fracture of the left femoral neck. Patient was advised of the plan for hospitalization. Patient verbalizes understanding and agreement to this plan of care.     10:36 AM - I discussed the patient's case and the above findings with Dr. Yang (hospitalist) who agreed to see the patient for hospitalization.     MEDICAL DECISION MAKING  This is a 75 y.o. female who presents with a ground-level fall and hip pain.  X-rays were done shows no pelvic fracture but there is a fracture of the left femoral neck.  Preop lab test and EKGs were completed also.  I spoke with the hospitalist for admission, orthopedist for consultation.  The patient will be admitted for further evaluation and treatment      DISPOSITION:  Patient will be hospitalized by Dr. Yang in guarded condition.    FINAL IMPRESSION  1. Fall, initial encounter    2. Closed nondisplaced basicervical fracture of left femur, initial encounter ([facility])         Dorina CAMILO (Scribe), am scribing for, and in the presence of, Edgar Pereira D.O..    Electronically signed by: Dorina Veras (Jose Ramon), 8/24/2020    Edgar CAMILO D.O. personally performed the services described in this documentation, as scribed by Dorina Veras in my presence, and it is both accurate and complete. C.    The note accurately reflects work and decisions made by me.   Edgar Pereira D.O.  8/24/2020  1:52 PM

## 2020-08-24 NOTE — ANESTHESIA PREPROCEDURE EVALUATION
Relevant Problems   CARDIAC   (+) Atherosclerosis   (+) Elevated coronary artery calcium score   (+) Hypertension goal BP (blood pressure) < 140/80      Other   (+) DDD (degenerative disc disease), lumbar   (+) Dyslipidemia   (+) Femur fracture (HCC)   (+) Malignant neoplasm of head of pancreas (HCC)   chronic pain on opiods    Physical Exam    Airway   Mallampati: II  TM distance: >3 FB  Neck ROM: full       Cardiovascular - normal exam  Rhythm: regular  Rate: normal  (-) murmur     Dental - normal exam  Comments: Upper and lower partial, no loose teeth, several missing         Pulmonary - normal exam  Breath sounds clear to auscultation     Abdominal    Neurological - normal exam                 Anesthesia Plan    ASA 3 (pancreatic cancer)   ASA physical status 3 criteria: CAD/stents (> 3 months)    Plan - general       Airway plan will be ETT        Induction: intravenous    Postoperative Plan: Postoperative administration of opioids is intended.    Pertinent diagnostic labs and testing reviewed    Informed Consent:    Anesthetic plan and risks discussed with patient.    Use of blood products discussed with: patient whom consented to blood products.

## 2020-08-24 NOTE — ANESTHESIA PROCEDURE NOTES
Airway    Date/Time: 8/24/2020 4:13 PM  Performed by: Victor M Bey M.D.  Authorized by: Victor M Bey M.D.     Location:  OR  Urgency:  Elective  Indications for Airway Management:  Anesthesia      Spontaneous Ventilation: absent    Sedation Level:  Deep  Preoxygenated: Yes    Patient Position:  Sniffing  Mask Difficulty Assessment:  1 - vent by mask  Final Airway Type:  Endotracheal airway  Final Endotracheal Airway:  ETT  Cuffed: Yes    Technique Used for Successful ETT Placement:  Direct laryngoscopy  Devices/Methods Used in Placement:  Intubating stylet and anterior pressure/BURP    Insertion Site:  Oral  Blade Type:  Ortiz  Laryngoscope Blade/Videolaryngoscope Blade Size:  2  ETT Size (mm):  6.5  Measured from:  Teeth  ETT to Teeth (cm):  21  Placement Verified by: auscultation and capnometry    Cormack-Lehane Classification:  Grade IIa - partial view of glottis  Number of Attempts at Approach:  1

## 2020-08-25 LAB
25(OH)D3 SERPL-MCNC: 35 NG/ML (ref 30–100)
ANION GAP SERPL CALC-SCNC: 9 MMOL/L (ref 7–16)
BASOPHILS # BLD AUTO: 0.2 % (ref 0–1.8)
BASOPHILS # BLD: 0.04 K/UL (ref 0–0.12)
BUN SERPL-MCNC: 16 MG/DL (ref 8–22)
CALCIUM SERPL-MCNC: 9.8 MG/DL (ref 8.5–10.5)
CHLORIDE SERPL-SCNC: 97 MMOL/L (ref 96–112)
CO2 SERPL-SCNC: 27 MMOL/L (ref 20–33)
CREAT SERPL-MCNC: 0.82 MG/DL (ref 0.5–1.4)
EOSINOPHIL # BLD AUTO: 0 K/UL (ref 0–0.51)
EOSINOPHIL NFR BLD: 0 % (ref 0–6.9)
ERYTHROCYTE [DISTWIDTH] IN BLOOD BY AUTOMATED COUNT: 49.1 FL (ref 35.9–50)
EST. AVERAGE GLUCOSE BLD GHB EST-MCNC: 148 MG/DL
GLUCOSE SERPL-MCNC: 199 MG/DL (ref 65–99)
HBA1C MFR BLD: 6.8 % (ref 0–5.6)
HCT VFR BLD AUTO: 39.6 % (ref 37–47)
HGB BLD-MCNC: 12.8 G/DL (ref 12–16)
IMM GRANULOCYTES # BLD AUTO: 0.12 K/UL (ref 0–0.11)
IMM GRANULOCYTES NFR BLD AUTO: 0.7 % (ref 0–0.9)
LYMPHOCYTES # BLD AUTO: 1.95 K/UL (ref 1–4.8)
LYMPHOCYTES NFR BLD: 11.5 % (ref 22–41)
MCH RBC QN AUTO: 30.9 PG (ref 27–33)
MCHC RBC AUTO-ENTMCNC: 32.3 G/DL (ref 33.6–35)
MCV RBC AUTO: 95.7 FL (ref 81.4–97.8)
MONOCYTES # BLD AUTO: 1.53 K/UL (ref 0–0.85)
MONOCYTES NFR BLD AUTO: 9 % (ref 0–13.4)
NEUTROPHILS # BLD AUTO: 13.33 K/UL (ref 2–7.15)
NEUTROPHILS NFR BLD: 78.6 % (ref 44–72)
NRBC # BLD AUTO: 0 K/UL
NRBC BLD-RTO: 0 /100 WBC
PLATELET # BLD AUTO: 270 K/UL (ref 164–446)
PMV BLD AUTO: 8.9 FL (ref 9–12.9)
POTASSIUM SERPL-SCNC: 3.8 MMOL/L (ref 3.6–5.5)
RBC # BLD AUTO: 4.14 M/UL (ref 4.2–5.4)
SODIUM SERPL-SCNC: 133 MMOL/L (ref 135–145)
WBC # BLD AUTO: 17 K/UL (ref 4.8–10.8)

## 2020-08-25 PROCEDURE — 700111 HCHG RX REV CODE 636 W/ 250 OVERRIDE (IP): Performed by: HOSPITALIST

## 2020-08-25 PROCEDURE — 700105 HCHG RX REV CODE 258

## 2020-08-25 PROCEDURE — 36415 COLL VENOUS BLD VENIPUNCTURE: CPT

## 2020-08-25 PROCEDURE — 83036 HEMOGLOBIN GLYCOSYLATED A1C: CPT

## 2020-08-25 PROCEDURE — 80048 BASIC METABOLIC PNL TOTAL CA: CPT

## 2020-08-25 PROCEDURE — 700102 HCHG RX REV CODE 250 W/ 637 OVERRIDE(OP): Performed by: HOSPITALIST

## 2020-08-25 PROCEDURE — 700111 HCHG RX REV CODE 636 W/ 250 OVERRIDE (IP): Performed by: ORTHOPAEDIC SURGERY

## 2020-08-25 PROCEDURE — 85025 COMPLETE CBC W/AUTO DIFF WBC: CPT

## 2020-08-25 PROCEDURE — 770006 HCHG ROOM/CARE - MED/SURG/GYN SEMI*

## 2020-08-25 PROCEDURE — A9270 NON-COVERED ITEM OR SERVICE: HCPCS | Performed by: HOSPITALIST

## 2020-08-25 PROCEDURE — 97162 PT EVAL MOD COMPLEX 30 MIN: CPT

## 2020-08-25 PROCEDURE — 82306 VITAMIN D 25 HYDROXY: CPT

## 2020-08-25 PROCEDURE — 99232 SBSQ HOSP IP/OBS MODERATE 35: CPT | Performed by: HOSPITALIST

## 2020-08-25 PROCEDURE — 97166 OT EVAL MOD COMPLEX 45 MIN: CPT

## 2020-08-25 RX ORDER — SODIUM CHLORIDE 9 MG/ML
INJECTION, SOLUTION INTRAVENOUS
Status: COMPLETED
Start: 2020-08-25 | End: 2020-08-25

## 2020-08-25 RX ADMIN — OXYCODONE HYDROCHLORIDE 10 MG: 5 TABLET ORAL at 01:16

## 2020-08-25 RX ADMIN — OXYCODONE HYDROCHLORIDE 10 MG: 5 TABLET ORAL at 09:15

## 2020-08-25 RX ADMIN — OXYCODONE HYDROCHLORIDE 10 MG: 5 TABLET ORAL at 13:58

## 2020-08-25 RX ADMIN — LISINOPRIL 20 MG: 20 TABLET ORAL at 05:32

## 2020-08-25 RX ADMIN — ATORVASTATIN CALCIUM 20 MG: 20 TABLET, FILM COATED ORAL at 20:42

## 2020-08-25 RX ADMIN — TOPIRAMATE 50 MG: 25 TABLET, FILM COATED ORAL at 05:33

## 2020-08-25 RX ADMIN — OXYCODONE HYDROCHLORIDE 10 MG: 5 TABLET ORAL at 05:32

## 2020-08-25 RX ADMIN — SODIUM CHLORIDE: 9 INJECTION, SOLUTION INTRAVENOUS at 01:45

## 2020-08-25 RX ADMIN — HYDROCHLOROTHIAZIDE 25 MG: 25 TABLET ORAL at 05:33

## 2020-08-25 RX ADMIN — DOCUSATE SODIUM 50 MG AND SENNOSIDES 8.6 MG 2 TABLET: 8.6; 5 TABLET, FILM COATED ORAL at 17:21

## 2020-08-25 RX ADMIN — CEFAZOLIN SODIUM 2 G: 2 INJECTION, SOLUTION INTRAVENOUS at 01:26

## 2020-08-25 RX ADMIN — OXYCODONE HYDROCHLORIDE 10 MG: 5 TABLET ORAL at 20:42

## 2020-08-25 RX ADMIN — ACETAMINOPHEN 650 MG: 325 TABLET, FILM COATED ORAL at 14:33

## 2020-08-25 RX ADMIN — ACETAMINOPHEN 650 MG: 325 TABLET, FILM COATED ORAL at 20:42

## 2020-08-25 RX ADMIN — OXYCODONE HYDROCHLORIDE 10 MG: 5 TABLET ORAL at 17:21

## 2020-08-25 RX ADMIN — METFORMIN HYDROCHLORIDE 500 MG: 500 TABLET, EXTENDED RELEASE ORAL at 20:42

## 2020-08-25 RX ADMIN — ENOXAPARIN SODIUM 40 MG: 40 INJECTION SUBCUTANEOUS at 09:19

## 2020-08-25 RX ADMIN — CEFAZOLIN SODIUM 2 G: 2 INJECTION, SOLUTION INTRAVENOUS at 12:06

## 2020-08-25 ASSESSMENT — ENCOUNTER SYMPTOMS
BACK PAIN: 0
SHORTNESS OF BREATH: 0
FEVER: 0
EYE DISCHARGE: 0
NECK PAIN: 0
VOMITING: 0
DEPRESSION: 0
DIAPHORESIS: 0
PALPITATIONS: 0
SORE THROAT: 0
LOSS OF CONSCIOUSNESS: 0
WEAKNESS: 0
CLAUDICATION: 0
BRUISES/BLEEDS EASILY: 0
COUGH: 0
ABDOMINAL PAIN: 0
CONSTIPATION: 0
HEADACHES: 0
DIARRHEA: 0
CHILLS: 0
NAUSEA: 0
SENSORY CHANGE: 0
SPUTUM PRODUCTION: 0
FOCAL WEAKNESS: 0
WHEEZING: 0
HEMOPTYSIS: 0
MYALGIAS: 0
EYE PAIN: 0
DIZZINESS: 0
SPEECH CHANGE: 0

## 2020-08-25 ASSESSMENT — COGNITIVE AND FUNCTIONAL STATUS - GENERAL
MOVING TO AND FROM BED TO CHAIR: UNABLE
SUGGESTED CMS G CODE MODIFIER DAILY ACTIVITY: CK
DRESSING REGULAR LOWER BODY CLOTHING: A LOT
DAILY ACTIVITIY SCORE: 16
STANDING UP FROM CHAIR USING ARMS: A LOT
CLIMB 3 TO 5 STEPS WITH RAILING: A LOT
MOVING FROM LYING ON BACK TO SITTING ON SIDE OF FLAT BED: A LITTLE
WALKING IN HOSPITAL ROOM: A LITTLE
TURNING FROM BACK TO SIDE WHILE IN FLAT BAD: A LITTLE
SUGGESTED CMS G CODE MODIFIER MOBILITY: CL
TOILETING: A LOT
DRESSING REGULAR UPPER BODY CLOTHING: A LITTLE
HELP NEEDED FOR BATHING: A LOT
MOBILITY SCORE: 14
PERSONAL GROOMING: A LITTLE

## 2020-08-25 ASSESSMENT — GAIT ASSESSMENTS
ASSISTIVE DEVICE: FRONT WHEEL WALKER
DISTANCE (FEET): 5
DEVIATION: BRADYKINETIC;ANTALGIC;DECREASED HEEL STRIKE;DECREASED TOE OFF
GAIT LEVEL OF ASSIST: MINIMAL ASSIST

## 2020-08-25 ASSESSMENT — LIFESTYLE VARIABLES: SUBSTANCE_ABUSE: 0

## 2020-08-25 ASSESSMENT — ACTIVITIES OF DAILY LIVING (ADL): TOILETING: INDEPENDENT

## 2020-08-25 NOTE — ANESTHESIA TIME REPORT
Anesthesia Start and Stop Event Times     Date Time Event    8/24/2020 1539 Ready for Procedure     1607 Anesthesia Start     1825 Anesthesia Stop        Responsible Staff  08/24/20    Name Role Begin End    Victor M Bey M.D. Anesth 1607 1825        Preop Diagnosis (Free Text):  Pre-op Diagnosis     LEFT FEMORAL NECK FRACTURE        Preop Diagnosis (Codes):    Post op Diagnosis  Closed displaced fracture of left femoral neck (HCC)      Premium Reason  K. Alert    Comments:

## 2020-08-25 NOTE — OR NURSING
1823: Pt arrives to PACU asleep and calm. Dressing to left hip is CDI, ice applied.     1843: Pt c/o pain to left hip- medicated per MAR. Tolerating sips of water.    1902: Xray at bedside.    1909: 1 belongings bag and pts black purse retrieved from locker 29.    1915: spoke to Guillermo pts  and updated him on pt status.    1928: Report given to Shikha JOHNSON. Pt going to phase 2.     2010: Pt going to her room with transport at this time. O2 tank full and connected to pt.

## 2020-08-25 NOTE — PROGRESS NOTES
[facility] Medicine Daily Progress Note    Date of Service  8/25/2020    Chief Complaint  75 y.o. female admitted 8/24/2020 with h/o pancreatic cancer s/p surgical removal and cure per patient, chronic back pain presented with GLF at home    Hospital Course    This 74 yo female with h/o pancreatic cancer s/p surgical removal and cure, chronic back pain presented with a GLF at her second story home after slipping in the BR.  Her  lifted her to an office chair, rolled her to the bed and got her in bed.  The following day, she could not move her right leg.  EMS transported to ER found to have left femoral neck fracture requiring KAMALA by Dr. Venegas on 8/24.  She is doing well post op, pending PT/OT.  Post op bandage in place, CD&I.  Check labs, vitamin D and Hga1c.*        Consultants/Specialty  Dr. Venegas    Code Status  Full Code    Disposition  PT/OT pending, lives with , stairs in home.    Review of Systems  Review of Systems   Constitutional: Negative for chills, diaphoresis, fever and malaise/fatigue.   HENT: Negative for congestion and sore throat.    Eyes: Negative for pain and discharge.   Respiratory: Negative for cough, hemoptysis, sputum production, shortness of breath and wheezing.    Cardiovascular: Negative for chest pain, palpitations, claudication and leg swelling.   Gastrointestinal: Negative for abdominal pain, constipation, diarrhea, melena, nausea and vomiting.   Genitourinary: Negative for dysuria, frequency and urgency.   Musculoskeletal: Positive for joint pain (right hip post op pain). Negative for back pain, myalgias and neck pain.   Skin: Negative for itching and rash.   Neurological: Negative for dizziness, sensory change, speech change, focal weakness, loss of consciousness, weakness and headaches.   Endo/Heme/Allergies: Does not bruise/bleed easily.   Psychiatric/Behavioral: Negative for depression, substance abuse and suicidal ideas.        Physical Exam  Temp:  [36.2 °C (97.1  °F)-36.8 °C (98.2 °F)] 36.2 °C (97.2 °F)  Pulse:  [56-81] 74  Resp:  [11-22] 17  BP: (112-148)/(41-79) 122/41  SpO2:  [92 %-100 %] 92 %    Physical Exam  Vitals signs and nursing note reviewed.   Constitutional:       General: She is not in acute distress.     Appearance: She is well-developed. She is not diaphoretic.   HENT:      Head: Normocephalic and atraumatic.      Nose: Nose normal.      Mouth/Throat:      Pharynx: No oropharyngeal exudate.   Eyes:      General: No scleral icterus.        Right eye: No discharge.         Left eye: No discharge.      Conjunctiva/sclera: Conjunctivae normal.      Pupils: Pupils are equal, round, and reactive to light.   Neck:      Musculoskeletal: Normal range of motion and neck supple.      Thyroid: No thyromegaly.      Vascular: No JVD.      Trachea: No tracheal deviation.   Cardiovascular:      Rate and Rhythm: Normal rate and regular rhythm.      Heart sounds: Normal heart sounds. No murmur. No friction rub. No gallop.    Pulmonary:      Effort: Pulmonary effort is normal. No respiratory distress.      Breath sounds: Normal breath sounds. No stridor. No wheezing or rales.   Chest:      Chest wall: No tenderness.   Abdominal:      General: Bowel sounds are normal. There is no distension.      Palpations: Abdomen is soft. There is no mass.      Tenderness: There is no abdominal tenderness. There is no guarding or rebound.   Musculoskeletal: Normal range of motion.         General: Tenderness present.      Comments: Right hip incision CD&I.  NV intact RLE.   Lymphadenopathy:      Cervical: No cervical adenopathy.   Skin:     General: Skin is warm and dry.      Findings: No erythema or rash.   Neurological:      Mental Status: She is alert and oriented to person, place, and time.      Cranial Nerves: No cranial nerve deficit.      Motor: No abnormal muscle tone.      Coordination: Coordination normal.   Psychiatric:         Behavior: Behavior normal.         Thought Content:  Thought content normal.         Judgment: Judgment normal.         Fluids    Intake/Output Summary (Last 24 hours) at 8/25/2020 1129  Last data filed at 8/24/2020 1824  Gross per 24 hour   Intake 1400 ml   Output 200 ml   Net 1200 ml       Laboratory  Recent Labs     08/24/20  0844 08/25/20  0810   WBC 14.9* 17.0*   RBC 4.73 4.14*   HEMOGLOBIN 14.9 12.8   HEMATOCRIT 44.5 39.6   MCV 94.1 95.7   MCH 31.5 30.9   MCHC 33.5* 32.3*   RDW 47.3 49.1   PLATELETCT 309 270   MPV 8.8* 8.9*     Recent Labs     08/24/20  0844 08/25/20  0810   SODIUM 139 133*   POTASSIUM 4.0 3.8   CHLORIDE 102 97   CO2 25 27   GLUCOSE 158* 199*   BUN 17 16   CREATININE 0.78 0.82   CALCIUM 10.5 9.8                   Imaging  DX-PELVIS-1 OR 2 VIEWS   Final Result      Interval left hip arthroplasty with proximal femoral cerclage wire in place      DX-FEMUR-2+ LEFT   Final Result      Acute, nondisplaced fracture of the left femoral neck.      DX-PELVIS-1 OR 2 VIEWS   Final Result      Acute, nondisplaced fracture of the left femoral neck.      DX-CHEST-LIMITED (1 VIEW)   Final Result      No radiographic evidence of acute cardiopulmonary process.           Assessment/Plan  * Femur fracture (HCC)- (present on admission)  Assessment & Plan  Left femoral neck fracture from ground level fall  Dr. Venegas, orthopedics performed left KAMALA on 8/24/20.  Stat COVID negative  Oral oxycodone ordered and IV morphine PRN  8/25:  Check vitamin D level. PT/OT pending.    Elevated coronary artery calcium score- (present on admission)  Assessment & Plan  CT from 7/20:     Total Calcium Score: 1360.4     Percentile: Calcium score is above the 90th percentile for the patient's age and sex    She denies anginal complaints  She has been started on lipitor 40 mg  EKG does not reveal dynamic changes    Malignant neoplasm of head of pancreas (HCC)- (present on admission)  Assessment & Plan  Status post resection and presumptive cure by Dr. Valadez    Hypertension goal  BP (blood pressure) < 140/80- (present on admission)  Assessment & Plan  Continue lisinopril hctz with holding parameters    Chronic bilateral low back pain with bilateral sciatica- (present on admission)  Assessment & Plan  Followed by Dr. Gonzales     Prediabetes- (present on admission)  Assessment & Plan  Ordered Hga1c for mild hyperglycemia.  Continue diabetic diet and home metformin.       VTE prophylaxis: lovenox started 8/25.

## 2020-08-25 NOTE — ANESTHESIA POSTPROCEDURE EVALUATION
Patient: Larissa Ramirez    Procedure Summary     Date: 08/24/20 Room / Location: [place] [id number] / SURGERY [facility]    Anesthesia Start: 1607 Anesthesia Stop: 1825    Procedure: ARTHROPLASTY, HIP, TOTAL (Left Hip) Diagnosis: (LEFT FEMORAL NECK FRACTURE)    Surgeon: Tomas Venegas M.D. Responsible Provider: Victor M Bey M.D.    Anesthesia Type: general ASA Status: 3          Final Anesthesia Type: general  Last vitals  BP   Blood Pressure : 122/41    Temp   (P) 36.6 °C (97.8 °F)    Pulse   Pulse: 81   Resp   (!) 22    SpO2   98 %      Anesthesia Post Evaluation    Patient location during evaluation: PACU  Patient participation: complete - patient participated  Level of consciousness: awake and alert  Pain score: 6    Airway patency: patent  Anesthetic complications: no  Cardiovascular status: hemodynamically stable  Respiratory status: acceptable  Hydration status: euvolemic    PONV: none

## 2020-08-25 NOTE — PROGRESS NOTES
Received report from PACU nurse. Patient arrived to unit via hospital bed, vital signs stable. Assessment complete, patient updated on plan of care, hourly rounding in place.

## 2020-08-25 NOTE — THERAPY
Physical Therapy   Initial Evaluation     Patient Name: Larissa Ramirez  Age:  75 y.o., Sex:  female  Medical Record #: [id number]  Today's Date: 8/25/2020     Precautions: Posterior Hip Precautions, Weight Bearing As Tolerated Left Lower Extremity    Assessment  Larissa Ramirez is a 75 y.o. female who sustained L displaced femoral neck fx secondary to GLF. Is now POD1 L posterior approach KAMALA, WBAT LLE. Pt educated on WB status and posterior hip precautions and received handout, pt receptive and verbalized and demonstrated understanding. Pt required max verbal cueing and min A to come from supine to sitting EOB. She stood to FWW at SPV level. Pt ambulated 5 feet with verbal cueing and and min A for safety, educated on heel-toe gait. Pt instructed to walk to door however refused and requested to sit in recliner. At end of session, PT reviewed supine and seated TherEx with pt, and pt received handout. Pt motivated to ambulate however is concerned about incontinence and not making it to bathroom. Will continue to follow in order to improve pt's activity tolerance, independence with functional mobility, and gait.    Plan    Recommend Physical Therapy 5 times per week until therapy goals are met for the following treatments:  Bed Mobility, Gait Training, Neuro Re-Education / Balance, Stair Training, Therapeutic Activities and Therapeutic Exercises    DC Equipment Recommendations: (P) Front-Wheel Walker  Discharge Recommendations: (P) At pt's current status, recommend post-acute placement for additional physical therapy services prior to discharge home. If pt is able to achieve SPV level with all mobility, recommend home with HH.          Objective     08/25/20 1119   Pain 0 - 10 Group   Therapist Pain Assessment   (no complaints of pain)   Prior Living Situation   Prior Services None   Housing / Facility 2 Story House  (able to stay on first floor)   Steps Into Home 0   Steps In Home 18  (9, landing, 9)   Rail Left Rail (Steps  in Home)   Equipment Owned None   Lives with - Patient's Self Care Capacity Spouse  ( able to help)   Prior Level of Functional Mobility   Bed Mobility Independent   Transfer Status Independent   Ambulation Independent   Distance Ambulation (Feet)   (community)   Assistive Devices Used None   Stairs Independent   History of Falls   History of Falls Yes   Date of Last Fall 08/24/20   Cognition    Level of Consciousness Alert   Passive ROM Lower Body   Passive ROM Lower Body X   Comments maintaining posterior hip precautions   Active ROM Lower Body    Active ROM Lower Body  WDL   Comments maintaining posterior hip precautions   Strength Lower Body   Lower Body Strength  WDL   Sensation Lower Body   Lower Extremity Sensation   WDL   Balance Assessment   Sitting Balance (Static) Fair +   Sitting Balance (Dynamic) Fair   Standing Balance (Static) Fair   Standing Balance (Dynamic) Fair -   Weight Shift Sitting Fair   Weight Shift Standing Fair   Gait Analysis   Gait Level Of Assist Minimal Assist   Assistive Device Front Wheel Walker   Distance (Feet) 5   # of Times Distance was Traveled 1   Deviation Bradykinetic;Antalgic;Decreased Heel Strike;Decreased Toe Off   Weight Bearing Status WBAT LLE   Bed Mobility    Supine to Sit Minimal Assist   Sit to Supine   (pt in recliner end of session)   Scooting Supervised   Functional Mobility   Sit to Stand Supervised   Short Term Goals    Short Term Goal # 1 Pt will perform bed mobility at SPV level within 6tx in order to improve independence.   Short Term Goal # 2 Pt will ambulate 50ft using FWW at SPV level within 6tx in order to improve independence.   Education Group   Education Provided Role of Physical Therapist   Role of Physical Therapist Patient Response Patient;Acceptance;Handout;Explanation;Demonstration;Action Demonstration;Verbal Demonstration   Problem List    Problems Pain;Impaired Bed Mobility;Impaired Ambulation;Decreased Activity Tolerance;Motor Planning /  Sequencing   Anticipated Discharge Equipment and Recommendations   DC Equipment Recommendations Front-Wheel Walker   Discharge Recommendations Recommend post-acute placement for additional physical therapy services prior to discharge home

## 2020-08-25 NOTE — THERAPY
"Occupational Therapy   Initial Evaluation     Patient Name: Larissa Ramirez  Age:  75 y.o., Sex:  female  Medical Record #: [id number]  Today's Date: 8/25/2020     Precautions: Posterior Hip Precautions, Weight Bearing As Tolerated Left Lower Extremity    Assessment  Patient is 75 y.o. female s/p hip arthroplasty following GLF at home, WBAT with posterior hip precaution. Pt provided education on movement precautions, compensatory strategies, and handout for reference of posterior hip precautions. Pt states she can stay on the ground floor of her home, sleep in a recliner, her  can manage all IADLs and assist with ADLs. Pt is eager to DC home, very receptive to edu, and was pleasantly surprised by her own activity tolerance today. Will benefit from additional acute OT to ensure independence with toileting and AE training to maintain PHP at home.   Plan    Recommend Occupational Therapy 4 times per week until therapy goals are met for the following treatments:  Adaptive Equipment, Self Care/Activities of Daily Living, Therapeutic Activities and Therapeutic Exercises.    DC Equipment Recommendations: Bed Side Commode  Discharge Recommendations: Recommend home health for continued occupational therapy services     Subjective    \"My  does most of the cooking already.\"     08/25/20 1111   Prior Living Situation   Housing / Facility 2 Story House  (can stay ground floor)   Steps In Home 16   Rail Left Rail (Steps in Home)   Equipment Owned None   Lives with - Patient's Self Care Capacity Spouse   Prior Level of ADL Function   Comments independent   Prior Level of IADL Function   Home Management Independent   Shopping Independent   Prior Level Of Mobility Independent Without Device in Community;Independent Without Device in Home   Precautions   Precautions Posterior Hip Precautions;Fall Risk;Weight Bearing As Tolerated Left Lower Extremity   Pain 0 - 10 Group   Therapist Pain Assessment Post Activity Pain Same as " Prior to Activity;Nurse Notified  (tolerable for activity after premedication)   Cognition    Cognition / Consciousness WDL   Level of Consciousness Alert   Comments receptive to edu   Balance Assessment   Weight Shift Sitting Fair   Weight Shift Standing Fair   Comments standing with FWW   Bed Mobility    Supine to Sit Minimal Assist   Scooting Supervised   Rolling Minimal Assist to Rt.   ADL Assessment   Eating Modified Independent   Grooming Supervision;Seated   Upper Body Dressing Supervision   Lower Body Dressing Maximal Assist   Toileting   (urinated in [place] prior to session)   Functional Mobility   Sit to Stand Supervised   Bed, Chair, Wheelchair Transfer Minimal Assist   Toilet Transfers Refused   Transfer Method Stand Step   Mobility w/FWW   Comments cues for weightshifting into BUEs and stepping safe distance from FWW   Short Term Goals   Short Term Goal # 1 pt will demo toileting ADL with SPV   Short Term Goal # 2 pt will tolerate >5min stand at sink for grooming ADLs at SPV level   Short Term Goal # 3 pt will recall 3 taught posterior hip precautions without cues   Problem List   Problem List Decreased Active Daily Living Skills;Decreased Homemaking Skills;Decreased Activity Tolerance;Decreased Functional Mobility;Limited Knowledge of Post Op Precautions   Anticipated Discharge Equipment and Recommendations   DC Equipment Recommendations Bed Side Commode   Discharge Recommendations Recommend home health for continued occupational therapy services

## 2020-08-25 NOTE — PROGRESS NOTES
"75yoF with left displaced femoral neck fracture s/p KAMALA 8/24.    S: Some \"soreness\" in left hip but otherwise doing okay.  Hasnt mobilized with PT yet.    O:    Vitals:    08/25/20 0755   BP: 122/41   Pulse: 74   Resp: 17   Temp: 36.2 °C (97.2 °F)   SpO2: 92%     Exam:  General-NAD, wakes and follows commands  LLE-hip dressing c/d/i, +EHL/FHL/TA/GS motor, SILT in foot, palp dp pulse    Hct: 39.6    A: 75yoF with left displaced femoral neck fracture s/p KAMALA 8/24.    Recs:  --WBAT LLE  --posterior hip precautions  --lovenox and SCDs while inpatient  --ancef x 2 doses postop  --PT/OT for mobilization ASAP  --fu 2 weeks postop  "

## 2020-08-25 NOTE — OP REPORT
DATE OF SERVICE:  08/24/2020    PREOPERATIVE DIAGNOSIS:  Left displaced femoral neck fracture.    POSTOPERATIVE DIAGNOSIS:  Left displaced femoral neck fracture.    PROCEDURE PERFORMED:  Open treatment of left femoral neck fracture with total   hip arthroplasty.    SURGEON:  Tomas Venegas MD    ANESTHESIOLOGIST:  Victor M Bey MD    ANESTHESIA:  General.    ASSISTANT:  Percy Rider PA-C    ESTIMATED BLOOD LOSS:  300 mL.    IMPLANTS:  Aron Press-Fit total hip arthroplasty with the following   components:    1.  A size 4 Accolade II standard offset femoral stem.  2.  A +4 mm 28x28 mm inner diameter ceramic femoral head.  3.  A 28 mm inner diameter x 42 mm outer diameter bipolar femoral head.    4.  A 52 mm Trident acetabular shell with three 6.5 mm supra-acetabular screws   and an MDM acetabular liner.  5.  Aron cable and cable crimp.    INDICATIONS FOR PROCEDURE:  Patient is a 75-year-old female.  She slipped and   fell, sustained a left vertically oriented and displaced femoral neck   fracture.  I discussed with her treatment options and recommended total hip   arthroplasty.  She signed informed consent and wished to proceed with surgery   as outlined above.    DESCRIPTION OF PROCEDURE:  Patient was met in the preop holding area and her   surgical site was signed.  Her consent was confirmed to be accurate.  She was   taken back to the operating room and general anesthesia was induced.  Ancef   and vancomycin and tranexamic acid were administered.  She was positioned in   the right lateral decubitus position with Stulberg positioners.  The left   lower extremity was provisionally cleansed with alcohol.  It was then prepped   and draped in the usual sterile fashion.  A formal timeout was performed to   confirm patient's correct name, correct surgical site, correct procedure and   correct laterality.  A posterior approach to the hip was then performed with   scalpel down through skin.   Dissection was carried with Bovie cautery down to   the iliotibial band and the gluteal fascia, which were split longitudinally.    A Charnley retractor was placed, identified the piriformis tendon and   developed a plane in between gluteus minimus and piriformis.  I then released   the short external rotators and a full-thickness sleeve with the posterior   capsule off the proximal femur including the piriformis.  Identified the   femoral neck fracture.  I released soft tissues off of the femoral neck and   used retractors to retract the femoral neck and used an oscillating saw to   resect the excess femoral neck.  I then removed the head from the acetabulum,   it measured about 46 mm.  I placed anterior and inferior acetabular retractors   and excised soft tissue off the acetabular rim as well as the cotyloid fossa.    I reamed with a 45 mm reamer down to the cotyloid baseplate and expanded   ultimately up to a 52 mm reamer, so that I could get a 28 mm ceramic head for   improved stability and wear properties.  The cup had a little micromotion   present despite being well seated.  I therefore placed 3 acetabular screws,   all of which had excellent bony purchase.  I felt the cup position was   acceptable based on the level of the transverse acetabular ligament and the   fact it was anteverted about 5 degrees compared to native anteversion below   the anterior rim.  The acetabular shell was then irrigated and dried and I   inserted and impacted the MDM acetabular liner.  I then turned my attention to   the femoral side.  With retractors in place, I used a box osteotome followed   by a canal finding reamer followed by sequential broaching up to a size 4.    The fracture extension of the vertical neck fracture did extend below the   level of the left femoral neck resection and toward the lesser trochanter, so   I decided to place a prophylactic cable to prevent any iatrogenic fracture   extension.  I made a split in  the vastus lateralis and using a cable passer   and direct exposure onto the femur, I placed a Assaria cable and cable crimp   and passed underneath the vastus lateralis muscle posteriorly as well.  I   tensioned it and crimped it and then cut excess cable.  I confirmed that the   femoral stem size was acceptable.  It was rotationally stable and then trialed   and ultimately selected a size 4 standard offset femoral stem.  I removed the   broach, thoroughly irrigated out the femur and gently inserted and impacted   the size 4 Accolade II femoral stem.  I then trialed again and ultimately   selected a +4 mm neck length and she had excellent stability parameters and   clinically symmetric limb lengths.  I removed the trial components, thoroughly   cleansed and dried the Norton taper, gently inserted and impacted the femoral   head onto the Norton taper and reduced the hip.  I then soaked the wound in   dilute Betadine solution for 3 minutes, thoroughly irrigated out the wound.  I   repaired the short external rotators including the piriformis and the   posterior capsule through bone tunnels in the greater trochanter using #5   Ti-Cron.  I repaired the IT band and gluteal fascia with size 2 Stratafix   sutures, the subQ layers with 0 Vicryl and 2-0 Vicryl and the skin edges with   staples.  I applied a sterile silver-impregnated Mepilex dressing.  She was   then awoken from anesthesia, transferred on the [place] and taken to the   postanesthesia care unit in stable condition.    PLAN:  1.  Patient will be readmitted to the medical service postop.  2.  She can be weightbearing as tolerated in the left lower extremity.  3.  She should maintain posterior hip precautions at all times.  4.  She will need Ancef for 2 doses postop for infection prophylaxis.  5.  She should be started on Lovenox or equivalent in the morning and should   continue SCDs for DVT prophylaxis in the meantime.  6.  She should work with physical and  occupational therapy as soon as possible   for mobilization.       ____________________________________     MD KELLIE Baird / CHARLIE    DD:  08/24/2020 18:36:45  DT:  08/24/2020 19:53:10    D#:  [id number]  Job#:  [id number]

## 2020-08-25 NOTE — CARE PLAN
Problem: Safety  Goal: Will remain free from falls  Outcome: PROGRESSING AS EXPECTED  Intervention: Implement fall precautions  Flowsheets (Taken 8/24/2020 2030)  Environmental Precautions:   Treaded Slipper Socks on Patient   Personal Belongings, Wastebasket, Call Bell etc. in Easy Reach   Transferred to Stronger Side   Report Given to Other Health Care Providers Regarding Fall Risk   Bed in Low Position   Communication Sign for Patients & Families   Mobility Assessed & Appropriate Sign Placed     Problem: Pain Management  Goal: Pain level will decrease to patient's comfort goal  Outcome: PROGRESSING AS EXPECTED  Intervention: Educate and implement non-pharmacologic comfort measures. Examples: relaxation, distration, play therapy, activity therapy, massage, etc.  Flowsheets (Taken 8/24/2020 2032 by Penny Briggs, R.N.)  Intervention:   Rest   Medication (see MAR)

## 2020-08-25 NOTE — OR SURGEON
Immediate Post OP Note    PreOp Diagnosis: Left displaced femoral neck fracture    PostOp Diagnosis: same    Procedure(s):  ARTHROPLASTY, HIP, TOTAL - Wound Class: Clean    Surgeon(s):  Tomas Venegas M.D.    Anesthesiologist/Type of Anesthesia:  Anesthesiologist: Victor M Bey M.D./General    Surgical Staff:  Circulator: Anika Ballard R.N.  Limb Burton: Loy Ramos  Relief Circulator: Eleanor Rios R.N.  Scrub Person: Aayush Juan  First Assist: Percy Rider P.A.-C.    Specimens removed if any:  * No specimens in log *    Estimated Blood Loss: 300cc    Findings: see dictation    Complications: none known    PLAN:  --readmit medicine postop  --WBAT LLE  --posterior hip precautions  --ancef x 2 doses postop  --okay to start lovenox or equivalent in am, continue SCDs  --PT/OT for mobilization ASAP    8/24/2020 6:20 PM Tomas Venegas M.D.

## 2020-08-25 NOTE — PROGRESS NOTES
2 RN Skin Check    2 RN skin check complete with Shikha JOHNSON.   Devices in place: SCDs and oxymask.  Skin assessed under devices: yes.  Confirmed pressure ulcers found on: none.  New potential pressure ulcers noted on none. Wound consult placed No.  The following interventions in place Pillows, q2hr turns, pt educated on skin breakdown prevention    · B/l heels, sacrum, b/l ears, b/l elbows-pink and blanching  · Dressing to left hip c/d/i.

## 2020-08-26 ENCOUNTER — PATIENT OUTREACH (OUTPATIENT)
Dept: HEALTH INFORMATION MANAGEMENT | Facility: OTHER | Age: 75
End: 2020-08-26

## 2020-08-26 ENCOUNTER — HOME HEALTH ADMISSION (OUTPATIENT)
Dept: HOME HEALTH SERVICES | Facility: HOME HEALTHCARE | Age: 75
End: 2020-08-26
Payer: MEDICARE

## 2020-08-26 VITALS
TEMPERATURE: 99.6 F | DIASTOLIC BLOOD PRESSURE: 36 MMHG | WEIGHT: 168 LBS | SYSTOLIC BLOOD PRESSURE: 106 MMHG | RESPIRATION RATE: 16 BRPM | OXYGEN SATURATION: 94 % | HEIGHT: 61 IN | HEART RATE: 70 BPM | BODY MASS INDEX: 31.72 KG/M2

## 2020-08-26 LAB
ANION GAP SERPL CALC-SCNC: 11 MMOL/L (ref 7–16)
APPEARANCE UR: ABNORMAL
BACTERIA #/AREA URNS HPF: NEGATIVE /HPF
BASOPHILS # BLD AUTO: 0.4 % (ref 0–1.8)
BASOPHILS # BLD: 0.06 K/UL (ref 0–0.12)
BILIRUB UR QL STRIP.AUTO: NEGATIVE
BUN SERPL-MCNC: 16 MG/DL (ref 8–22)
CALCIUM SERPL-MCNC: 9.5 MG/DL (ref 8.5–10.5)
CHLORIDE SERPL-SCNC: 101 MMOL/L (ref 96–112)
CO2 SERPL-SCNC: 27 MMOL/L (ref 20–33)
COLOR UR: YELLOW
CREAT SERPL-MCNC: 0.78 MG/DL (ref 0.5–1.4)
EOSINOPHIL # BLD AUTO: 0.03 K/UL (ref 0–0.51)
EOSINOPHIL NFR BLD: 0.2 % (ref 0–6.9)
EPI CELLS #/AREA URNS HPF: NEGATIVE /HPF
ERYTHROCYTE [DISTWIDTH] IN BLOOD BY AUTOMATED COUNT: 49.7 FL (ref 35.9–50)
GLUCOSE SERPL-MCNC: 163 MG/DL (ref 65–99)
GLUCOSE UR STRIP.AUTO-MCNC: NEGATIVE MG/DL
HCT VFR BLD AUTO: 37.2 % (ref 37–47)
HGB BLD-MCNC: 11.9 G/DL (ref 12–16)
HYALINE CASTS #/AREA URNS LPF: ABNORMAL /LPF
IMM GRANULOCYTES # BLD AUTO: 0.08 K/UL (ref 0–0.11)
IMM GRANULOCYTES NFR BLD AUTO: 0.5 % (ref 0–0.9)
KETONES UR STRIP.AUTO-MCNC: NEGATIVE MG/DL
LEUKOCYTE ESTERASE UR QL STRIP.AUTO: NEGATIVE
LYMPHOCYTES # BLD AUTO: 3.44 K/UL (ref 1–4.8)
LYMPHOCYTES NFR BLD: 21.1 % (ref 22–41)
MCH RBC QN AUTO: 30.9 PG (ref 27–33)
MCHC RBC AUTO-ENTMCNC: 32 G/DL (ref 33.6–35)
MCV RBC AUTO: 96.6 FL (ref 81.4–97.8)
MICRO URNS: ABNORMAL
MONOCYTES # BLD AUTO: 1.84 K/UL (ref 0–0.85)
MONOCYTES NFR BLD AUTO: 11.3 % (ref 0–13.4)
NEUTROPHILS # BLD AUTO: 10.88 K/UL (ref 2–7.15)
NEUTROPHILS NFR BLD: 66.5 % (ref 44–72)
NITRITE UR QL STRIP.AUTO: NEGATIVE
NRBC # BLD AUTO: 0 K/UL
NRBC BLD-RTO: 0 /100 WBC
PH UR STRIP.AUTO: 7 [PH] (ref 5–8)
PLATELET # BLD AUTO: 244 K/UL (ref 164–446)
PMV BLD AUTO: 9.1 FL (ref 9–12.9)
POTASSIUM SERPL-SCNC: 3.6 MMOL/L (ref 3.6–5.5)
PROT UR QL STRIP: NEGATIVE MG/DL
RBC # BLD AUTO: 3.85 M/UL (ref 4.2–5.4)
RBC # URNS HPF: ABNORMAL /HPF
RBC UR QL AUTO: NEGATIVE
SODIUM SERPL-SCNC: 139 MMOL/L (ref 135–145)
SP GR UR STRIP.AUTO: 1.01
UROBILINOGEN UR STRIP.AUTO-MCNC: 0.2 MG/DL
WBC # BLD AUTO: 16.3 K/UL (ref 4.8–10.8)
WBC #/AREA URNS HPF: ABNORMAL /HPF

## 2020-08-26 PROCEDURE — 81001 URINALYSIS AUTO W/SCOPE: CPT

## 2020-08-26 PROCEDURE — 700102 HCHG RX REV CODE 250 W/ 637 OVERRIDE(OP): Performed by: HOSPITALIST

## 2020-08-26 PROCEDURE — 36415 COLL VENOUS BLD VENIPUNCTURE: CPT

## 2020-08-26 PROCEDURE — 85025 COMPLETE CBC W/AUTO DIFF WBC: CPT

## 2020-08-26 PROCEDURE — 99239 HOSP IP/OBS DSCHRG MGMT >30: CPT | Performed by: HOSPITALIST

## 2020-08-26 PROCEDURE — 97110 THERAPEUTIC EXERCISES: CPT

## 2020-08-26 PROCEDURE — 97116 GAIT TRAINING THERAPY: CPT

## 2020-08-26 PROCEDURE — A9270 NON-COVERED ITEM OR SERVICE: HCPCS | Performed by: HOSPITALIST

## 2020-08-26 PROCEDURE — 700111 HCHG RX REV CODE 636 W/ 250 OVERRIDE (IP): Performed by: HOSPITALIST

## 2020-08-26 PROCEDURE — 80048 BASIC METABOLIC PNL TOTAL CA: CPT

## 2020-08-26 RX ORDER — AMOXICILLIN 250 MG
2 CAPSULE ORAL 2 TIMES DAILY
Qty: 120 TAB | Refills: 0 | Status: SHIPPED | OUTPATIENT
Start: 2020-08-26 | End: 2021-02-09

## 2020-08-26 RX ORDER — HYDROCODONE BITARTRATE AND ACETAMINOPHEN 10; 325 MG/1; MG/1
1-2 TABLET ORAL EVERY 6 HOURS PRN
Qty: 56 TAB | Refills: 0 | Status: SHIPPED | OUTPATIENT
Start: 2020-08-26 | End: 2020-08-26 | Stop reason: SDUPTHER

## 2020-08-26 RX ORDER — HYDROCODONE BITARTRATE AND ACETAMINOPHEN 10; 325 MG/1; MG/1
1-2 TABLET ORAL EVERY 6 HOURS PRN
Qty: 56 TAB | Refills: 0 | Status: SHIPPED | OUTPATIENT
Start: 2020-08-26 | End: 2020-09-02

## 2020-08-26 RX ADMIN — OXYCODONE HYDROCHLORIDE 10 MG: 5 TABLET ORAL at 13:02

## 2020-08-26 RX ADMIN — OXYCODONE HYDROCHLORIDE 5 MG: 5 TABLET ORAL at 09:20

## 2020-08-26 RX ADMIN — OXYCODONE HYDROCHLORIDE 5 MG: 5 TABLET ORAL at 04:13

## 2020-08-26 RX ADMIN — ACETAMINOPHEN 650 MG: 325 TABLET, FILM COATED ORAL at 10:06

## 2020-08-26 RX ADMIN — OXYCODONE HYDROCHLORIDE 5 MG: 5 TABLET ORAL at 00:41

## 2020-08-26 RX ADMIN — ENOXAPARIN SODIUM 40 MG: 40 INJECTION SUBCUTANEOUS at 04:13

## 2020-08-26 RX ADMIN — DOCUSATE SODIUM 50 MG AND SENNOSIDES 8.6 MG 2 TABLET: 8.6; 5 TABLET, FILM COATED ORAL at 04:13

## 2020-08-26 RX ADMIN — ACETAMINOPHEN 650 MG: 325 TABLET, FILM COATED ORAL at 04:13

## 2020-08-26 RX ADMIN — TOPIRAMATE 50 MG: 25 TABLET, FILM COATED ORAL at 04:13

## 2020-08-26 SDOH — ECONOMIC STABILITY: FOOD INSECURITY: WITHIN THE PAST 12 MONTHS, YOU WORRIED THAT YOUR FOOD WOULD RUN OUT BEFORE YOU GOT MONEY TO BUY MORE.: NEVER TRUE

## 2020-08-26 SDOH — ECONOMIC STABILITY: INCOME INSECURITY: HOW HARD IS IT FOR YOU TO PAY FOR THE VERY BASICS LIKE FOOD, HOUSING, MEDICAL CARE, AND HEATING?: NOT HARD AT ALL

## 2020-08-26 SDOH — ECONOMIC STABILITY: FOOD INSECURITY: WITHIN THE PAST 12 MONTHS, THE FOOD YOU BOUGHT JUST DIDN'T LAST AND YOU DIDN'T HAVE MONEY TO GET MORE.: NEVER TRUE

## 2020-08-26 SDOH — ECONOMIC STABILITY: TRANSPORTATION INSECURITY
IN THE PAST 12 MONTHS, HAS THE LACK OF TRANSPORTATION KEPT YOU FROM MEDICAL APPOINTMENTS OR FROM GETTING MEDICATIONS?: NO

## 2020-08-26 SDOH — ECONOMIC STABILITY: TRANSPORTATION INSECURITY
IN THE PAST 12 MONTHS, HAS LACK OF TRANSPORTATION KEPT YOU FROM MEETINGS, WORK, OR FROM GETTING THINGS NEEDED FOR DAILY LIVING?: NO

## 2020-08-26 ASSESSMENT — GAIT ASSESSMENTS
GAIT LEVEL OF ASSIST: SUPERVISED
DEVIATION: BRADYKINETIC
ASSISTIVE DEVICE: FRONT WHEEL WALKER
DISTANCE (FEET): 100

## 2020-08-26 ASSESSMENT — COGNITIVE AND FUNCTIONAL STATUS - GENERAL
MOBILITY SCORE: 22
MOVING TO AND FROM BED TO CHAIR: A LITTLE
SUGGESTED CMS G CODE MODIFIER MOBILITY: CJ
TURNING FROM BACK TO SIDE WHILE IN FLAT BAD: A LITTLE

## 2020-08-26 NOTE — FACE TO FACE
Face to Face Note  -  Durable Medical Equipment    Tosin Golden M.D. - NPI: [id number]  I certify that this patient is under my care and that they have had a durable medical equipment(DME)face to face encounter by myself that meets the physician DME face-to-face encounter requirements with this patient on:    Date of encounter:   Patient:                    MRN:                       YOB: 2019  Larissa Ramirez  [id number]  1945     The encounter with the patient was in whole, or in part, for the following medical condition, which is the primary reason for durable medical equipment:  Total Joint Replacement    I certify that, based on my findings, the following durable medical equipment is medically necessary:  Walkers.    HOME O2 Saturation Measurements:(Values must be present for Home Oxygen orders)         ,     ,         My Clinical findings support the need for the above equipment due to:  Abnormal Gait    Supporting Symptoms: PT/OT nini recommend HH to continue PT/OT.  Needs FWW.    ------------------------------------------------------------------------------------------------------------------    Face to Face Supporting Documentation - Home Health    The encounter with this patient was in whole or in part the primary reason for home health admission.    Date of encounter:   Patient:                    MRN:                       YOB: 2019  Larissa Ramirez  [id number]  1945     Home health to see patient for:  Skilled Nursing care for assessment, interventions & education, Home health aide, Physical Therapy evaluation and treatment and Occupational therapy evaluation and treatment    Skilled need for:  Surgical Aftercare post op left total hip arthroplasty    Skilled nursing interventions to include:  Comment: home safety    Homebound evidenced status by:  Need the aid of supportive devices such as crutches, canes, wheelchairs or walkers. Leaving home must require  a considerable and taxing effort. There must exist a normal inability to leave the home.    Community Physician to provide follow up care: CORNELIA Ramos     Optional Interventions    Wound information & treatment:    Home Infusion Therapy orders:    Line/Drain/Airway:    I certify the face to face encounter for this home care referral meets the CMS requirements and the encounter/clinical assessment with the patient was, in whole, or in part, for the medical condition(s) listed above, which is the primary reason for home health care. Based on my clinical findings: the service(s) are medically necessary, support the need for home health care, and the homebound criteria are met.  I certify that this patient has had a face to face encounter by myself.  Tosin Golden M.D. - NPI: [id number]    *Debility, frailty and advanced age in the absence of an acute deterioration or exacerbation of a condition do not qualify a patient for home health.

## 2020-08-26 NOTE — DISCHARGE PLANNING
Received Choice form at 1045  Agency/Facility Name: [facility]  Referral sent per Choice form @ 1045    RN CAITLYN informed

## 2020-08-26 NOTE — PROGRESS NOTES
Pt being dc'd to home with HHC and FWW. The following prescriptions given norco. IV dc'd. Dc instructions discussed with patient. All questions answered.  Patient  agreeable to dc plan. Cheyanne Bedside RN to confirm that patient has all belongings at dc and that all home care needs have been arranged.

## 2020-08-26 NOTE — DISCHARGE PLANNING
Anticipated Discharge Disposition: Home with HH    Action: spoke to pt at bedside, pt states that she has a PCP and great support from her  at home. Choice for Home Health and DME faxed to [facility].    Barriers to Discharge: HH acceptance    Plan: f/u with [facility], pt

## 2020-08-26 NOTE — DISCHARGE PLANNING
Good morning  This referral has been escalated to a Clinical Supervisor for review in order to determine Home Health appropriateness.  This issue will be resolved as quickly as possible, but for any questions feel free to call us at [phone].  Thank you!

## 2020-08-26 NOTE — PROGRESS NOTES
"Patient called for pain medication, but was sleeping heavily when RN went to rate her pain level twice.  RN did not give her pain medication at that time because she was sleeping.  Patient later woke up and shouted at the CNA saying that she is \"not getting good care\" because she is not getting her pain medication.  RN gave her medication at that time and educated patient on narcotics and sedation.  Patient expressed understanding and apologized for being angry.  "

## 2020-08-26 NOTE — DISCHARGE INSTRUCTIONS
Discharge Instructions    Discharged to home by car with relative. Discharged via wheelchair, hospital escort: Yes.  Special equipment needed: walker; home care    Be sure to schedule a follow-up appointment with your primary care doctor or any specialists as instructed.     Discharge Plan:        I understand that a diet low in cholesterol, fat, and sodium is recommended for good health. Unless I have been given specific instructions below for another diet, I accept this instruction as my diet prescription.   Other diet: diabetic    Follow up with Dr. Venegas in 2 weeks post op for recheck, staple removal.  Posterior hip precautions given to patient,   Weight bearing as tolerated.    Special Instructions: Discharge instructions for the Orthopedic Patient    Follow up with Primary Care Physician within 2 weeks of discharge to home, regarding:  Review of medications and diagnostic testing.  Surveillance for medical complications.  Workup and treatment of osteoporosis, if appropriate.     -Is this a Hip/Knee/Shoulder Joint Replacement patient? Yes   TOTAL HIP REPLACEMENT, AFTER-CARE GUIDELINES     These instructions provide you with information on caring for yourself and your hip after surgery. Your health care provider may also give you instructions that are more specific. Your treatment was planned and performed according to current medical practices but problems sometimes occur. Call your health care provider if you have any problems or questions.     WHAT TO EXPECT AFTER THE PROCEDURE   After your procedure, your hip will typically be stiff, sore, and bruised. This will improve over time.     Pain   · Follow your home pain management plan as discussed with your nurse and as directed by your provider.   · It is important to follow any scheduled pain medications for maximal pain relief.   · If prescribed opioid medication, the goal is to use opioids only as needed and to wean off prescription pain medicine as soon  as possible.   · Ice use for pain control.  · Put ice in a plastic bag.   · Place a towel between your skin and the bag.   · Leave the ice on for 20 minutes, 2-3 times a day at a minimum.   · Most patients are off the pain pills by 3 weeks. If your pain continues to be severe, follow up with your provider.     Infection   Deep hip joint infections that require removal of the prostheses occur in less than 1.0% of patients. Lesser infections in the skin (cellulites) are more common and much more easily treated.   · Keep the incision as clean and dry as possible.   · Always wash your hands before touching your incision.   · Avoid dental care for 3 months after surgery. Your provider may recommend taking a dose of antibiotics an hour prior to any dental procedure. After 2 years, most providers recommend antibiotics only before an extensive procedure. Ask your provider what they recommend.   · Signs and symptoms of infection include low-grade fever, redness, pain, swelling and drainage from your incision. Notify your provider IMMEDIATELY if you develop ANY of these symptoms.     Post op Disturbances   · Bowel Habits - constipation is extremely common and caused by a combination of anesthesia, lack of mobility, dehydration and pain medicine. Use stool softeners or laxatives if necessary. It is important not to ignore this problem as bowel obstructions can be a serious complication after joint replacement surgery.   · Mood/Energy Level - Many patients experience a lack of energy and endurance for up to 2-3 months after surgery. Some people feel down and can even become depressed. This is likely due to postoperative anemia, change in activity level, lack of sleep, pain medicine and just the emotional reaction to the surgery itself that is a big disruption in a person’s life. This usually passes. If symptoms persist, follow up with your primary care provider.   · Returning to Work - Your provider will give you specific  instructions based on your profession. Generally, if you work a sedentary job requiring little standing or walking, most patients may return within 2-6 weeks. Manual labor jobs involving walking, lifting and standing may take 3-4 months. Your provider’s office can provide a release to part-time or light duty work early on in your recovery and progress you to full duty as able.   · Driving - You can begin driving once cleared by your provider, provided you are no longer taking narcotic pain medication or any other medications that impair driving. Discuss the length of time with your doctor as returning to driving will depend on things such as your vehicle, which hip was replaced (right or left) and if you do or do not have post-operative movement precautions.   · Avoiding falls - A fall during the first few weeks after surgery can damage your new hip and may result in a need for further surgery.  throw rugs and tack down loose carpeting. Be aware of floor hazards such as pets, small objects or uneven surfaces. Notify your provider of any falls.   · [employer] Metal Detectors - The sensitivity of metal detectors varies and it is likely that your prosthesis will cause an alarm. Inform the  of your artificial joint.     Diet   · Resume your normal diet as tolerated.   · It is important to achieve a healthy nutritional status by eating a well-balanced diet on a regular basis.   · Your provider may recommend that you take iron and vitamin supplements.   · Continue to drink plenty of fluids.     Shower/Bathing   · You may shower as soon as you get home from the hospital unless otherwise instructed.   · Keep your incision out of water to prevent infection. To keep the incision dry when showering, cover it with a plastic bag or plastic wrap. If your bandage is waterproof, this may not be necessary.   · Pat incision dry if it gets wet. Do not rub. Notify your provider.   · Do not submerge in a bath until  cleared by your provider. Your staples must be out and the incision completely healed.     Dressing Changes: Only change your dressing if directed by your provider.   · Wash hands.   · Open all dressing change materials.   · Remove old dressing and discard.   · Inspect incision for signs of irritation or infection including redness, increase in clear drainage, yellow/green drainage, odor and surrounding skin hot to touch. Notify your provider if present.   ·  the new dressing by one corner and lay over the incision. Be careful not to touch the inside of the dressing that will lay over the incision.   · Secure in place as instructed.     Swelling/Bruising   · Swelling is normal after hip replacement and can involve the thigh, knee, calf and foot.   · Swelling can last from 3-6 months.   · To reduce swelling, elevate your leg higher than your heart while reclining. The first week you are home you should elevate your leg an equal amount of time as you are active.   · The swelling is usually worse after you go home since you are upright for longer periods of time.   · Bruising often does not appear until after you arrive home and can be quite dramatic- appearing purple, black, or green. Bruising is typically not concerning and will subside without any treatment.     Blood Clot Prevention   Your treatment plan includes multiple preventative measures to decrease the risk of blood clots in the legs (DVTs) and the less common, but serious, clots that travel to the lungs (pulmonary emboli). Most patients are at standard risk for them, but people who are at higher risk include those who have had previous clots, a family history of clotting, smoking, diabetes, obesity, advanced age, use estrogen and/or live a sedentary lifestyle.     · Signs of blood clots in legs include - Swelling in thigh, calf or ankle that does not go down with elevation. Pain, heat and tenderness in calf, back of calf or groin area. NOTE: blood  clots can occur in either leg.   · Signs of blood clots in lungs include - Sudden increased shortness of breath, sudden onset of chest pain, and localized chest pain with coughing.   · If you experience any of the above symptoms, notify your provider and seek medical attention immediately.   · You received anticoagulant therapy (blood thinners) in the hospital. Continue the prescribed blood-thinning medication at home, as directed by your provider.   · Your risk for developing a clot continues for up to 2-3 months after surgery. Avoid prolonged sitting and dehydration (long air trips and car trips). If you do take a trip during this time, please get up, move around every 1-1.5 hours, and discuss all travel plans with your provider.     Activity   Once you get home, stay active. The key is not to overdo it. While you can expect some good days and some bad days, you should notice a gradual improvement over time and a gradual increase in endurance over the next 6 to 12 months.     · Weight Bearing - You can begin to bear weight as tolerated unless otherwise directed by your provider. Use a walker, crutches or cane to assist with walking until you can walk smoothly (minimal or no limp) without assistance.   · Physical Precautions - To assure proper recovery and tissue healing, you may be asked to take special precautions when moving (sitting, bending or sleeping) - usually for the first 6 weeks after surgery. Precautions vary from patient to patient depending on surgical approach used by your provider. Follow any specific precautions provided by your provider and physical therapist until cleared by your provider.   · Sitting - Early on it is easier to get up from a tall chair or a chair with arms. The physical therapist will show you how to sit and stand from a chair keeping your affected leg out in front of you. Get up and move around on a regular basis--at least once every hour.   · Walking - Walk as much as you like  once your doctor gives permission to proceed, but remember that walking is no substitute for your prescribed exercises.  · Follow the home exercise program prescribed during your hospital stay as directed by your physical therapist or provider.   · Continued physical therapy may be prescribed after hospital discharge to strengthen your muscles and improve your gait/walking pattern. The decision to have therapy or not after the hospital stay is made by you and your provider prior to surgery or at your follow up appointment.   · Swimming is an excellent low impact activity; you can begin as soon as the wound is healed and your provider clears you. Using a pair of training fins may make swimming a more enjoyable and effective exercise.   · Sexual activity - Your provider can tell you when it is safe to resume sexual activity.   · Other activities - Low impact activities are preferred. If you have specific questions, consult your provider.     When to Call the Doctor   Call the provider if you experience:   · Fever over 100.5° F   · Increased pain, drainage, redness, odor or heat around the incision area   · Shaking chills   · Increased knee pain with activity and rest   · Increased pain in calf, tenderness or redness above or below the knee   · Increased swelling of calf, ankle, foot   · Sudden increased shortness of breath, sudden onset of chest pain, localized chest pain with coughing   · Incision opening   Or, if there are any questions or concerns about medications or care.     Infection statistic resource:   [email or web address]-joint-[email or web address]-epidemiology-microbiology-clinical-manifestations-and-diagnosis     -Is this patient being discharged with medication to prevent blood clots?  Yes, Aspirin Aspirin, ASA oral tablets  What is this medicine?  ASPIRIN (AS pir in) is a pain reliever. It is used to treat mild pain and fever. This medicine is also used as directed by a doctor to prevent  and to treat heart attacks, to prevent strokes and blood clots, and to treat arthritis or inflammation.  This medicine may be used for other purposes; ask your health care provider or pharmacist if you have questions.  COMMON BRAND NAME(S): Aspir-Low, Aspir-Eileen, Aspirtab, Jeff Advanced Aspirin, Jeff Aspirin, Jeff Aspirin Extra Strength, Jeff Aspirin Plus, Jeff Extra Strength, Jeff Extra Strength Plus, Jeff Genuine Aspirin, Jeff Womens Aspirin, Bufferin, Bufferin Extra Strength, Bufferin Low Dose  What should I tell my health care provider before I take this medicine?  They need to know if you have any of these conditions:  · anemia  · asthma  · bleeding problems  · child with chickenpox, the flu, or other viral infection  · diabetes  · gout  · if you frequently drink alcohol containing drinks  · kidney disease  · liver disease  · low level of vitamin K  · lupus  · smoke tobacco  · stomach ulcers or other problems  · an unusual or allergic reaction to aspirin, tartrazine dye, other medicines, dyes, or preservatives  · pregnant or trying to get pregnant  · breast-feeding  How should I use this medicine?  Take this medicine by mouth with a glass of water. Follow the directions on the package or prescription label. You can take this medicine with or without food. If it upsets your stomach, take it with food. Do not take your medicine more often than directed.  Talk to your pediatrician regarding the use of this medicine in children. While this drug may be prescribed for children as young as 12 years of age for selected conditions, precautions do apply. Children and teenagers should not use this medicine to treat chicken pox or flu symptoms unless directed by a doctor.  Patients over 65 years old may have a stronger reaction and need a smaller dose.  Overdosage: If you think you have taken too much of this medicine contact a poison control center or emergency room at once.  NOTE: This medicine is only for you.  Do not share this medicine with others.  What if I miss a dose?  If you are taking this medicine on a regular schedule and miss a dose, take it as soon as you can. If it is almost time for your next dose, take only that dose. Do not take double or extra doses.  What may interact with this medicine?  Do not take this medicine with any of the following medications:  · cidofovir  · ketorolac  · probenecid  This medicine may also interact with the following medications:  · alcohol  · alendronate  · bismuth subsalicylate  · flavocoxid  · herbal supplements like feverfew, garlic, mayelin, ginkgo biloba, horse chestnut  · medicines for diabetes or glaucoma like acetazolamide, methazolamide  · medicines for gout  · medicines that treat or prevent blood clots like enoxaparin, heparin, ticlopidine, warfarin  · other aspirin and aspirin-like medicines  · NSAIDs, medicines for pain and inflammation, like ibuprofen or naproxen  · pemetrexed  · sulfinpyrazone  · varicella live vaccine  This list may not describe all possible interactions. Give your health care provider a list of all the medicines, herbs, non-prescription drugs, or dietary supplements you use. Also tell them if you smoke, drink alcohol, or use illegal drugs. Some items may interact with your medicine.  What should I watch for while using this medicine?  If you are treating yourself for pain, tell your doctor or health care professional if the pain lasts more than 10 days, if it gets worse, or if there is a new or different kind of pain. Tell your doctor if you see redness or swelling. Also, check with your doctor if you have a fever that lasts for more than 3 days. Only take this medicine to prevent heart attacks or blood clotting if prescribed by your doctor or health care professional.  Do not take aspirin or aspirin-like medicines with this medicine. Too much aspirin can be dangerous. Always read the labels carefully.  This medicine can irritate your stomach or  cause bleeding problems. Do not smoke cigarettes or drink alcohol while taking this medicine. Do not lie down for 30 minutes after taking this medicine to prevent irritation to your throat.  If you are scheduled for any medical or dental procedure, tell your healthcare provider that you are taking this medicine. You may need to stop taking this medicine before the procedure.  This medicine may be used to treat migraines. If you take migraine medicines for 10 or more days a month, your migraines may get worse. Keep a diary of headache days and medicine use. Contact your healthcare professional if your migraine attacks occur more frequently.  What side effects may I notice from receiving this medicine?  Side effects that you should report to your doctor or health care professional as soon as possible:  · allergic reactions like skin rash, itching or hives, swelling of the face, lips, or tongue  · breathing problems  · changes in hearing, ringing in the ears  · confusion  · general ill feeling or flu-like symptoms  · pain on swallowing  · redness, blistering, peeling or loosening of the skin, including inside the mouth or nose  · signs and symptoms of bleeding such as bloody or black, tarry stools; red or dark-brown urine; spitting up blood or brown material that looks like coffee grounds; red spots on the skin; unusual bruising or bleeding from the eye, gums, or nose  · trouble passing urine or change in the amount of urine  · unusually weak or tired  · yellowing of the eyes or skin  Side effects that usually do not require medical attention (report to your doctor or health care professional if they continue or are bothersome):  · diarrhea or constipation  · headache  · nausea, vomiting  · stomach gas, heartburn  This list may not describe all possible side effects. Call your doctor for medical advice about side effects. You may report side effects to FDA at 5-896-LZF-7344.  Where should I keep my medicine?  Keep out  of the reach of children.  Store at room temperature between 15 and 30 degrees C (59 and 86 degrees F). Protect from heat and moisture. Do not use this medicine if it has a strong vinegar smell. Throw away any unused medicine after the expiration date.  NOTE: This sheet is a summary. It may not cover all possible information. If you have questions about this medicine, talk to your doctor, pharmacist, or health care provider.  © 2020 [employer] (2018-01-30 10:42:13)      · Is patient discharged on Warfarin / Coumadin?   No       Depression / Suicide Risk    As you are discharged from this [facility] facility, it is important to learn how to keep safe from harming yourself.    Recognize the warning signs:  · Abrupt changes in personality, positive or negative- including increase in energy   · Giving away possessions  · Change in eating patterns- significant weight changes-  positive or negative  · Change in sleeping patterns- unable to sleep or sleeping all the time   · Unwillingness or inability to communicate  · Depression  · Unusual sadness, discouragement and loneliness  · Talk of wanting to die  · Neglect of personal appearance   · Rebelliousness- reckless behavior  · Withdrawal from people/activities they love  · Confusion- inability to concentrate     If you or a loved one observes any of these behaviors or has concerns about self-harm, here's what you can do:  · Talk about it- your feelings and reasons for harming yourself  · Remove any means that you might use to hurt yourself (examples: pills, rope, extension cords, firearm)  · Get professional help from the community (Mental Health, Substance Abuse, psychological counseling)  · Do not be alone:Call your Safe Contact- someone whom you trust who will be there for you.  · Call your local CRISIS HOTLINE 585-9983 or 116-808-6724  · Call your local Children's Mobile Crisis Response Team [place] (089) 415-6169 or www.[employer]  · Call  the toll free National Suicide Prevention Hotlines   · National Suicide Prevention Lifeline 219-946-VIZB (5793)  · National Hope Line Network 800-SUICIDE (913-9554)

## 2020-08-26 NOTE — CARE PLAN
Problem: Communication  Goal: The ability to communicate needs accurately and effectively will improve  Outcome: PROGRESSING AS EXPECTED   Patient is able to communicate needs appropriately   Problem: Infection  Goal: Will remain free from infection  Outcome: PROGRESSING AS EXPECTED   Patient continues to remain afebrile

## 2020-08-26 NOTE — THERAPY
Physical Therapy   Daily Treatment     Patient Name: Larissa Ramirez  Age:  75 y.o., Sex:  female  Medical Record #: [id number]  Today's Date: 8/26/2020     Precautions: Posterior Hip Precautions, Weight Bearing As Tolerated Left Lower Extremity    Assessment    Patient demonstrates household functional mobility at this time, and has  at home that can 24/7 physically assist.  Patient required significant education for posterior precautions, and HEP.  Patient perseverating on having nurses available in order to go to the bathroom, so increased time needed in room.  Patient demonstrates supervision level with HEP (tested at end of session), and by end of session demonstrates good coordination with gait.  Strongly reinforced posterior precautions, however patient has difficulty maintaining.  Patient would benefit from home health therapy to maintain precautions, and to progress gait quality and endurance.        08/26/20 1055   Precautions   Precautions Posterior Hip Precautions;Weight Bearing As Tolerated Left Lower Extremity   Cognition    Cognition / Consciousness WDL   Level of Consciousness Alert   Safety Awareness Impaired  (appears baseline, odd affect)   Comments receptive to education, perseverated about periwick   Supine Lower Body Exercise   Supine Lower Body Exercises Yes  (reviwed hip HEP routine, and performed 10 reps of each)   Balance   Sitting Balance (Static) Fair +   Sitting Balance (Dynamic) Fair +   Standing Balance (Static) Fair   Standing Balance (Dynamic) Fair   Weight Shift Sitting Fair   Weight Shift Standing Fair   Skilled Intervention Verbal Cuing   Gait Analysis   Gait Level Of Assist Supervised   Assistive Device Front Wheel Walker   Distance (Feet) 100   # of Times Distance was Traveled 1   Deviation Bradykinetic  (speed improved as patient ambulated)   Weight Bearing Status WBAT LLE   Skilled Intervention Verbal Cuing   Comments pt declined needing stairs to go home   Bed Mobility     Supine to Sit Supervised   Sit to Supine Supervised   Scooting Supervised   Rolling Supervised   Functional Mobility   Sit to Stand Supervised   Bed, Chair, Wheelchair Transfer Supervised   How much difficulty does the patient currently have...   Turning over in bed (including adjusting bedclothes, sheets and blankets)? 3   Sitting down on and standing up from a chair with arms (e.g., wheelchair, bedside commode, etc.) 4   Moving from lying on back to sitting on the side of the bed? 3   How much help from another person does the patient currently need...   Moving to and from a bed to a chair (including a wheelchair)? 4   Need to walk in a hospital room? 4   Climbing 3-5 steps with a railing? 4   6 clicks Mobility Score 22   Short Term Goals    Short Term Goal # 1 Pt will perform bed mobility at SPV level within 6tx in order to improve independence.   Goal Outcome # 1 Goal met   Short Term Goal # 2 Pt will ambulate 50ft using FWW at SPV level within 6tx in order to improve independence.   Goal Outcome # 2 Goal met   Education Group   Education Provided Role of Physical Therapist   Role of Physical Therapist Patient Response Patient;Acceptance;Handout;Explanation;Demonstration;Action Demonstration;Verbal Demonstration   Anticipated Discharge Equipment and Recommendations   DC Equipment Recommendations Front-Wheel Walker   Discharge Recommendations Recommend home health for continued physical therapy services       Plan    Discharge secondary to goals met.    DC Equipment Recommendations: Front-Wheel Walker  Discharge Recommendations: Recommend home health for continued physical therapy services

## 2020-08-26 NOTE — DISCHARGE PLANNING
ATTN: Case Management  RE: Referral for Home Health    As of 08/26/2020, we have accepted the Home Health referral for the patient listed above.    A [facility] clinician will be out to see the patient within 48 hours. If you have any questions or concerns regarding the patient's transition to Home Health, please do not hesitate to contact us at [phone].      We look forward to collaborating with you,  [facility] Health Team

## 2020-08-26 NOTE — PROGRESS NOTES
Community Health Worker Intake    • Social determinates of health intake completed.   • Identified barriers to .  • Contact information provided to Larissa Ramirez   • Has PCP appointment scheduled for: Monday, September 21st at 10:40 am  • Declined Meds-To-Beds.   • Inpatient assessment completed.    MER[facility] Howe spoke with pt at bedside to introduce CCM and [facility] services. Pt accepted both. She confirmed still seeing PCP Milagros Gupta and acceppted that CHW schedule hospital f/u appt, but would like it about a month out since she wants to take care of other priorities first such as the therapy she will be receiving. For transportation to medical appts, pt would usually drive but can have her  drive her after this d/c due to her condition. Pt made aware of [facility] by this CHW. She does not currently have trouble paying for resources such as food, housing or medical care. Pt owns a house where she lives with her  and receives income from social security. Her support system consists of her  and neighbors/friends. Pt indicated feeling confident in managing her health.     Plan: CHW will do a warm hand-off to [facility] since pt does not have social determinants of health and accepted services.

## 2020-08-26 NOTE — DISCHARGE PLANNING
Received Choice form at [phone]  Agency/Facility Name: [facility]  Referral sent per Choice form @ [phone]

## 2020-08-26 NOTE — DISCHARGE SUMMARY
Discharge Summary    CHIEF COMPLAINT ON ADMISSION  Chief Complaint   Patient presents with   • T-5000 GLF   • Hip Pain       Reason for Admission  Fall     Admission Date  8/24/2020    CODE STATUS  Full Code    HPI & HOSPITAL COURSE  This is a 75 y.o. female here with left cervical femoral neck fracture of hip.  This 74 yo female with h/o pancreatic cancer s/p surgical removal and cure, chronic back pain presented with a GLF at her second story home after slipping in the BR.  Her  lifted her to an office chair, rolled her to the bed and got her in bed.  The following day, she could not move her right leg.  EMS transported to ER found to have left femoral neck fracture requiring KAMALA by Dr. Venegas on 8/24.  She is doing well post op, pending PT/OT.  Post op bandage in place, CD&I.  Check labs, vitamin D and Hga1c.*     Hga1c 6.8%, continue metformin and diabetic diet.  Vitamin D wnl, continue home supplements.  Patient did well with PT/OT and FWW.  She was determined to go home to  and can move her bedroom to the first floor.  HH PT/OT/health aide and FWW provided.  A UA was checked prior to dc since patient having some urgency/incontinence issues.      Therefore, she is discharged in good and stable condition to home with organized home healthcare and close outpatient follow-up.    The patient met 2-midnight criteria for an inpatient stay at the time of discharge.    Discharge Date  8/26/20    FOLLOW UP ITEMS POST DISCHARGE  Follow up with Dr. Venegas in 2 weeks post op for recheck, staple removal.  Posterior hip precautions given to patient, WBAT.    DISCHARGE DIAGNOSES  Principal Problem:    Femur fracture (HCC) POA: Yes  Active Problems:    Malignant neoplasm of head of pancreas (HCC) (Chronic) POA: Yes    Elevated coronary artery calcium score POA: Yes    Hypertension goal BP (blood pressure) < 140/80 POA: Yes    Prediabetes POA: Yes    Chronic bilateral low back pain with bilateral sciatica POA:  Yes  Resolved Problems:    * No resolved hospital problems. *      FOLLOW UP  No future appointments.  No follow-up provider specified.    MEDICATIONS ON DISCHARGE     Medication List      START taking these medications      Instructions   aspirin  MG Tbec  Commonly known as: ECOTRIN   Take 1 Tab by mouth every day for 30 days.  Dose: 325 mg     senna-docusate 8.6-50 MG Tabs  Commonly known as: PERICOLACE or SENOKOT S   Take 2 Tabs by mouth 2 Times a Day.  Dose: 2 Tab        CHANGE how you take these medications      Instructions   HYDROcodone/acetaminophen  MG Tabs  What changed:   · how much to take  · when to take this  · reasons to take this  Commonly known as: NORCO   Take 1-2 Tabs by mouth every 6 hours as needed for Severe Pain for up to 7 days.  Dose: 1-2 Tab        CONTINUE taking these medications      Instructions   atorvastatin 40 MG Tabs  Commonly known as: LIPITOR   Take 20 mg by mouth every evening.  Dose: 20 mg     CALCIUM 1000 + D PO   Take 1 Tab by mouth every day.  Dose: 1 Tab     diazePAM 2 MG Tabs  Commonly known as: VALIUM   Take 4-6 mg by mouth at bedtime as needed for Sleep.  Dose: 4-6 mg     lisinopril-hydrochlorothiazide 20-25 MG per tablet  Commonly known as: PRINZIDE   Take 1 Tab by mouth every day.  Dose: 1 Tab     metFORMIN  MG Tb24  Commonly known as: GLUCOPHAGE XR   Take 1 Tab by mouth every day. With dinner  Dose: 500 mg     multivitamin Tabs   Take 1 Tab by mouth every day.  Dose: 1 Tab     topiramate 50 MG tablet  Commonly known as: TOPAMAX   Take 50 mg by mouth every day.  Dose: 50 mg     tramadol 50 MG Tabs  Commonly known as: ULTRAM   Take 50 mg by mouth every 8 hours as needed.  Dose: 50 mg            Allergies  Allergies   Allergen Reactions   • Levofloxacin Swelling     Swelling of tongue and neck   • Nitrofurantoin Swelling     Swelling of lips, tongue, face   • Amitriptyline Swelling     Facial swelling    • Sulfa Drugs Nausea   • Lyrica Rash     .              DIET  Orders Placed This Encounter   Procedures   • Diet Order Diabetic     Standing Status:   Standing     Number of Occurrences:   1     Order Specific Question:   Diet:     Answer:   Diabetic [3]       ACTIVITY  As tolerated.  Weight bearing as tolerated    CONSULTATIONS  Dr. Venegas    PROCEDURES  Date of Service:  8/24/2020  6:20 PM                    []Hide copied text    []Jadver for details  Immediate Post OP Note     PreOp Diagnosis: Left displaced femoral neck fracture     PostOp Diagnosis: same     Procedure(s):  ARTHROPLASTY, HIP, TOTAL - Wound Class: Clean     Surgeon(s):  Tomas Venegas M.D.     Anesthesiologist/Type of Anesthesia:  Anesthesiologist: Victor M Bey M.D./General     Surgical Staff:  Circulator: Anika Ballard R.N.  Limb Burton: Loy Ramos  Relief Circulator: Eleanor Rios R.N.  Scrub Person: Aayush Juan  First Assist: Percy Rider P.A.-C.     Specimens removed if any:  * No specimens in log *     Estimated Blood Loss: 300cc     Findings: see dictation     Complications: none known     PLAN:  --readmit medicine postop  --WBAT LLE  --posterior hip precautions  --ancef x 2 doses postop  --okay to start lovenox or equivalent in am, continue SCDs  --PT/OT for mobilization ASAP     8/24/2020 6:20 PM Tomas Venegas M.D.            LABORATORY  Lab Results   Component Value Date    SODIUM 139 08/26/2020    POTASSIUM 3.6 08/26/2020    CHLORIDE 101 08/26/2020    CO2 27 08/26/2020    GLUCOSE 163 (H) 08/26/2020    BUN 16 08/26/2020    CREATININE 0.78 08/26/2020        Lab Results   Component Value Date    WBC 16.3 (H) 08/26/2020    HEMOGLOBIN 11.9 (L) 08/26/2020    HEMATOCRIT 37.2 08/26/2020    PLATELETCT 244 08/26/2020        Total time of the discharge process exceeds 36 minutes.

## 2020-08-27 RX ORDER — ATORVASTATIN CALCIUM 40 MG/1
TABLET, FILM COATED ORAL
Qty: 30 TAB | Refills: 5 | Status: SHIPPED | OUTPATIENT
Start: 2020-08-27 | End: 2020-09-21 | Stop reason: SDUPTHER

## 2020-08-28 ENCOUNTER — PATIENT OUTREACH (OUTPATIENT)
Dept: HEALTH INFORMATION MANAGEMENT | Facility: OTHER | Age: 75
End: 2020-08-28

## 2020-09-21 ENCOUNTER — OFFICE VISIT (OUTPATIENT)
Dept: MEDICAL GROUP | Facility: LAB | Age: 75
End: 2020-09-21
Payer: MEDICARE

## 2020-09-21 VITALS
RESPIRATION RATE: 16 BRPM | SYSTOLIC BLOOD PRESSURE: 130 MMHG | DIASTOLIC BLOOD PRESSURE: 60 MMHG | WEIGHT: 172 LBS | BODY MASS INDEX: 28.66 KG/M2 | TEMPERATURE: 97.2 F | HEIGHT: 65 IN | OXYGEN SATURATION: 97 % | HEART RATE: 60 BPM

## 2020-09-21 DIAGNOSIS — Z09 HOSPITAL DISCHARGE FOLLOW-UP: ICD-10-CM

## 2020-09-21 DIAGNOSIS — E11.9 TYPE 2 DIABETES MELLITUS WITHOUT COMPLICATION, WITHOUT LONG-TERM CURRENT USE OF INSULIN (HCC): ICD-10-CM

## 2020-09-21 DIAGNOSIS — S72.002D CLOSED FRACTURE OF NECK OF LEFT FEMUR WITH ROUTINE HEALING, SUBSEQUENT ENCOUNTER: ICD-10-CM

## 2020-09-21 DIAGNOSIS — E78.5 DYSLIPIDEMIA: ICD-10-CM

## 2020-09-21 DIAGNOSIS — C25.0 MALIGNANT NEOPLASM OF HEAD OF PANCREAS (HCC): Chronic | ICD-10-CM

## 2020-09-21 PROBLEM — R73.01 IMPAIRED FASTING GLUCOSE: Status: RESOLVED | Noted: 2017-06-12 | Resolved: 2020-09-21

## 2020-09-21 PROCEDURE — 99215 OFFICE O/P EST HI 40 MIN: CPT | Performed by: NURSE PRACTITIONER

## 2020-09-21 RX ORDER — HYDROCODONE BITARTRATE AND ACETAMINOPHEN 10; 325 MG/1; MG/1
TABLET ORAL
COMMUNITY
Start: 2020-09-06 | End: 2024-02-26

## 2020-09-21 RX ORDER — ATORVASTATIN CALCIUM 20 MG/1
20 TABLET, FILM COATED ORAL DAILY
Qty: 90 TAB | Refills: 3 | Status: SHIPPED | OUTPATIENT
Start: 2020-09-21 | End: 2021-07-23

## 2020-09-21 RX ORDER — ASPIRIN 325 MG
TABLET ORAL
Status: ON HOLD | COMMUNITY
Start: 2020-09-17 | End: 2021-02-11

## 2020-09-21 ASSESSMENT — FIBROSIS 4 INDEX: FIB4 SCORE: 1.44

## 2020-09-21 NOTE — PROGRESS NOTES
"Chief Complaint   Patient presents with   • Hospital Follow-up       HPI  Larissa is a 76 yo est female here for hospital f/u.  Fell in bathroom at home,  wheeled her to bed in office chair, couldn't get out of bed in the morning and  called 911.  Admitted 8/24/2020 for left femoral neck fracture requiring AKMALA on 8/24 with Dr. Venegas - f/u 10/14/2020.  Overall tells me that her pain is controlled, she is followed by pain management.  States that she is doing really well, mobility in the home has improved greatly over the past 2 weeks.  Not in PT b/c of cost - doing exercises she learned while hospitalized.  Able to walk on her own - no assistive device.  Going up and down stairs a few x per day without problem.  Getting tired easily.  Able to sleep at night.  Denies constipation / diarrhea / dysuria.    Taking 325 mg asa daily to prevent DVT/PE.  Sees Dr. Garcia for pain - taking hydrocodone for pain control.     Hyperlipidemia: chronic issue.  Doing well on 20 mg atorvastatin and does not have any side effects of this compared to 40 mg tablet.     Hx of pancreatic CA:  Feels well.  Denies n/v/d or abdominal pain.  No weight loss.  Completed surgery / chemo.      Prediabetes:  Now diabetic.  A1C up to 6.8%, this was done while she was hospitalized last month- eating a lot of potatoes and not exercising.  She does not check home blood sugars.  She is compliant with metformin.    Past medical, surgical, family, and social history is reviewed and updated in Epic chart by me today.   Medications and allergies reviewed and updated in Epic chart by me today.     ROS:   As documented in history of present illness above    Exam:  /60   Pulse 60   Temp 36.2 °C (97.2 °F)   Resp 16   Ht 1.651 m (5' 5\")   Wt 78 kg (172 lb)   SpO2 97%   Constitutional: Alert, no distress, plus 3 vital signs  Skin:  Warm, dry, no rashes invisible areas  Eye: Equal, round and reactive, conjunctiva clear  ENMT: Lips " without lesions.  Oropharynx without erythema or exudate.  Neck: Trachea midline, no masses.  Respiratory: Unlabored respiration, lungs clear to auscultation, no wheezes, no rhonchi  Cardiovascular: Normal rate and rhythm, no murmur, no edema  Abdomen: Soft, nontender.  Psych: Alert, pleasant, well-groomed, normal affect    Assessment / Plan / Medical Decision makin. Hospital discharge follow-up      2. Dyslipidemia  -Tolerating 20 mg of atorvastatin.  Continue same.  Recommend repeat labs in February.  - atorvastatin (LIPITOR) 20 MG Tab; Take 1 Tab by mouth every day.  Dispense: 90 Tab; Refill: 3    3. Closed fracture of neck of left femur with routine healing, subsequent encounter  -Doing really well.  Up-to-date with orthopedics.  Pain management through Dr. Garcia.  Compliant with aspirin therapy.    4. Type 2 diabetes mellitus without complication, without long-term current use of insulin (HCC)  -Discussed her type 2 diabetes development in depth.  She was under the impression that potatoes were good for her diabetes.  We had a long discussion about nutrition and she verbalized understanding that she should limit her carbohydrates/concentrate on a high vegetable-based, high-fiber, lean protein diet.  Recheck 3 months in the office with an A1c.  Discussed the importance of proper foot care and exercising as tolerated, considering recent hip fracture.    5. Malignant neoplasm of head of pancreas (HCC)  -Fortunately she is feeling well, denies nausea, vomiting, abdominal pain or weight loss.    Total face to face time 40 minutes of which over 50% of this visit is spent in counseling, education and outlining a plan of treatment and coordination of care for the above conditions. This included but was not limited to discussion of medication options and potential risks related to the medications, referral and specialty care options. All patient questions were answered

## 2020-11-04 DIAGNOSIS — I10 ESSENTIAL HYPERTENSION: ICD-10-CM

## 2020-11-04 RX ORDER — LISINOPRIL AND HYDROCHLOROTHIAZIDE 25; 20 MG/1; MG/1
TABLET ORAL
Qty: 100 TAB | Refills: 0 | Status: SHIPPED | OUTPATIENT
Start: 2020-11-04 | End: 2021-02-07 | Stop reason: SDUPTHER

## 2020-11-04 NOTE — TELEPHONE ENCOUNTER
Received request via: Pharmacy    Was the patient seen in the last year in this department? Yes  9/21/20  Does the patient have an active prescription (recently filled or refills available) for medication(s) requested? No

## 2020-11-30 RX ORDER — ATORVASTATIN CALCIUM 40 MG/1
TABLET, FILM COATED ORAL
Qty: 100 TAB | Refills: 3 | Status: SHIPPED | OUTPATIENT
Start: 2020-11-30 | End: 2021-09-08

## 2020-12-14 DIAGNOSIS — R73.01 IMPAIRED FASTING BLOOD SUGAR: ICD-10-CM

## 2020-12-14 RX ORDER — METFORMIN HYDROCHLORIDE 500 MG/1
500 TABLET, EXTENDED RELEASE ORAL DAILY
Qty: 100 TAB | Refills: 2 | Status: SHIPPED | OUTPATIENT
Start: 2020-12-14 | End: 2021-09-08 | Stop reason: SDUPTHER

## 2020-12-21 ENCOUNTER — TELEPHONE (OUTPATIENT)
Dept: MEDICAL GROUP | Facility: LAB | Age: 75
End: 2020-12-21

## 2021-01-15 DIAGNOSIS — Z23 NEED FOR VACCINATION: ICD-10-CM

## 2021-01-25 ENCOUNTER — TELEPHONE (OUTPATIENT)
Dept: MEDICAL GROUP | Facility: LAB | Age: 76
End: 2021-01-25

## 2021-01-25 ENCOUNTER — APPOINTMENT (OUTPATIENT)
Dept: MEDICAL GROUP | Facility: LAB | Age: 76
End: 2021-01-25
Payer: MEDICARE

## 2021-01-25 NOTE — TELEPHONE ENCOUNTER
Please let pt know that she is due for pcv 23.  Thanks  (Not sure if you can see the telephone message, she called asking [facility].)

## 2021-01-25 NOTE — TELEPHONE ENCOUNTER
----- Message from Yani Rees sent at 1/22/2021 10:20 AM PST -----  Regarding: Pneumonia Booster  Hello,    I just reschedule the patient for 3/1/2021, per their request. They were going [facility] and get there Pneumonia Booster. She wants to know which one and I wasn't sure how to answer. Can you please reach out to the patient as soon as your available.    Thank you,    Yani Rees

## 2021-02-06 DIAGNOSIS — I10 ESSENTIAL HYPERTENSION: ICD-10-CM

## 2021-02-06 NOTE — TELEPHONE ENCOUNTER
Received request via: Pharmacy    Was the patient seen in the last year in this department? Yes    Does the patient have an active prescription (recently filled or refills available) for medication(s) requested? No     LISINOPRIL-HCTZ 20-25 MG TAB

## 2021-02-07 RX ORDER — LISINOPRIL AND HYDROCHLOROTHIAZIDE 25; 20 MG/1; MG/1
TABLET ORAL
Qty: 100 TAB | Refills: 0 | Status: SHIPPED | OUTPATIENT
Start: 2021-02-07 | End: 2021-05-17

## 2021-02-09 NOTE — PREPROCEDURE INSTRUCTIONS
PAT note: PAT call made 02/09/2021 at 1259. Spoke with Larissa. Health history, allergies, medications and pre-procedure/sedation instructions reviewed with patient. Pre-procedure respiratory screening completed. Pt denies being sick/symptomatic(fever, cough, shortness of breath)  within 72 hours or having been in close contact with symptomatic individuals in the past 2 weeks.Pt was informed to contact his/her physician should he/she or any close personal contact become symptomatic prior to the procedure. Pt was told to bring a mask as he/she would be wearing it during the entire stay. It was recommended that the pt self isolate 72 hrs before the procedure and  recommend that his/her  remain on site til pt is d/susan. Pt verbalized understanding of instructions.

## 2021-02-11 ENCOUNTER — APPOINTMENT (OUTPATIENT)
Dept: RADIOLOGY | Facility: REHABILITATION | Age: 76
End: 2021-02-11
Attending: SPECIALIST
Payer: MEDICARE

## 2021-02-11 ENCOUNTER — HOSPITAL ENCOUNTER (OUTPATIENT)
Facility: REHABILITATION | Age: 76
End: 2021-02-11
Attending: SPECIALIST | Admitting: SPECIALIST
Payer: MEDICARE

## 2021-02-11 VITALS
RESPIRATION RATE: 16 BRPM | BODY MASS INDEX: 27.85 KG/M2 | WEIGHT: 173.28 LBS | OXYGEN SATURATION: 94 % | HEART RATE: 60 BPM | DIASTOLIC BLOOD PRESSURE: 53 MMHG | HEIGHT: 66 IN | TEMPERATURE: 97.5 F | SYSTOLIC BLOOD PRESSURE: 138 MMHG

## 2021-02-11 PROCEDURE — 700101 HCHG RX REV CODE 250

## 2021-02-11 PROCEDURE — 64635 DESTROY LUMB/SAC FACET JNT: CPT

## 2021-02-11 PROCEDURE — 700111 HCHG RX REV CODE 636 W/ 250 OVERRIDE (IP)

## 2021-02-11 PROCEDURE — 99152 MOD SED SAME PHYS/QHP 5/>YRS: CPT

## 2021-02-11 PROCEDURE — 99153 MOD SED SAME PHYS/QHP EA: CPT

## 2021-02-11 PROCEDURE — 64636 DESTROY L/S FACET JNT ADDL: CPT

## 2021-02-11 RX ORDER — LIDOCAINE HYDROCHLORIDE 20 MG/ML
INJECTION, SOLUTION EPIDURAL; INFILTRATION; INTRACAUDAL; PERINEURAL
Status: COMPLETED
Start: 2021-02-11 | End: 2021-02-11

## 2021-02-11 RX ORDER — MIDAZOLAM HYDROCHLORIDE 1 MG/ML
INJECTION INTRAMUSCULAR; INTRAVENOUS
Status: COMPLETED
Start: 2021-02-11 | End: 2021-02-11

## 2021-02-11 RX ORDER — LIDOCAINE HYDROCHLORIDE 10 MG/ML
INJECTION, SOLUTION EPIDURAL; INFILTRATION; INTRACAUDAL; PERINEURAL
Status: COMPLETED
Start: 2021-02-11 | End: 2021-02-11

## 2021-02-11 RX ORDER — ONDANSETRON 2 MG/ML
4 INJECTION INTRAMUSCULAR; INTRAVENOUS
Status: DISCONTINUED | OUTPATIENT
Start: 2021-02-11 | End: 2021-02-11 | Stop reason: HOSPADM

## 2021-02-11 RX ORDER — DEXAMETHASONE SODIUM PHOSPHATE 10 MG/ML
INJECTION, SOLUTION INTRAMUSCULAR; INTRAVENOUS
Status: COMPLETED
Start: 2021-02-11 | End: 2021-02-11

## 2021-02-11 RX ADMIN — MIDAZOLAM HYDROCHLORIDE 1 MG: 1 INJECTION, SOLUTION INTRAMUSCULAR; INTRAVENOUS at 15:45

## 2021-02-11 RX ADMIN — LIDOCAINE HYDROCHLORIDE 5 ML: 20 INJECTION, SOLUTION EPIDURAL; INFILTRATION; INTRACAUDAL; PERINEURAL at 16:00

## 2021-02-11 RX ADMIN — FENTANYL CITRATE 25 MCG: 50 INJECTION, SOLUTION INTRAMUSCULAR; INTRAVENOUS at 15:45

## 2021-02-11 ASSESSMENT — FIBROSIS 4 INDEX: FIB4 SCORE: 1.44

## 2021-02-11 ASSESSMENT — PAIN DESCRIPTION - PAIN TYPE: TYPE: CHRONIC PAIN

## 2021-02-11 NOTE — PROGRESS NOTES
Pt given verbal and written d/c instructions including verbal infection prevention information and s/s of post procedure infection and verbalizes understanding. denies nsaid use in last 3 days, denies blood thinners use in last 5 days, denies current infection or abx use. Med rec complete. Pt has post procedural ride home with

## 2021-02-12 NOTE — NON-PROVIDER
Identified patient, medication allergies, site and procedure confirmed and diego by Dr. Gonzales . Reviewed medical health history  ( HTN, atherosclerosis ).  Off Aspirin 81 mg. for 3 days .Timed out agreed by team and patient. Reflexes remains intact. Vital signs stable ( see V/S epic template.) Post - procedure confirmed. Escorted   to recovery room,  ambulatory  without difficulty / deficits .Hand off given to RUBEN Floyd RN.

## 2021-02-12 NOTE — OR SURGEON
Immediate Post OP Note    PreOp Diagnosis: Lumbar spondylosis    PostOp Diagnosis: Same    Procedure(s):  DESTRUCTION, NERVE, FACET JOINT, LEFT L3-4, L4-5, L5-S1, USING RFA, WITH FLUOROSCOPIC GUIDANCE - Wound Class: Clean    Surgeon(s):  Jose Gonzales M.D.    Anesthesiologist/Type of Anesthesia:  No anesthesia staff entered./Sedation    Surgical Staff:  Circulator: Elaine Reyes R.N.  Scrub Person: Jaky Cummings  Radiology Technologist: Lizet Moe    Specimens removed if any:  * No specimens in log *    Estimated Blood Loss: None    Findings: None    Complications: None        2/11/2021 4:10 PM Jose Gonzales M.D.

## 2021-02-12 NOTE — NON-PROVIDER
Pre- procedure  nursing checklist and assessment done. Preparatory pre- procedure education given and understood by patient.Consent for procedure signed and H&P plan updated. Placed  prone  positioned onto procedure table and necessary monitoring equipment attached  ( pulse oximeter, B/P, EKG pad, oxygen ) with the help by team ( CST, RN X- ray Tech. )  Grounding pad verified by RN applied by  CST to  right  flank .

## 2021-02-12 NOTE — PROGRESS NOTES
Pt ambulated to recovery by procedure room RN, VSS, eating cookies and drinking water w/o n/v, adhesie coverlets x2 to left low back are cdi, pt is alert and conversational, d/c'd per order and ambulated to vehicle, CGA, by RN.

## 2021-02-12 NOTE — PROCEDURES
DATE OF PROCEDURE:  02/11/2021     PREOPERATIVE DIAGNOSES:  1.  Lumbar facet syndrome and/or spondylosis.  2.  Anxiety over painful procedure.     PROCEDURES:  1.  Left lumbar facet radiofrequency ablation, L2, L3, L4, and L5.  2.  Fluoroscopy for needle-guidance confirmation of placement.  3.  IV sedation per patient?s request.     DIAGNOSES:   1.  Lumbar spondylosis.  2.  Anxiety over painful procedure.  The patient requests IV sedation.     DESCRIPTION:  After informed consent, the patient was placed prone, monitored,   and sedated with Versed and fentanyl.  Fluoroscopy was utilized to identify   the junction of the transverse process with the superior articular process on   the left at L3, L4, L5 and the sacral alar depression.  Back is prepped with   Betadine, sterile draped and anesthetized.  Under intermittent fluoroscopic   guidance and local anesthesia, 18-gauge radiofrequency needles were passed to   the aforementioned target making contact with periosteum and advanced 1-2 mm   over the superior edge.  The 2 Hz motor stimulation was carried out at each   level, confirming local motor activity without radicular motor activity.    Following this, I injected 2% lidocaine and performed the following lesions.  1.  L2 underwent radiofrequency lesion at 80 degrees centigrade for 90 seconds   x2.  2.  L3 underwent radiofrequency lesion at 80 degrees centigrade for 90 seconds   x2.  3.  L4 underwent radiofrequency lesion at 80 degrees centigrade for 90 seconds   x2.  4.  L5 underwent radiofrequency lesion at 80 degrees centigrade for 90 seconds   x2.     Needles were removed.  She tolerated this well.     PLAN:  Follow up for to treat the right side in the next one-two weeks.        ______________________________  MD TEZ CAMPOS/MARIBEL/EDILSON    DD:  02/11/2021 16:24  DT:  02/11/2021 16:48    Job#:  [id number]

## 2021-02-22 NOTE — PREPROCEDURE INSTRUCTIONS
PAT call made 2/22/2021 at 11:35, spoke with pt after confirming 2 pt identifiers. Health hx, medications, allergies reviewed with pt, as well as pre-procedure/sedation instructions. Respiratory screen completed. Pt denies herself or any household member of being sick/symptomatic (fever, cough, SOB, diarrhea) w/in last 2 wks, having close contact w/ sick individuals in the past 2 wks, or positive covid test in last 3 wks. Pt education to notify her MD should she or a household member become symptomatic prior to procedure. Pt verbalizes understanding. Pt told to bring a form fitting mask and the she will be wearing it entire stay. Informed pt it is recommended pt self isolate for 72 hrs prior to procedure. Pt verbalizes understanding.

## 2021-02-25 ENCOUNTER — HOSPITAL ENCOUNTER (OUTPATIENT)
Facility: REHABILITATION | Age: 76
End: 2021-02-25
Attending: PAIN MEDICINE | Admitting: PAIN MEDICINE
Payer: MEDICARE

## 2021-02-25 ENCOUNTER — APPOINTMENT (OUTPATIENT)
Dept: RADIOLOGY | Facility: REHABILITATION | Age: 76
End: 2021-02-25
Attending: SPECIALIST
Payer: MEDICARE

## 2021-02-25 VITALS
DIASTOLIC BLOOD PRESSURE: 55 MMHG | TEMPERATURE: 98.6 F | RESPIRATION RATE: 14 BRPM | BODY MASS INDEX: 27.32 KG/M2 | HEIGHT: 66 IN | HEART RATE: 61 BPM | OXYGEN SATURATION: 93 % | SYSTOLIC BLOOD PRESSURE: 143 MMHG | WEIGHT: 169.97 LBS

## 2021-02-25 PROCEDURE — 99152 MOD SED SAME PHYS/QHP 5/>YRS: CPT

## 2021-02-25 PROCEDURE — 700101 HCHG RX REV CODE 250

## 2021-02-25 PROCEDURE — 64635 DESTROY LUMB/SAC FACET JNT: CPT

## 2021-02-25 PROCEDURE — 700111 HCHG RX REV CODE 636 W/ 250 OVERRIDE (IP)

## 2021-02-25 PROCEDURE — 64636 DESTROY L/S FACET JNT ADDL: CPT

## 2021-02-25 PROCEDURE — 99153 MOD SED SAME PHYS/QHP EA: CPT

## 2021-02-25 RX ORDER — DEXAMETHASONE SODIUM PHOSPHATE 10 MG/ML
INJECTION, SOLUTION INTRAMUSCULAR; INTRAVENOUS
Status: COMPLETED
Start: 2021-02-25 | End: 2021-02-25

## 2021-02-25 RX ORDER — TRAMADOL HYDROCHLORIDE 50 MG/1
50 TABLET ORAL EVERY 8 HOURS PRN
COMMUNITY

## 2021-02-25 RX ORDER — MIDAZOLAM HYDROCHLORIDE 1 MG/ML
INJECTION INTRAMUSCULAR; INTRAVENOUS
Status: COMPLETED
Start: 2021-02-25 | End: 2021-02-25

## 2021-02-25 RX ORDER — KETOROLAC TROMETHAMINE 30 MG/ML
INJECTION, SOLUTION INTRAMUSCULAR; INTRAVENOUS
Status: COMPLETED
Start: 2021-02-25 | End: 2021-02-25

## 2021-02-25 RX ORDER — LIDOCAINE HYDROCHLORIDE 40 MG/ML
INJECTION, SOLUTION RETROBULBAR
Status: COMPLETED
Start: 2021-02-25 | End: 2021-02-25

## 2021-02-25 RX ADMIN — FENTANYL CITRATE 25 MCG: 50 INJECTION, SOLUTION INTRAMUSCULAR; INTRAVENOUS at 16:21

## 2021-02-25 RX ADMIN — FENTANYL CITRATE 25 MCG: 50 INJECTION, SOLUTION INTRAMUSCULAR; INTRAVENOUS at 16:35

## 2021-02-25 RX ADMIN — KETOROLAC TROMETHAMINE 30 MG: 30 INJECTION, SOLUTION INTRAMUSCULAR; INTRAVENOUS at 16:33

## 2021-02-25 RX ADMIN — MIDAZOLAM HYDROCHLORIDE 1 MG: 1 INJECTION, SOLUTION INTRAMUSCULAR; INTRAVENOUS at 16:21

## 2021-02-25 RX ADMIN — LIDOCAINE HYDROCHLORIDE 5 ML: 40 INJECTION, SOLUTION RETROBULBAR; TOPICAL at 16:33

## 2021-02-25 ASSESSMENT — FIBROSIS 4 INDEX: FIB4 SCORE: 1.44

## 2021-02-25 ASSESSMENT — PAIN DESCRIPTION - PAIN TYPE: TYPE: CHRONIC PAIN

## 2021-02-26 ENCOUNTER — TELEPHONE (OUTPATIENT)
Dept: MEDICAL GROUP | Facility: LAB | Age: 76
End: 2021-02-26

## 2021-02-26 NOTE — PROGRESS NOTES
Pt ambulated to recovery by procedure room RN, report received, VSS, pt drinking coffee and eating cookies w/o n/v, adhesive coverlets x2 to right low back with scant sanguinous drng to upper dressing and lower dressing is cdi, pt is alert and conversational. D/c'd per order and ambulated to vehicle, CGA, by RN.

## 2021-02-26 NOTE — OR SURGEON
Immediate Post OP Note    PreOp Diagnosis: lumbar spondylosis    PostOp Diagnosis: as above    Procedure(s):  DESTRUCTION, NERVE, FACET JOINT, LUMBOSACRAL, USING NEUROLYTIC AGENT, WITH FLUOROSCOPIC GUIDANCE    Surgeon(s):  Frankie Saha M.D.    Anesthesiologist/Type of Anesthesia:  No anesthesia staff entered./Local    Surgical Staff:  Circulator: Katherine Lott R.N.  Scrub Person: Jaky Cummings  Radiology Technologist: Damaso Silverio    Specimens removed if any:  * No specimens in log *    Estimated Blood Loss: None    Findings: None    Complications: None        2/25/2021 4:13 PM Frankie Saha M.D.

## 2021-02-26 NOTE — TELEPHONE ENCOUNTER
ESTABLISHED PATIENT PRE-VISIT PLANNING     Patient was NOT contacted to complete PVP.     Note: Patient will not be contacted if there is no indication to call.     1.  Reviewed notes from the last few office visits within the medical group: Yes    2.  If any orders were placed at last visit or intended to be done for this visit (i.e. 6 mos follow-up), do we have Results/Consult Notes?         •  Labs - Labs were not ordered at last office visit.  Note: If patient appointment is for lab review and patient did not complete labs, check with provider if OK to reschedule patient until labs completed.       •  Imaging - Imaging was not ordered at last office visit.       •  Referrals - No referrals were ordered at last office visit.    3. Is this appointment scheduled as a Hospital Follow-Up? No    4.  Immunizations were updated in Epic using Reconcile Outside Information activity? Yes    5.  Patient is due for the following Health Maintenance Topics:   Health Maintenance Due   Topic Date Due   • IMM MENINGOCOCCAL B VACCINE AT RISK PATIENTS AGED 10+ (1 of 5 - Risk Trumenba 3-dose series) Never done   • COVID-19 Vaccine (1 of 2) Never done   • IMM HEP B VACCINE (1 of 3 - Risk 3-dose series) Never done   • IMM DTaP/Tdap/Td Vaccine (1 - Tdap) Never done   • IMM ZOSTER VACCINES (1 of 2) Never done   • COLONOSCOPY  09/20/2017   • IMM PNEUMOCOCCAL VACCINE: 65+ Years (2 of 2 - PPSV23) 01/17/2020   • IMM MENINGOCOCCAL VACCINE (MCV4) (2 - Risk 2-dose series) 04/15/2020   • Annual Wellness Visit  07/16/2020   • IMM INFLUENZA (1) Never done           6.  AHA (Pulse8) form printed for Provider? Yes

## 2021-02-26 NOTE — NON-PROVIDER
Pre-procedure assessment completed  and pertinent health information (DM) communicated to physician and staff during timeout. Pt positionned pre-procedure on table by RN, CST and xray tech. Pillow placed under feet for support.Grounding pad applied to left low back by CST and verified by RN.

## 2021-03-01 ENCOUNTER — OFFICE VISIT (OUTPATIENT)
Dept: MEDICAL GROUP | Facility: LAB | Age: 76
End: 2021-03-01
Payer: MEDICARE

## 2021-03-01 VITALS
OXYGEN SATURATION: 94 % | BODY MASS INDEX: 28.32 KG/M2 | DIASTOLIC BLOOD PRESSURE: 58 MMHG | HEART RATE: 60 BPM | WEIGHT: 170 LBS | TEMPERATURE: 97.1 F | SYSTOLIC BLOOD PRESSURE: 130 MMHG | HEIGHT: 65 IN

## 2021-03-01 DIAGNOSIS — E11.9 CONTROLLED TYPE 2 DIABETES MELLITUS WITHOUT COMPLICATION, WITHOUT LONG-TERM CURRENT USE OF INSULIN (HCC): ICD-10-CM

## 2021-03-01 DIAGNOSIS — M50.30 DDD (DEGENERATIVE DISC DISEASE), CERVICAL: ICD-10-CM

## 2021-03-01 DIAGNOSIS — M51.36 DDD (DEGENERATIVE DISC DISEASE), LUMBAR: ICD-10-CM

## 2021-03-01 DIAGNOSIS — Z23 NEED FOR PNEUMOCOCCAL VACCINATION: ICD-10-CM

## 2021-03-01 DIAGNOSIS — Q89.01 ASPLENIA: ICD-10-CM

## 2021-03-01 DIAGNOSIS — C25.0 MALIGNANT NEOPLASM OF HEAD OF PANCREAS (HCC): Chronic | ICD-10-CM

## 2021-03-01 LAB
HBA1C MFR BLD: 6.5 % (ref 0–5.6)
INT CON NEG: ABNORMAL
INT CON POS: ABNORMAL

## 2021-03-01 PROCEDURE — 90732 PPSV23 VACC 2 YRS+ SUBQ/IM: CPT | Performed by: NURSE PRACTITIONER

## 2021-03-01 PROCEDURE — G0009 ADMIN PNEUMOCOCCAL VACCINE: HCPCS | Performed by: NURSE PRACTITIONER

## 2021-03-01 PROCEDURE — 83036 HEMOGLOBIN GLYCOSYLATED A1C: CPT | Performed by: NURSE PRACTITIONER

## 2021-03-01 PROCEDURE — 99214 OFFICE O/P EST MOD 30 MIN: CPT | Mod: 25 | Performed by: NURSE PRACTITIONER

## 2021-03-01 ASSESSMENT — PATIENT HEALTH QUESTIONNAIRE - PHQ9: CLINICAL INTERPRETATION OF PHQ2 SCORE: 0

## 2021-03-01 ASSESSMENT — FIBROSIS 4 INDEX: FIB4 SCORE: 1.44

## 2021-03-01 NOTE — PROGRESS NOTES
"CC  Follow-up    HPI  Ce is a 75-year-old established female here to follow-up on chronic issues.  Overall feeling pretty well.  Has been miserable for several months with her chronic pain.    #1-DDD (degenerative disc disease), lumbar  Chronic issue and followed by Dr. Garcia / Maria A.  Just had a successful nerve ablation and is finally out of misery.  Also suffers from chronic neck pain but this has been much more mild than her lumbar spine.    #2-controlled type 2 diabetes mellitus without complication, without long-term current use of insulin (HCC)  Chronic issue.  Was always prediabetic until 2020 when her A1c climbed to 6.8%.  Not checking home blood sugars.  Has lost 2 lbs.  Unfortunately has been physically inactive x a few mo b/c of worsening low back pain that just improved with nerve ablation.    Avoiding white carbs except white rice.  Eats while wheat toast, oatmeal, beans, whole wheat wraps,protein, vegetables.  Denies foot numbness or tingling.    #3-asplenia: Also here to catch up on vaccines -wants to receive one pneumonia shot today.  No recent illness.  Has not had any Covid vaccines yet.      Past medical, surgical, family, and social history is reviewed and updated in Epic chart by me today.   Medications and allergies reviewed and updated in Epic chart by me today.     ROS:   As documented in history of present illness above    Exam:  /58 (BP Location: Left arm, Patient Position: Sitting, BP Cuff Size: Adult)   Pulse 60   Temp 36.2 °C (97.1 °F)   Ht 1.651 m (5' 5\")   Wt 77.1 kg (170 lb)   SpO2 94%   Gen. appears healthy in no distress   Skin appropriate for sex and age   Neck trachea is midline  Lungs unlabored breathing  Heart regular rate  Neuro gait and station normal   Psych appropriate, calm, interactive, well-groomed    Assessment / Plan / Medical Decision makin. DDD (degenerative disc disease), lumbar  -Followed closely by her pain management doctor,  " Radha.  Responded well to recent nerve ablation, these typically last her a few months.    2. Controlled type 2 diabetes mellitus without complication, without long-term current use of insulin (HCC)  -Fortunately A1c is down to 6.5 from 6.8.  Encouraged her to cut out white rice and include more vegetables/lean protein.  Recheck 3 months.  Discussed proper foot care.  Discussed a diabetic diet and exercise in depth.  - POCT  A1C    3. Need for pneumococcal vaccination  -Patient was counseled regarding all immunizations, what the patient is being immunized against, possible side effects, and the importance of immunization.  - Pneumococal Polysaccharide Vaccine 23-Valent =>3YO SQ/IM    4. DDD (degenerative disc disease), cervical  -Currently stable per patient.  Followed by Dr. Garcia.    5. Malignant neoplasm of head of pancreas (HCC)  -History of.  Denies nausea, vomiting or unintentional weight loss.  Has been released from Dr. GUTIERREZ and Dr. Blackburn      Counseled that she also needs to receive numerous other vaccines which she declines today.

## 2021-03-01 NOTE — PROGRESS NOTES
Annual Health Assessment Questions:    1.  Are you currently engaging in any exercise or physical activity? Yes    2.  How would you describe your mood or emotional well-being today? good    3.  Have you had any falls in the last year? Yes    4.  Have you noticed any problems with your balance or had difficulty walking? No    5.  In the last six months have you experienced any leakage of urine? No    6. DPA/Advanced Directive: Patient does not have an Advanced Directive.  A packet and workshop information was given on Advanced Directives.

## 2021-03-01 NOTE — ASSESSMENT & PLAN NOTE
Chronic issue and followed by Dr. Garcia / Maria A.  Just had a successful nerve ablation and is finally out of misery.

## 2021-03-13 NOTE — PROCEDURES
DATE OF PROCEDURE:  02/25/2021     NAME OF THE PROCEDURE:  Radiofrequency ablation, right side L2-L3, L4-L5   median branch.     PREPROCEDURAL DIAGNOSES:  The patient with lumbar spondylosis, symptomatic   facet arthropathy for radiofrequency ablation.     POSTPROCEDURAL DIAGNOSES:  The patient with lumbar spondylosis, symptomatic   facet arthropathy for radiofrequency ablation.     SURGEON:  Frankie Saha MD     ANESTHESIA:  Local standby with IV sedation.     DESCRIPTION OF PROCEDURE:  The patient was brought to the procedure suite,   placed in a prone position.  The back was sterilely prepped using a surgical   prep set.  During the course of the procedure, the patient did receive 2 mg of   Versed and 50 mcg of fentanyl.  She was monitored throughout with pulse   oximetry, blood pressure monitoring.  Lidocaine skin wheal was raised in the   right flank.  Four 18-gauge RF needles, 10 mm tipped and 10 cm length, were   placed to the superior and medial aspect of transverse process and the lateral   aspect of the superior articular cartilage at L2, L3, L4 and L5 and the   inferomedial aspect of the sacral ala.  Confirmation of positioning of the   needles with both PA and lateral fluoroscopy.  Stimulation at 2 Hz attained no   stimulation of lower extremity. A 1 mL containing 0.75 mL of 4% lidocaine   mixed with 0.25 mL of 4 mg/mL dexamethasone was injected.  After 2-minute   delay, radiofrequency ablation was performed.  Two ___ were performed at each   of the 3 adhesions at 4 levels and 90 degrees centigrade for 90 seconds.    Needles were removed.  The patient tolerated the procedure quite well.  There   were no apparent complications of procedure.        ______________________________  MD EDD CAUSEY/MARIBEL/MADAN    DD:  03/12/2021 15:45  DT:  03/12/2021 17:56    Job#:  [id number]

## 2021-03-31 ENCOUNTER — IMMUNIZATION (OUTPATIENT)
Dept: FAMILY PLANNING/WOMEN'S HEALTH CLINIC | Facility: IMMUNIZATION CENTER | Age: 76
End: 2021-03-31
Attending: INTERNAL MEDICINE
Payer: MEDICARE

## 2021-03-31 DIAGNOSIS — Z23 NEED FOR VACCINATION: ICD-10-CM

## 2021-03-31 DIAGNOSIS — Z23 ENCOUNTER FOR VACCINATION: Primary | ICD-10-CM

## 2021-03-31 PROCEDURE — 91300 PFIZER SARS-COV-2 VACCINE: CPT | Performed by: INTERNAL MEDICINE

## 2021-03-31 PROCEDURE — 0001A PFIZER SARS-COV-2 VACCINE: CPT | Performed by: INTERNAL MEDICINE

## 2021-04-02 ENCOUNTER — PATIENT MESSAGE (OUTPATIENT)
Dept: HEALTH INFORMATION MANAGEMENT | Facility: OTHER | Age: 76
End: 2021-04-02

## 2021-04-20 ENCOUNTER — PATIENT OUTREACH (OUTPATIENT)
Dept: HEALTH INFORMATION MANAGEMENT | Facility: OTHER | Age: 76
End: 2021-04-20

## 2021-04-20 NOTE — PROGRESS NOTES
Outcome: Left Message   to schedule  Comprehensive Geriatric Assessment  Please transfer to Patient Outreach Team at [phone] when patient returns call.      Attempt # 2

## 2021-04-22 ENCOUNTER — IMMUNIZATION (OUTPATIENT)
Dept: FAMILY PLANNING/WOMEN'S HEALTH CLINIC | Facility: IMMUNIZATION CENTER | Age: 76
End: 2021-04-22
Payer: MEDICARE

## 2021-04-22 DIAGNOSIS — Z23 ENCOUNTER FOR VACCINATION: Primary | ICD-10-CM

## 2021-04-22 PROCEDURE — 0002A PFIZER SARS-COV-2 VACCINE: CPT | Performed by: INTERNAL MEDICINE

## 2021-04-22 PROCEDURE — 91300 PFIZER SARS-COV-2 VACCINE: CPT | Performed by: INTERNAL MEDICINE

## 2021-05-17 DIAGNOSIS — I10 ESSENTIAL HYPERTENSION: ICD-10-CM

## 2021-05-17 RX ORDER — LISINOPRIL AND HYDROCHLOROTHIAZIDE 25; 20 MG/1; MG/1
TABLET ORAL
Qty: 100 TABLET | Refills: 0 | Status: SHIPPED | OUTPATIENT
Start: 2021-05-17 | End: 2021-08-25

## 2021-05-17 NOTE — TELEPHONE ENCOUNTER
Received request via: Pharmacy    Was the patient seen in the last year in this department? Yes   LOV: 03/01/2021    Does the patient have an active prescription (recently filled or refills available) for medication(s) requested? No

## 2021-05-27 ENCOUNTER — TELEPHONE (OUTPATIENT)
Dept: MEDICAL GROUP | Facility: LAB | Age: 76
End: 2021-05-27

## 2021-05-27 NOTE — TELEPHONE ENCOUNTER
ESTABLISHED PATIENT PRE-VISIT PLANNING     Patient was NOT contacted to complete PVP.     Note: Patient will not be contacted if there is no indication to call.     1.  Reviewed notes from the last few office visits within the medical group: Yes    2.  If any orders were placed at last visit or intended to be done for this visit (i.e. 6 mos follow-up), do we have Results/Consult Notes?         •  Labs - Labs were not ordered at last office visit.  Note: If patient appointment is for lab review and patient did not complete labs, check with provider if OK to reschedule patient until labs completed.       •  Imaging - Imaging was not ordered at last office visit.       •  Referrals - No referrals were ordered at last office visit.    3. Is this appointment scheduled as a Hospital Follow-Up? No    4.  Immunizations were updated in Epic using Reconcile Outside Information activity? Yes    5.  Patient is due for the following Health Maintenance Topics:   Health Maintenance Due   Topic Date Due   • IMM MENINGOCOCCAL B VACCINE AT RISK PATIENTS AGED 10+ (1 of 5 - Risk Trumenba 3-dose series) Never done   • IMM HEP B VACCINE (1 of 3 - Risk 3-dose series) Never done   • IMM DTaP/Tdap/Td Vaccine (1 - Tdap) Never done   • IMM ZOSTER VACCINES (1 of 2) Never done   • RETINAL SCREENING  05/21/2016   • DIABETES MONOFILAMENT / LE EXAM  05/21/2016   • URINE ACR / MICROALBUMIN  01/11/2017   • COLONOSCOPY  09/20/2017   • IMM MENINGOCOCCAL VACCINE (MCV4) (2 - Risk 2-dose series) 04/15/2020   • Annual Wellness Visit  07/16/2020   • FASTING LIPID PROFILE  02/18/2021   • MAMMOGRAM  06/27/2021     6.  AHA (Pulse8) form printed for Provider? No, patient does not have any open alerts

## 2021-05-28 NOTE — PROCEDURES
DATE OF SERVICE:  08/22/2017    PROCEDURES:  1.  Right C3 medial branch block.  2.  Right C4 medial branch block.  3.  Right C5 medial branch block.  4.  Right C6 medial branch block.  5.  Fluoroscopy for needle guidance, confirmation of placement.  6.  IV sedation per patient request.    DIAGNOSES:  1.  Cervical spondylosis with mechanical neck pain and facet syndrome.  2.  Anxiety over procedure, patient requests intravenous sedation.    DESCRIPTION OF PROCEDURE:  After informed consent with the patient prone, head   turned slightly to the left, fluoroscopy was utilized to identify the lateral   facet body concavity use of C3, C4, C5 and C6.  She is monitored, sedated   with Versed and fentanyl.  Neck is prepped with Betadine, sterile draped and   anesthetized.  Under intermittent fluoroscopic guidance and local anesthesia,   a 22-guage spinal needle was passed to the posterolateral facet body concavity   of C3 and advanced partially across the lateral edge of the body.  Contrast   was injected confirming spread along the pathway of the medial branch nerve.    I slowly injected 0.5 mL of 0.25% bupivacaine, 2 mg of dexamethasone, needle   was removed.    The right C4 target was approached with the same technique, injected with the   same medication, the needle was removed.    The right C5 target was approached with the same technique, injected with the   same medication, the needle was removed.    The right C6 target was approached with the same technique, injected with the   same medication, the needle was removed.    She tolerated this well.    PLAN:  Follow up to evaluate response to treatment.  She will be discharged   with a pain diary to assess for radiofrequency ablation.       ____________________________________     MD TEZ CAMPOS / NTS    DD:  08/22/2017 09:27:42  DT:  08/22/2017 10:21:27    D#:  [id number]  Job#:  [id number]    cc: [facility]   Subjective   Patient continues to be slowly improving, decreasing oxygen requirements, currently on 2 L/min, afebrile    Review of Systems   Constitutional: Positive for fatigue. Negative for appetite change, fever and unexpected weight change.   HENT: Negative for facial swelling, hearing loss, mouth sores, rhinorrhea, sinus pain, sore throat and voice change.    Eyes: Negative for photophobia, redness and visual disturbance.   Respiratory: Positive for cough. Negative for shortness of breath and wheezing.    Cardiovascular: Negative for chest pain and leg swelling.   Gastrointestinal: Negative for abdominal distention, abdominal pain, diarrhea, nausea and vomiting.   Endocrine: Negative for heat intolerance and polyuria.   Genitourinary: Negative for difficulty urinating, flank pain and urgency.   Musculoskeletal: Negative for back pain, joint swelling and neck stiffness.   Skin: Positive for rash. Negative for wound.   Allergic/Immunologic: Negative for environmental allergies, food allergies and immunocompromised state.   Neurological: Negative for seizures, speech difficulty, weakness and headaches.   Hematological: Negative for adenopathy.   Psychiatric/Behavioral: Negative for agitation, behavioral problems and confusion. The patient is not nervous/anxious.         Objective       Last Recorded Vitals  Blood pressure 129/77, pulse (!) 44, temperature 97.7 °F (36.5 °C), temperature source Oral, resp. rate 18, height 5' 5\" (1.651 m), weight 92.8 kg (204 lb 9.4 oz), SpO2 96 %.  Body mass index is 34.05 kg/m².    Physical Exam   Constitutional: He is oriented to person, place, and time. He appears well-developed and well-nourished. No distress.   HENT:   Head: Normocephalic and atraumatic.   Eyes: Right eye exhibits no discharge. Left eye exhibits no discharge. No scleral icterus.   Cardiovascular: Normal rate, regular rhythm and normal heart sounds.   Pulmonary/Chest: Effort normal and breath sounds normal. No  respiratory distress. He has no wheezes.   Abdominal: Soft. Bowel sounds are normal. There is no abdominal tenderness.   Musculoskeletal:         General: No tenderness or edema. Normal range of motion.      Cervical back: Neck supple.   Lymphadenopathy:     He has no cervical adenopathy.   Neurological: He is alert and oriented to person, place, and time.   Skin: Skin is warm and dry. Rash noted. He is not diaphoretic. No erythema.   Plaque-like appearing psoriatic lesions   Psychiatric: He has a normal mood and affect. His behavior is normal. Judgment and thought content normal.   Nursing note and vitals reviewed.         Current Facility-Administered Medications   Medication Dose Route Frequency Provider Last Rate Last Admin   • dextrose 50 % injection 25 g  25 g Intravenous PRN Zoë Rosa CNP       • dextrose 50 % injection 12.5 g  12.5 g Intravenous PRN Zoë Rosa CNP       • glucagon (GLUCAGEN) injection 1 mg  1 mg Intramuscular PRN Zoë Rosa CNP       • dextrose (GLUTOSE) 40 % gel 15 g  15 g Oral PRN Zoë Rosa CNP       • dextrose (GLUTOSE) 40 % gel 30 g  30 g Oral PRN Zoë Rosa CNP       • insulin lispro (ADMELOG,HumaLOG) scheduled AND correction dose   Subcutaneous TID WC Zoë Rosa CNP   2 Units at 05/27/21 1830   • cholecalciferol (VITAMIN D) tablet 125 mcg  125 mcg Oral Daily Theodora Toro DO   125 mcg at 05/28/21 0838   • calcium carbonate (TUMS) chewable tablet 500 mg  500 mg Oral Q4H PRN Alba Hinds MD   500 mg at 05/26/21 2136   • pantoprazole (PROTONIX) EC tablet 40 mg  40 mg Oral Nightly Alba Hinds MD   40 mg at 05/27/21 2010   • remdesivir 100 mg in sodium chloride 0.9 % 250 mL total volume infusion  100 mg Intravenous Daily at noon Dulce Connelly CNP   Stopped at 05/27/21 1444   • sodium chloride 0.9 % flush bag 25 mL  25 mL Intravenous PRN Patricia Posadas CNP       • sodium chloride (PF) 0.9 % injection 2 mL  2 mL Intracatheter 2 times per day Patricia Posadas CNP    2 mL at 05/28/21 0838   • sodium chloride (NORMAL SALINE) 0.9 % bolus 500 mL  500 mL Intravenous PRN Patricia Posadas CNP       • enoxaparin (LOVENOX) injection 40 mg  40 mg Subcutaneous Daily Patricia Posadas CNP   40 mg at 05/28/21 0838   • acetaminophen (TYLENOL) tablet 650 mg  650 mg Oral Q4H PRN Patricia Posadas CNP   650 mg at 05/25/21 1332   • ondansetron (ZOFRAN) injection 4 mg  4 mg Intravenous Q6H PRN Patricia Posadas CNP       • benzonatate (TESSALON PERLES) capsule 100 mg  100 mg Oral TID PRN Patricia Posadas CNP       • guaiFENesin-DM (MUCINEX DM) 600-30 mg ER tablet 2 tablet  2 tablet Oral 2 times per day Patricia Posadas CNP   2 tablet at 05/28/21 0837   • zinc sulfate (ZINCATE) capsule 220 mg  220 mg Oral Daily with breakfast Patricia Posadas CNP   220 mg at 05/28/21 0837   • ascorbic acid (VITAMIN C) tablet 500 mg  500 mg Oral Daily Patricia Posadas CNP   500 mg at 05/28/21 0836   • dexamethasone (DECADRON) tablet 10 mg  10 mg Oral 2 times per day Bulmaro Ocsar MD   10 mg at 05/28/21 0836   • triamcinolone (ARISTOCORT) 0.1 % cream   Topical TID Bulmaro Oscar MD   Given at 05/28/21 0839        Labs     Admission on 05/25/2021   Component Date Value Ref Range Status   • Extra Tube 05/25/2021 Hold for Add Ons   Final   • Extra Tube 05/25/2021 Hold for Add Ons   Final   • Extra Tube 05/25/2021 Hold for Add Ons   Final   • Extra Tube 05/25/2021 Hold for Add Ons   Final   • Extra Tube 05/25/2021 Hold for Add Ons   Final   • Extra Tube 05/25/2021 Hold for Add Ons   Final   • Culture, Blood or Bone Marrow 05/25/2021 No Growth 2 Days.   Preliminary   • Culture, Blood or Bone Marrow 05/25/2021 No Growth 2 Days.   Preliminary   • Sodium 05/25/2021 135  135 - 145 mmol/L Final   • Potassium 05/25/2021 3.7  3.4 - 5.1 mmol/L Final   • Chloride 05/25/2021 101  98 - 107 mmol/L Final   • Carbon Dioxide 05/25/2021 31  21 - 32 mmol/L Final   • Anion Gap 05/25/2021 7* 10 - 20 mmol/L Final   • Glucose 05/25/2021 148* 65 - 99 mg/dL  Final   • BUN 05/25/2021 16  6 - 20 mg/dL Final   • Creatinine 05/25/2021 0.88  0.67 - 1.17 mg/dL Final   • Glomerular Filtration Rate 05/25/2021 >90  >90 mL/min/1.73m2 Final    eGFR results = or >90 mL/min/1.73m2 = Normal kidney function.   • BUN/ Creatinine Ratio 05/25/2021 18  7 - 25 Final   • Calcium 05/25/2021 9.3  8.4 - 10.2 mg/dL Final   • Bilirubin, Total 05/25/2021 0.7  0.2 - 1.0 mg/dL Final   • GOT/AST 05/25/2021 30  <=37 Units/L Final   • GPT/ALT 05/25/2021 31  <64 Units/L Final   • Alkaline Phosphatase 05/25/2021 68  45 - 117 Units/L Final   • Albumin 05/25/2021 3.6  3.6 - 5.1 g/dL Final   • Protein, Total 05/25/2021 8.3* 6.4 - 8.2 g/dL Final   • Globulin 05/25/2021 4.7* 2.0 - 4.0 g/dL Final   • A/G Ratio 05/25/2021 0.8* 1.0 - 2.4 Final   • Ventricular Rate EKG/Min (BPM) 05/25/2021 101   Final   • Atrial Rate (BPM) 05/25/2021 101   Final   • NM-Interval (MSEC) 05/25/2021 130   Final   • QRS-Interval (MSEC) 05/25/2021 80   Final   • QT-Interval (MSEC) 05/25/2021 314   Final   • QTc 05/25/2021 407   Final   • P Axis (Degrees) 05/25/2021 36   Final   • R Axis (Degrees) 05/25/2021 10   Final   • T Axis (Degrees) 05/25/2021 12   Final   • REPORT TEXT 05/25/2021    Final                    Value:Sinus tachycardia  Otherwise normal ECG  No previous ECGs available  Confirmed by LATOYA BLACK MD ([id number]) on 5/25/2021 5:52:20 PM     • WBC 05/25/2021 7.1  4.2 - 11.0 K/mcL Final   • RBC 05/25/2021 4.71  4.50 - 5.90 mil/mcL Final   • HGB 05/25/2021 14.7  13.0 - 17.0 g/dL Final   • HCT 05/25/2021 44.0  39.0 - 51.0 % Final   • MCV 05/25/2021 93.4  78.0 - 100.0 fl Final   • MCH 05/25/2021 31.2  26.0 - 34.0 pg Final   • MCHC 05/25/2021 33.4  32.0 - 36.5 g/dL Final   • RDW-CV 05/25/2021 11.6  11.0 - 15.0 % Final   • RDW-SD 05/25/2021 39.8  39.0 - 50.0 fL Final   • PLT 05/25/2021 159  140 - 450 K/mcL Final   • NRBC 05/25/2021 0  <=0 /100 WBC Final   • Neutrophil, Percent 05/25/2021 86  % Final   • Lymphocytes, Percent  05/25/2021 9  % Final   • Mono, Percent 05/25/2021 4  % Final   • Eosinophils, Percent 05/25/2021 0  % Final   • Basophils, Percent 05/25/2021 0  % Final   • Immature Granulocytes 05/25/2021 1  % Final   • Absolute Neutrophils 05/25/2021 6.1  1.8 - 7.7 K/mcL Final   • Absolute Lymphocytes 05/25/2021 0.7* 1.0 - 4.8 K/mcL Final   • Absolute Monocytes 05/25/2021 0.3  0.3 - 0.9 K/mcL Final   • Absolute Eosinophils  05/25/2021 0.0  0.0 - 0.5 K/mcL Final   • Absolute Basophils 05/25/2021 0.0  0.0 - 0.3 K/mcL Final   • Absolute Immmature Granulocytes 05/25/2021 0.0  0.0 - 0.2 K/mcL Final   • Prothrombin Time 05/25/2021 10.3  9.7 - 11.8 sec Final   • INR 05/25/2021 1.0  <=5.0 Final    INR Therapeutic Range: 2.0 to 3.0 (2.5 to 3.5 recommended for recurrent thrombotic episodes and mechanical prosthetic heart valves.)   • PTT 05/25/2021 27  22 - 30 sec Final   • Rapid SARS-COV-2 by PCR 05/25/2021 Detected* Not Detected / Detected / Presumptive Positive / Inhibitors present Final   • Procedural Comment 05/25/2021    Final    SARS-CoV-2 nucleic acid has been detected indicating the presence of COVID-19.       Testing was performed using the [employer] SARS-CoV-2 RT-PCR assay that has been given Emergency Use Authorization (EUA) by the [place] Food and Drug Administration (FDA).  These results are considered definitive and do not need to be confirmed by another method.   • SARS-CoV-2 Ct Value 05/25/2021 23.9   Final    The results of this test are interpreted as POSITIVE. The Cycle Threshold (CT) value is provided for informational purposes only. While Ct values in a positive SARS-CoV-2 test may indicate the relative amount of viral RNA (not intact virus) present in the sample at the time of the test, these values may change over the course of the illness and should NOT be used to predict intervention strategy, infectious potential or patient outcome.  The Ct values in a positive specimen may range from 0 - 45.    • C-Reactive Protein 05/25/2021 9.4* <=1.0 mg/dL Final   • LD, Total 05/25/2021 408* 86 - 234 Units/L Final   • Troponin I, Ultra Sensitive 05/25/2021 <0.02  <=0.04 ng/mL Final   • Procalcitonin 05/25/2021 0.19* <=0.09 ng/mL Final      Bacterial Sepsis:  <0.5 ng/mL:    Sepsis not likely. Localized bacterial infection possible.  0.5 - 2 ng/mL: Sepsis or other conditions possible  >2.0 ng/mL:    High risk of sepsis/septic shock    NOTE: If bacterial sepsis is of high clinical suspicion but PCT<2 ng/mL,  consider recheck PCT 6-24 hours after initial test.     Lower Respiratory Tract Infection (LRTI):  <0.1 ng/mL:       Bacterial infection highly unlikely  0.1 - 0.25 ng/mL: Bacterial infection unlikely  0.26 - 0.5 ng/mL: Bacterial infection likely  > 0.5 ng/mL:      Bacterial infection highly likely    NOTE: Patients with advanced CKD or medical/surgical trauma may have  elevated baseline PCT (>0.5 ng/mL) in the absence of bacterial infection. If  bacterial infection is suspected, consider repeat PCT in 2 to 4 days to guide de-escalation or discontinuation of antibiotic therapy.  Higher reference range cutoffs may be appropriate for patients <3 days old.     • Vitamin D, 25-Hydroxy 05/25/2021 7.6* 30.0 - 100.0 ng/mL Final    <20 ng/mL  =  Vitamin D deficiency  20-29 ng/mL  =  Vitamin D insufficiency   ng/mL  =  Optimal Vitamin D  >150 ng/mL  =  Possible toxicity   • Extra Tube 05/25/2021 Hold for Add Ons   Final   • Extra Tube 05/25/2021 Hold for Add Ons   Final   • Extra Tube 05/25/2021 Hold for Add Ons   Final   • Sodium 05/26/2021 138  135 - 145 mmol/L Final   • Potassium 05/26/2021 4.5  3.4 - 5.1 mmol/L Final   • Chloride 05/26/2021 106  98 - 107 mmol/L Final   • Carbon Dioxide 05/26/2021 31  21 - 32 mmol/L Final   • Anion Gap 05/26/2021 6* 10 - 20 mmol/L Final   • Glucose 05/26/2021 157* 65 - 99 mg/dL Final   • BUN 05/26/2021 22* 6 - 20 mg/dL Final   • Creatinine 05/26/2021 0.70  0.67 - 1.17 mg/dL Final   •  Glomerular Filtration Rate 05/26/2021 >90  >90 mL/min/1.73m2 Final    eGFR results = or >90 mL/min/1.73m2 = Normal kidney function.   • BUN/ Creatinine Ratio 05/26/2021 31* 7 - 25 Final   • Calcium 05/26/2021 9.1  8.4 - 10.2 mg/dL Final   • Bilirubin, Total 05/26/2021 0.5  0.2 - 1.0 mg/dL Final   • GOT/AST 05/26/2021 27  <=37 Units/L Final   • GPT/ALT 05/26/2021 33  <64 Units/L Final   • Alkaline Phosphatase 05/26/2021 67  45 - 117 Units/L Final   • Albumin 05/26/2021 3.1* 3.6 - 5.1 g/dL Final   • Protein, Total 05/26/2021 7.8  6.4 - 8.2 g/dL Final   • Globulin 05/26/2021 4.7* 2.0 - 4.0 g/dL Final   • A/G Ratio 05/26/2021 0.7* 1.0 - 2.4 Final   • Magnesium 05/26/2021 2.5* 1.7 - 2.4 mg/dL Final   • WBC 05/26/2021 6.5  4.2 - 11.0 K/mcL Final   • RBC 05/26/2021 4.59  4.50 - 5.90 mil/mcL Final   • HGB 05/26/2021 14.2  13.0 - 17.0 g/dL Final   • HCT 05/26/2021 43.2  39.0 - 51.0 % Final   • MCV 05/26/2021 94.1  78.0 - 100.0 fl Final   • MCH 05/26/2021 30.9  26.0 - 34.0 pg Final   • MCHC 05/26/2021 32.9  32.0 - 36.5 g/dL Final   • RDW-CV 05/26/2021 11.7  11.0 - 15.0 % Final   • RDW-SD 05/26/2021 40.2  39.0 - 50.0 fL Final   • PLT 05/26/2021 201  140 - 450 K/mcL Final   • NRBC 05/26/2021 0  <=0 /100 WBC Final   • Neutrophil, Percent 05/26/2021 85  % Final   • Lymphocytes, Percent 05/26/2021 11  % Final   • Mono, Percent 05/26/2021 3  % Final   • Eosinophils, Percent 05/26/2021 0  % Final   • Basophils, Percent 05/26/2021 0  % Final   • Immature Granulocytes 05/26/2021 1  % Final   • Absolute Neutrophils 05/26/2021 5.5  1.8 - 7.7 K/mcL Final   • Absolute Lymphocytes 05/26/2021 0.7* 1.0 - 4.8 K/mcL Final   • Absolute Monocytes 05/26/2021 0.2* 0.3 - 0.9 K/mcL Final   • Absolute Eosinophils  05/26/2021 0.0  0.0 - 0.5 K/mcL Final   • Absolute Basophils 05/26/2021 0.0  0.0 - 0.3 K/mcL Final   • Absolute Immmature Granulocytes 05/26/2021 0.1  0.0 - 0.2 K/mcL Final   • Extra Tube 05/26/2021 Hold for Add Ons   Final   • Hemoglobin  A1C 05/26/2021 6.1* 4.5 - 5.6 % Final      Diabetic Screening  Non Diabetic:             <5.7%  Increased Risk:           5.7-6.4%  Diagnostic For Diabetes:  >6.4%    Diabetic Control  A1C%       eAG mg/dL  6.0            126  6.5            140  7.0            154  7.5            169  8.0            183  8.5            197  9.0            212  9.5            226  10.0           240   • Sodium 05/27/2021 140  135 - 145 mmol/L Final   • Potassium 05/27/2021 4.4  3.4 - 5.1 mmol/L Final   • Chloride 05/27/2021 107  98 - 107 mmol/L Final   • Carbon Dioxide 05/27/2021 31  21 - 32 mmol/L Final   • Anion Gap 05/27/2021 6* 10 - 20 mmol/L Final   • Glucose 05/27/2021 166* 65 - 99 mg/dL Final   • BUN 05/27/2021 25* 6 - 20 mg/dL Final   • Creatinine 05/27/2021 0.74  0.67 - 1.17 mg/dL Final   • Glomerular Filtration Rate 05/27/2021 >90  >90 mL/min/1.73m2 Final    eGFR results = or >90 mL/min/1.73m2 = Normal kidney function.   • BUN/ Creatinine Ratio 05/27/2021 34* 7 - 25 Final   • Calcium 05/27/2021 8.7  8.4 - 10.2 mg/dL Final   • Bilirubin, Total 05/27/2021 0.4  0.2 - 1.0 mg/dL Final   • GOT/AST 05/27/2021 20  <=37 Units/L Final   • GPT/ALT 05/27/2021 32  <64 Units/L Final   • Alkaline Phosphatase 05/27/2021 62  45 - 117 Units/L Final   • Albumin 05/27/2021 3.0* 3.6 - 5.1 g/dL Final   • Protein, Total 05/27/2021 7.1  6.4 - 8.2 g/dL Final   • Globulin 05/27/2021 4.1* 2.0 - 4.0 g/dL Final   • A/G Ratio 05/27/2021 0.7* 1.0 - 2.4 Final   • Extra Tube 05/27/2021 Hold for Add Ons   Final   • Extra Tube 05/27/2021 Hold for Add Ons   Final   • GLUCOSE, BEDSIDE - POINT OF CARE 05/27/2021 183* 70 - 99 mg/dL Final   • GLUCOSE, BEDSIDE - POINT OF CARE 05/27/2021 152* 70 - 99 mg/dL Final   • GLUCOSE, BEDSIDE - POINT OF CARE 05/27/2021 175* 70 - 99 mg/dL Final    Notified RN   • Sodium 05/28/2021 140  135 - 145 mmol/L Final   • Potassium 05/28/2021 4.2  3.4 - 5.1 mmol/L Final   • Chloride 05/28/2021 108* 98 - 107 mmol/L Final   • Carbon  Dioxide 05/28/2021 29  21 - 32 mmol/L Final   • Anion Gap 05/28/2021 7* 10 - 20 mmol/L Final   • Glucose 05/28/2021 147* 65 - 99 mg/dL Final   • BUN 05/28/2021 23* 6 - 20 mg/dL Final   • Creatinine 05/28/2021 0.70  0.67 - 1.17 mg/dL Final   • Glomerular Filtration Rate 05/28/2021 >90  >90 mL/min/1.73m2 Final    eGFR results = or >90 mL/min/1.73m2 = Normal kidney function.   • BUN/ Creatinine Ratio 05/28/2021 33* 7 - 25 Final   • Calcium 05/28/2021 8.5  8.4 - 10.2 mg/dL Final   • Bilirubin, Total 05/28/2021 0.4  0.2 - 1.0 mg/dL Final   • GOT/AST 05/28/2021 16  <=37 Units/L Final   • GPT/ALT 05/28/2021 34  <64 Units/L Final   • Alkaline Phosphatase 05/28/2021 69  45 - 117 Units/L Final   • Albumin 05/28/2021 2.9* 3.6 - 5.1 g/dL Final   • Protein, Total 05/28/2021 6.9  6.4 - 8.2 g/dL Final   • Globulin 05/28/2021 4.0  2.0 - 4.0 g/dL Final   • A/G Ratio 05/28/2021 0.7* 1.0 - 2.4 Final   • Procalcitonin 05/28/2021 0.15* <=0.09 ng/mL Final      Bacterial Sepsis:  <0.5 ng/mL:    Sepsis not likely. Localized bacterial infection possible.  0.5 - 2 ng/mL: Sepsis or other conditions possible  >2.0 ng/mL:    High risk of sepsis/septic shock    NOTE: If bacterial sepsis is of high clinical suspicion but PCT<2 ng/mL,  consider recheck PCT 6-24 hours after initial test.     Lower Respiratory Tract Infection (LRTI):  <0.1 ng/mL:       Bacterial infection highly unlikely  0.1 - 0.25 ng/mL: Bacterial infection unlikely  0.26 - 0.5 ng/mL: Bacterial infection likely  > 0.5 ng/mL:      Bacterial infection highly likely    NOTE: Patients with advanced CKD or medical/surgical trauma may have  elevated baseline PCT (>0.5 ng/mL) in the absence of bacterial infection. If  bacterial infection is suspected, consider repeat PCT in 2 to 4 days to guide de-escalation or discontinuation of antibiotic therapy.  Higher reference range cutoffs may be appropriate for patients <3 days old.     • Magnesium 05/28/2021 2.6* 1.7 - 2.4 mg/dL Final   • WBC  05/28/2021 11.8* 4.2 - 11.0 K/mcL Final   • RBC 05/28/2021 4.42* 4.50 - 5.90 mil/mcL Final   • HGB 05/28/2021 13.8  13.0 - 17.0 g/dL Final   • HCT 05/28/2021 41.6  39.0 - 51.0 % Final   • MCV 05/28/2021 94.1  78.0 - 100.0 fl Final   • MCH 05/28/2021 31.2  26.0 - 34.0 pg Final   • MCHC 05/28/2021 33.2  32.0 - 36.5 g/dL Final   • PLT 05/28/2021 278  140 - 450 K/mcL Final   • RDW-CV 05/28/2021 11.6  11.0 - 15.0 % Final   • RDW-SD 05/28/2021 40.1  39.0 - 50.0 fL Final   • NRBC 05/28/2021 0  <=0 /100 WBC Final   • GLUCOSE, BEDSIDE - POINT OF CARE 05/28/2021 145* 70 - 99 mg/dL Final       Culture: Reviewed    Imaging  XR CHEST PA OR AP 1 VIEW    Result Date: 5/25/2021  Narrative: EXAM: XR CHEST PA OR AP 1 VIEW CLINICAL INDICATION: Covid 19 infection, shortness of breath. COMPARISON: No previous available. FINDINGS: Portable upright frontal view of the chest was obtained. The cardiac silhouette is normal in size.  Subtle areas of patchy opacity are seen in lower lung zone regions consistent with Covid 19 infection.  No pleural effusion or pneumothorax is noted.      Impression: Subtle patchy lower lung zone opacities consistent with history of Covid 19 infection. Electronically Signed by: SUSAN GUEVARA M.D. Signed on: 5/25/2021 8:04 AM         Assessment & Plan   1. COVID-19.Infection  2.  Skin rash, could be immune mediated, related to COVID-19 versus Psoriatic eruption.     Plan:  -Continue remdesivir, monitoring renal and hepatic function, completing 5-day course.  -continue dexamethasone 10 mg p.o. twice daily .  -Continue oxygen supplementation and wean as tolerated.  -continue application of topical triamcinolone to affected areas.  -Doubt secondary bacterial pneumonia, continue azithromycin for now,short course, 5 days  -Vitamin C, zinc and vitamin D supplements, low vitamin D levels.  -We will continue to follow closely, continue to wean down oxygen supplementation, hopefully can be discharged soon..    Remdesivir  5/25  Dexamethasone 5/25  zithromax 5/25

## 2021-06-03 ENCOUNTER — APPOINTMENT (OUTPATIENT)
Dept: MEDICAL GROUP | Facility: LAB | Age: 76
End: 2021-06-03
Payer: MEDICARE

## 2021-06-03 ENCOUNTER — HOSPITAL ENCOUNTER (OUTPATIENT)
Dept: RADIOLOGY | Facility: MEDICAL CENTER | Age: 76
End: 2021-06-03
Attending: SPECIALIST
Payer: MEDICARE

## 2021-06-03 DIAGNOSIS — M54.50 LOW BACK PAIN, UNSPECIFIED BACK PAIN LATERALITY, UNSPECIFIED CHRONICITY, UNSPECIFIED WHETHER SCIATICA PRESENT: ICD-10-CM

## 2021-06-03 DIAGNOSIS — M54.50 CHRONIC LOW BACK PAIN, UNSPECIFIED BACK PAIN LATERALITY, UNSPECIFIED WHETHER SCIATICA PRESENT: ICD-10-CM

## 2021-06-03 DIAGNOSIS — G89.29 CHRONIC LOW BACK PAIN, UNSPECIFIED BACK PAIN LATERALITY, UNSPECIFIED WHETHER SCIATICA PRESENT: ICD-10-CM

## 2021-06-03 PROCEDURE — 72148 MRI LUMBAR SPINE W/O DYE: CPT | Mod: MH

## 2021-06-03 PROCEDURE — 72110 X-RAY EXAM L-2 SPINE 4/>VWS: CPT

## 2021-06-16 ENCOUNTER — TELEPHONE (OUTPATIENT)
Dept: MEDICAL GROUP | Facility: LAB | Age: 76
End: 2021-06-16

## 2021-07-23 DIAGNOSIS — E78.5 DYSLIPIDEMIA: ICD-10-CM

## 2021-07-23 RX ORDER — ATORVASTATIN CALCIUM 20 MG/1
TABLET, FILM COATED ORAL
Qty: 100 TABLET | Refills: 3 | Status: SHIPPED | OUTPATIENT
Start: 2021-07-23 | End: 2022-09-06

## 2021-07-23 NOTE — TELEPHONE ENCOUNTER
Received request via: Pharmacy    Was the patient seen in the last year in this department? Yes  LOV: 3/1/2021  Does the patient have an active prescription (recently filled or refills available) for medication(s) requested? No

## 2021-07-28 ENCOUNTER — TELEPHONE (OUTPATIENT)
Dept: MEDICAL GROUP | Facility: LAB | Age: 76
End: 2021-07-28

## 2021-07-28 NOTE — TELEPHONE ENCOUNTER
APPOINTMENT SCHEDULING  Patient overdue for appointment. Called patient to schedule appointment.  Phone Number Called: [phone]    Call outcome: Left detailed message for patient. Requested patient to call back.[phone]    2 X's

## 2021-08-03 ENCOUNTER — TELEPHONE (OUTPATIENT)
Dept: MEDICAL GROUP | Facility: LAB | Age: 76
End: 2021-08-03

## 2021-08-03 NOTE — TELEPHONE ENCOUNTER
APPOINTMENT SCHEDULING  Patient overdue for appointment. Called patient to schedule appointment.  Phone Number Called: [phone]    Call outcome: Left detailed message for patient. Requested patient to call back.

## 2021-08-25 ENCOUNTER — TELEPHONE (OUTPATIENT)
Dept: MEDICAL GROUP | Facility: LAB | Age: 76
End: 2021-08-25

## 2021-08-25 DIAGNOSIS — I10 ESSENTIAL HYPERTENSION: ICD-10-CM

## 2021-08-25 RX ORDER — LISINOPRIL AND HYDROCHLOROTHIAZIDE 25; 20 MG/1; MG/1
TABLET ORAL
Qty: 100 TABLET | Refills: 0 | Status: SHIPPED | OUTPATIENT
Start: 2021-08-25 | End: 2021-11-29

## 2021-08-25 NOTE — TELEPHONE ENCOUNTER
Left message for patient to call back regarding pre-visit planning. Please transfer call to [phone]

## 2021-08-25 NOTE — TELEPHONE ENCOUNTER
ANNUAL WELLNESS VISIT PRE-VISIT PLANNING    1.  Reviewed notes from the last office visit: Yes    2.  If any orders were ordered or intended to be done prior to visit (i.e. 6 mos follow-up), do we have results/consult notes or has patient scheduled?        •  Labs - Labs were not ordered at last office visit.  Note: If patient appointment is for lab review and patient did not complete labs, check with provider if OK to reschedule patient until labs completed.       •  Imaging - Imaging was not ordered at last office visit.       •  Referrals - No referrals were ordered at last office visit.    3.  Immunizations were updated in Epic using Reconcile Outside Information activity? Yes    4.  Patient is due for the following Health Maintenance Topics:   Health Maintenance Due   Topic Date Due   • IMM MENINGOCOCCAL B VACCINE AT RISK PATIENTS AGED 10+ (1 of 5 - Risk Trumenba 3-dose series) Never done   • IMM HEP B VACCINE (1 of 3 - Risk 3-dose series) Never done   • IMM DTaP/Tdap/Td Vaccine (1 - Tdap) Never done   • IMM ZOSTER VACCINES (1 of 2) Never done   • RETINAL SCREENING  05/21/2016   • DIABETES MONOFILAMENT / LE EXAM  05/21/2016   • URINE ACR / MICROALBUMIN  01/11/2017   • COLORECTAL CANCER SCREENING  09/20/2017   • IMM MENINGOCOCCAL VACCINE (MCV4) (2 - Risk 2-dose series) 04/15/2020   • Annual Wellness Visit  07/16/2020   • FASTING LIPID PROFILE  02/18/2021   • MAMMOGRAM  06/27/2021   • SERUM CREATININE  08/26/2021   • A1C SCREENING  09/01/2021       5.  Reviewed/Updated the following with patient:       •   Preferred Pharmacy? No       •   Preferred Lab? No       •   Preferred Communication? No       •   Allergies? No       •   Medications? NO      6.  Care Team Updated:       •   DME Company (gait device, O2, CPAP, etc.): NO       •   Other Specialists (eye doctor, derm, GYN, cardiology, endo, etc): NO    7.  Patient was not advised: “This is a free wellness visit. The provider will screen for medical conditions  to help you stay healthy. If you have other concerns to address you may be asked to discuss these at a separate visit or there may be an additional fee.”     8.  AHA (Puls[employer]) form printed for Provider? No, patient does not have any open alerts

## 2021-09-08 ENCOUNTER — OFFICE VISIT (OUTPATIENT)
Dept: MEDICAL GROUP | Facility: LAB | Age: 76
End: 2021-09-08
Payer: MEDICARE

## 2021-09-08 VITALS
DIASTOLIC BLOOD PRESSURE: 60 MMHG | HEIGHT: 65 IN | RESPIRATION RATE: 20 BRPM | HEART RATE: 66 BPM | SYSTOLIC BLOOD PRESSURE: 140 MMHG | WEIGHT: 175 LBS | BODY MASS INDEX: 29.16 KG/M2 | OXYGEN SATURATION: 94 % | TEMPERATURE: 96.4 F

## 2021-09-08 DIAGNOSIS — R07.81 RIB PAIN ON LEFT SIDE: ICD-10-CM

## 2021-09-08 DIAGNOSIS — Q89.01 ASPLENIA: ICD-10-CM

## 2021-09-08 DIAGNOSIS — E78.5 DYSLIPIDEMIA: ICD-10-CM

## 2021-09-08 DIAGNOSIS — E11.9 CONTROLLED TYPE 2 DIABETES MELLITUS WITHOUT COMPLICATION, WITHOUT LONG-TERM CURRENT USE OF INSULIN (HCC): ICD-10-CM

## 2021-09-08 DIAGNOSIS — Z23 NEED FOR MENINGITIS VACCINATION: ICD-10-CM

## 2021-09-08 PROCEDURE — 90472 IMMUNIZATION ADMIN EACH ADD: CPT | Performed by: NURSE PRACTITIONER

## 2021-09-08 PROCEDURE — 90471 IMMUNIZATION ADMIN: CPT | Performed by: NURSE PRACTITIONER

## 2021-09-08 PROCEDURE — 90734 MENACWYD/MENACWYCRM VACC IM: CPT | Performed by: NURSE PRACTITIONER

## 2021-09-08 PROCEDURE — 99214 OFFICE O/P EST MOD 30 MIN: CPT | Mod: 25 | Performed by: NURSE PRACTITIONER

## 2021-09-08 PROCEDURE — 90621 MENB-FHBP VACC 2/3 DOSE IM: CPT | Performed by: NURSE PRACTITIONER

## 2021-09-08 RX ORDER — METFORMIN HYDROCHLORIDE 500 MG/1
500 TABLET, EXTENDED RELEASE ORAL DAILY
Qty: 100 TABLET | Refills: 2 | Status: SHIPPED | OUTPATIENT
Start: 2021-09-08 | End: 2021-10-28 | Stop reason: SDUPTHER

## 2021-09-08 ASSESSMENT — FIBROSIS 4 INDEX: FIB4 SCORE: 1.46

## 2021-09-08 NOTE — PROGRESS NOTES
"CC  F/u     HPI   Ce is a 76-year-old established female here to follow-up on chronic issues.  #1- chronic low back pain: worsening.  Seeing [facility] for hydrocodone and tramadol prescriptions.  Working on her core muscles which has helped.  Is letting me know that she had an updated MRI in June that showed chronic degenerative disc disease but no change from previous MRI although she was told by her specialist that she worsened her spinal issues with her fall back in August 2020.  #2- left anterior rib pain:  New issue x a few months.  Denies injury or trauma.  Denies chronic cough.  Hurts to push on.  No new exercises with upper body.  No other associated symptoms.  Denies left breast pain.  She would like to have an x-ray of this area.  #3-type 2 diabetes: Chronic issue.  Compliant with Metformin.  Denies foot numbness or tingling.  Overdue to see her eye doctor.  Does not check her blood sugars.  Physically active with a new seated pedal machine -not necessarily a stationary bike.  Last A1c was at 6.5, A1c has ranged from 6.0-6.8 over the past 3 years.    Past medical, surgical, family, and social history is reviewed and updated in Epic chart by me today.   Medications and allergies reviewed and updated in Epic chart by me today.     ROS:   As documented in history of present illness above    Exam:  /60 (BP Location: Right arm, Patient Position: Sitting, BP Cuff Size: Large adult)   Pulse 66   Temp (!) 35.8 °C (96.4 °F)   Resp 20   Ht 1.651 m (5' 5\")   Wt 79.4 kg (175 lb)   SpO2 94%   Constitutional: Alert, no distress, plus 3 vital signs  Skin:  Warm, dry, no rashes invisible areas  Eye: Equal, round and reactive, conjunctiva clear  ENMT: Lips without lesions, good dentition, oropharynx clear    Neck: Trachea midline, no masses, no thyromegaly  Respiratory: Unlabored respiration, lungs clear to auscultation, no wheezes, no rhonchi  Cardiovascular: Normal rate and rhythm, no murmur, no " edema  Abd musculoskeletal: She does have tenderness from approximately her 6th-8th rib, left anterior rib cage without overlying bruising, crepitus or rash.  Psych: Alert, pleasant, well-groomed, normal affect  Monofilament testing with a 10 gram force: sensation intact: intact bilaterally  Visual Inspection: Feet without maceration, ulcers, fissures.  Pedal pulses: intact bilaterally      Assessment / Plan / Medical Decision makin. Controlled type 2 diabetes mellitus without complication, without long-term current use of insulin (HCC)  -Recommend updated lab work.  Monofilament exam completed today.  Encouraged her to follow-up with her eye doctor for retinal exam.  Discussed proper foot care.  Encouraged to continue with exercise and a diabetic diet.  Follow-up every 6 months.  - HEMOGLOBIN A1C; Future  - TSH; Future  - MICROALBUMIN CREAT RATIO URINE; Future    2. Dyslipidemia  -Compliant with statin therapy.  Recommend updated labs.  - Comp Metabolic Panel; Future  - CBC WITH DIFFERENTIAL; Future  - Lipid Profile; Future    3. Need for meningitis vaccination  -pt was counseled regarding all immunizations, what the patient is being immunized against, possible side effects, and the importance of immunization.  -She declined an updated Tdap or any other vaccines today beyond 2 meningitis shots.  Discussed the importance of a Covid booster 8 months from her second Covid shot.  - Meningococcal Conjugate Vaccine 4-Valent IM (Menactra)  - Meningococcal Vaccine Serogroup B 2-3 Dose IM    4. Asplenia  - Meningococcal Conjugate Vaccine 4-Valent IM (Menactra)  - Meningococcal Vaccine Serogroup B 2-3 Dose IM    5. Rib pain on left side  -Uncertain etiology with lack of trauma or cough.  Recommend x-ray considering her history of cancer.  - [id number] (WITH 1-VIEW CXR) LEFT; Future

## 2021-09-15 ENCOUNTER — HOSPITAL ENCOUNTER (OUTPATIENT)
Dept: RADIOLOGY | Facility: MEDICAL CENTER | Age: 76
End: 2021-09-15
Attending: NURSE PRACTITIONER
Payer: MEDICARE

## 2021-09-15 DIAGNOSIS — R07.81 RIB PAIN ON LEFT SIDE: ICD-10-CM

## 2021-09-15 PROCEDURE — 71101 X-RAY EXAM UNILAT RIBS/CHEST: CPT | Mod: LT

## 2021-09-24 ENCOUNTER — TELEPHONE (OUTPATIENT)
Dept: MEDICAL GROUP | Facility: LAB | Age: 76
End: 2021-09-24

## 2021-09-24 NOTE — TELEPHONE ENCOUNTER
Colorectal Care Gap Outreach    1. Confirmed patient is between the ages of 50-75: YES     2. Confirmed that patient IS overdue or due soon for colorectal cancer screening: YES     3. Were orders placed within the last 12 months to complete screening: NO     Phone Number Called: [phone]  Call outcome: left message for patient to call back regarding message below. Pt overdue for colonoscopy, AWV     _____________________________________________________________________    Colon Cancer Screening Guidelines:     Important: If patient has any history of colon polyps or family history of colorectal cancer, FIT and Cologuard are NOT appropriate options. A colonoscopy is the recommended test for this set of patients.    • Colonoscopy  o Always recommend colonoscopy first.   o A colonoscopy is recommended over the other tests because it provides direct visualization of the colon and if there are small polyps these can also be removed with one procedure.  o If negative and no family history, could be cleared for 10 years.     • Cologuard/FIT  o Cologuard is completed once every 3 years.  o FIT is completed annually.  o If positive, Cologuard and FIT will require a diagnostic colonoscopy. Screening colonoscopies are classically covered by insurances, however, diagnostic colonoscopies may result in a bill.

## 2021-10-18 ENCOUNTER — HOSPITAL ENCOUNTER (OUTPATIENT)
Dept: LAB | Facility: MEDICAL CENTER | Age: 76
End: 2021-10-18
Attending: NURSE PRACTITIONER
Payer: MEDICARE

## 2021-10-18 DIAGNOSIS — E78.5 DYSLIPIDEMIA: ICD-10-CM

## 2021-10-18 DIAGNOSIS — E11.9 CONTROLLED TYPE 2 DIABETES MELLITUS WITHOUT COMPLICATION, WITHOUT LONG-TERM CURRENT USE OF INSULIN (HCC): ICD-10-CM

## 2021-10-18 LAB
ALBUMIN SERPL BCP-MCNC: 4 G/DL (ref 3.2–4.9)
ALBUMIN/GLOB SERPL: 1.2 G/DL
ALP SERPL-CCNC: 86 U/L (ref 30–99)
ALT SERPL-CCNC: 18 U/L (ref 2–50)
ANION GAP SERPL CALC-SCNC: 10 MMOL/L (ref 7–16)
AST SERPL-CCNC: 12 U/L (ref 12–45)
BASOPHILS # BLD AUTO: 1 % (ref 0–1.8)
BASOPHILS # BLD: 0.09 K/UL (ref 0–0.12)
BILIRUB SERPL-MCNC: 0.5 MG/DL (ref 0.1–1.5)
BUN SERPL-MCNC: 19 MG/DL (ref 8–22)
CALCIUM SERPL-MCNC: 10.7 MG/DL (ref 8.5–10.5)
CHLORIDE SERPL-SCNC: 100 MMOL/L (ref 96–112)
CHOLEST SERPL-MCNC: 106 MG/DL (ref 100–199)
CO2 SERPL-SCNC: 28 MMOL/L (ref 20–33)
CREAT SERPL-MCNC: 0.93 MG/DL (ref 0.5–1.4)
CREAT UR-MCNC: 112.49 MG/DL
EOSINOPHIL # BLD AUTO: 0.21 K/UL (ref 0–0.51)
EOSINOPHIL NFR BLD: 2.3 % (ref 0–6.9)
ERYTHROCYTE [DISTWIDTH] IN BLOOD BY AUTOMATED COUNT: 47.4 FL (ref 35.9–50)
EST. AVERAGE GLUCOSE BLD GHB EST-MCNC: 223 MG/DL
GLOBULIN SER CALC-MCNC: 3.3 G/DL (ref 1.9–3.5)
GLUCOSE SERPL-MCNC: 232 MG/DL (ref 65–99)
HBA1C MFR BLD: 9.4 % (ref 4–5.6)
HCT VFR BLD AUTO: 43.7 % (ref 37–47)
HDLC SERPL-MCNC: 51 MG/DL
HGB BLD-MCNC: 14 G/DL (ref 12–16)
IMM GRANULOCYTES # BLD AUTO: 0.03 K/UL (ref 0–0.11)
IMM GRANULOCYTES NFR BLD AUTO: 0.3 % (ref 0–0.9)
LDLC SERPL CALC-MCNC: 38 MG/DL
LYMPHOCYTES # BLD AUTO: 3.01 K/UL (ref 1–4.8)
LYMPHOCYTES NFR BLD: 32.3 % (ref 22–41)
MCH RBC QN AUTO: 31.3 PG (ref 27–33)
MCHC RBC AUTO-ENTMCNC: 32 G/DL (ref 33.6–35)
MCV RBC AUTO: 97.5 FL (ref 81.4–97.8)
MICROALBUMIN UR-MCNC: 1.4 MG/DL
MICROALBUMIN/CREAT UR: 12 MG/G (ref 0–30)
MONOCYTES # BLD AUTO: 0.98 K/UL (ref 0–0.85)
MONOCYTES NFR BLD AUTO: 10.5 % (ref 0–13.4)
NEUTROPHILS # BLD AUTO: 4.99 K/UL (ref 2–7.15)
NEUTROPHILS NFR BLD: 53.6 % (ref 44–72)
NRBC # BLD AUTO: 0 K/UL
NRBC BLD-RTO: 0 /100 WBC
PLATELET # BLD AUTO: 355 K/UL (ref 164–446)
PMV BLD AUTO: 9.4 FL (ref 9–12.9)
POTASSIUM SERPL-SCNC: 4.7 MMOL/L (ref 3.6–5.5)
PROT SERPL-MCNC: 7.3 G/DL (ref 6–8.2)
RBC # BLD AUTO: 4.48 M/UL (ref 4.2–5.4)
SODIUM SERPL-SCNC: 138 MMOL/L (ref 135–145)
TRIGL SERPL-MCNC: 84 MG/DL (ref 0–149)
TSH SERPL DL<=0.005 MIU/L-ACNC: 3.3 UIU/ML (ref 0.38–5.33)
WBC # BLD AUTO: 9.3 K/UL (ref 4.8–10.8)

## 2021-10-18 PROCEDURE — 82043 UR ALBUMIN QUANTITATIVE: CPT

## 2021-10-18 PROCEDURE — 85025 COMPLETE CBC W/AUTO DIFF WBC: CPT

## 2021-10-18 PROCEDURE — 83036 HEMOGLOBIN GLYCOSYLATED A1C: CPT

## 2021-10-18 PROCEDURE — 84443 ASSAY THYROID STIM HORMONE: CPT

## 2021-10-18 PROCEDURE — 82570 ASSAY OF URINE CREATININE: CPT

## 2021-10-18 PROCEDURE — 36415 COLL VENOUS BLD VENIPUNCTURE: CPT

## 2021-10-18 PROCEDURE — 80053 COMPREHEN METABOLIC PANEL: CPT

## 2021-10-18 PROCEDURE — 80061 LIPID PANEL: CPT

## 2021-10-21 ENCOUNTER — TELEPHONE (OUTPATIENT)
Dept: MEDICAL GROUP | Facility: LAB | Age: 76
End: 2021-10-21

## 2021-10-21 NOTE — TELEPHONE ENCOUNTER
ESTABLISHED PATIENT PRE-VISIT PLANNING     Patient was NOT contacted to complete PVP.     Note: Patient will not be contacted if there is no indication to call.     1.  Reviewed notes from the last few office visits within the medical group: Yes    2.  If any orders were placed at last visit or intended to be done for this visit (i.e. 6 mos follow-up), do we have Results/Consult Notes?         •  Labs - Labs ordered, completed on 10/18/2021 and results are in chart.  Note: If patient appointment is for lab review and patient did not complete labs, check with provider if OK to reschedule patient until labs completed.       •  Imaging - Imaging ordered, completed and results are in chart.       •  Referrals - No referrals were ordered at last office visit.    3. Is this appointment scheduled as a Hospital Follow-Up? No    4.  Immunizations were updated in Epic using Reconcile Outside Information activity? Yes    5.  Patient is due for the following Health Maintenance Topics:   Health Maintenance Due   Topic Date Due   • IMM DTaP/Tdap/Td Vaccine (1 - Tdap) Never done   • IMM ZOSTER VACCINES (1 of 2) Never done   • RETINAL SCREENING  05/21/2016   • COLORECTAL CANCER SCREENING  09/20/2017   • Annual Wellness Visit  07/16/2020   • MAMMOGRAM  06/27/2021   • IMM INFLUENZA (1) Never done   • IMM MENINGOCOCCAL B VACCINE AT RISK PATIENTS AGED 10+ (2 of 5 - Risk Trumenba 3-dose series) 10/06/2021         6.  AHA (Pulse8) form printed for Provider? No, patient does not have any open alerts

## 2021-10-28 ENCOUNTER — TELEMEDICINE (OUTPATIENT)
Dept: MEDICAL GROUP | Facility: LAB | Age: 76
End: 2021-10-28
Payer: MEDICARE

## 2021-10-28 VITALS — HEIGHT: 65 IN | BODY MASS INDEX: 29.16 KG/M2 | WEIGHT: 175 LBS

## 2021-10-28 DIAGNOSIS — E11.65 UNCONTROLLED TYPE 2 DIABETES MELLITUS WITH HYPERGLYCEMIA (HCC): ICD-10-CM

## 2021-10-28 PROBLEM — E11.9 CONTROLLED TYPE 2 DIABETES MELLITUS WITHOUT COMPLICATION, WITHOUT LONG-TERM CURRENT USE OF INSULIN (HCC): Status: RESOLVED | Noted: 2021-03-01 | Resolved: 2021-10-28

## 2021-10-28 PROCEDURE — 99213 OFFICE O/P EST LOW 20 MIN: CPT | Mod: 95 | Performed by: NURSE PRACTITIONER

## 2021-10-28 RX ORDER — METFORMIN HYDROCHLORIDE 500 MG/1
1000 TABLET, EXTENDED RELEASE ORAL DAILY
Qty: 200 TABLET | Refills: 2 | Status: SHIPPED | OUTPATIENT
Start: 2021-10-28 | End: 2022-12-07

## 2021-10-28 RX ORDER — LANCETS 30 GAUGE
EACH MISCELLANEOUS
Qty: 100 EACH | Refills: 3 | Status: SHIPPED | OUTPATIENT
Start: 2021-10-28

## 2021-10-28 ASSESSMENT — FIBROSIS 4 INDEX: FIB4 SCORE: 0.6

## 2021-10-28 NOTE — ASSESSMENT & PLAN NOTE
"Chronic issue.  Now out of control at 9.4.  Admits that she has been eating bagels, pretzels, cookies, etc for the past few months.  Now eating quinua, chicken, eggs, vegetables and occasional sweet potatoes / brown rice / oatmeal.  Does not have a glucometer but wants one.  Taking metformin 500 mg XR once daily.    Denies vision changes in the past 3 mo but plans on seeing her eye doc before end of year.   Denies foot burning but \"maybe a little tingling.\"   Denies dysuria.   Feeling a bit more tired than normal.   Denies diarrhea.    Doing 1000 steps per day with chair stepper.    "
[phone]

## 2021-11-24 ENCOUNTER — TELEPHONE (OUTPATIENT)
Dept: MEDICAL GROUP | Facility: LAB | Age: 76
End: 2021-11-24

## 2021-11-24 NOTE — TELEPHONE ENCOUNTER
Colorectal Care Gap Outreach    1. Confirmed patient is between the ages of 50-75: YES     2. Confirmed that patient IS overdue or due soon for colorectal cancer screening: YES     3. Were orders placed within the last 12 months to complete screening: NO     Phone Number Called: [phone]  Call outcome: left message for patient to call back regarding message below. Pt due for colonoscopy or cologuard test     _____________________________________________________________________    Colon Cancer Screening Guidelines:     Important: If patient has any history of colon polyps or family history of colorectal cancer, FIT and Cologuard are NOT appropriate options. A colonoscopy is the recommended test for this set of patients.    • Colonoscopy  o Always recommend colonoscopy first.   o A colonoscopy is recommended over the other tests because it provides direct visualization of the colon and if there are small polyps these can also be removed with one procedure.  o If negative and no family history, could be cleared for 10 years.     • Cologuard/FIT  o Cologuard is completed once every 3 years.  o FIT is completed annually.  o If positive, Cologuard and FIT will require a diagnostic colonoscopy. Screening colonoscopies are classically covered by insurances, however, diagnostic colonoscopies may result in a bill.

## 2021-11-29 DIAGNOSIS — I10 ESSENTIAL HYPERTENSION: ICD-10-CM

## 2021-11-29 RX ORDER — LISINOPRIL AND HYDROCHLOROTHIAZIDE 25; 20 MG/1; MG/1
TABLET ORAL
Qty: 100 TABLET | Refills: 0 | Status: SHIPPED | OUTPATIENT
Start: 2021-11-29 | End: 2022-03-07

## 2021-11-29 NOTE — TELEPHONE ENCOUNTER
Received request via: Pharmacy    Was the patient seen in the last year in this department? Yes  LOV 10/28/2021 - Telemedicine  Does the patient have an active prescription (recently filled or refills available) for medication(s) requested? No

## 2022-02-07 ENCOUNTER — TELEPHONE (OUTPATIENT)
Dept: MEDICAL GROUP | Facility: LAB | Age: 77
End: 2022-02-07

## 2022-02-07 NOTE — TELEPHONE ENCOUNTER
Colorectal Care Gap Outreach    1. Confirmed patient is between the ages of 50-75: YES     2. Confirmed that patient IS overdue or due soon for colorectal cancer screening: YES     3. Were orders placed within the last 12 months to complete screening: NO     Phone Number Called: [phone]  Call outcome: left message for patient to call back regarding message below unable to lv msg no vm     _____________________________________________________________________    Colon Cancer Screening Guidelines:     Important: If patient has any history of colon polyps or family history of colorectal cancer, FIT and Cologuard are NOT appropriate options. A colonoscopy is the recommended test for this set of patients.    • Colonoscopy  o Always recommend colonoscopy first.   o A colonoscopy is recommended over the other tests because it provides direct visualization of the colon and if there are small polyps these can also be removed with one procedure.  o If negative and no family history, could be cleared for 10 years.     • Cologuard/FIT  o Cologuard is completed once every 3 years.  o FIT is completed annually.  o If positive, Cologuard and FIT will require a diagnostic colonoscopy. Screening colonoscopies are classically covered by insurances, however, diagnostic colonoscopies may result in a bill.

## 2022-02-16 ENCOUNTER — PATIENT MESSAGE (OUTPATIENT)
Dept: HEALTH INFORMATION MANAGEMENT | Facility: OTHER | Age: 77
End: 2022-02-16
Payer: MEDICARE

## 2022-03-01 ENCOUNTER — TELEPHONE (OUTPATIENT)
Dept: MEDICAL GROUP | Facility: LAB | Age: 77
End: 2022-03-01
Payer: MEDICARE

## 2022-03-01 NOTE — TELEPHONE ENCOUNTER
ANNUAL WELLNESS VISIT PRE-VISIT PLANNING    1.  Reviewed notes from the last office visit: Yes    2.  If any orders were ordered or intended to be done prior to visit (i.e. 6 mos follow-up), do we have results/consult notes or has patient scheduled?        •  Labs - Labs ordered, NOT completed. Patient advised to complete prior to next appointment.  Note: If patient appointment is for lab review and patient did not complete labs, check with provider if OK to reschedule patient until labs completed.       •  Imaging - Imaging was not ordered at last office visit.       •  Referrals - No referrals were ordered at last office visit.    3.  Immunizations were updated in Epic using Reconcile Outside Information activity? Yes    4.  Patient is due for the following Health Maintenance Topics:   Health Maintenance Due   Topic Date Due   • IMM DTaP/Tdap/Td Vaccine (1 - Tdap) Never done   • IMM ZOSTER VACCINES (1 of 2) Never done   • RETINAL SCREENING  05/21/2016   • COLORECTAL CANCER SCREENING  09/20/2017   • Annual Wellness Visit  07/16/2020   • MAMMOGRAM  06/27/2021   • IMM INFLUENZA (1) Never done   • COVID-19 Vaccine (3 - Booster for Pfizer series) 09/22/2021   • IMM MENINGOCOCCAL B VACCINE AT RISK PATIENTS AGED 10+ (2 of 5 - Risk Trumenba 3-dose series) 10/06/2021     5.  Reviewed/Updated the following with patient:       •   Preferred Pharmacy?Yes        •   Preferred Lab? Yes        •   Preferred Communication? Yes        •   Allergies? Yes        •   Medications? Yes, abstract     6.  Care Team Updated:       •   DME Company (gait device, O2, CPAP, etc.): N/A        •   Other Specialists (eye doctor, derm, GYN, cardiology, endo, etc): Yes     7.  Patient was advised: “This is a free wellness visit. The provider will screen for medical conditions to help you stay healthy. If you have other concerns to address you may be asked to discuss these at a separate visit or there may be an additional fee.”     8.  AHA (Puls8)  form printed for Provider? Email sent to [facility] requesting form

## 2022-03-01 NOTE — TELEPHONE ENCOUNTER
Left message for patient to call back regarding pre-visit planning. Please transfer call to [phone].

## 2022-03-04 NOTE — TELEPHONE ENCOUNTER
I spoke with Larissa for pre-visit planning and she said she would make an appt for eye exam and speak with [facility] about colonoscopy referral as well.

## 2022-03-05 ENCOUNTER — HOSPITAL ENCOUNTER (OUTPATIENT)
Dept: LAB | Facility: MEDICAL CENTER | Age: 77
End: 2022-03-05
Attending: NURSE PRACTITIONER
Payer: MEDICARE

## 2022-03-05 DIAGNOSIS — E11.65 UNCONTROLLED TYPE 2 DIABETES MELLITUS WITH HYPERGLYCEMIA (HCC): ICD-10-CM

## 2022-03-05 LAB
CREAT UR-MCNC: 84.01 MG/DL
EST. AVERAGE GLUCOSE BLD GHB EST-MCNC: 180 MG/DL
HBA1C MFR BLD: 7.9 % (ref 4–5.6)
MICROALBUMIN UR-MCNC: <1.2 MG/DL
MICROALBUMIN/CREAT UR: NORMAL MG/G (ref 0–30)

## 2022-03-05 PROCEDURE — 83036 HEMOGLOBIN GLYCOSYLATED A1C: CPT

## 2022-03-05 PROCEDURE — 82043 UR ALBUMIN QUANTITATIVE: CPT

## 2022-03-05 PROCEDURE — 82570 ASSAY OF URINE CREATININE: CPT

## 2022-03-05 PROCEDURE — 36415 COLL VENOUS BLD VENIPUNCTURE: CPT

## 2022-03-07 DIAGNOSIS — I10 ESSENTIAL HYPERTENSION: ICD-10-CM

## 2022-03-07 RX ORDER — LISINOPRIL AND HYDROCHLOROTHIAZIDE 25; 20 MG/1; MG/1
TABLET ORAL
Qty: 100 TABLET | Refills: 0 | Status: SHIPPED | OUTPATIENT
Start: 2022-03-07 | End: 2022-06-17 | Stop reason: SDUPTHER

## 2022-03-07 SDOH — ECONOMIC STABILITY: HOUSING INSECURITY: IN THE LAST 12 MONTHS, HOW MANY PLACES HAVE YOU LIVED?: 1

## 2022-03-07 SDOH — ECONOMIC STABILITY: HOUSING INSECURITY
IN THE LAST 12 MONTHS, WAS THERE A TIME WHEN YOU DID NOT HAVE A STEADY PLACE TO SLEEP OR SLEPT IN A SHELTER (INCLUDING NOW)?: NO

## 2022-03-07 SDOH — ECONOMIC STABILITY: FOOD INSECURITY: WITHIN THE PAST 12 MONTHS, THE FOOD YOU BOUGHT JUST DIDN'T LAST AND YOU DIDN'T HAVE MONEY TO GET MORE.: NEVER TRUE

## 2022-03-07 SDOH — ECONOMIC STABILITY: INCOME INSECURITY: IN THE LAST 12 MONTHS, WAS THERE A TIME WHEN YOU WERE NOT ABLE TO PAY THE MORTGAGE OR RENT ON TIME?: NO

## 2022-03-07 SDOH — ECONOMIC STABILITY: FOOD INSECURITY: WITHIN THE PAST 12 MONTHS, YOU WORRIED THAT YOUR FOOD WOULD RUN OUT BEFORE YOU GOT MONEY TO BUY MORE.: NEVER TRUE

## 2022-03-07 SDOH — ECONOMIC STABILITY: TRANSPORTATION INSECURITY
IN THE PAST 12 MONTHS, HAS LACK OF RELIABLE TRANSPORTATION KEPT YOU FROM MEDICAL APPOINTMENTS, MEETINGS, WORK OR FROM GETTING THINGS NEEDED FOR DAILY LIVING?: NO

## 2022-03-07 SDOH — HEALTH STABILITY: PHYSICAL HEALTH: ON AVERAGE, HOW MANY DAYS PER WEEK DO YOU ENGAGE IN MODERATE TO STRENUOUS EXERCISE (LIKE A BRISK WALK)?: 5 DAYS

## 2022-03-07 SDOH — HEALTH STABILITY: PHYSICAL HEALTH: ON AVERAGE, HOW MANY MINUTES DO YOU ENGAGE IN EXERCISE AT THIS LEVEL?: 20 MIN

## 2022-03-07 SDOH — HEALTH STABILITY: MENTAL HEALTH
STRESS IS WHEN SOMEONE FEELS TENSE, NERVOUS, ANXIOUS, OR CAN'T SLEEP AT NIGHT BECAUSE THEIR MIND IS TROUBLED. HOW STRESSED ARE YOU?: NOT AT ALL

## 2022-03-07 ASSESSMENT — SOCIAL DETERMINANTS OF HEALTH (SDOH)
HOW OFTEN DO YOU GET TOGETHER WITH FRIENDS OR RELATIVES?: PATIENT DECLINED
IN A TYPICAL WEEK, HOW MANY TIMES DO YOU TALK ON THE PHONE WITH FAMILY, FRIENDS, OR NEIGHBORS?: THREE TIMES A WEEK
WITHIN THE PAST 12 MONTHS, YOU WORRIED THAT YOUR FOOD WOULD RUN OUT BEFORE YOU GOT THE MONEY TO BUY MORE: NEVER TRUE
HOW OFTEN DO YOU ATTENT MEETINGS OF THE CLUB OR ORGANIZATION YOU BELONG TO?: NEVER
HOW OFTEN DO YOU ATTEND CHURCH OR RELIGIOUS SERVICES?: PATIENT DECLINED
HOW OFTEN DO YOU ATTENT MEETINGS OF THE CLUB OR ORGANIZATION YOU BELONG TO?: NEVER
IN A TYPICAL WEEK, HOW MANY TIMES DO YOU TALK ON THE PHONE WITH FAMILY, FRIENDS, OR NEIGHBORS?: THREE TIMES A WEEK
HOW OFTEN DO YOU HAVE A DRINK CONTAINING ALCOHOL: NEVER
DO YOU BELONG TO ANY CLUBS OR ORGANIZATIONS SUCH AS CHURCH GROUPS UNIONS, FRATERNAL OR ATHLETIC GROUPS, OR SCHOOL GROUPS?: NO
HOW OFTEN DO YOU ATTEND CHURCH OR RELIGIOUS SERVICES?: PATIENT DECLINED
DO YOU BELONG TO ANY CLUBS OR ORGANIZATIONS SUCH AS CHURCH GROUPS UNIONS, FRATERNAL OR ATHLETIC GROUPS, OR SCHOOL GROUPS?: NO
HOW OFTEN DO YOU GET TOGETHER WITH FRIENDS OR RELATIVES?: PATIENT DECLINED
HOW OFTEN DO YOU HAVE SIX OR MORE DRINKS ON ONE OCCASION: NEVER
HOW MANY DRINKS CONTAINING ALCOHOL DO YOU HAVE ON A TYPICAL DAY WHEN YOU ARE DRINKING: PATIENT DECLINED

## 2022-03-07 ASSESSMENT — LIFESTYLE VARIABLES
HOW MANY STANDARD DRINKS CONTAINING ALCOHOL DO YOU HAVE ON A TYPICAL DAY: PATIENT DECLINED
HOW OFTEN DO YOU HAVE A DRINK CONTAINING ALCOHOL: NEVER
HOW OFTEN DO YOU HAVE SIX OR MORE DRINKS ON ONE OCCASION: NEVER

## 2022-03-07 NOTE — TELEPHONE ENCOUNTER
Received request via: Pharmacy  10/28/2021lov  Was the patient seen in the last year in this department? Yes    Does the patient have an active prescription (recently filled or refills available) for medication(s) requested? No

## 2022-03-08 ENCOUNTER — OFFICE VISIT (OUTPATIENT)
Dept: MEDICAL GROUP | Facility: LAB | Age: 77
End: 2022-03-08
Payer: MEDICARE

## 2022-03-08 VITALS
HEIGHT: 65 IN | RESPIRATION RATE: 16 BRPM | SYSTOLIC BLOOD PRESSURE: 110 MMHG | DIASTOLIC BLOOD PRESSURE: 70 MMHG | WEIGHT: 173 LBS | BODY MASS INDEX: 28.82 KG/M2

## 2022-03-08 DIAGNOSIS — N95.2 ATROPHIC VAGINITIS: ICD-10-CM

## 2022-03-08 DIAGNOSIS — F11.20 OPIATE DEPENDENCE, CONTINUOUS (HCC): ICD-10-CM

## 2022-03-08 DIAGNOSIS — E55.9 VITAMIN D DEFICIENCY: ICD-10-CM

## 2022-03-08 DIAGNOSIS — N81.10 ACQUIRED FEMALE BLADDER PROLAPSE: ICD-10-CM

## 2022-03-08 DIAGNOSIS — G89.29 CHRONIC SHOULDER PAIN, UNSPECIFIED LATERALITY: ICD-10-CM

## 2022-03-08 DIAGNOSIS — G89.29 CHRONIC BILATERAL LOW BACK PAIN WITH BILATERAL SCIATICA: ICD-10-CM

## 2022-03-08 DIAGNOSIS — Q89.01 ASPLENIA: ICD-10-CM

## 2022-03-08 DIAGNOSIS — M25.519 CHRONIC SHOULDER PAIN, UNSPECIFIED LATERALITY: ICD-10-CM

## 2022-03-08 DIAGNOSIS — M50.30 DDD (DEGENERATIVE DISC DISEASE), CERVICAL: ICD-10-CM

## 2022-03-08 DIAGNOSIS — Z00.00 MEDICARE ANNUAL WELLNESS VISIT, SUBSEQUENT: ICD-10-CM

## 2022-03-08 DIAGNOSIS — E11.9 CONTROLLED TYPE 2 DIABETES MELLITUS WITHOUT COMPLICATION, WITHOUT LONG-TERM CURRENT USE OF INSULIN (HCC): ICD-10-CM

## 2022-03-08 DIAGNOSIS — Z00.6 RESEARCH STUDY PATIENT: ICD-10-CM

## 2022-03-08 DIAGNOSIS — M54.41 CHRONIC BILATERAL LOW BACK PAIN WITH BILATERAL SCIATICA: ICD-10-CM

## 2022-03-08 DIAGNOSIS — E78.5 DYSLIPIDEMIA: ICD-10-CM

## 2022-03-08 DIAGNOSIS — I10 HYPERTENSION GOAL BP (BLOOD PRESSURE) < 140/80: ICD-10-CM

## 2022-03-08 DIAGNOSIS — M54.42 CHRONIC BILATERAL LOW BACK PAIN WITH BILATERAL SCIATICA: ICD-10-CM

## 2022-03-08 DIAGNOSIS — M54.2 CHRONIC NECK PAIN: ICD-10-CM

## 2022-03-08 DIAGNOSIS — I70.90 ATHEROSCLEROSIS: ICD-10-CM

## 2022-03-08 DIAGNOSIS — R93.1 ELEVATED CORONARY ARTERY CALCIUM SCORE: ICD-10-CM

## 2022-03-08 DIAGNOSIS — Z23 NEED FOR VACCINATION: ICD-10-CM

## 2022-03-08 DIAGNOSIS — C25.0 MALIGNANT NEOPLASM OF HEAD OF PANCREAS (HCC): Chronic | ICD-10-CM

## 2022-03-08 DIAGNOSIS — G89.29 CHRONIC NECK PAIN: ICD-10-CM

## 2022-03-08 PROBLEM — S72.90XA FEMUR FRACTURE (HCC): Status: RESOLVED | Noted: 2020-08-24 | Resolved: 2022-03-08

## 2022-03-08 PROBLEM — E11.65 UNCONTROLLED TYPE 2 DIABETES MELLITUS WITH HYPERGLYCEMIA (HCC): Status: RESOLVED | Noted: 2021-10-28 | Resolved: 2022-03-08

## 2022-03-08 PROCEDURE — 90621 MENB-FHBP VACC 2/3 DOSE IM: CPT | Performed by: NURSE PRACTITIONER

## 2022-03-08 PROCEDURE — 99213 OFFICE O/P EST LOW 20 MIN: CPT | Mod: 25 | Performed by: NURSE PRACTITIONER

## 2022-03-08 PROCEDURE — 90472 IMMUNIZATION ADMIN EACH ADD: CPT | Performed by: NURSE PRACTITIONER

## 2022-03-08 PROCEDURE — 90715 TDAP VACCINE 7 YRS/> IM: CPT | Performed by: NURSE PRACTITIONER

## 2022-03-08 PROCEDURE — G0439 PPPS, SUBSEQ VISIT: HCPCS | Mod: 25 | Performed by: NURSE PRACTITIONER

## 2022-03-08 PROCEDURE — 90471 IMMUNIZATION ADMIN: CPT | Performed by: NURSE PRACTITIONER

## 2022-03-08 ASSESSMENT — PATIENT HEALTH QUESTIONNAIRE - PHQ9: CLINICAL INTERPRETATION OF PHQ2 SCORE: 0

## 2022-03-08 ASSESSMENT — ENCOUNTER SYMPTOMS: GENERAL WELL-BEING: GOOD

## 2022-03-08 ASSESSMENT — FIBROSIS 4 INDEX: FIB4 SCORE: 0.61

## 2022-03-08 ASSESSMENT — ACTIVITIES OF DAILY LIVING (ADL): BATHING_REQUIRES_ASSISTANCE: 0

## 2022-03-08 NOTE — PROGRESS NOTES
Chief Complaint   Patient presents with   • Medicare Annual Wellness         HPI:  Larissa Burk is a 77 y.o. here for Medicare Annual Wellness Visit.  Overall feels really well and denies any specific complaints or concerns today.    Diabetes control has improved - fasting blood sugars 130-140.  Planning on walking for exercise when it warms up.    She does not have retinopathy per her optometrist.    Cut out cakes / ice cream.    Feet are tingling chronically which she attributes to her spine issues.     Chronic pain management through Dr. Garcia - also Epson salt baths help.     Patient Active Problem List    Diagnosis Date Noted   • Uncontrolled type 2 diabetes mellitus with hyperglycemia ([facility]) 10/28/2021   • Asplenia 03/01/2021   • Femur fracture ([facility]) 08/24/2020   • Elevated coronary artery calcium score 08/24/2020   • Atherosclerosis 02/19/2020   • Research study patient 06/13/2018   • Malignant neoplasm of head of pancreas ([facility])- hx of.  Dr. Blackburn. 2018 05/30/2018   • Opiate dependence, continuous ([facility]) 04/12/2018   • Dyslipidemia 06/12/2017   • Chronic bilateral low back pain with bilateral sciatica 06/12/2017   • Chronic neck pain 06/12/2017   • Acquired female bladder prolapse 05/19/2017   • DDD (degenerative disc disease), cervical 01/30/2017   • Atrophic vaginitis 01/30/2017   • Chronic sciatica of right side 10/13/2014   • DDD (degenerative disc disease), lumbar 10/13/2014   • Chronic shoulder pain 10/13/2014   • Vitamin D deficiency disease 02/25/2014   • Hypertension goal BP (blood pressure) < 140/80 04/20/2012       Current Outpatient Medications   Medication Sig Dispense Refill   • lisinopril-hydrochlorothiazide (PRINZIDE) 20-25 MG per tablet TAKE 1 TABLET BY MOUTH EVERY  Tablet 0   • metFORMIN ER (GLUCOPHAGE XR) 500 MG TABLET SR 24 HR Take 2 Tablets by mouth every day. With dinner 200 Tablet 2   • Blood Glucose Test Strips Test strips order: Test strips for meter covered by senior care  plus. Sig: use fasting in the morning and prn ssx high or low sugar #100 RF x 3 100 Strip 4   • Lancets Lancets order: Lancets for meter covered by [facility] plus. Sig: use fasting in the morning and prn ssx high or low sugar. #100 RF x 3 100 Each 3   • atorvastatin (LIPITOR) 20 MG Tab TAKE 1 TABLET BY MOUTH EVERY  tablet 3   • traMADol (ULTRAM) 50 MG Tab Take 50 mg by mouth every 8 hours as needed. Indications: Pain     • aspirin EC (ECOTRIN) 81 MG Tablet Delayed Response Take 81 mg by mouth every day.     • HYDROcodone/acetaminophen (NORCO)  MG Tab TAKE 1 TABLET EVERY 4 6 HOURS 09/04/20 30 DAYS     • multivitamin (THERAGRAN) Tab Take 1 Tab by mouth every day.     • Calcium Carb-Cholecalciferol (CALCIUM 1000 + D PO) Take 1 Tab by mouth every day.       No current facility-administered medications for this visit.        Patient is taking medications as noted in medication list.  Current supplements as per medication list.     Allergies: Levofloxacin, Nitrofurantoin, Amitriptyline, Sulfa drugs, and Lyrica    Current social contact/activities: shopping/ family/out to dinner    Is patient current with immunizations? No, due for TDAP. Patient is interested in receiving TDAP today./ Men B    She  reports that she has never smoked. She has never used smokeless tobacco. She reports that she does not drink alcohol and does not use drugs.  Counseling given: Not Answered        DPA/Advanced directive: Patient has Advanced Directive, but it is not on file. Instructed to bring in a copy to scan into their chart.    ROS:    Gait: Uses no assistive device   Ostomy: No   Other tubes: No   Amputations: No   Chronic oxygen use No   Last eye exam 2020   Wears hearing aids: No   : Denies any urinary leakage during the last 6 months      Screening:      Depression Screening  Little interest or pleasure in doing things?  0 - not at all  Feeling down, depressed, or hopeless? 0 - not at all  Patient Health Questionnaire  Score: 0    If depressive symptoms identified deferred to follow up visit unless specifically addressed in assessment and plan.    Interpretation of PHQ-9 Total Score   Score Severity   1-4 No Depression   5-9 Mild Depression   10-14 Moderate Depression   15-19 Moderately Severe Depression   20-27 Severe Depression    Screening for Cognitive Impairment  Three Minute Recall (daughter, heaven, mountain)  3/3    Garcia clock face with all 12 numbers and set the hands to show 10 past 11.  Yes    If cognitive concerns identified, deferred for follow up unless specifically addressed in assessment and plan.    Fall Risk Assessment  Has the patient had two or more falls in the last year or any fall with injury in the last year?  No  If fall risk identified, deferred for follow up unless specifically addressed in assessment and plan.    Safety Assessment  Throw rugs on floor.  No  Handrails on all stairs.  Yes  Good lighting in all hallways.  Yes  Difficulty hearing.  No  Patient counseled about all safety risks that were identified.    Functional Assessment ADLs  Are there any barriers preventing you from cooking for yourself or meeting nutritional needs?  No.    Are there any barriers preventing you from driving safely or obtaining transportation?  No.    Are there any barriers preventing you from using a telephone or calling for help?  No.    Are there any barriers preventing you from shopping?  No.    Are there any barriers preventing you from taking care of your own finances?  No.    Are there any barriers preventing you from managing your medications?  No.    Are there any barriers preventing you from showering, bathing or dressing yourself?  No.    Are you currently engaging in any exercise or physical activity?  Yes.     What is your perception of your health?  Good.    Health Maintenance Summary          Overdue - IMM DTaP/Tdap/Td Vaccine (1 - Tdap) Order placed this encounter    No completion history exists for this  topic.          Overdue - IMM ZOSTER VACCINES (1 of 2) Overdue - never done    No completion history exists for this topic.          Overdue - RETINAL SCREENING (Yearly) Overdue since 5/21/2016 05/21/2015  Postponed          Overdue - COLORECTAL CANCER SCREENING (COLONOSCOPY - Every 5 Years) Overdue since 9/20/2017 09/20/2012  REFERRAL TO GI FOR COLONOSCOPY          Overdue - Annual Wellness Visit (Every 366 Days) Overdue since 7/16/2020 07/16/2019  Visit Dx: Medicare annual wellness visit, subsequent    06/12/2017  Visit Dx: Medicare annual wellness visit, subsequent    03/16/2016  Visit Dx: Medicare annual wellness visit, subsequent          Overdue - MAMMOGRAM (Yearly) Overdue since 6/27/2021 06/27/2020  MA-SCREENING MAMMO BILAT W/TOMOSYNTHESIS W/CAD    01/15/2019  MA-SCREENING MAMMO BILAT W/TOMOSYNTHESIS W/CAD    06/05/2017  MA-MAMMO SCREENING BILAT W/NAINA W/CAD    03/23/2016  MA-SCREEN MAMMO W/CAD-BILAT    02/24/2015  MA-DIAGNOSTIC DIGITAL MAMMO-UNILAT    Only the first 5 history entries have been loaded, but more history exists.          Overdue - IMM INFLUENZA (1) Overdue - never done    No completion history exists for this topic.          Overdue - COVID-19 Vaccine (3 - Booster for Pfizer series) Overdue since 9/22/2021 04/22/2021  Imm Admin: Pfizer SARS-CoV-2 Vaccine    03/31/2021  Imm Admin: Pfizer SARS-CoV-2 Vaccine          Overdue - IMM MENINGOCOCCAL B VACCINE AT RISK PATIENTS AGED 10+ (2 of 5 - Risk Trumenba 3-dose series) Order placed this encounter    09/08/2021  Imm Admin: MENING VAC SERO B 2-3 DOSE SCHED IM (TRUMENBA)          BONE DENSITY (Every 5 Years) Tentatively due on 6/5/2022 06/05/2017  DS-BONE DENSITY STUDY (DEXA)    02/18/2014  DS-BONE DENSITY STUDY (DEXA)          A1C SCREENING (Every 6 Months) Tentatively due on 9/5/2022 03/05/2022  HEMOGLOBIN A1C    10/18/2021  HEMOGLOBIN A1C    03/01/2021  POCT  A1C    08/25/2020  HEMOGLOBIN A1C    02/18/2020  HEMOGLOBIN A1C     Only the first 5 history entries have been loaded, but more history exists.          DIABETES MONOFILAMENT / LE EXAM (Yearly) Next due on 9/8/2022 09/08/2021  Smart[employer]: WORKFLOW - DIABETES - DIABETIC FOOT EXAM PERFORMED    05/21/2015  Done    02/25/2014  Done          FASTING LIPID PROFILE (Yearly) Next due on 10/18/2022    10/18/2021  Lipid Profile    02/18/2020  Lipid Profile    07/13/2019  Lipid Profile    04/18/2018  LIPID PROFILE    06/09/2017  LIPID PROFILE    Only the first 5 history entries have been loaded, but more history exists.          SERUM CREATININE (Yearly) Next due on 10/18/2022    10/18/2021  Comp Metabolic Panel    08/26/2020  Basic Metabolic Panel    08/25/2020  Basic Metabolic Panel    08/24/2020  COMP METABOLIC PANEL    02/18/2020  Comp Metabolic Panel    Only the first 5 history entries have been loaded, but more history exists.          URINE ACR / MICROALBUMIN (Yearly) Next due on 3/5/2023    03/05/2022  MICROALBUMIN CREAT RATIO URINE    10/18/2021  MICROALBUMIN CREAT RATIO URINE    01/11/2016  MICROALBUMIN CREAT RATIO URINE    05/26/2015  MICROALBUMIN CREAT RATIO URINE    02/22/2014  MICROALBUMIN CREAT RATIO URINE          IMM MENINGOCOCCAL VACCINE (MCV4) (3 - Risk start 2-23 months series) Next due on 9/8/2026 09/08/2021  Imm Admin: Meningococcal Conjugate Vaccine MCV4 (Menactra)    02/19/2020  Imm Admin: Meningococcal Conjugate Vaccine MCV4 (Menactra)          HEPATITIS C SCREENING  Completed    06/09/2017  HEP C VIRUS ANTIBODY          IMM PNEUMOCOCCAL VACCINE: 65+ Years (Series Information) Completed    03/01/2021  Imm Admin: Pneumococcal polysaccharide vaccine (PPSV-23)    01/17/2019  Imm Admin: Pneumococcal Conjugate Vaccine (Prevnar/PCV-13)          IMM HEP B VACCINE (Series Information) Aged Out    No completion history exists for this topic.          Discontinued - PAP SMEAR  Discontinued    11/09/2017  PATHOLOGY GYN SPECIMEN    03/25/2014  PAP IG, HPV-HR                 Patient Care Team:  CORNELIA Ramos as PCP - General (Family Medicine)  Will Naqvi O.D. as Consulting Physician (Optometry)  Jose Gonazles M.D. (Anesthesiology)  Paty Eaton M.D. as Consulting Physician (Medical Oncology)  [facility] (Inactive) as Consulting Physician  Maylin Murphy C.N.A. as [facility]     Social History     Tobacco Use   • Smoking status: Never Smoker   • Smokeless tobacco: Never Used   Vaping Use   • Vaping Use: Never used   Substance Use Topics   • Alcohol use: No     Alcohol/week: 0.0 oz   • Drug use: No     Family History   Problem Relation Age of Onset   • No Known Problems Mother    • No Known Problems Father      She  has a past medical history of Bowel habit changes (05/17/2018), Bronchitis (2005), Cancer (HCC) (05/2018), Chronic low back pain, Chronic neck pain, Dental disorder, Hypertension (2015), Other specified symptom associated with female genital organs, Pain (05/17/2018), Pre-diabetes, and Snoring.    She has no past medical history of Breast cancer ([facility]).   Past Surgical History:   Procedure Laterality Date   • NEURO DEST FACET L/S W/IG SNGL Right 2/25/2021    Procedure: DESTRUCTION, NERVE, FACET JOINT, LUMBOSACRAL, USING NEUROLYTIC AGENT, WITH FLUOROSCOPIC GUIDANCE;  Surgeon: Frankie Saha M.D.;  [facility];  Service: Pain Management   • NEURO DEST FACET L/S W/IG SNGL Left 2/11/2021    Procedure: DESTRUCTION, NERVE, FACET JOINT, LUMBOSACRAL, USING NEUROLYTIC AGENT, WITH FLUOROSCOPIC GUIDANCE;  Surgeon: Jose Gonzales M.D.;  Location: [facility];  Service: Pain Management   • PB TOTAL HIP ARTHROPLASTY Left 8/24/2020    Procedure: ARTHROPLASTY, HIP, TOTAL;  Surgeon: oTmas Venegas M.D.;  Location: SURGERY [facility];  Service: Orthopedics   • NEURO DEST FACET L/S W/IG SNGL Left 8/11/2020    Procedure: DESTRUCTION, NERVE, FACET JOINT,  LUMBOSACRAL, USING NEUROLYTIC AGENT, WITH FLUOROSCOPIC GUIDANCE;  Surgeon: Jose Gonzales M.D.;  Location: [facility];  Service: Pain Management   • PANCREATECTOMY  11/5/2018    Procedure: PANCREATECTOMY-  DISTAL;  Surgeon: Carl Valadez M.D.;  Location: SURGERY [facility];  Service: General   • SPLENECTOMY  11/5/2018    Procedure: SPLENECTOMY;  Surgeon: Carl Valadez M.D.;  Location: SURGERY [facility];  Service: General   • NODE DISSECTION  11/5/2018    Procedure: NODE DISSECTION;  Surgeon: Carl Valadez M.D.;  Location: SURGERY [facility];  Service: General   • OMENTECTOMY  11/5/2018    Procedure: OMENTECTOMY-  OMENTAL FLAP, INTRAOPERATIVE ULTRASOUND, PORTAL VEIN RESECTION AND REPAIR, CELIAC TRUNK PARTIAL RESECTION AND REPAIR;  Surgeon: Carl Valadez M.D.;  Location: [facility];  Service: General   • CATH PLACEMENT Right 5/30/2018    Procedure: CATH PLACEMENT - CEPHALIC POWER PORT;  Surgeon: Carl Hunter M.D.;  Location: SURGERY [facility] DAY [facility];  Service: General   • GASTROSCOPY  5/18/2018    Procedure: GASTROSCOPY;  Surgeon: Geovanni Romero M.D.;  Location: [facility];  Service: EUS   • EGD W/ENDOSCOPIC ULTRASOUND  5/18/2018    Procedure: EGD W/ENDOSCOPIC ULTRASOUND-  UPPER CURVILLINEAR;  Surgeon: Geovanni Romero M.D.;  Location: [facility];  Service: EUS   • EGD WITH ASP/BX  5/18/2018    Procedure: EGD WITH ASP/BX-  GASTRIC BIOPSIES,  FNA BIOPSIES OF PANCREAS HEAD MASS AND OR GALLBLADDER;  Surgeon: Geovanni Romero M.D.;  Location: [facility];  Service: EUS   • DENTAL EXTRACTION(S)  12/2017    AND hx of other dental extractions with sedation   • HYSTERECTOMY ROBOTIC XI  7/9/2015    Procedure: HYSTERECTOMY ROBOTIC XI W/ BILATERAL SALPINGO-OOPHORECTOMY, MCCALLS PROCEDURES;  Surgeon: Brian Parks M.D.;  Location: [facility];  Service:    •  "COLONOSCOPY  2008   • CERVICAL DISK AND FUSION ANTERIOR  2006    neck cage/fusion   • DENTAL EXTRACTION(S)  1966    Lissie teeth   • OTHER      Sedation for several MRI's   • OTHER NEUROLOGICAL SURG      cervical 2007     Exam:   /70   Resp 16   Ht 1.651 m (5' 5\")   Wt 78.5 kg (173 lb)   Hearing excellent.    Dentition good  Alert, oriented in no acute distress.  Eye contact is good, speech goal directed, affect calm  CV RRR  Lungs:  CTA bilaterally    Assessment and Plan. The following treatment and monitoring plan is recommended:    1. Medicare annual wellness visit, subsequent    2. Need for vaccination  -Patient was counseled regarding all immunizations, what the patient is being immunized against, possible side effects, and the importance of immunization.  - Tdap Vaccine =>8YO IM  - Meningococcal Vaccine Serogroup B 2-3 Dose (TRUMENBA)    3. Malignant neoplasm of head of pancreas (HCC)- hx of.  Dr. Blackburn. 2018  -Resolved.  No longer follows up with oncology.  Denies nausea, vomiting, unintentional weight loss or abdominal pain.    4. Hypertension goal BP (blood pressure) < 140/80  -Controlled.  Continue same.  - HEMOGLOBIN A1C; Future  - Comp Metabolic Panel; Future    5. Vitamin D deficiency disease    6. Chronic shoulder pain, unspecified laterality  -stable.      7. DDD (degenerative disc disease), cervical  -stable.      8. Atrophic vaginitis  -stable.      9. Acquired female bladder prolapse  -stable.     10. Dyslipidemia  -labs UTD    11. Chronic bilateral low back pain with bilateral sciatica  --stable / UTD with Dr. Garcia    12. Chronic neck pain  -stable.  UTD Dr. Garcia    13. Opiate dependence, continuous (HCC)  -Followed by pain management physician.    14. Research study patient    15. Atherosclerosis  -On statin therapy.  Working on weight loss, exercise and healthy eating.    16. Elevated coronary artery calcium score  -On statin therapy.  Not having chest pain.    17. " Asplenia  -Updated immunizations today.  Recommend continuation of wearing a mask in public during the pandemic because of being asplenic.    18. controlled type 2 diabetes mellitus with hyperglycemia (HCC)  -A1c dramatically improved now at 7.9.  She is checking fasting blood sugars in the morning and I encouraged her to periodically check her blood sugar about 1 to 2 hours after dinner so that she can see the repercussions of carbohydrate intake.  Encouraged her to restart an exercise program.  Discussed proper foot care and yearly eye appointments.  Recheck a1c mo.    - HEMOGLOBIN A1C; Future      HCM:  Recommend scheduling mammogram.  Declined flu shot / covid booster.  Encouraged shingrix.    Services suggested: No services needed at this time  Health Care Screening recommendations as per orders if indicated.  Referrals offered: PT/OT/Nutrition counseling/Behavioral Health/Smoking cessation as per orders if indicated.    Discussion today about general wellness and lifestyle habits:    · Prevent falls and reduce trip hazards; Cautioned about securing or removing rugs.  · Have a working fire alarm and carbon monoxide detector;   · Engage in regular physical activity and social activities

## 2022-05-25 ENCOUNTER — TELEPHONE (OUTPATIENT)
Dept: MEDICAL GROUP | Facility: LAB | Age: 77
End: 2022-05-25

## 2022-05-25 NOTE — TELEPHONE ENCOUNTER
I spoke with Larissa and she had eye exam last week with Dr. Naqvi and was told she has cataracts and needs to see surgeon ([facility], Isaias Cox) and was told she would be called to schedule.   She declines any further colonoscopies.   She will call to schedule mammogram.  I will try to get records from Dr. Naqvi's office. Faxed records request     Eye exam report in chart.

## 2022-06-16 ENCOUNTER — TELEPHONE (OUTPATIENT)
Dept: HEALTH INFORMATION MANAGEMENT | Facility: OTHER | Age: 77
End: 2022-06-16
Payer: MEDICARE

## 2022-06-17 DIAGNOSIS — I10 ESSENTIAL HYPERTENSION: ICD-10-CM

## 2022-06-17 RX ORDER — LISINOPRIL AND HYDROCHLOROTHIAZIDE 25; 20 MG/1; MG/1
TABLET ORAL
Qty: 100 TABLET | Refills: 0 | Status: SHIPPED | OUTPATIENT
Start: 2022-06-17 | End: 2022-12-09 | Stop reason: SDUPTHER

## 2022-07-29 ENCOUNTER — OFFICE VISIT (OUTPATIENT)
Dept: URGENT CARE | Facility: CLINIC | Age: 77
End: 2022-07-29
Payer: MEDICARE

## 2022-07-29 ENCOUNTER — TELEPHONE (OUTPATIENT)
Dept: MEDICAL GROUP | Facility: PHYSICIAN GROUP | Age: 77
End: 2022-07-29
Payer: MEDICARE

## 2022-07-29 VITALS
TEMPERATURE: 97.3 F | SYSTOLIC BLOOD PRESSURE: 186 MMHG | HEIGHT: 66 IN | BODY MASS INDEX: 28.12 KG/M2 | HEART RATE: 76 BPM | WEIGHT: 175 LBS | RESPIRATION RATE: 16 BRPM | DIASTOLIC BLOOD PRESSURE: 70 MMHG | OXYGEN SATURATION: 96 %

## 2022-07-29 DIAGNOSIS — I10 ELEVATED BLOOD PRESSURE READING WITH DIAGNOSIS OF HYPERTENSION: ICD-10-CM

## 2022-07-29 PROCEDURE — 99215 OFFICE O/P EST HI 40 MIN: CPT | Performed by: PHYSICIAN ASSISTANT

## 2022-07-29 RX ORDER — MELOXICAM 7.5 MG/1
7.5 TABLET ORAL DAILY
COMMUNITY
End: 2023-06-07

## 2022-07-29 ASSESSMENT — ENCOUNTER SYMPTOMS
EYE DISCHARGE: 0
MYALGIAS: 0
DOUBLE VISION: 0
FEVER: 0
BLURRED VISION: 0
HEADACHES: 1
EYE REDNESS: 0
SHORTNESS OF BREATH: 0
NAUSEA: 0
VOMITING: 0
COUGH: 0

## 2022-07-29 ASSESSMENT — FIBROSIS 4 INDEX: FIB4 SCORE: 0.61
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
## 2022-09-04 DIAGNOSIS — E78.5 DYSLIPIDEMIA: ICD-10-CM

## 2022-09-06 RX ORDER — ATORVASTATIN CALCIUM 20 MG/1
TABLET, FILM COATED ORAL
Qty: 100 TABLET | Refills: 3 | Status: SHIPPED | OUTPATIENT
Start: 2022-09-06 | End: 2022-12-09 | Stop reason: SDUPTHER

## 2022-12-07 ENCOUNTER — PATIENT MESSAGE (OUTPATIENT)
Dept: MEDICAL GROUP | Facility: LAB | Age: 77
End: 2022-12-07
Payer: MEDICARE

## 2022-12-07 DIAGNOSIS — E11.65 UNCONTROLLED TYPE 2 DIABETES MELLITUS WITH HYPERGLYCEMIA (HCC): ICD-10-CM

## 2022-12-07 RX ORDER — METFORMIN HYDROCHLORIDE 500 MG/1
TABLET, EXTENDED RELEASE ORAL
Qty: 100 TABLET | Refills: 2 | Status: SHIPPED | OUTPATIENT
Start: 2022-12-07 | End: 2023-02-28 | Stop reason: SDUPTHER

## 2022-12-07 NOTE — TELEPHONE ENCOUNTER
Received request via: Pharmacy    Was the patient seen in the last year in this department? Yes  LOV 03/08/2022  Does the patient have an active prescription (recently filled or refills available) for medication(s) requested? No    Does the patient have [facility] Plus and need 100 day supply (blood pressure, diabetes and cholesterol meds only)? Yes, quantity updated to 100 days

## 2022-12-09 DIAGNOSIS — I10 ESSENTIAL HYPERTENSION: ICD-10-CM

## 2022-12-09 DIAGNOSIS — E78.5 DYSLIPIDEMIA: ICD-10-CM

## 2022-12-09 RX ORDER — LISINOPRIL AND HYDROCHLOROTHIAZIDE 25; 20 MG/1; MG/1
1 TABLET ORAL
Qty: 100 TABLET | Refills: 0 | Status: SHIPPED | OUTPATIENT
Start: 2022-12-09 | End: 2023-04-03

## 2022-12-09 RX ORDER — ATORVASTATIN CALCIUM 20 MG/1
20 TABLET, FILM COATED ORAL
Qty: 100 TABLET | Refills: 3 | Status: SHIPPED | OUTPATIENT
Start: 2022-12-09 | End: 2024-01-22

## 2022-12-09 NOTE — TELEPHONE ENCOUNTER
Received request via: Patient    Was the patient seen in the last year in this department? Yes    Does the patient have an active prescription (recently filled or refills available) for medication(s) requested? No    Does the patient have halfway Plus and need 100 day supply (blood pressure, diabetes and cholesterol meds only)? Yes, quantity updated to 100 days

## 2023-01-30 NOTE — TELEPHONE ENCOUNTER
Received request via: Pharmacy    Was the patient seen in the last year in this department? Yes    Does the patient have an active prescription (recently filled or refills available) for medication(s) requested? No    Does the patient have half-way Plus and need 100 day supply (blood pressure, diabetes and cholesterol meds only)? Medication is not for cholesterol, blood pressure or diabetes

## 2023-02-22 ENCOUNTER — TELEPHONE (OUTPATIENT)
Dept: MEDICAL GROUP | Facility: LAB | Age: 78
End: 2023-02-22
Payer: MEDICARE

## 2023-02-22 NOTE — TELEPHONE ENCOUNTER
ESTABLISHED PATIENT PRE-VISIT PLANNING     Patient was contacted to complete PVP.     Note: Patient will not be contacted if there is no indication to call.     1.  Reviewed notes from the last few office visits within the medical group: Yes    2.  If any orders were placed at last visit or intended to be done for this visit (i.e. 6 mos follow-up), do we have Results/Consult Notes?           Labs - Labs ordered, but not to be completed until pt will have done prior .  Note: If patient appointment is for lab review and patient did not complete labs, check with provider if OK to reschedule patient until labs completed.         Imaging - Imaging was not ordered at last office visit.         Referrals - No referrals were ordered at last office visit.    3. Is this appointment scheduled as a Hospital Follow-Up? No    4.  Immunizations were updated in Epic using Reconcile Outside Information activity? Yes    5.  Patient is due for the following Health Maintenance Topics:   Health Maintenance Due   Topic Date Due    IMM ZOSTER VACCINES (1 of 2) Never done    COVID-19 Vaccine (3 - Booster for Pfizer series) 06/17/2021    BONE DENSITY  06/05/2022    IMM INFLUENZA (1) Never done    A1C SCREENING  09/05/2022    DIABETES MONOFILAMENT / LE EXAM  09/08/2022    FASTING LIPID PROFILE  10/18/2022    SERUM CREATININE  10/18/2022    URINE ACR / MICROALBUMIN  03/05/2023    IMM MENINGOCOCCAL B VACCINE AT RISK PATIENTS AGED 10+ (3 of 4 - Risk Trumenba 3-dose series) 03/08/2023       6.  AHA (Pulse8) form printed for Provider? N/A

## 2023-02-28 ENCOUNTER — OFFICE VISIT (OUTPATIENT)
Dept: MEDICAL GROUP | Facility: LAB | Age: 78
End: 2023-02-28
Payer: MEDICARE

## 2023-02-28 VITALS
RESPIRATION RATE: 16 BRPM | BODY MASS INDEX: 27.48 KG/M2 | TEMPERATURE: 96.7 F | OXYGEN SATURATION: 95 % | DIASTOLIC BLOOD PRESSURE: 60 MMHG | WEIGHT: 171 LBS | HEIGHT: 66 IN | SYSTOLIC BLOOD PRESSURE: 138 MMHG | HEART RATE: 70 BPM

## 2023-02-28 DIAGNOSIS — Z78.0 ASYMPTOMATIC MENOPAUSE: ICD-10-CM

## 2023-02-28 DIAGNOSIS — F11.20 OPIATE DEPENDENCE, CONTINUOUS (HCC): ICD-10-CM

## 2023-02-28 DIAGNOSIS — I10 HYPERTENSION GOAL BP (BLOOD PRESSURE) < 140/80: ICD-10-CM

## 2023-02-28 DIAGNOSIS — M54.41 CHRONIC BILATERAL LOW BACK PAIN WITH BILATERAL SCIATICA: ICD-10-CM

## 2023-02-28 DIAGNOSIS — Z12.31 SCREENING MAMMOGRAM FOR BREAST CANCER: ICD-10-CM

## 2023-02-28 DIAGNOSIS — E11.9 CONTROLLED TYPE 2 DIABETES MELLITUS WITHOUT COMPLICATION, WITHOUT LONG-TERM CURRENT USE OF INSULIN (HCC): ICD-10-CM

## 2023-02-28 DIAGNOSIS — M54.42 CHRONIC BILATERAL LOW BACK PAIN WITH BILATERAL SCIATICA: ICD-10-CM

## 2023-02-28 DIAGNOSIS — G89.29 CHRONIC BILATERAL LOW BACK PAIN WITH BILATERAL SCIATICA: ICD-10-CM

## 2023-02-28 PROCEDURE — 99214 OFFICE O/P EST MOD 30 MIN: CPT | Performed by: NURSE PRACTITIONER

## 2023-02-28 RX ORDER — METFORMIN HYDROCHLORIDE 500 MG/1
1000 TABLET, EXTENDED RELEASE ORAL
Qty: 200 TABLET | Refills: 2 | Status: SHIPPED | OUTPATIENT
Start: 2023-02-28 | End: 2024-01-19 | Stop reason: SDUPTHER

## 2023-02-28 ASSESSMENT — FIBROSIS 4 INDEX: FIB4 SCORE: 0.61

## 2023-02-28 ASSESSMENT — PATIENT HEALTH QUESTIONNAIRE - PHQ9: CLINICAL INTERPRETATION OF PHQ2 SCORE: 0

## 2023-02-28 NOTE — PROGRESS NOTES
"Chief Complaint   Patient presents with    Paperwork     DMV       HPI:  Larissa is a 76 yo est female here to f/u on chronic issues.  Overall feeling well.      Controlled type 2 diabetes mellitus without complication, without long-term current use of insulin (HCC)  Chronic issue.  Taking metformin 1000 mg xr daily but rx is for 500 mg.   today and around this range for past few months.  Exercising at home via bands / foot movement tool.  Denies foot burning / tingling.  Overdue for labs - plans on doing next week.  Eating an avocado and egg everyday to stabilize BG.    Hypertension goal BP (blood pressure) < 140/80  Chronic issue. Denies CP or leg swelling.  Home /70 - 140/80's.      Chronic bilateral low back pain with bilateral sciatica  Chronic issue and pain management is through Dr. Garcia.     Exam:  /60 (BP Location: Left arm, Patient Position: Sitting, BP Cuff Size: Large adult)   Pulse 70   Temp 35.9 °C (96.7 °F)   Resp 16   Ht 1.676 m (5' 6\")   Wt 77.6 kg (171 lb)   SpO2 95%   Gen: NAD  Resp: nonlabored.  Able to speak in full sentences  Psy: pleasant / cooperative.   Neuro:  Alert and oriented x 3    A/P:  1.  type 2 diabetes mellitus without complication, without long-term current use of insulin (HCC)  Overdue for labs.  Plans on doing these in a week or so when weather improves.  Encouraged a strict DM diet and daily exercise along with foot care and yearly eye appts.  Metformin rx increased to 1000 mg daily.   - Comp Metabolic Panel; Future  - CBC WITH DIFFERENTIAL; Future  - Lipid Profile; Future  - HEMOGLOBIN A1C; Future  - TSH; Future  - MICROALBUMIN CREAT RATIO URINE; Future  - metFORMIN ER (GLUCOPHAGE XR) 500 MG TABLET SR 24 HR; Take 2 Tablets by mouth with dinner.  Dispense: 200 Tablet; Refill: 2    2. Asymptomatic menopause  -recommend Ca, vit D and weight bearing exercise.  Update dexa  - DS-BONE DENSITY STUDY (DEXA); Future    3. Screening mammogram for breast " cancer  - MA-SCREENING MAMMO BILAT W/TOMOSYNTHESIS W/CAD; Future    4. Opiate dependence, continuous (HCC)  -followed by Dr. Garcia.  No driving within 6 hours of opiate.      5. Hypertension goal BP (blood pressure) < 140/80  -stable.  Continue same.     6. Chronic bilateral low back pain with bilateral sciatica  -stable.  Followed by Dr. Saha.    F/u 6 mo.     Kevin @ pharmacy.

## 2023-02-28 NOTE — ASSESSMENT & PLAN NOTE
Chronic issue.  Taking metformin 500 mg xr daily.   today.  Exercising at home via bands / foot movement tool.  Denies foot burning / tingling.  Overdue for labs - plans on doing next week.

## 2023-03-07 ENCOUNTER — TELEPHONE (OUTPATIENT)
Dept: HEALTH INFORMATION MANAGEMENT | Facility: OTHER | Age: 78
End: 2023-03-07

## 2023-04-03 DIAGNOSIS — I10 ESSENTIAL HYPERTENSION: ICD-10-CM

## 2023-04-03 RX ORDER — LISINOPRIL AND HYDROCHLOROTHIAZIDE 25; 20 MG/1; MG/1
1 TABLET ORAL
Qty: 100 TABLET | Refills: 2 | Status: SHIPPED | OUTPATIENT
Start: 2023-04-03 | End: 2024-02-12

## 2023-04-03 NOTE — TELEPHONE ENCOUNTER
Received request via: Pharmacy    Was the patient seen in the last year in this department? Yes  LOV 02/28/2023  Does the patient have an active prescription (recently filled or refills available) for medication(s) requested? No    Does the patient have [facility] Plus and need 100 day supply (blood pressure, diabetes and cholesterol meds only)? Yes, quantity updated to 100 days

## 2023-05-31 ENCOUNTER — HOSPITAL ENCOUNTER (OUTPATIENT)
Dept: RADIOLOGY | Facility: MEDICAL CENTER | Age: 78
End: 2023-05-31
Attending: NURSE PRACTITIONER
Payer: MEDICARE

## 2023-05-31 DIAGNOSIS — Z78.0 ASYMPTOMATIC MENOPAUSE: ICD-10-CM

## 2023-05-31 DIAGNOSIS — Z12.31 SCREENING MAMMOGRAM FOR BREAST CANCER: ICD-10-CM

## 2023-05-31 PROCEDURE — 77063 BREAST TOMOSYNTHESIS BI: CPT

## 2023-05-31 PROCEDURE — 77080 DXA BONE DENSITY AXIAL: CPT

## 2023-06-02 ENCOUNTER — HOSPITAL ENCOUNTER (OUTPATIENT)
Dept: LAB | Facility: MEDICAL CENTER | Age: 78
End: 2023-06-02
Attending: NURSE PRACTITIONER
Payer: MEDICARE

## 2023-06-02 DIAGNOSIS — E11.9 CONTROLLED TYPE 2 DIABETES MELLITUS WITHOUT COMPLICATION, WITHOUT LONG-TERM CURRENT USE OF INSULIN (HCC): ICD-10-CM

## 2023-06-02 LAB
ALBUMIN SERPL BCP-MCNC: 4.2 G/DL (ref 3.2–4.9)
ALBUMIN/GLOB SERPL: 1.4 G/DL
ALP SERPL-CCNC: 101 U/L (ref 30–99)
ALT SERPL-CCNC: 32 U/L (ref 2–50)
ANION GAP SERPL CALC-SCNC: 11 MMOL/L (ref 7–16)
AST SERPL-CCNC: 19 U/L (ref 12–45)
BASOPHILS # BLD AUTO: 1 % (ref 0–1.8)
BASOPHILS # BLD: 0.09 K/UL (ref 0–0.12)
BILIRUB SERPL-MCNC: 0.4 MG/DL (ref 0.1–1.5)
BUN SERPL-MCNC: 25 MG/DL (ref 8–22)
CALCIUM ALBUM COR SERPL-MCNC: 10.2 MG/DL (ref 8.5–10.5)
CALCIUM SERPL-MCNC: 10.4 MG/DL (ref 8.5–10.5)
CHLORIDE SERPL-SCNC: 100 MMOL/L (ref 96–112)
CHOLEST SERPL-MCNC: 106 MG/DL (ref 100–199)
CO2 SERPL-SCNC: 30 MMOL/L (ref 20–33)
CREAT SERPL-MCNC: 0.72 MG/DL (ref 0.5–1.4)
EOSINOPHIL # BLD AUTO: 0.22 K/UL (ref 0–0.51)
EOSINOPHIL NFR BLD: 2.5 % (ref 0–6.9)
ERYTHROCYTE [DISTWIDTH] IN BLOOD BY AUTOMATED COUNT: 49.8 FL (ref 35.9–50)
EST. AVERAGE GLUCOSE BLD GHB EST-MCNC: 206 MG/DL
FASTING STATUS PATIENT QL REPORTED: NORMAL
GFR SERPLBLD CREATININE-BSD FMLA CKD-EPI: 86 ML/MIN/1.73 M 2
GLOBULIN SER CALC-MCNC: 3 G/DL (ref 1.9–3.5)
GLUCOSE SERPL-MCNC: 156 MG/DL (ref 65–99)
HBA1C MFR BLD: 8.8 % (ref 4–5.6)
HCT VFR BLD AUTO: 43.4 % (ref 37–47)
HDLC SERPL-MCNC: 57 MG/DL
HGB BLD-MCNC: 13.9 G/DL (ref 12–16)
IMM GRANULOCYTES # BLD AUTO: 0.05 K/UL (ref 0–0.11)
IMM GRANULOCYTES NFR BLD AUTO: 0.6 % (ref 0–0.9)
LDLC SERPL CALC-MCNC: 33 MG/DL
LYMPHOCYTES # BLD AUTO: 3.31 K/UL (ref 1–4.8)
LYMPHOCYTES NFR BLD: 37.3 % (ref 22–41)
MCH RBC QN AUTO: 31.6 PG (ref 27–33)
MCHC RBC AUTO-ENTMCNC: 32 G/DL (ref 32.2–35.5)
MCV RBC AUTO: 98.6 FL (ref 81.4–97.8)
MONOCYTES # BLD AUTO: 1.05 K/UL (ref 0–0.85)
MONOCYTES NFR BLD AUTO: 11.8 % (ref 0–13.4)
NEUTROPHILS # BLD AUTO: 4.16 K/UL (ref 1.82–7.42)
NEUTROPHILS NFR BLD: 46.8 % (ref 44–72)
NRBC # BLD AUTO: 0 K/UL
NRBC BLD-RTO: 0 /100 WBC (ref 0–0.2)
PLATELET # BLD AUTO: 334 K/UL (ref 164–446)
PMV BLD AUTO: 9.6 FL (ref 9–12.9)
POTASSIUM SERPL-SCNC: 4.6 MMOL/L (ref 3.6–5.5)
PROT SERPL-MCNC: 7.2 G/DL (ref 6–8.2)
RBC # BLD AUTO: 4.4 M/UL (ref 4.2–5.4)
SODIUM SERPL-SCNC: 141 MMOL/L (ref 135–145)
TRIGL SERPL-MCNC: 82 MG/DL (ref 0–149)
TSH SERPL DL<=0.005 MIU/L-ACNC: 2.77 UIU/ML (ref 0.38–5.33)
WBC # BLD AUTO: 8.9 K/UL (ref 4.8–10.8)

## 2023-06-02 PROCEDURE — 36415 COLL VENOUS BLD VENIPUNCTURE: CPT

## 2023-06-02 PROCEDURE — 84443 ASSAY THYROID STIM HORMONE: CPT

## 2023-06-02 PROCEDURE — 83036 HEMOGLOBIN GLYCOSYLATED A1C: CPT

## 2023-06-02 PROCEDURE — 82570 ASSAY OF URINE CREATININE: CPT

## 2023-06-02 PROCEDURE — 80061 LIPID PANEL: CPT

## 2023-06-02 PROCEDURE — 85025 COMPLETE CBC W/AUTO DIFF WBC: CPT

## 2023-06-02 PROCEDURE — 80053 COMPREHEN METABOLIC PANEL: CPT

## 2023-06-02 PROCEDURE — 82043 UR ALBUMIN QUANTITATIVE: CPT

## 2023-06-03 LAB
CREAT UR-MCNC: 159.99 MG/DL
MICROALBUMIN UR-MCNC: <1.2 MG/DL
MICROALBUMIN/CREAT UR: NORMAL MG/G (ref 0–30)

## 2023-06-05 ENCOUNTER — TELEPHONE (OUTPATIENT)
Dept: MEDICAL GROUP | Facility: LAB | Age: 78
End: 2023-06-05
Payer: MEDICARE

## 2023-06-07 ENCOUNTER — OFFICE VISIT (OUTPATIENT)
Dept: MEDICAL GROUP | Facility: LAB | Age: 78
End: 2023-06-07
Payer: MEDICARE

## 2023-06-07 VITALS
TEMPERATURE: 96.7 F | HEIGHT: 66 IN | BODY MASS INDEX: 26.52 KG/M2 | WEIGHT: 165 LBS | HEART RATE: 66 BPM | SYSTOLIC BLOOD PRESSURE: 130 MMHG | RESPIRATION RATE: 16 BRPM | OXYGEN SATURATION: 93 % | DIASTOLIC BLOOD PRESSURE: 50 MMHG

## 2023-06-07 DIAGNOSIS — E11.9 CONTROLLED TYPE 2 DIABETES MELLITUS WITHOUT COMPLICATION, WITHOUT LONG-TERM CURRENT USE OF INSULIN (HCC): ICD-10-CM

## 2023-06-07 DIAGNOSIS — M81.0 OSTEOPOROSIS, UNSPECIFIED OSTEOPOROSIS TYPE, UNSPECIFIED PATHOLOGICAL FRACTURE PRESENCE: ICD-10-CM

## 2023-06-07 DIAGNOSIS — M25.551 RIGHT HIP PAIN: ICD-10-CM

## 2023-06-07 PROCEDURE — 99214 OFFICE O/P EST MOD 30 MIN: CPT | Performed by: NURSE PRACTITIONER

## 2023-06-07 PROCEDURE — 3078F DIAST BP <80 MM HG: CPT | Performed by: NURSE PRACTITIONER

## 2023-06-07 PROCEDURE — 3075F SYST BP GE 130 - 139MM HG: CPT | Performed by: NURSE PRACTITIONER

## 2023-06-07 RX ORDER — ALENDRONATE SODIUM 70 MG/1
70 TABLET ORAL
Qty: 4 TABLET | Refills: 11 | Status: SHIPPED | OUTPATIENT
Start: 2023-06-07 | End: 2024-02-26

## 2023-06-07 ASSESSMENT — FIBROSIS 4 INDEX: FIB4 SCORE: 0.77

## 2023-06-07 NOTE — PROGRESS NOTES
"CC  F/u      HPI:  Larissa is a 78-year-old established female here to follow-up:    #1- osteoporosis:  latest bone density showed osteoporosis.  She had a 4.1% decrease in her bone density compared to 2017 and now has a T score of -2.5 in her hip.  T score in her spine is -1.9.  has a senior treadmill with handles - using 3-4 x per week.   Taking calcium / vit D.       #2- DM type II:  chronic issue.  A1c up to 8.8% but she has been eating more ice cream and carbs lately.  Taking metformin 1gm daily.   today and the past few days.     #3- right hip pain:  present x 4-5 months.  Limping a bit.  Hurts to  one place.        Exam:  /50 (BP Location: Right arm, Patient Position: Sitting, BP Cuff Size: Large adult)   Pulse 66   Temp 35.9 °C (96.7 °F)   Resp 16   Ht 1.676 m (5' 6\")   Wt 74.8 kg (165 lb)   SpO2 93%   Gen: NAD  Resp: nonlabored.  Able to speak in full sentences  Psy: pleasant / cooperative.   Neuro:  Alert and oriented x 3    A/P:  1. Right hip pain  -Recommend x-ray to assess degree of arthritis involved and will then further advise on need for replacement or injection.  She has tried meloxicam without any improvement.  - DX-HIP-COMPLETE - UNILATERAL 2+ RIGHT; Future    2. Controlled type 2 diabetes mellitus without complication, without long-term current use of insulin ([facility])  -A1c now out of control.  Goal below 7.5, preferably 7 or below.  Continue metformin.  Added in Trulicity 0.75 mg weekly, month 1.  She will contact me in 1 month so that we can go up to 1.5 mg Trulicity if tolerated well.  We will see each other in September for an A1c recheck.  Discussed how to use Trulicity and side effects of Trulicity including early satiety.  I encouraged her to drastically lower her portions to avoid abdominal pain and nausea.  She will let me know if she is having any problems with Trulicity prior to our appointment in September.  Discussed the importance of tight diabetic control and " a diabetic diet.  Encouraged her to see her eye doctor yearly and make sure she takes good care of her feet.  - Dulaglutide 0.75 MG/0.5ML Solution Pen-injector; Inject 0.5 mL under the skin every 7 days.  Dispense: 2 mL; Refill: 5    3. Osteoporosis, unspecified osteoporosis type, unspecified pathological fracture presence  -Discussed her bone density in depth.  She is willing to start Fosamax.  She was prescribed Fosamax to take once a week.  She has upper dentures and I encouraged her to notify her dentist if she plans on having any lower teeth pulled or any dental work as she will need to come off of Fosamax for several months following dental work.  She will also let me know if she begins to have heartburn or any GI issues with Fosamax.  She will tell me if she has bone pain with Fosamax, something new beyond her right hip pain.  - alendronate (FOSAMAX) 70 MG Tab; Take 1 Tablet by mouth every 7 days. Take in the morning and do not eat or lie down for 30 minutes after taking.  Dispense: 4 Tablet; Refill: 11    Side effects of all medications prescribed today were discussed with the patient including how to take the medications and proper dosage. Discussed repercussions of not taking the medications as prescribed. Instructed to call the office should she have any negative side effects or problems with the medications.      She was due for meningitis B but left prior to the shot, accidentally.  I will give this to her in September.

## 2023-06-13 DIAGNOSIS — E11.9 CONTROLLED TYPE 2 DIABETES MELLITUS WITHOUT COMPLICATION, WITHOUT LONG-TERM CURRENT USE OF INSULIN (HCC): ICD-10-CM

## 2023-06-15 ENCOUNTER — HOSPITAL ENCOUNTER (OUTPATIENT)
Dept: RADIOLOGY | Facility: MEDICAL CENTER | Age: 78
End: 2023-06-15
Attending: NURSE PRACTITIONER
Payer: MEDICARE

## 2023-06-15 DIAGNOSIS — M25.551 RIGHT HIP PAIN: ICD-10-CM

## 2023-06-15 PROCEDURE — 73502 X-RAY EXAM HIP UNI 2-3 VIEWS: CPT | Mod: RT

## 2023-06-25 ENCOUNTER — PATIENT MESSAGE (OUTPATIENT)
Dept: MEDICAL GROUP | Facility: LAB | Age: 78
End: 2023-06-25
Payer: MEDICARE

## 2023-06-30 NOTE — TELEPHONE ENCOUNTER
Received request via: Pharmacy    Was the patient seen in the last year in this department? Yes  LOV 06/07/2023  Does the patient have an active prescription (recently filled or refills available) for medication(s) requested? No    Does the patient have custodial Plus and need 100 day supply (blood pressure, diabetes and cholesterol meds only)? Yes, quantity updated to 100 days

## 2023-08-02 ENCOUNTER — DOCUMENTATION (OUTPATIENT)
Dept: HEALTH INFORMATION MANAGEMENT | Facility: OTHER | Age: 78
End: 2023-08-02
Payer: MEDICARE

## 2023-08-31 ENCOUNTER — TELEPHONE (OUTPATIENT)
Dept: MEDICAL GROUP | Facility: LAB | Age: 78
End: 2023-08-31
Payer: MEDICARE

## 2023-08-31 NOTE — TELEPHONE ENCOUNTER
ESTABLISHED PATIENT PRE-VISIT PLANNING     Patient was not contacted to complete PVP.     Note: Patient will not be contacted if there is no indication to call.     1.  Reviewed notes from the last few office visits within the medical group: Yes    2.  If any orders were placed at last visit or intended to be done for this visit (i.e. 6 mos follow-up), do we have Results/Consult Notes?           Labs - Labs were not ordered at last office visit.  Note: If patient appointment is for lab review and patient did not complete labs, check with provider if OK to reschedule patient until labs completed.         Imaging - Imaging ordered, completed and results are in chart.         Referrals - No referrals were ordered at last office visit.    3. Is this appointment scheduled as a Hospital Follow-Up? No    4.  Immunizations were updated in Epic using Reconcile Outside Information activity? Yes    5.  Patient is due for the following Health Maintenance Topics:   Health Maintenance Due   Topic Date Due    IMM MENINGOCOCCAL B VACCINE AT RISK PATIENTS AGED 10+ (1 of 5 - Risk Trumenba 3-dose series) Never done    Diabetes: Monofilament / LE Exam  09/08/2022    Meningococcal B Vaccine (3 of 4 - Increased Risk Trumenba 3-dose series) 03/08/2023    Annual Wellness Visit  03/09/2023    Diabetes: Retinopathy Screening  06/21/2023    Influenza Vaccine (1) 09/01/2023     6.  AHA (Pulse8) form printed for Provider? N/A

## 2023-09-07 ENCOUNTER — APPOINTMENT (OUTPATIENT)
Dept: MEDICAL GROUP | Facility: LAB | Age: 78
End: 2023-09-07
Payer: MEDICARE

## 2023-09-21 ENCOUNTER — TELEPHONE (OUTPATIENT)
Dept: MEDICAL GROUP | Facility: LAB | Age: 78
End: 2023-09-21
Payer: MEDICARE

## 2023-09-21 NOTE — TELEPHONE ENCOUNTER
I spoke with Larissa and she said she is scheduled with new eye doctor from Dr. Lopez's office on Friday, 9/29/23.     I called Dr. Lopez's office and she did not show for her 9/29/23 appt. I called and left her a msg to reschedule eye appt so we can get records when completed.

## 2023-10-03 ENCOUNTER — HOSPITAL ENCOUNTER (OUTPATIENT)
Facility: MEDICAL CENTER | Age: 78
End: 2023-10-03
Attending: NURSE PRACTITIONER
Payer: MEDICARE

## 2023-10-03 PROCEDURE — G0480 DRUG TEST DEF 1-7 CLASSES: HCPCS

## 2023-10-04 ENCOUNTER — APPOINTMENT (OUTPATIENT)
Dept: MEDICAL GROUP | Facility: LAB | Age: 78
End: 2023-10-04
Payer: MEDICARE

## 2023-10-08 LAB
NORTRAMADOL UR-MCNC: NORMAL NG/ML
TRAMADOL UR-MCNC: NORMAL NG/ML

## 2023-10-28 NOTE — PROGRESS NOTES
73 y.o.  female previously seen for : Chief Complaint:  Recent vuvlar lesions , while on Chemo for Pancreatic Cancer       Specialty Problems     None      . Patient now here in follow up. Patient last seen by Gyn 2 yrs ago . Patient with dx of pancreatic cancer , and undergoing preop-chemo , and has felt new small vulvar lesions .not painful , not growing and no itching   Takes premarin vaginal cream : 2 weeks /week     Patient's last menstrual period was 1986.      Subjective: Abdominal Pain: negative    Vaginal Bleedingnegative  Menstrual Cycle: normal: negative  Dysmenorrhea:negative:   Dyspareunia:negative  Urinary Symptoms: negative   Vaginal Discharge:{No          Current Outpatient Prescriptions:   •  LORAZEPAM PO, Take  by mouth., Disp: , Rfl:   •  potassium chloride SA (KDUR) 20 MEQ Tab CR, Take 20 mEq by mouth every day., Disp: , Rfl: 2  •  ondansetron (ZOFRAN ODT) 8 MG TABLET DISPERSIBLE, , Disp: , Rfl:   •  prochlorperazine (COMPAZINE) 10 MG Tab, TAKE 1 TABLET BY MOUTH EVERY 4-6 HOURS AS NEEDED FOR NAUSEA/VOMITING, Disp: , Rfl: 1  •  lisinopril-hydrochlorothiazide (PRINZIDE, ZESTORETIC) 20-25 MG per tablet, TAKE 1 TAB BY MOUTH EVERY DAY., Disp: 90 Tab, Rfl: 3  •  lidocaine-prilocaine (EMLA) 2.5-2.5 % Cream, Apply  to affected area(s) as needed., Disp: , Rfl:   •  promethazine (PHENERGAN) 25 MG Tab, Take 0.5 Tabs by mouth every 6 hours as needed., Disp: 30 Tab, Rfl: 0  •  Menthol, Topical Analgesic, (BIOFREEZE COLORLESS) 4 % Gel, by Apply externally route 2 times a day as needed., Disp: , Rfl:   •  Calcium Carb-Cholecalciferol (CALCIUM 1000 + D PO), Take 1 Tab by mouth every day., Disp: , Rfl:   •  topiramate (TOPAMAX) 100 MG Tab, Take 50 mg by mouth every bedtime., Disp: , Rfl:   •  conjugated estrogen (PREMARIN) 0.625 MG/GM Cream, Insert 0.5 g in vagina every day., Disp: 1 Tube, Rfl: 2  •  hydrocodone/acetaminophen (NORCO)  MG Tab, Take 1 Tab by mouth every 8 hours as needed., Disp: 90  Tab, Rfl: 0  •  vitamin D (CHOLECALCIFEROL) 1000 UNIT TABS, Take 1,000 Units by mouth every day., Disp: , Rfl:   •  Multiple Vitamins-Iron (MULTIVITAMIN/IRON PO), Take 1 Tab by mouth every day., Disp: , Rfl:   •  diazepam (VALIUM) 2 MG TABS, Take 2 mg by mouth every 6 hours as needed for Sleep. Take two to three tabs by mouth at bedtime as needed, Disp: , Rfl:   •  tramadol (ULTRAM) 50 MG TABS, Take  mg by mouth every 6 hours as needed., Disp: , Rfl:   ROS: no change in ROS since visit of : Visit date not found.  :  Recent Results (from the past 336 hour(s))   CBC WITH DIFFERENTIAL    Collection Time: 08/26/18  3:25 PM   Result Value Ref Range    WBC 4.5 (L) 4.8 - 10.8 K/uL    RBC 3.39 (L) 4.20 - 5.40 M/uL    Hemoglobin 10.9 (L) 12.0 - 16.0 g/dL    Hematocrit 32.4 (L) 37.0 - 47.0 %    MCV 95.6 81.4 - 97.8 fL    MCH 32.2 27.0 - 33.0 pg    MCHC 33.6 33.6 - 35.0 g/dL    RDW 61.4 (H) 35.9 - 50.0 fL    Platelet Count 100 (L) 164 - 446 K/uL    MPV 9.5 9.0 - 12.9 fL    Neutrophils-Polys 44.30 44.00 - 72.00 %    Lymphocytes 35.20 22.00 - 41.00 %    Monocytes 18.00 (H) 0.00 - 13.40 %    Eosinophils 1.10 0.00 - 6.90 %    Basophils 0.70 0.00 - 1.80 %    Immature Granulocytes 0.70 0.00 - 0.90 %    Nucleated RBC 0.00 /100 WBC    Neutrophils (Absolute) 1.99 (L) 2.00 - 7.15 K/uL    Lymphs (Absolute) 1.58 1.00 - 4.80 K/uL    Monos (Absolute) 0.81 0.00 - 0.85 K/uL    Eos (Absolute) 0.05 0.00 - 0.51 K/uL    Baso (Absolute) 0.03 0.00 - 0.12 K/uL    Immature Granulocytes (abs) 0.03 0.00 - 0.11 K/uL    NRBC (Absolute) 0.00 K/uL   MAGNESIUM    Collection Time: 08/26/18  3:25 PM   Result Value Ref Range    Magnesium 1.9 1.5 - 2.5 mg/dL   CA 19-9    Collection Time: 08/26/18  3:25 PM   Result Value Ref Range    Ca 19-9 12.0 0.0 - 35.0 U/mL   CMP    Collection Time: 08/26/18  3:25 PM   Result Value Ref Range    Sodium 135 135 - 145 mmol/L    Potassium 4.3 3.6 - 5.5 mmol/L    Chloride 99 96 - 112 mmol/L    Co2 29 20 - 33 mmol/L     "Anion Gap 7.0 0.0 - 11.9    Glucose 97 65 - 99 mg/dL    Bun 26 (H) 8 - 22 mg/dL    Creatinine 0.89 0.50 - 1.40 mg/dL    Calcium 10.4 8.5 - 10.5 mg/dL    AST(SGOT) 31 12 - 45 U/L    ALT(SGPT) 42 2 - 50 U/L    Alkaline Phosphatase 109 (H) 30 - 99 U/L    Total Bilirubin 0.3 0.1 - 1.5 mg/dL    Albumin 3.8 3.2 - 4.9 g/dL    Total Protein 7.0 6.0 - 8.2 g/dL    Globulin 3.2 1.9 - 3.5 g/dL    A-G Ratio 1.2 g/dL   ESTIMATED GFR    Collection Time: 08/26/18  3:25 PM   Result Value Ref Range    GFR If African American >60 >60 mL/min/1.73 m 2    GFR If Non African American >60 >60 mL/min/1.73 m 2       Vitals:    09/07/18 1405   BP: 118/66   Weight: 67.1 kg (148 lb)   Height: 1.651 m (5' 5\")     Past Medical History:   Diagnosis Date   • Bowel habit changes 05/17/2018    constipation related to pain meds   • Bronchitis 2005   • Cancer (HCC) 05/2018    adenocarcinoma pancreatic tail   • Chronic low back pain     followed by [facility] Dr. Garcia, Dr. Mobley   • Chronic neck pain     followed by [facility] Dr. Garcia, Dr. Mobley   • Dental disorder     upper partial   • Hypertension 2015    controlled on meds   • Other specified symptom associated with female genital organs    • Pain 05/17/2018    Right shoulder, right arm, low back, right leg.   • Pre-diabetes    • Snoring      PGYN: Prior Uterine Surgery and [facility]  Social History     Social History   • Marital status:      Spouse name: N/A   • Number of children: 3   • Years of education: N/A     Occupational History   • retired      Social History Main Topics   • Smoking status: Never Smoker   • Smokeless tobacco: Never Used   • Alcohol use No   • Drug use: No   • Sexual activity: Yes     Partners: Male     Other Topics Concern   • Not on file     Social History Narrative   • No narrative on file     Family History   Problem Relation Age of Onset   • No Known Problems Mother    • No Known Problems Father      Past Surgical History:   Procedure Laterality Date   • CATH PLACEMENT " Right 5/30/2018    Procedure: CATH PLACEMENT - CEPHALIC POWER PORT;  Surgeon: Carl Hunter M.D.;  Location: SURGERY SAME DAY [facility];  Service: General   • GASTROSCOPY  5/18/2018    Procedure: GASTROSCOPY;  Surgeon: Geovanni Romero M.D.;  Location: SURGERY [facility];  Service: EUS   • EGD W/ENDOSCOPIC ULTRASOUND  5/18/2018    Procedure: EGD W/ENDOSCOPIC ULTRASOUND-  UPPER CURVILLINEAR;  Surgeon: Geovanni Romero M.D.;  Location: SURGERY [facility];  Service: EUS   • EGD WITH ASP/BX  5/18/2018    Procedure: EGD WITH ASP/BX-  GASTRIC BIOPSIES,  FNA BIOPSIES OF PANCREAS HEAD MASS AND OR GALLBLADDER;  Surgeon: Geovanni Romero M.D.;  Location: SURGERY [facility];  Service: EUS   • DENTAL EXTRACTION(S)  12/2017    AND hx of other dental extractions with sedation   • HYSTERECTOMY ROBOTIC XI  7/9/2015    Procedure: HYSTERECTOMY ROBOTIC XI W/ BILATERAL SALPINGO-OOPHORECTOMY, MCCALLS PROCEDURES;  Surgeon: Brian Parks M.D.;  Location: SURGERY [facility];  Service:    • COLONOSCOPY  2008   • CERVICAL DISK AND FUSION ANTERIOR  2006    neck cage/fusion   • DENTAL EXTRACTION(S)  1966    Elliott teeth   • OTHER      Sedation for several MRI's   • OTHER NEUROLOGICAL SURG      cervical 2007         Exam:   General : Awake, alert and oriented x 3  Abdominal : no masses, nontender, soft, non-distended no rebound or guarding  Pelvic :   Vulvar folliculitis , with old cerumen filled lesions 2 mm, : milked and cerumen released , no evidence for HPV, or other  Pathology Bartholin's glands, urethra, Isleta's glands negative, vaginal mucosa normal;  Cervix/uterus , absent , mucosa normal   Pelvic Support system : cuff suspended , no prolapse       Ass:   Vulvar folliculitis   menopausal female   Post -hyst     Spent 15 minutes minutes with the patient ; Face to Face, with >50% of this time spent in counseling and coordination of care, surrounding the above mentioned issues as well as:     P. Continue  cyclic topical vaginal estrogen as prescribed   Complete Chemo.... Surgery for pancreatic Cancer   RTC PRN            There are no Wet Read(s) to document.

## 2023-11-06 ENCOUNTER — APPOINTMENT (OUTPATIENT)
Dept: LAB | Facility: MEDICAL CENTER | Age: 78
End: 2023-11-06
Payer: MEDICARE

## 2023-11-08 ENCOUNTER — APPOINTMENT (OUTPATIENT)
Dept: MEDICAL GROUP | Facility: LAB | Age: 78
End: 2023-11-08
Payer: MEDICARE

## 2024-01-19 DIAGNOSIS — E11.9 CONTROLLED TYPE 2 DIABETES MELLITUS WITHOUT COMPLICATION, WITHOUT LONG-TERM CURRENT USE OF INSULIN (HCC): ICD-10-CM

## 2024-01-19 RX ORDER — METFORMIN HYDROCHLORIDE 500 MG/1
1000 TABLET, EXTENDED RELEASE ORAL
Qty: 200 TABLET | Refills: 2 | Status: SHIPPED | OUTPATIENT
Start: 2024-01-19 | End: 2024-02-26 | Stop reason: SDUPTHER

## 2024-01-19 NOTE — TELEPHONE ENCOUNTER
Received request via: Pharmacy    Was the patient seen in the last year in this department? Yes    Does the patient have an active prescription (recently filled or refills available) for medication(s) requested? No    [facility]    Does the patient have shelter Plus and need 100 day supply (blood pressure, diabetes and cholesterol meds only)? Yes, quantity updated to 100 days

## 2024-01-20 DIAGNOSIS — E78.5 DYSLIPIDEMIA: ICD-10-CM

## 2024-01-22 RX ORDER — ATORVASTATIN CALCIUM 20 MG/1
20 TABLET, FILM COATED ORAL
Qty: 100 TABLET | Refills: 3 | Status: SHIPPED | OUTPATIENT
Start: 2024-01-22

## 2024-02-12 DIAGNOSIS — I10 ESSENTIAL HYPERTENSION: ICD-10-CM

## 2024-02-12 RX ORDER — LISINOPRIL AND HYDROCHLOROTHIAZIDE 25; 20 MG/1; MG/1
1 TABLET ORAL
Qty: 100 TABLET | Refills: 2 | Status: SHIPPED | OUTPATIENT
Start: 2024-02-12

## 2024-02-21 ENCOUNTER — TELEPHONE (OUTPATIENT)
Dept: MEDICAL GROUP | Facility: LAB | Age: 79
End: 2024-02-21
Payer: MEDICARE

## 2024-02-26 ENCOUNTER — OFFICE VISIT (OUTPATIENT)
Dept: MEDICAL GROUP | Facility: LAB | Age: 79
End: 2024-02-26
Payer: MEDICARE

## 2024-02-26 VITALS
BODY MASS INDEX: 26.52 KG/M2 | RESPIRATION RATE: 16 BRPM | TEMPERATURE: 96.9 F | OXYGEN SATURATION: 93 % | SYSTOLIC BLOOD PRESSURE: 144 MMHG | WEIGHT: 165 LBS | HEIGHT: 66 IN | HEART RATE: 72 BPM | DIASTOLIC BLOOD PRESSURE: 60 MMHG

## 2024-02-26 DIAGNOSIS — C25.0 MALIGNANT NEOPLASM OF HEAD OF PANCREAS (HCC): Chronic | ICD-10-CM

## 2024-02-26 DIAGNOSIS — M81.0 OSTEOPOROSIS, UNSPECIFIED OSTEOPOROSIS TYPE, UNSPECIFIED PATHOLOGICAL FRACTURE PRESENCE: ICD-10-CM

## 2024-02-26 DIAGNOSIS — F11.20 OPIATE DEPENDENCE, CONTINUOUS (HCC): ICD-10-CM

## 2024-02-26 DIAGNOSIS — M54.42 CHRONIC BILATERAL LOW BACK PAIN WITH BILATERAL SCIATICA: ICD-10-CM

## 2024-02-26 DIAGNOSIS — M81.0 AGE-RELATED OSTEOPOROSIS WITHOUT CURRENT PATHOLOGICAL FRACTURE: ICD-10-CM

## 2024-02-26 DIAGNOSIS — M54.41 CHRONIC BILATERAL LOW BACK PAIN WITH BILATERAL SCIATICA: ICD-10-CM

## 2024-02-26 DIAGNOSIS — G89.29 CHRONIC BILATERAL LOW BACK PAIN WITH BILATERAL SCIATICA: ICD-10-CM

## 2024-02-26 DIAGNOSIS — E11.65 UNCONTROLLED TYPE 2 DIABETES MELLITUS WITH HYPERGLYCEMIA (HCC): ICD-10-CM

## 2024-02-26 LAB
HBA1C MFR BLD: 11.3 % (ref ?–5.8)
POCT INT CON NEG: NEGATIVE
POCT INT CON POS: POSITIVE

## 2024-02-26 PROCEDURE — 3078F DIAST BP <80 MM HG: CPT | Performed by: NURSE PRACTITIONER

## 2024-02-26 PROCEDURE — 83036 HEMOGLOBIN GLYCOSYLATED A1C: CPT | Performed by: NURSE PRACTITIONER

## 2024-02-26 PROCEDURE — 3077F SYST BP >= 140 MM HG: CPT | Performed by: NURSE PRACTITIONER

## 2024-02-26 PROCEDURE — 99214 OFFICE O/P EST MOD 30 MIN: CPT | Performed by: NURSE PRACTITIONER

## 2024-02-26 RX ORDER — METFORMIN HYDROCHLORIDE 500 MG/1
1000 TABLET, EXTENDED RELEASE ORAL 2 TIMES DAILY
Qty: 120 TABLET | Refills: 2 | Status: SHIPPED | OUTPATIENT
Start: 2024-02-26 | End: 2024-03-21

## 2024-02-26 RX ORDER — HYDROCODONE BITARTRATE AND ACETAMINOPHEN 7.5; 325 MG/1; MG/1
1 TABLET ORAL EVERY 6 HOURS PRN
Qty: 120 TABLET | Refills: 0
Start: 2024-02-26 | End: 2024-03-27

## 2024-02-26 RX ORDER — GLIMEPIRIDE 2 MG/1
2 TABLET ORAL EVERY MORNING
Qty: 30 TABLET | Refills: 5 | Status: SHIPPED | OUTPATIENT
Start: 2024-02-26 | End: 2024-03-21

## 2024-02-26 RX ORDER — ALENDRONATE SODIUM 70 MG/1
70 TABLET ORAL
Qty: 4 TABLET | Refills: 11 | Status: SHIPPED | OUTPATIENT
Start: 2024-02-26 | End: 2024-03-18

## 2024-02-26 ASSESSMENT — FIBROSIS 4 INDEX: FIB4 SCORE: 0.77

## 2024-02-26 ASSESSMENT — PATIENT HEALTH QUESTIONNAIRE - PHQ9: CLINICAL INTERPRETATION OF PHQ2 SCORE: 0

## 2024-02-26 NOTE — PATIENT INSTRUCTIONS
Limit your carbs to max of 80 grams per day.    Try to get at least 80 grams of protein perday.  Try to get 25 gm of fiber per day.

## 2024-02-26 NOTE — LETTER
[facility]  Milagros Gupta, A.P.N.  [address]  [place] [address]  Fax: [phone]   Authorization for Release/Disclosure of   Protected Health Information   Name: COLTEN RAMIREZ : 1945 SSN: [id number]   Address: [address] [place] [address] [address] [place] [address] Phone:    [phone] (home)    I authorize the entity listed below to release/disclose the PHI below to:   [facility]/Milagros Gupta, A.P.N. and Milagros Gupta, A.P.N.   Provider or Entity Name:     Address   City, State, Zip   Phone:      Fax:  [phone]      Reason for request: continuity of care   Information to be released:    [  ] LAST COLONOSCOPY,  including any PATH REPORT and follow-up  [  ] LAST FIT/COLOGUARD RESULT [  ] LAST DEXA  [  ] LAST MAMMOGRAM  [  ] LAST PAP  [  ] LAST LABS [ XX ] RETINA EXAM REPORT  [  ] IMMUNIZATION RECORDS  [  ] Release all info      [  ] Check here and initial the line next to each item to release ALL health information INCLUDING  _____ Care and treatment for drug and / or alcohol abuse  _____ HIV testing, infection status, or AIDS  _____ Genetic Testing    DATES OF SERVICE OR TIME PERIOD TO BE DISCLOSED: _____________  I understand and acknowledge that:  * This Authorization may be revoked at any time by you in writing, except if your health information has already been used or disclosed.  * Your health information that will be used or disclosed as a result of you signing this authorization could be re-disclosed by the recipient. If this occurs, your re-disclosed health information may no longer be protected by State or Federal laws.  * You may refuse to sign this Authorization. Your refusal will not affect your ability to obtain treatment.  * This Authorization becomes effective upon signing and will  on (date) __________.      If no date is indicated, this Authorization will  one (1) year from the signature date.    Name: Colten Ramirez  Signature:continuity of care Date:    2/26/2024     PLEASE FAX REQUESTED RECORDS BACK TO: [phone]

## 2024-02-26 NOTE — PROGRESS NOTES
"Verbal consent was acquired by the patient to use [facility] ambient listening note generation during this visit Yes      Subjective   Larissa Ramirez is a 78 y.o. female who presents for f/u:   History of Present Illness  The patient is a 78-year-old established female here to follow up on type 2 diabetes. She does have a history of pancreatic cancer that has been resected. She is here for an A1c today.    She checks her blood sugars at home and they have been up and down depending upon what she eats. Last night, her  made a big bowl of potato soup. This morning, her blood sugar was 190. Usually, her blood sugar runs between 140 to 160. The day after she took Trulicity, she woke up and her left eye was swollen. The swelling resolved by the evening and she no longer wants to use any type of medication related to Trulicity. She did not like the way she felt with Jardiance. She took it for a couple of days and did not feel good. She is still taking metformin 500 mg 2 pills a day.  She has been eating Cheerios frequently over the past 6 weeks.  she eats meat at night, either hamburger or chicken. She sometimes skips lunch. She does not eat dessert at night. She just started exercising on her treadmill about a week ago. She used to do 10 to 12 minutes, but now she is only doing 5 minutes. She does not write down her blood sugars.    Chronic back pain: About 3 months ago, her hydrocodone was reduced from 40 mg a day to 30 mg a day. She had more side effects because she has been on 10 mg for years. She has sciatic, low back, and shoulder pain. [facility] also prescribes her tramadol and hydrocodone. She goes every 2 months.          Objective   BP (!) 144/60 (BP Location: Left arm, Patient Position: Sitting, BP Cuff Size: Large adult)   Pulse 72   Temp 36.1 °C (96.9 °F)   Resp 16   Ht 1.676 m (5' 6\")   Wt 74.8 kg (165 lb)   SpO2 93%   Physical Exam  Gen: NAD  Resp: nonlabored.  Able to speak in full " sentences  Psy: pleasant / cooperative.   Neuro:  Alert and oriented x 3      Results  Laboratory Studies  Labs were reviewed with the patient.       Assessment & Plan  1. Uncontrolled diabetes.  Her A1c is 11 today.   Misunderstood dietary recommendations regarding diabetes and has been eating too many carbohydrates to include frequent intake of Cheerios/potatoes.  sHe prefers to refrain from starting insulin.  I will certainly keep a close eye on her in terms of communicating frequently about her blood sugars over the next few weeks, considering her history of pancreatic cancer/partial pancreatic resection.  I will increase her metformin to 2 in the morning and 2 at night. She will watch out for gas and bloating. I will start her on glimepiride 2 mg once a day with breakfast. She will get only 80 g maximum of carbs every day, she will get 80 g of protein, lots of fiber, and water. She will start writing down her blood sugars. I will give her a lab slip for 3 months from now.  She prefers to refrain from any further trials of another GLP-1 agonist.  She did not tolerate SGLT therapy/Jardiance.  She is not appropriate candidate for Actos with her cancer history.  We discussed the importance of getting her diabetes under control to prevent onset nephropathy, neuropathy, retinopathy and coronary artery disease.  She will send me a MyChart message with a picture of her blood sugars taken fasting in the morning and 1 to 2 hours after dinner, within the next 1 to 2 weeks.  It is difficult for her to get over to our office as she lives on the other side of [facility] but she is willing to come in in 6 months.  I will MyChart her with her lab results in 3 months.    2.  Chronic pain in her spine: [facility]  Updated her medication list.    3.  History of pancreatic cancer: Released from oncology.  Doing well in terms of lack of recurrence of back and abdominal pain.  Denies chronic nausea or vomiting.    4.   Osteoporosis: She made a comment on her medication list prior to arrival that she was not taking her Fosamax.  She denies any problems with Fosamax.  Reviewed her bone density.  She needs to be on Fosamax and was reeducated regarding this.  She will restart Fosamax.    Follow-up  The patient will follow up in 3 months.         Side effects of all medications prescribed today were discussed with the patient including how to take the medications and proper dosage. Discussed repercussions of not taking the medications as prescribed. Instructed to call the office should she have any negative side effects or problems with the medications.      Please note that this dictation was created using voice recognition software. I have made every reasonable attempt to correct obvious errors, but I expect that there are errors of grammar and possibly content that I did not discover before finalizing the note.

## 2024-03-18 DIAGNOSIS — M81.0 OSTEOPOROSIS, UNSPECIFIED OSTEOPOROSIS TYPE, UNSPECIFIED PATHOLOGICAL FRACTURE PRESENCE: ICD-10-CM

## 2024-03-18 RX ORDER — ALENDRONATE SODIUM 70 MG/1
70 TABLET ORAL
Qty: 12 TABLET | Refills: 4 | Status: SHIPPED | OUTPATIENT
Start: 2024-03-18

## 2024-03-20 DIAGNOSIS — E11.65 UNCONTROLLED TYPE 2 DIABETES MELLITUS WITH HYPERGLYCEMIA (HCC): ICD-10-CM

## 2024-03-21 RX ORDER — METFORMIN HYDROCHLORIDE 500 MG/1
1000 TABLET, EXTENDED RELEASE ORAL 2 TIMES DAILY
Qty: 360 TABLET | Refills: 1 | Status: SHIPPED | OUTPATIENT
Start: 2024-03-21

## 2024-03-21 RX ORDER — GLIMEPIRIDE 2 MG/1
2 TABLET ORAL EVERY MORNING
Qty: 90 TABLET | Refills: 2 | Status: SHIPPED | OUTPATIENT
Start: 2024-03-21

## 2024-03-27 NOTE — PROGRESS NOTES
"Pharmacy Chemotherapy Calculations Note:    Patient Name: Larissa Ramirez      Dx: Locally Advanced Pancreatic Cancer     Cycle 9 delayed due to surgery   Previous treatment: 10/8/18     Protocol: FOLFIRINOX (Fluorouracil/Leucovorin/Irinotecan/OXALIplatin)  *Dosing Reference*  OXALIplatin 85 mg/m2 IV over 2 hours on Day 1  Irinotecan 180 mg/m2 IV over 90 min on Day 1  06/11/18: dose reduced to 150 mg/m2 for anticipated tolerance  06/11/18: Leucovorin and 5-FU push discontinued d/t  anticipated tolerance.  Fluorouracil 1200 mg/m2 CIVI over 24 hours on Days 1-2 (2400 mg/m2 IV over 46 hours)  Q14 days x 4-12 cycles (unresectable or locally advanced)  NCCN Guidelines for Pancreatic Adenocarcinoma. V.2.2016.  Juan Pablo T, et al. N Engl J Med. 2011;364(19):1816-15.    [place]es: Levofloxacin; Nitrofurantoin; Amitriptyline; Sulfa drugs; and Lyrica  /41   Pulse 69   Temp 37.1 °C (98.7 °F) (Temporal)   Resp 18   Ht 1.67 m (5' 5.75\")   Wt 68.4 kg (150 lb 12.7 oz)   LMP 06/27/1986   SpO2 97%   BMI 24.53 kg/m²  Body surface area is 1.78 meters squared.     1/20/19  ANC~ 2280 Plt = 292k   Hgb = 12.5  SCr = 1.04 mg/dL CrCl ~ 52 mL/min   LFT's = WNl TBili = 0.2  Mag = 2.0 K = 3.9     OXALIplatin (Eloxatin) 85 mg/m² x 1.78 m² = 151 mg   <5% difference, ok to treat with final dose = 151.3 mg IV    Irinotecan (Camptosar) 150 mg/m² x 1.78 m² = 267 mg   <5% difference, ok to treat with final dose = 267 mg IV    Fluorouracil (5-FU) 2400 mg/m² x 1.78 m² = 4272 mg   <5% difference, ok to treat with final dose = 4270 mg    CIVI via CADD pump @ 1.9 mL/hr over 46 hours     Percy Ramirez, PharmD  " Detail Level: Detailed

## 2024-03-28 ENCOUNTER — HOSPITAL ENCOUNTER (OUTPATIENT)
Facility: MEDICAL CENTER | Age: 79
End: 2024-03-28
Attending: NURSE PRACTITIONER
Payer: MEDICARE

## 2024-04-08 LAB — THC UR CFM-MCNC: 56 NG/ML

## 2024-04-29 ENCOUNTER — TELEPHONE (OUTPATIENT)
Dept: HEALTH INFORMATION MANAGEMENT | Facility: OTHER | Age: 79
End: 2024-04-29

## 2024-05-24 ENCOUNTER — OFFICE VISIT (OUTPATIENT)
Dept: URGENT CARE | Facility: CLINIC | Age: 79
End: 2024-05-24
Payer: MEDICARE

## 2024-05-24 ENCOUNTER — HOSPITAL ENCOUNTER (OUTPATIENT)
Facility: MEDICAL CENTER | Age: 79
End: 2024-05-24
Attending: NURSE PRACTITIONER
Payer: MEDICARE

## 2024-05-24 VITALS
WEIGHT: 167 LBS | SYSTOLIC BLOOD PRESSURE: 126 MMHG | RESPIRATION RATE: 16 BRPM | HEIGHT: 66 IN | BODY MASS INDEX: 26.84 KG/M2 | OXYGEN SATURATION: 93 % | DIASTOLIC BLOOD PRESSURE: 68 MMHG | HEART RATE: 80 BPM | TEMPERATURE: 96.2 F

## 2024-05-24 DIAGNOSIS — N30.01 ACUTE CYSTITIS WITH HEMATURIA: ICD-10-CM

## 2024-05-24 DIAGNOSIS — R30.0 DYSURIA: ICD-10-CM

## 2024-05-24 DIAGNOSIS — R39.9 UTI SYMPTOMS: ICD-10-CM

## 2024-05-24 PROCEDURE — 99213 OFFICE O/P EST LOW 20 MIN: CPT | Performed by: NURSE PRACTITIONER

## 2024-05-24 PROCEDURE — 81002 URINALYSIS NONAUTO W/O SCOPE: CPT | Performed by: NURSE PRACTITIONER

## 2024-05-24 PROCEDURE — 3078F DIAST BP <80 MM HG: CPT | Performed by: NURSE PRACTITIONER

## 2024-05-24 PROCEDURE — 3074F SYST BP LT 130 MM HG: CPT | Performed by: NURSE PRACTITIONER

## 2024-05-24 RX ORDER — HYDROCODONE BITARTRATE AND ACETAMINOPHEN 7.5; 325 MG/1; MG/1
TABLET ORAL
COMMUNITY
Start: 2024-05-21

## 2024-05-24 RX ORDER — CEPHALEXIN 500 MG/1
500 CAPSULE ORAL 2 TIMES DAILY
Qty: 14 CAPSULE | Refills: 0 | Status: SHIPPED | OUTPATIENT
Start: 2024-05-24 | End: 2024-05-31

## 2024-05-24 ASSESSMENT — ENCOUNTER SYMPTOMS
VOMITING: 0
NAUSEA: 0
CHILLS: 0

## 2024-05-24 ASSESSMENT — FIBROSIS 4 INDEX: FIB4 SCORE: 0.78

## 2024-05-24 NOTE — PROGRESS NOTES
Subjective:     Larissa Ramirez is a 79 y.o. female who presents for Dysuria (Burning while urinating, pressure on bladder, blood in urine x 1 day)      Dysuria   This is a new (Larissa is a pleasant 79 year old female who presents to  today with complaints of hematuria, burning with urination.) problem. The current episode started in the past 7 days. The problem has been gradually worsening. The pain is moderate. There has been no fever. Associated symptoms include hematuria and urgency. Pertinent negatives include no chills, frequency, nausea or vomiting.         Review of Systems   Constitutional:  Negative for chills.   Gastrointestinal:  Negative for nausea and vomiting.   Genitourinary:  Positive for dysuria, hematuria and urgency. Negative for frequency.       PMH:   Past Medical History:   Diagnosis Date    Bowel habit changes 05/17/2018    constipation related to pain meds    Bronchitis 2005    Cancer (HCC) 05/2018    adenocarcinoma pancreatic tail    Chronic low back pain     followed by [facility] Dr. Itz Lopez    Chronic neck pain     followed by [facility] Dr. Garcia, Dr. Mobley    Dental disorder     upper partial    Hypertension 2015    controlled on meds    Other specified symptom associated with female genital organs     Pain 05/17/2018    Right shoulder, right arm, low back, right leg.    Pre-diabetes     Snoring      ALLERGIES:   Allergies   Allergen Reactions    Levofloxacin Swelling     Swelling of tongue and neck    Nitrofurantoin Swelling     Swelling of lips, tongue, face    Amitriptyline Swelling     Facial swelling     Sulfa Drugs Nausea    Lyrica Rash     .     SURGHX:   Past Surgical History:   Procedure Laterality Date    NEURO DEST FACET L/S W/IG SNGL Right 2/25/2021    Procedure: DESTRUCTION, NERVE, FACET JOINT, LUMBOSACRAL, USING NEUROLYTIC AGENT, WITH FLUOROSCOPIC GUIDANCE;  Surgeon: Frankie Saha M.D.;  [facility];  Service: Pain Management    NEURO DEST  FACET L/S W/IG SNGL Left 2/11/2021    Procedure: DESTRUCTION, NERVE, FACET JOINT, LUMBOSACRAL, USING NEUROLYTIC AGENT, WITH FLUOROSCOPIC GUIDANCE;  Surgeon: Jose Gonzales M.D.;  [facility];  Service: Pain Management    MO TOTAL HIP ARTHROPLASTY Left 8/24/2020    Procedure: ARTHROPLASTY, HIP, TOTAL;  Surgeon: Tomas Venegas M.D.;  Location: [facility];  Service: Orthopedics    NEURO DEST FACET L/S W/IG SNGL Left 8/11/2020    Procedure: DESTRUCTION, NERVE, FACET JOINT, LUMBOSACRAL, USING NEUROLYTIC AGENT, WITH FLUOROSCOPIC GUIDANCE;  Surgeon: Jose Gonzales M.D.;  [facility];  Service: Pain Management    PANCREATECTOMY  11/5/2018    Procedure: PANCREATECTOMY-  DISTAL;  Surgeon: Carl Valadez M.D.;  Location: [facility];  Service: General    SPLENECTOMY  11/5/2018    Procedure: SPLENECTOMY;  Surgeon: Carl Valadez M.D.;  Location: [facility];  Service: General    NODE DISSECTION  11/5/2018    Procedure: NODE DISSECTION;  Surgeon: Carl Valadez M.D.;  Location: [facility];  Service: General    OMENTECTOMY  11/5/2018    Procedure: OMENTECTOMY-  OMENTAL FLAP, INTRAOPERATIVE ULTRASOUND, PORTAL VEIN RESECTION AND REPAIR, CELIAC TRUNK PARTIAL RESECTION AND REPAIR;  Surgeon: Carl Valadez M.D.;  Location: [facility];  Service: General    CATH PLACEMENT Right 5/30/2018    Procedure: CATH PLACEMENT - CEPHALIC POWER PORT;  Surgeon: Carl Hunter M.D.;  Location: [facility]ERY [facility];  Service: General    GASTROSCOPY  5/18/2018    Procedure: GASTROSCOPY;  Surgeon: Geovanni Romero M.D.;  Location: [facility];  Service: EUS    EGD W/ENDOSCOPIC ULTRASOUND  5/18/2018    Procedure: EGD W/ENDOSCOPIC ULTRASOUND-  UPPER CURVILLINEAR;  Surgeon: Geovanni Romero M.D.;  Location: [facility];  Service: EUS    EGD WITH  ASP/BX  5/18/2018    Procedure: EGD WITH ASP/BX-  GASTRIC BIOPSIES,  FNA BIOPSIES OF PANCREAS HEAD MASS AND OR GALLBLADDER;  Surgeon: Geovanni Romero M.D.;  Location: SURGERY [facility];  Service: EUS    DENTAL EXTRACTION(S)  12/2017    AND hx of other dental extractions with sedation    HYSTERECTOMY ROBOTIC XI  7/9/2015    Procedure: HYSTERECTOMY ROBOTIC XI W/ BILATERAL SALPINGO-OOPHORECTOMY, MCCALLS PROCEDURES;  Surgeon: Brian Parks M.D.;  Location: [facility];  Service:     COLONOSCOPY  2008    CERVICAL DISK AND FUSION ANTERIOR  2006    neck cage/fusion    DENTAL EXTRACTION(S)  1966    [place] teeth    OTHER      Sedation for several MRI's    OTHER NEUROLOGICAL SURG      cervical 2007     SOCHX:   Social History     Socioeconomic History    Marital status:     Number of children: 3    Highest education level: 12th grade   Occupational History    Occupation: retired   Tobacco Use    Smoking status: Never    Smokeless tobacco: Never   Vaping Use    Vaping status: Never Used   Substance and Sexual Activity    Alcohol use: No     Alcohol/week: 0.0 oz    Drug use: No    Sexual activity: Yes     Partners: Male     Social Determinants of Health     Financial Resource Strain: Low Risk  (8/26/2020)    Overall Financial Resource Strain (CARDIA)     Difficulty of Paying Living Expenses: Not hard at all   Food Insecurity: No Food Insecurity (3/7/2022)    Hunger Vital Sign     Worried About Running Out of Food in the Last Year: Never true     Ran Out of Food in the Last Year: Never true   Transportation Needs: No Transportation Needs (3/7/2022)    PRAPARE - Transportation     Lack of Transportation (Medical): No     Lack of Transportation (Non-Medical): No   Physical Activity: Insufficiently Active (3/7/2022)    Exercise Vital Sign     Days of Exercise per Week: 5 days     Minutes of Exercise per Session: 20 min   Stress: No Stress Concern Present (3/7/2022)    [place] [employer]  "Health - Occupational Stress Questionnaire     Feeling of Stress : Not at all   Social Connections: Unknown (3/7/2022)    Social Connection and Isolation Panel [NHANES]     Frequency of Communication with Friends and Family: Three times a week     Frequency of Social Gatherings with Friends and Family: Patient declined     Attends Mandaeism Services: Patient declined     Active Member of Clubs or Organizations: No     Attends Club or Organization Meetings: Never     Marital Status:    Housing Stability: Low Risk  (3/7/2022)    Housing Stability Vital Sign     Unable to Pay for Housing in the Last Year: No     Number of Places Lived in the Last Year: 1     Unstable Housing in the Last Year: No     FH:   Family History   Problem Relation Age of Onset    No Known Problems Mother     No Known Problems Father          Objective:   /68 (BP Location: Left arm, Patient Position: Sitting, BP Cuff Size: Adult)   Pulse 80   Temp (!) 35.7 °C (96.2 °F) (Temporal)   Resp 16   Ht 1.676 m (5' 6\")   Wt 75.8 kg (167 lb)   LMP 06/27/1986   SpO2 93%   BMI 26.95 kg/m²     Physical Exam  Vitals and nursing note reviewed.   Constitutional:       General: She is not in acute distress.     Appearance: Normal appearance. She is not ill-appearing.   HENT:      Head: Normocephalic and atraumatic.      Right Ear: External ear normal.      Left Ear: External ear normal.      Nose: No congestion or rhinorrhea.      Mouth/Throat:      Mouth: Mucous membranes are moist.   Eyes:      Extraocular Movements: Extraocular movements intact.      Pupils: Pupils are equal, round, and reactive to light.   Cardiovascular:      Rate and Rhythm: Normal rate and regular rhythm.      Pulses: Normal pulses.      Heart sounds: Normal heart sounds.   Pulmonary:      Effort: Pulmonary effort is normal.      Breath sounds: Normal breath sounds.   Abdominal:      General: Abdomen is flat. Bowel sounds are normal.      Palpations: Abdomen is soft. "      Tenderness: There is abdominal tenderness. There is right CVA tenderness. There is no left CVA tenderness.   Musculoskeletal:         General: Normal range of motion.      Cervical back: Normal range of motion and neck supple.   Skin:     General: Skin is warm and dry.      Capillary Refill: Capillary refill takes less than 2 seconds.   Neurological:      General: No focal deficit present.      Mental Status: She is alert and oriented to person, place, and time. Mental status is at baseline.   Psychiatric:         Mood and Affect: Mood normal.         Behavior: Behavior normal.         Thought Content: Thought content normal.         Judgment: Judgment normal.       Results for orders placed or performed in visit on 05/24/24   POCT Urinalysis   Result Value Ref Range    POC Color yellow Negative    POC Appearance clear Negative    POC Glucose negative Negative mg/dL    POC Bilirubin negative Negative mg/dL    POC Ketones negative Negative mg/dL    POC Specific Gravity 1.020 <1.005 - >1.030    POC Blood moderate Negative    POC Urine PH 7.0 5.0 - 8.0    POC Protein negative Negative mg/dL    POC Urobiligen 0.2 Negative (0.2) mg/dL    POC Nitrites negative Negative    POC Leukocyte Esterase small Negative       Assessment/Plan:   Assessment      1. Dysuria  cephALEXin (KEFLEX) 500 MG Cap    POCT Urinalysis    URINE CULTURE(NEW)      2. UTI symptoms  cephALEXin (KEFLEX) 500 MG Cap    POCT Urinalysis    URINE CULTURE(NEW)      3. Acute cystitis with hematuria  cephALEXin (KEFLEX) 500 MG Cap        Prescription was sent in to preferred pharmacy. AVS was given and reviewed with patient. Patient educated on red flags of UTI and encouraged to seek care back in UC or ER for  fever, chills, flank pain, or worsening symptoms.

## 2024-05-27 LAB
BACTERIA UR CULT: NORMAL
SIGNIFICANT IND 70042: NORMAL
SITE SITE: NORMAL
SOURCE SOURCE: NORMAL

## 2024-06-13 ENCOUNTER — PATIENT MESSAGE (OUTPATIENT)
Dept: MEDICAL GROUP | Facility: LAB | Age: 79
End: 2024-06-13
Payer: MEDICARE

## 2024-06-17 RX ORDER — CEFDINIR 300 MG/1
300 CAPSULE ORAL 2 TIMES DAILY
Qty: 10 CAPSULE | Refills: 0 | Status: SHIPPED | OUTPATIENT
Start: 2024-06-17 | End: 2024-06-22

## 2024-07-03 ENCOUNTER — TELEPHONE (OUTPATIENT)
Dept: MEDICAL GROUP | Facility: LAB | Age: 79
End: 2024-07-03

## 2024-07-03 ENCOUNTER — APPOINTMENT (OUTPATIENT)
Dept: MEDICAL GROUP | Facility: LAB | Age: 79
End: 2024-07-03
Payer: MEDICARE

## 2024-07-03 VITALS
TEMPERATURE: 96.7 F | HEIGHT: 66 IN | RESPIRATION RATE: 20 BRPM | SYSTOLIC BLOOD PRESSURE: 148 MMHG | WEIGHT: 162 LBS | HEART RATE: 74 BPM | OXYGEN SATURATION: 96 % | DIASTOLIC BLOOD PRESSURE: 60 MMHG | BODY MASS INDEX: 26.03 KG/M2

## 2024-07-03 DIAGNOSIS — C25.0 MALIGNANT NEOPLASM OF HEAD OF PANCREAS (HCC): Chronic | ICD-10-CM

## 2024-07-03 DIAGNOSIS — F11.20 OPIATE DEPENDENCE, CONTINUOUS (HCC): ICD-10-CM

## 2024-07-03 DIAGNOSIS — R14.0 ABDOMINAL BLOATING: ICD-10-CM

## 2024-07-03 DIAGNOSIS — E11.69 TYPE 2 DIABETES MELLITUS WITH OTHER SPECIFIED COMPLICATION, WITHOUT LONG-TERM CURRENT USE OF INSULIN (HCC): ICD-10-CM

## 2024-07-03 PROCEDURE — 3078F DIAST BP <80 MM HG: CPT | Performed by: NURSE PRACTITIONER

## 2024-07-03 PROCEDURE — 3077F SYST BP >= 140 MM HG: CPT | Performed by: NURSE PRACTITIONER

## 2024-07-03 PROCEDURE — 99214 OFFICE O/P EST MOD 30 MIN: CPT | Performed by: NURSE PRACTITIONER

## 2024-07-03 RX ORDER — FAMOTIDINE 20 MG/1
20 TABLET, FILM COATED ORAL 2 TIMES DAILY
Qty: 60 TABLET | Refills: 0 | Status: SHIPPED | OUTPATIENT
Start: 2024-07-03 | End: 2024-07-25

## 2024-07-03 ASSESSMENT — FIBROSIS 4 INDEX: FIB4 SCORE: 0.78

## 2024-07-05 ENCOUNTER — HOSPITAL ENCOUNTER (OUTPATIENT)
Dept: LAB | Facility: MEDICAL CENTER | Age: 79
End: 2024-07-05
Attending: NURSE PRACTITIONER
Payer: MEDICARE

## 2024-07-05 DIAGNOSIS — E11.65 UNCONTROLLED TYPE 2 DIABETES MELLITUS WITH HYPERGLYCEMIA (HCC): ICD-10-CM

## 2024-07-05 LAB
ALBUMIN SERPL BCP-MCNC: 3.9 G/DL (ref 3.2–4.9)
ALBUMIN/GLOB SERPL: 1.2 G/DL
ALP SERPL-CCNC: 77 U/L (ref 30–99)
ALT SERPL-CCNC: 98 U/L (ref 2–50)
ANION GAP SERPL CALC-SCNC: 8 MMOL/L (ref 7–16)
AST SERPL-CCNC: 79 U/L (ref 12–45)
BASOPHILS # BLD AUTO: 1.2 % (ref 0–1.8)
BASOPHILS # BLD: 0.09 K/UL (ref 0–0.12)
BILIRUB SERPL-MCNC: 0.4 MG/DL (ref 0.1–1.5)
BUN SERPL-MCNC: 20 MG/DL (ref 8–22)
CALCIUM ALBUM COR SERPL-MCNC: 10.5 MG/DL (ref 8.5–10.5)
CALCIUM SERPL-MCNC: 10.4 MG/DL (ref 8.5–10.5)
CHLORIDE SERPL-SCNC: 101 MMOL/L (ref 96–112)
CHOLEST SERPL-MCNC: 91 MG/DL (ref 100–199)
CO2 SERPL-SCNC: 28 MMOL/L (ref 20–33)
CREAT SERPL-MCNC: 0.62 MG/DL (ref 0.5–1.4)
EOSINOPHIL # BLD AUTO: 0.2 K/UL (ref 0–0.51)
EOSINOPHIL NFR BLD: 2.7 % (ref 0–6.9)
ERYTHROCYTE [DISTWIDTH] IN BLOOD BY AUTOMATED COUNT: 54.2 FL (ref 35.9–50)
EST. AVERAGE GLUCOSE BLD GHB EST-MCNC: 137 MG/DL
GFR SERPLBLD CREATININE-BSD FMLA CKD-EPI: 91 ML/MIN/1.73 M 2
GLOBULIN SER CALC-MCNC: 3.3 G/DL (ref 1.9–3.5)
GLUCOSE SERPL-MCNC: 118 MG/DL (ref 65–99)
HBA1C MFR BLD: 6.4 % (ref 4–5.6)
HCT VFR BLD AUTO: 42.6 % (ref 37–47)
HDLC SERPL-MCNC: 53 MG/DL
HGB BLD-MCNC: 13.8 G/DL (ref 12–16)
IMM GRANULOCYTES # BLD AUTO: 0.02 K/UL (ref 0–0.11)
IMM GRANULOCYTES NFR BLD AUTO: 0.3 % (ref 0–0.9)
LDLC SERPL CALC-MCNC: 26 MG/DL
LYMPHOCYTES # BLD AUTO: 2.92 K/UL (ref 1–4.8)
LYMPHOCYTES NFR BLD: 39.4 % (ref 22–41)
MCH RBC QN AUTO: 32.5 PG (ref 27–33)
MCHC RBC AUTO-ENTMCNC: 32.4 G/DL (ref 32.2–35.5)
MCV RBC AUTO: 100.2 FL (ref 81.4–97.8)
MONOCYTES # BLD AUTO: 0.79 K/UL (ref 0–0.85)
MONOCYTES NFR BLD AUTO: 10.6 % (ref 0–13.4)
NEUTROPHILS # BLD AUTO: 3.4 K/UL (ref 1.82–7.42)
NEUTROPHILS NFR BLD: 45.8 % (ref 44–72)
NRBC # BLD AUTO: 0 K/UL
NRBC BLD-RTO: 0 /100 WBC (ref 0–0.2)
PLATELET # BLD AUTO: 272 K/UL (ref 164–446)
PMV BLD AUTO: 9.7 FL (ref 9–12.9)
POTASSIUM SERPL-SCNC: 4.5 MMOL/L (ref 3.6–5.5)
PROT SERPL-MCNC: 7.2 G/DL (ref 6–8.2)
RBC # BLD AUTO: 4.25 M/UL (ref 4.2–5.4)
SODIUM SERPL-SCNC: 137 MMOL/L (ref 135–145)
TRIGL SERPL-MCNC: 59 MG/DL (ref 0–149)
TSH SERPL DL<=0.005 MIU/L-ACNC: 1.93 UIU/ML (ref 0.38–5.33)
WBC # BLD AUTO: 7.4 K/UL (ref 4.8–10.8)

## 2024-07-05 PROCEDURE — 80053 COMPREHEN METABOLIC PANEL: CPT

## 2024-07-05 PROCEDURE — 84443 ASSAY THYROID STIM HORMONE: CPT

## 2024-07-05 PROCEDURE — 36415 COLL VENOUS BLD VENIPUNCTURE: CPT

## 2024-07-05 PROCEDURE — 83036 HEMOGLOBIN GLYCOSYLATED A1C: CPT

## 2024-07-05 PROCEDURE — 80061 LIPID PANEL: CPT

## 2024-07-05 PROCEDURE — 85025 COMPLETE CBC W/AUTO DIFF WBC: CPT

## 2024-07-07 ENCOUNTER — PATIENT MESSAGE (OUTPATIENT)
Dept: MEDICAL GROUP | Facility: LAB | Age: 79
End: 2024-07-07
Payer: MEDICARE

## 2024-07-07 DIAGNOSIS — R14.0 ABDOMINAL BLOATING: ICD-10-CM

## 2024-07-07 DIAGNOSIS — C25.0 MALIGNANT NEOPLASM OF HEAD OF PANCREAS (HCC): ICD-10-CM

## 2024-07-10 ENCOUNTER — PATIENT MESSAGE (OUTPATIENT)
Dept: MEDICAL GROUP | Facility: LAB | Age: 79
End: 2024-07-10

## 2024-07-10 ENCOUNTER — HOSPITAL ENCOUNTER (OUTPATIENT)
Dept: RADIOLOGY | Facility: MEDICAL CENTER | Age: 79
End: 2024-07-10
Attending: NURSE PRACTITIONER
Payer: MEDICARE

## 2024-07-10 DIAGNOSIS — K83.8 DILATED BILE DUCT: ICD-10-CM

## 2024-07-10 DIAGNOSIS — C25.0 MALIGNANT NEOPLASM OF HEAD OF PANCREAS (HCC): ICD-10-CM

## 2024-07-10 DIAGNOSIS — R14.0 ABDOMINAL BLOATING: ICD-10-CM

## 2024-07-10 DIAGNOSIS — C25.0 MALIGNANT NEOPLASM OF HEAD OF PANCREAS (HCC): Chronic | ICD-10-CM

## 2024-07-10 PROCEDURE — 74177 CT ABD & PELVIS W/CONTRAST: CPT

## 2024-07-10 PROCEDURE — 700117 HCHG RX CONTRAST REV CODE 255: Performed by: NURSE PRACTITIONER

## 2024-07-10 RX ADMIN — IOHEXOL 100 ML: 350 INJECTION, SOLUTION INTRAVENOUS at 14:09

## 2024-07-10 RX ADMIN — IOHEXOL 25 ML: 240 INJECTION, SOLUTION INTRATHECAL; INTRAVASCULAR; INTRAVENOUS; ORAL at 13:32

## 2024-07-16 ENCOUNTER — APPOINTMENT (OUTPATIENT)
Dept: RADIOLOGY | Facility: MEDICAL CENTER | Age: 79
End: 2024-07-16
Attending: NURSE PRACTITIONER
Payer: MEDICARE

## 2024-07-25 RX ORDER — FAMOTIDINE 20 MG/1
20 TABLET, FILM COATED ORAL 2 TIMES DAILY
Qty: 180 TABLET | Refills: 1 | Status: SHIPPED | OUTPATIENT
Start: 2024-07-25

## 2024-11-04 ENCOUNTER — HOSPITAL ENCOUNTER (OUTPATIENT)
Dept: LAB | Facility: MEDICAL CENTER | Age: 79
End: 2024-11-04
Payer: MEDICARE

## 2024-11-04 LAB
ALBUMIN SERPL BCP-MCNC: 3.8 G/DL (ref 3.2–4.9)
ALBUMIN/GLOB SERPL: 1 G/DL
ALP SERPL-CCNC: 128 U/L (ref 30–99)
ALT SERPL-CCNC: 188 U/L (ref 2–50)
ANION GAP SERPL CALC-SCNC: 6 MMOL/L (ref 7–16)
AST SERPL-CCNC: 159 U/L (ref 12–45)
BASOPHILS # BLD AUTO: 1.3 % (ref 0–1.8)
BASOPHILS # BLD: 0.11 K/UL (ref 0–0.12)
BILIRUB SERPL-MCNC: 0.4 MG/DL (ref 0.1–1.5)
BUN SERPL-MCNC: 16 MG/DL (ref 8–22)
CALCIUM ALBUM COR SERPL-MCNC: 10.8 MG/DL (ref 8.5–10.5)
CALCIUM SERPL-MCNC: 10.6 MG/DL (ref 8.5–10.5)
CHLORIDE SERPL-SCNC: 99 MMOL/L (ref 96–112)
CO2 SERPL-SCNC: 31 MMOL/L (ref 20–33)
CREAT SERPL-MCNC: 0.59 MG/DL (ref 0.5–1.4)
EOSINOPHIL # BLD AUTO: 0.24 K/UL (ref 0–0.51)
EOSINOPHIL NFR BLD: 2.9 % (ref 0–6.9)
ERYTHROCYTE [DISTWIDTH] IN BLOOD BY AUTOMATED COUNT: 52.3 FL (ref 35.9–50)
EST. AVERAGE GLUCOSE BLD GHB EST-MCNC: 160 MG/DL
FERRITIN SERPL-MCNC: 223 NG/ML (ref 10–291)
GFR SERPLBLD CREATININE-BSD FMLA CKD-EPI: 92 ML/MIN/1.73 M 2
GGT SERPL-CCNC: 501 U/L (ref 7–34)
GLOBULIN SER CALC-MCNC: 3.8 G/DL (ref 1.9–3.5)
GLUCOSE SERPL-MCNC: 126 MG/DL (ref 65–99)
HBA1C MFR BLD: 7.2 % (ref 4–5.6)
HBV CORE AB SERPL QL IA: NONREACTIVE
HBV SURFACE AB SERPL IA-ACNC: <3.5 MIU/ML (ref 0–10)
HBV SURFACE AG SER QL: NONREACTIVE
HCT VFR BLD AUTO: 43.5 % (ref 37–47)
HCV AB SER QL: NONREACTIVE
HGB BLD-MCNC: 14.1 G/DL (ref 12–16)
IMM GRANULOCYTES # BLD AUTO: 0.02 K/UL (ref 0–0.11)
IMM GRANULOCYTES NFR BLD AUTO: 0.2 % (ref 0–0.9)
INR PPP: 1.04 (ref 0.87–1.13)
IRON SATN MFR SERPL: 25 % (ref 15–55)
IRON SERPL-MCNC: 84 UG/DL (ref 40–170)
LYMPHOCYTES # BLD AUTO: 2.67 K/UL (ref 1–4.8)
LYMPHOCYTES NFR BLD: 32.7 % (ref 22–41)
MCH RBC QN AUTO: 32.3 PG (ref 27–33)
MCHC RBC AUTO-ENTMCNC: 32.4 G/DL (ref 32.2–35.5)
MCV RBC AUTO: 99.5 FL (ref 81.4–97.8)
MONOCYTES # BLD AUTO: 1.15 K/UL (ref 0–0.85)
MONOCYTES NFR BLD AUTO: 14.1 % (ref 0–13.4)
NEUTROPHILS # BLD AUTO: 3.98 K/UL (ref 1.82–7.42)
NEUTROPHILS NFR BLD: 48.8 % (ref 44–72)
NRBC # BLD AUTO: 0 K/UL
NRBC BLD-RTO: 0 /100 WBC (ref 0–0.2)
PLATELET # BLD AUTO: 303 K/UL (ref 164–446)
PMV BLD AUTO: 10 FL (ref 9–12.9)
POTASSIUM SERPL-SCNC: 4.3 MMOL/L (ref 3.6–5.5)
PROT SERPL-MCNC: 7.6 G/DL (ref 6–8.2)
PROTHROMBIN TIME: 13.6 SEC (ref 12–14.6)
RBC # BLD AUTO: 4.37 M/UL (ref 4.2–5.4)
SODIUM SERPL-SCNC: 136 MMOL/L (ref 135–145)
T4 FREE SERPL-MCNC: 1.18 NG/DL (ref 0.93–1.7)
TIBC SERPL-MCNC: 334 UG/DL (ref 250–450)
TSH SERPL-ACNC: 2.27 UIU/ML (ref 0.35–5.5)
UIBC SERPL-MCNC: 250 UG/DL (ref 110–370)
WBC # BLD AUTO: 8.2 K/UL (ref 4.8–10.8)

## 2024-11-04 PROCEDURE — 85610 PROTHROMBIN TIME: CPT

## 2024-11-04 PROCEDURE — 86706 HEP B SURFACE ANTIBODY: CPT

## 2024-11-04 PROCEDURE — 86381 MITOCHONDRIAL ANTIBODY EACH: CPT

## 2024-11-04 PROCEDURE — 83550 IRON BINDING TEST: CPT

## 2024-11-04 PROCEDURE — 82104 ALPHA-1-ANTITRYPSIN PHENO: CPT

## 2024-11-04 PROCEDURE — 82977 ASSAY OF GGT: CPT

## 2024-11-04 PROCEDURE — 82103 ALPHA-1-ANTITRYPSIN TOTAL: CPT

## 2024-11-04 PROCEDURE — 86708 HEPATITIS A ANTIBODY: CPT

## 2024-11-04 PROCEDURE — 36415 COLL VENOUS BLD VENIPUNCTURE: CPT

## 2024-11-04 PROCEDURE — 86038 ANTINUCLEAR ANTIBODIES: CPT

## 2024-11-04 PROCEDURE — 82247 BILIRUBIN TOTAL: CPT

## 2024-11-04 PROCEDURE — 86235 NUCLEAR ANTIGEN ANTIBODY: CPT | Mod: 91

## 2024-11-04 PROCEDURE — 84439 ASSAY OF FREE THYROXINE: CPT

## 2024-11-04 PROCEDURE — 82465 ASSAY BLD/SERUM CHOLESTEROL: CPT

## 2024-11-04 PROCEDURE — 82947 ASSAY GLUCOSE BLOOD QUANT: CPT

## 2024-11-04 PROCEDURE — 83540 ASSAY OF IRON: CPT

## 2024-11-04 PROCEDURE — 87340 HEPATITIS B SURFACE AG IA: CPT

## 2024-11-04 PROCEDURE — 84443 ASSAY THYROID STIM HORMONE: CPT

## 2024-11-04 PROCEDURE — 84478 ASSAY OF TRIGLYCERIDES: CPT

## 2024-11-04 PROCEDURE — 86256 FLUORESCENT ANTIBODY TITER: CPT | Mod: 91

## 2024-11-04 PROCEDURE — 86039 ANTINUCLEAR ANTIBODIES (ANA): CPT

## 2024-11-04 PROCEDURE — 82390 ASSAY OF CERULOPLASMIN: CPT

## 2024-11-04 PROCEDURE — 86015 ACTIN ANTIBODY EACH: CPT

## 2024-11-04 PROCEDURE — 85025 COMPLETE CBC W/AUTO DIFF WBC: CPT

## 2024-11-04 PROCEDURE — 86704 HEP B CORE ANTIBODY TOTAL: CPT

## 2024-11-04 PROCEDURE — 82728 ASSAY OF FERRITIN: CPT

## 2024-11-04 PROCEDURE — 82172 ASSAY OF APOLIPOPROTEIN: CPT

## 2024-11-04 PROCEDURE — 86225 DNA ANTIBODY NATIVE: CPT

## 2024-11-04 PROCEDURE — 83010 ASSAY OF HAPTOGLOBIN QUANT: CPT

## 2024-11-04 PROCEDURE — 83883 ASSAY NEPHELOMETRY NOT SPEC: CPT

## 2024-11-04 PROCEDURE — 80053 COMPREHEN METABOLIC PANEL: CPT

## 2024-11-04 PROCEDURE — 84460 ALANINE AMINO (ALT) (SGPT): CPT

## 2024-11-04 PROCEDURE — 83036 HEMOGLOBIN GLYCOSYLATED A1C: CPT

## 2024-11-04 PROCEDURE — 86803 HEPATITIS C AB TEST: CPT

## 2024-11-04 PROCEDURE — 84450 TRANSFERASE (AST) (SGOT): CPT

## 2024-11-06 LAB
A2 MACROGLOB SERPL-MCNC: 318 MG/DL (ref 110–276)
ALT SERPL W P-5'-P-CCNC: 190 IU/L (ref 0–40)
APO A-I SERPL-MCNC: 136 MG/DL (ref 116–209)
AST SERPL W P-5'-P-CCNC: 158 IU/L (ref 0–40)
BILIRUB SERPL-MCNC: 0.4 MG/DL (ref 0–1.2)
CERULOPLASMIN SERPL-MCNC: 25 MG/DL (ref 16–45)
CHOLEST SERPL-MCNC: 88 MG/DL (ref 100–199)
FIBROSIS SCORING 550107: ABNORMAL
FIBROSIS STAGE SERPL QL: ABNORMAL
GGT SERPL-CCNC: 542 IU/L (ref 0–60)
GLUCOSE SERPL-MCNC: 122 MG/DL (ref 70–99)
HAPTOGLOB SERPL-MCNC: 139 MG/DL (ref 42–346)
HAV AB SER QL IA: NEGATIVE
LABORATORY COMMENT REPORT: ABNORMAL
LIVER FIBR SCORE SERPL CALC.FIBROSURE: 0.79 (ref 0–0.21)
LIVER STEATOSIS GRADE SERPL QL: ABNORMAL
LIVER STEATOSIS SCORE SERPL: 0.89 (ref 0–0.4)
MITOCHONDRIA M2 IGG SER-ACNC: 8.8 UNITS (ref 0–24.9)
NASH GRADE SERPL QL: ABNORMAL
NASH INTERPRETATION SERPL-IMP: ABNORMAL
NASH SCORE SERPL: 0.98 (ref 0–0.25)
NASH SCORING Z4789: ABNORMAL
NUCLEAR IGG SER QL IA: DETECTED
SMA IGG SER-ACNC: 64 UNITS (ref 0–19)
SMOOTH MUSCLE IGG TITR SER: ABNORMAL {TITER}
STEATOSIS SCORING NL11718: ABNORMAL
TEST PERFORMANCE INFO SPEC: ABNORMAL
TEST PERFORMANCE INFO SPEC: ABNORMAL
TRIGL SERPL-MCNC: 70 MG/DL (ref 0–149)

## 2024-11-07 LAB
A1AT PHENOTYP SERPL-IMP: NORMAL
A1AT SERPL-MCNC: 148 MG/DL (ref 90–200)
ANA PAT SER IF-IMP: ABNORMAL
ANA PAT SER IF-IMP: ABNORMAL
NUCLEAR IGG SER QL IF: DETECTED
NUCLEAR IGG TITR SER IF: ABNORMAL {TITER}

## 2024-11-08 LAB
DSDNA AB TITR SER CLIF: NORMAL {TITER}
U1 SNRNP IGG SER QL: 3 UNITS (ref 0–19)

## 2024-11-09 LAB
DSDNA IGG TITR SER CLIF: ABNORMAL {TITER}
ENA JO1 AB TITR SER: 1 AU/ML (ref 0–40)
ENA SCL70 IGG SER QL: 2 AU/ML (ref 0–40)
ENA SM IGG SER-ACNC: 1 AU/ML (ref 0–40)
ENA SS-A 60KD AB SER-ACNC: 0 AU/ML (ref 0–40)
ENA SS-A IGG SER QL: 2 AU/ML (ref 0–40)
ENA SS-B IGG SER IA-ACNC: 0 AU/ML (ref 0–40)

## 2024-11-20 DIAGNOSIS — I10 ESSENTIAL HYPERTENSION: ICD-10-CM

## 2024-11-20 RX ORDER — LISINOPRIL AND HYDROCHLOROTHIAZIDE 20; 25 MG/1; MG/1
1 TABLET ORAL
Qty: 100 TABLET | Refills: 2 | Status: SHIPPED | OUTPATIENT
Start: 2024-11-20

## 2024-11-20 NOTE — TELEPHONE ENCOUNTER
Received request via: Pharmacy    Was the patient seen in the last year in this department? Yes    Does the patient have an active prescription (recently filled or refills available) for medication(s) requested? No    Pharmacy Name:   [facility]/pharmacy #[id number] - [place] - [address] [address]  Phone: [phone] Fax: [phone]       Does the patient have intermediate Plus and need 100-day supply? (This applies to ALL medications) Yes, quantity updated to 100 days

## 2024-12-02 ENCOUNTER — APPOINTMENT (OUTPATIENT)
Dept: ADMISSIONS | Facility: MEDICAL CENTER | Age: 79
End: 2024-12-02
Attending: INTERNAL MEDICINE
Payer: MEDICARE

## 2024-12-04 ENCOUNTER — PRE-ADMISSION TESTING (OUTPATIENT)
Dept: ADMISSIONS | Facility: MEDICAL CENTER | Age: 79
End: 2024-12-04
Attending: INTERNAL MEDICINE
Payer: MEDICARE

## 2024-12-04 VITALS — BODY MASS INDEX: 26.15 KG/M2 | HEIGHT: 66 IN

## 2024-12-04 DIAGNOSIS — Z01.812 PRE-OPERATIVE LABORATORY EXAMINATION: ICD-10-CM

## 2024-12-05 ENCOUNTER — HOSPITAL ENCOUNTER (OUTPATIENT)
Dept: LAB | Facility: MEDICAL CENTER | Age: 79
End: 2024-12-05
Attending: INTERNAL MEDICINE
Payer: MEDICARE

## 2024-12-05 DIAGNOSIS — Z01.812 PRE-OPERATIVE LABORATORY EXAMINATION: ICD-10-CM

## 2024-12-05 LAB
ERYTHROCYTE [DISTWIDTH] IN BLOOD BY AUTOMATED COUNT: 52.5 FL (ref 35.9–50)
HCT VFR BLD AUTO: 40.8 % (ref 37–47)
HGB BLD-MCNC: 13.2 G/DL (ref 12–16)
INR PPP: 1.02 (ref 0.87–1.13)
MCH RBC QN AUTO: 32.3 PG (ref 27–33)
MCHC RBC AUTO-ENTMCNC: 32.4 G/DL (ref 32.2–35.5)
MCV RBC AUTO: 99.8 FL (ref 81.4–97.8)
PLATELET # BLD AUTO: 303 K/UL (ref 164–446)
PMV BLD AUTO: 9.9 FL (ref 9–12.9)
PROTHROMBIN TIME: 13.4 SEC (ref 12–14.6)
RBC # BLD AUTO: 4.09 M/UL (ref 4.2–5.4)
WBC # BLD AUTO: 8.5 K/UL (ref 4.8–10.8)

## 2024-12-05 PROCEDURE — 36415 COLL VENOUS BLD VENIPUNCTURE: CPT

## 2024-12-05 PROCEDURE — 85610 PROTHROMBIN TIME: CPT

## 2024-12-05 PROCEDURE — 85027 COMPLETE CBC AUTOMATED: CPT

## 2024-12-05 NOTE — PREPROCEDURE INSTRUCTIONS
Pre-admit telephone appointment completed. Reviewed, updated medications/health history information. Instructions for check in location given and parking map emailed.

## 2024-12-13 ENCOUNTER — APPOINTMENT (OUTPATIENT)
Dept: RADIOLOGY | Facility: MEDICAL CENTER | Age: 79
End: 2024-12-13
Payer: MEDICARE

## 2024-12-13 ENCOUNTER — HOSPITAL ENCOUNTER (OUTPATIENT)
Facility: MEDICAL CENTER | Age: 79
End: 2024-12-13
Attending: INTERNAL MEDICINE | Admitting: INTERNAL MEDICINE
Payer: MEDICARE

## 2024-12-13 VITALS
WEIGHT: 165 LBS | HEART RATE: 90 BPM | BODY MASS INDEX: 26.52 KG/M2 | HEIGHT: 66 IN | RESPIRATION RATE: 28 BRPM | DIASTOLIC BLOOD PRESSURE: 62 MMHG | SYSTOLIC BLOOD PRESSURE: 130 MMHG | OXYGEN SATURATION: 94 %

## 2024-12-13 DIAGNOSIS — R93.5 ABNORMAL ABDOMINAL ULTRASOUND: ICD-10-CM

## 2024-12-13 DIAGNOSIS — R74.8 ELEVATED LIVER ENZYMES: ICD-10-CM

## 2024-12-13 LAB — PATHOLOGY CONSULT NOTE: NORMAL

## 2024-12-13 PROCEDURE — 99152 MOD SED SAME PHYS/QHP 5/>YRS: CPT

## 2024-12-13 PROCEDURE — 700111 HCHG RX REV CODE 636 W/ 250 OVERRIDE (IP): Mod: JZ

## 2024-12-13 PROCEDURE — 82962 GLUCOSE BLOOD TEST: CPT

## 2024-12-13 PROCEDURE — 160002 HCHG RECOVERY MINUTES (STAT)

## 2024-12-13 PROCEDURE — 160025 RECOVERY II MINUTES (STATS)

## 2024-12-13 PROCEDURE — 88313 SPECIAL STAINS GROUP 2: CPT | Mod: 91

## 2024-12-13 PROCEDURE — 4401636 IR-BIOPSY-LIVER PERCUTANEOUS(FL)

## 2024-12-13 PROCEDURE — 700111 HCHG RX REV CODE 636 W/ 250 OVERRIDE (IP): Mod: JZ | Performed by: RADIOLOGY

## 2024-12-13 PROCEDURE — 88307 TISSUE EXAM BY PATHOLOGIST: CPT

## 2024-12-13 PROCEDURE — 47000 NEEDLE BIOPSY OF LIVER PERQ: CPT

## 2024-12-13 RX ORDER — HYDRALAZINE HYDROCHLORIDE 20 MG/ML
INJECTION INTRAMUSCULAR; INTRAVENOUS
Status: DISCONTINUED
Start: 2024-12-13 | End: 2024-12-13 | Stop reason: HOSPADM

## 2024-12-13 RX ORDER — HYDRALAZINE HYDROCHLORIDE 20 MG/ML
INJECTION INTRAMUSCULAR; INTRAVENOUS
Status: COMPLETED
Start: 2024-12-13 | End: 2024-12-13

## 2024-12-13 RX ORDER — MIDAZOLAM HYDROCHLORIDE 1 MG/ML
INJECTION INTRAMUSCULAR; INTRAVENOUS
Status: COMPLETED
Start: 2024-12-13 | End: 2024-12-13

## 2024-12-13 RX ORDER — HYDRALAZINE HYDROCHLORIDE 20 MG/ML
20 INJECTION INTRAMUSCULAR; INTRAVENOUS ONCE
Status: COMPLETED | OUTPATIENT
Start: 2024-12-13 | End: 2024-12-13

## 2024-12-13 RX ORDER — HYDRALAZINE HYDROCHLORIDE 20 MG/ML
INJECTION INTRAMUSCULAR; INTRAVENOUS
Status: COMPLETED | OUTPATIENT
Start: 2024-12-13 | End: 2024-12-13

## 2024-12-13 RX ORDER — SODIUM CHLORIDE 9 MG/ML
500 INJECTION, SOLUTION INTRAVENOUS
Status: ACTIVE | OUTPATIENT
Start: 2024-12-13 | End: 2024-12-13

## 2024-12-13 RX ORDER — OXYCODONE HYDROCHLORIDE 5 MG/1
5 TABLET ORAL EVERY 6 HOURS PRN
COMMUNITY

## 2024-12-13 RX ORDER — ONDANSETRON 2 MG/ML
4 INJECTION INTRAMUSCULAR; INTRAVENOUS PRN
Status: ACTIVE | OUTPATIENT
Start: 2024-12-13 | End: 2024-12-13

## 2024-12-13 RX ORDER — MIDAZOLAM HYDROCHLORIDE 1 MG/ML
.5-2 INJECTION INTRAMUSCULAR; INTRAVENOUS PRN
Status: ACTIVE | OUTPATIENT
Start: 2024-12-13 | End: 2024-12-13

## 2024-12-13 RX ADMIN — HYDRALAZINE HYDROCHLORIDE 10 MG: 20 INJECTION, SOLUTION INTRAMUSCULAR; INTRAVENOUS at 10:30

## 2024-12-13 RX ADMIN — MIDAZOLAM HYDROCHLORIDE 2 MG: 1 INJECTION INTRAMUSCULAR; INTRAVENOUS at 10:28

## 2024-12-13 RX ADMIN — FENTANYL CITRATE 100 MCG: 50 INJECTION, SOLUTION INTRAMUSCULAR; INTRAVENOUS at 09:24

## 2024-12-13 RX ADMIN — HYDRALAZINE HYDROCHLORIDE 10 MG: 20 INJECTION, SOLUTION INTRAMUSCULAR; INTRAVENOUS at 09:36

## 2024-12-13 RX ADMIN — FENTANYL CITRATE 50 MCG: 50 INJECTION, SOLUTION INTRAMUSCULAR; INTRAVENOUS at 10:36

## 2024-12-13 RX ADMIN — MIDAZOLAM HYDROCHLORIDE 2 MG: 1 INJECTION, SOLUTION INTRAMUSCULAR; INTRAVENOUS at 10:28

## 2024-12-13 RX ADMIN — HYDRALAZINE HYDROCHLORIDE 10 MG: 20 INJECTION INTRAMUSCULAR; INTRAVENOUS at 09:36

## 2024-12-13 RX ADMIN — MIDAZOLAM HYDROCHLORIDE 2 MG: 1 INJECTION, SOLUTION INTRAMUSCULAR; INTRAVENOUS at 10:25

## 2024-12-13 RX ADMIN — MIDAZOLAM HYDROCHLORIDE 2 MG: 1 INJECTION INTRAMUSCULAR; INTRAVENOUS at 10:25

## 2024-12-13 ASSESSMENT — FIBROSIS 4 INDEX: FIB4 SCORE: 2.95

## 2024-12-13 NOTE — OR SURGEON
Immediate Post- Operative Note        Findings: Left lobe liver biopsy, 2 cores obtained.      Procedure(s): Liver biopsy      Estimated Blood Loss: Less than 5 ml        Complications: None            12/13/2024     10:40 AM     Venus Fitch M.D.

## 2024-12-13 NOTE — DISCHARGE INSTRUCTIONS
.Care After Liver Biopsy    After a liver biopsy, it is common to have these things in the area where the biopsy was done. You may:    Have pain.    Feel sore.    Have bruising.     You may also feel tired for a few days.     Follow these instructions at home:   Medicines    Take over-the-counter and prescription medicines only as told by your doctor.    If you were prescribed an antibiotic medicine, take it as told by your doctor. Do not stop taking the antibiotic, even if you start to feel better.    Do not take medicines that may thin your blood. These medicines include aspirin and ibuprofen. Take them only if your doctor tells you to.       Take steps to prevent problems with pooping (constipation). You may need to:    Drink enough fluid to keep your pee (urine) pale yellow.    Take medicines. Use what you normally would or call your provider for suggestions.     Eat foods that are high in fiber. These include beans, whole grains, and fresh fruits and vegetables.    Limit foods that are high in fat and sugar. These include fried or sweet foods.       Caring for your incision    Keep the dressing and incision dry and intact for 24 hours, do not shower while bandage is in place. Do not submerge (bath, hot tubs, lakes)  the site in water for 5 days.     Wash your hands with soap and water for at least 20 seconds before and after you change your bandage. If you cannot use soap and water, use hand .    If stitches or skin glue were used leave them in place for at least two weeks.     If steri strips were used leave them alone unless you are told to take them off. You may trim the edges of the tape strips if they curl up.      Check your incision every day for signs of infection. Check for:    Redness, swelling, or more pain.    Fluid or blood.    Warmth.    Pus or a bad smell.    Do not take baths, swim, or use a hot tub.      Activity    Rest at home for 1-2 days, or as told by your doctor.    Get up to  take short walks every 1 to 2 hours. Ask for help if you feel weak or unsteady.    Do not lift anything that is heavier than 10 lb (4.5 kg) for one week.    Do not play contact sports for 2 weeks after the procedure.    Return to your normal activities as told by your doctor. Ask what activities are safe for you.     General instructions    Do not drink alcohol in the first week after the procedure.    Plan to have a responsible adult care for you for the time you are told after you leave the hospital or clinic. This is important.    Your results will be posted to [employer] and reported to your Provider that ordered the procedure.    Keep all follow-up visits.     Contact a doctor if:      You have more bleeding in your incision.    Your incision swells, or is red and more painful.    You have fluid that comes from your incision.    You develop a rash.    You have fever or chills.     Get help right away if:    You have swelling, bloating, or pain in your belly (abdomen).    You get dizzy or faint.    You vomit or you feel like vomiting.    You have trouble breathing or feel short of breath.    You have chest pain.    You have problems talking or seeing.    You have trouble with your balance or moving your arms or legs.     These symptoms may be an emergency. Get help right away. Call your local emergency services (911 in the [place]    Do not wait to see if the symptoms will go away.    Do not drive yourself to the hospital.     Summary      After the procedure, it is common to have pain, soreness, bruising, and tiredness.    Your doctor will tell you how to take care of yourself at home. Change your bandage, take your medicines, and limit your activities as told by your doctor.    Call your doctor if you have symptoms of infection. Get help right away if your belly swells, your cut bleeds a lot, or you have trouble talking or breathing.     This information is not intended to replace advice given to you by your  health care provider. Make sure you discuss any questions you have with your health care provider

## 2024-12-13 NOTE — PROGRESS NOTES
Pt has met discharge criteria at this time. Pt was provided with discharge instructions, pt verbalized understanding and had no questions at this time. Pt's vitals WNL, dressing WNL, IV removed. Pt out of department at 1300.

## 2024-12-13 NOTE — PROGRESS NOTES
Pt presents to [facility]. Patient was consented by MD at bedside, confirmed by this RN and consent at bedside. Pt remained in [facility] in Supine position. Patient underwent a Non-Targeted Liver Biopsy by Dr. Fitch. Procedure site was marked by MD and verified using imaging guidance. Pt placed on monitor, prepped and draped in a sterile fashion. Vitals were taken every 5 minutes and remained stable during procedure (see doc flow sheet for results). CO2 waveform capnography was monitored and remained WNL throughout procedure. Bedside report with Estrellita JOHNSON. Pt transported by stretcher with RN to [facility].     Specimen: 2 cores in [place]

## 2024-12-15 DIAGNOSIS — E11.65 UNCONTROLLED TYPE 2 DIABETES MELLITUS WITH HYPERGLYCEMIA (HCC): ICD-10-CM

## 2024-12-16 LAB — GLUCOSE BLD STRIP.AUTO-MCNC: 123 MG/DL (ref 65–99)

## 2024-12-16 RX ORDER — GLIMEPIRIDE 2 MG/1
2 TABLET ORAL EVERY MORNING
Qty: 30 TABLET | Refills: 8 | Status: SHIPPED | OUTPATIENT
Start: 2024-12-16

## 2025-01-06 ENCOUNTER — APPOINTMENT (OUTPATIENT)
Dept: RADIOLOGY | Facility: MEDICAL CENTER | Age: 80
End: 2025-01-06
Payer: MEDICARE

## 2025-01-20 ENCOUNTER — HOSPITAL ENCOUNTER (OUTPATIENT)
Dept: RADIOLOGY | Facility: MEDICAL CENTER | Age: 80
End: 2025-01-20
Payer: MEDICARE

## 2025-01-20 DIAGNOSIS — R74.8 ACID PHOSPHATASE ELEVATED: ICD-10-CM

## 2025-01-20 DIAGNOSIS — R93.5 ABNORMAL ABDOMINAL ULTRASOUND: ICD-10-CM

## 2025-01-20 PROCEDURE — 74183 MRI ABD W/O CNTR FLWD CNTR: CPT

## 2025-01-20 PROCEDURE — 700117 HCHG RX CONTRAST REV CODE 255: Mod: JZ

## 2025-01-20 PROCEDURE — A9579 GAD-BASE MR CONTRAST NOS,1ML: HCPCS | Mod: JZ

## 2025-01-20 RX ADMIN — GADOTERIDOL 15 ML: 279.3 INJECTION, SOLUTION INTRAVENOUS at 15:26

## 2025-01-26 DIAGNOSIS — E11.65 UNCONTROLLED TYPE 2 DIABETES MELLITUS WITH HYPERGLYCEMIA (HCC): ICD-10-CM

## 2025-01-27 ENCOUNTER — HOSPITAL ENCOUNTER (OUTPATIENT)
Dept: LAB | Facility: MEDICAL CENTER | Age: 80
End: 2025-01-27
Payer: MEDICARE

## 2025-01-27 LAB
ALBUMIN SERPL BCP-MCNC: 3.9 G/DL (ref 3.2–4.9)
ALBUMIN/GLOB SERPL: 1.1 G/DL
ALP SERPL-CCNC: 83 U/L (ref 30–99)
ALT SERPL-CCNC: 55 U/L (ref 2–50)
ANION GAP SERPL CALC-SCNC: 8 MMOL/L (ref 7–16)
AST SERPL-CCNC: 45 U/L (ref 12–45)
BILIRUB SERPL-MCNC: <0.2 MG/DL (ref 0.1–1.5)
BUN SERPL-MCNC: 27 MG/DL (ref 8–22)
CALCIUM ALBUM COR SERPL-MCNC: 11.3 MG/DL (ref 8.5–10.5)
CALCIUM SERPL-MCNC: 11.2 MG/DL (ref 8.5–10.5)
CHLORIDE SERPL-SCNC: 99 MMOL/L (ref 96–112)
CO2 SERPL-SCNC: 30 MMOL/L (ref 20–33)
CREAT SERPL-MCNC: 0.76 MG/DL (ref 0.5–1.4)
GFR SERPLBLD CREATININE-BSD FMLA CKD-EPI: 80 ML/MIN/1.73 M 2
GLOBULIN SER CALC-MCNC: 3.6 G/DL (ref 1.9–3.5)
GLUCOSE SERPL-MCNC: 166 MG/DL (ref 65–99)
POTASSIUM SERPL-SCNC: 4.3 MMOL/L (ref 3.6–5.5)
PROT SERPL-MCNC: 7.5 G/DL (ref 6–8.2)
SODIUM SERPL-SCNC: 137 MMOL/L (ref 135–145)

## 2025-01-27 PROCEDURE — 84433 ASY THIOPURIN S-MTHYLTRNSFRS: CPT

## 2025-01-27 PROCEDURE — 36415 COLL VENOUS BLD VENIPUNCTURE: CPT

## 2025-01-27 PROCEDURE — 80053 COMPREHEN METABOLIC PANEL: CPT

## 2025-01-27 RX ORDER — GLIMEPIRIDE 2 MG/1
2 TABLET ORAL EVERY MORNING
Qty: 90 TABLET | Refills: 3 | Status: SHIPPED | OUTPATIENT
Start: 2025-01-27

## 2025-01-31 LAB — TPMT BLD-CCNC: 25.2 U/ML (ref 24–44)

## 2025-02-02 DIAGNOSIS — E11.65 UNCONTROLLED TYPE 2 DIABETES MELLITUS WITH HYPERGLYCEMIA (HCC): ICD-10-CM

## 2025-02-03 RX ORDER — METFORMIN HYDROCHLORIDE 500 MG/1
TABLET, EXTENDED RELEASE ORAL
Qty: 200 TABLET | Refills: 2 | Status: SHIPPED | OUTPATIENT
Start: 2025-02-03

## 2025-02-14 ENCOUNTER — HOSPITAL ENCOUNTER (OUTPATIENT)
Dept: LAB | Facility: MEDICAL CENTER | Age: 80
End: 2025-02-14
Attending: INTERNAL MEDICINE
Payer: MEDICARE

## 2025-02-14 PROCEDURE — 36415 COLL VENOUS BLD VENIPUNCTURE: CPT

## 2025-02-14 PROCEDURE — 83970 ASSAY OF PARATHORMONE: CPT

## 2025-02-15 LAB — PTH-INTACT SERPL-MCNC: 43 PG/ML (ref 14–72)

## 2025-03-04 ENCOUNTER — OFFICE VISIT (OUTPATIENT)
Dept: URGENT CARE | Facility: CLINIC | Age: 80
End: 2025-03-04
Payer: MEDICARE

## 2025-03-04 VITALS
WEIGHT: 160 LBS | TEMPERATURE: 99.2 F | HEIGHT: 66 IN | HEART RATE: 80 BPM | OXYGEN SATURATION: 93 % | DIASTOLIC BLOOD PRESSURE: 88 MMHG | RESPIRATION RATE: 20 BRPM | SYSTOLIC BLOOD PRESSURE: 132 MMHG | BODY MASS INDEX: 25.71 KG/M2

## 2025-03-04 DIAGNOSIS — J32.9 SINUSITIS, UNSPECIFIED CHRONICITY, UNSPECIFIED LOCATION: ICD-10-CM

## 2025-03-04 DIAGNOSIS — R05.1 ACUTE COUGH: ICD-10-CM

## 2025-03-04 PROCEDURE — 3079F DIAST BP 80-89 MM HG: CPT | Performed by: FAMILY MEDICINE

## 2025-03-04 PROCEDURE — 99213 OFFICE O/P EST LOW 20 MIN: CPT | Performed by: FAMILY MEDICINE

## 2025-03-04 PROCEDURE — 3075F SYST BP GE 130 - 139MM HG: CPT | Performed by: FAMILY MEDICINE

## 2025-03-04 RX ORDER — MORPHINE SULFATE 15 MG/1
15 TABLET ORAL 2 TIMES DAILY PRN
COMMUNITY
Start: 2025-02-27

## 2025-03-04 RX ORDER — AMOXICILLIN 875 MG/1
875 TABLET, COATED ORAL 2 TIMES DAILY
Qty: 14 TABLET | Refills: 0 | Status: SHIPPED | OUTPATIENT
Start: 2025-03-04 | End: 2025-03-11

## 2025-03-04 RX ORDER — BENZONATATE 100 MG/1
100 CAPSULE ORAL 3 TIMES DAILY PRN
Qty: 30 CAPSULE | Refills: 0 | Status: SHIPPED | OUTPATIENT
Start: 2025-03-04

## 2025-03-04 ASSESSMENT — ENCOUNTER SYMPTOMS
SINUS PAIN: 1
SHORTNESS OF BREATH: 0
CHILLS: 0
COUGH: 1
VOMITING: 0
DIZZINESS: 0
MYALGIAS: 0
SORE THROAT: 0
SINUS PRESSURE: 1
FEVER: 0
NAUSEA: 0

## 2025-03-04 ASSESSMENT — FIBROSIS 4 INDEX: FIB4 SCORE: 1.56

## 2025-03-04 NOTE — PROGRESS NOTES
Subjective:   Larissa Ramirez is a 79 y.o. female who presents for Sinus Problem (Sinus congestion and cough x 2 weeks , yellow and green mucous )        Sinus Problem  This is a new (Reports sinus pain pressure over the past 2 weeks) problem. Episode onset: 2 weeks. The problem has been gradually worsening since onset. Associated symptoms include congestion, coughing and sinus pressure. Pertinent negatives include no chills, shortness of breath or sore throat. Treatments tried: Over-the-counter DayQuil. The treatment provided no relief.     PMH:  has a past medical history of Arthritis, Bowel habit changes (05/17/2018), Bronchitis (2005), Cancer ([facility]) (05/2018), Chronic low back pain, Chronic neck pain, Dental disorder, Diabetes ([facility]) (2022), Hypertension (2015), Other specified symptom associated with female genital organs, Pain (05/17/2018), Pre-diabetes, Snoring, and Urinary incontinence (3-2023).    She has no past medical history of Breast cancer ([facility]).  MEDS:   Current Outpatient Medications:     morphine (MS IR) 15 MG tablet, Take 15 mg by mouth 2 times a day as needed., Disp: , Rfl:     amoxicillin (AMOXIL) 875 MG tablet, Take 1 Tablet by mouth 2 times a day for 7 days. Take twice a day for 7 days, Disp: 14 Tablet, Rfl: 0    benzonatate (TESSALON) 100 MG Cap, Take 1 Capsule by mouth 3 times a day as needed for Cough., Disp: 30 Capsule, Rfl: 0    metFORMIN ER (GLUCOPHAGE XR) 500 MG TABLET SR 24 HR, TAKE 2 TABLETS BY MOUTH WITH DINNER, Disp: 200 Tablet, Rfl: 2    glimepiride (AMARYL) 2 MG Tab, TAKE 1 TABLET BY MOUTH EVERY MORNING. WITH FOOD., Disp: 90 Tablet, Rfl: 3    lisinopril-hydrochlorothiazide (PRINZIDE) 20-25 MG per tablet, TAKE 1 TABLET BY MOUTH EVERY DAY, Disp: 100 Tablet, Rfl: 2    famotidine (PEPCID) 20 MG Tab, TAKE 1 TABLET BY MOUTH 2 TIMES A DAY. BEFORE MEALS, Disp: 180 Tablet, Rfl: 1    atorvastatin (LIPITOR) 20 MG Tab, TAKE 1 TABLET BY MOUTH EVERY DAY (Patient taking differently: Take 20 mg by  mouth every evening.), Disp: 100 Tablet, Rfl: 3    VITAMIN D PO, Take  by mouth., Disp: , Rfl:     Blood Glucose Test Strips, Test strips order: Test strips for meter covered by [facility]. Sig: use fasting in the morning and prn ssx high or low sugar #100 RF x 3, Disp: 100 Strip, Rfl: 4    Lancets, Lancets order: Lancets for meter covered by [facility] plus. Sig: use fasting in the morning and prn ssx high or low sugar. #100 RF x 3, Disp: 100 Each, Rfl: 3    traMADol (ULTRAM) 50 MG Tab, Take 50 mg by mouth every 8 hours as needed. Indications: Pain, Disp: , Rfl:     aspirin EC (ECOTRIN) 81 MG Tablet Delayed Response, Take 81 mg by mouth every day., Disp: , Rfl:     multivitamin (THERAGRAN) Tab, Take 1 Tablet by mouth every day., Disp: , Rfl:     oxyCODONE immediate-release (ROXICODONE) 5 MG Tab, Take 5 mg by mouth every 6 hours as needed for Severe Pain. (Patient not taking: Reported on 3/4/2025), Disp: , Rfl:     CONTOUR NEXT TEST strip, USE TO TEST HIGH OR LOW BLOOD SUGAR FASTIN ING THE MORNING AND AS NEEDED, Disp: 100 Strip, Rfl: 4    Calcium Carb-Cholecalciferol (CALCIUM 1000 + D PO), Take 1 Tab by mouth every day. (Patient not taking: Reported on 3/4/2025), Disp: , Rfl:   ALLERGIES:   Allergies   Allergen Reactions    Levofloxacin Swelling     Swelling of tongue and neck    Nitrofurantoin Swelling     Swelling of lips, tongue, face    Amitriptyline Swelling     Facial swelling     Sulfa Drugs Nausea    Lyrica Rash     .     SURGHX:   Past Surgical History:   Procedure Laterality Date    NEURO DEST FACET L/S W/IG SNGL Right 02/25/2021    Procedure: DESTRUCTION, NERVE, FACET JOINT, LUMBOSACRAL, USING NEUROLYTIC AGENT, WITH FLUOROSCOPIC GUIDANCE;  Surgeon: Frankie Saha M.D.;  [facility];  Service: Pain Management    NEURO DEST FACET L/S W/IG SNGL Left 02/11/2021    Procedure: DESTRUCTION, NERVE, FACET JOINT, LUMBOSACRAL, USING NEUROLYTIC AGENT, WITH FLUOROSCOPIC GUIDANCE;   Surgeon: Jose Gonzales M.D.;  [facility];  Service: Pain Management    [place] TOTAL HIP ARTHROPLASTY Left 08/24/2020    Procedure: ARTHROPLASTY, HIP, TOTAL;  Surgeon: Tomas Venegas M.D.;  Location: SURGERY [facility];  Service: Orthopedics    NEURO DEST FACET L/S W/IG SNGL Left 08/11/2020    Procedure: DESTRUCTION, NERVE, FACET JOINT, LUMBOSACRAL, USING NEUROLYTIC AGENT, WITH FLUOROSCOPIC GUIDANCE;  Surgeon: Jose Gonzales M.D.;  [facility];  Service: Pain Management    PANCREATECTOMY  11/05/2018    Procedure: PANCREATECTOMY-  DISTAL;  Surgeon: Carl Valadez M.D.;  Location: [facility];  Service: General    SPLENECTOMY  11/05/2018    Procedure: SPLENECTOMY;  Surgeon: Carl Valadez M.D.;  Location: [facility];  Service: General    NODE DISSECTION  11/05/2018    Procedure: NODE DISSECTION;  Surgeon: Carl Valadez M.D.;  Location: [facility];  Service: General    OMENTECTOMY  11/05/2018    Procedure: OMENTECTOMY-  OMENTAL FLAP, INTRAOPERATIVE ULTRASOUND, PORTAL VEIN RESECTION AND REPAIR, CELIAC TRUNK PARTIAL RESECTION AND REPAIR;  Surgeon: Carl Valadez M.D.;  Location: [facility];  Service: General    CATH PLACEMENT Right 05/30/2018    Procedure: CATH PLACEMENT - CEPHALIC POWER PORT;  Surgeon: Carl Hunter M.D.;  Location: SURGERY [facility];  Service: General    GASTROSCOPY  05/18/2018    Procedure: GASTROSCOPY;  Surgeon: Geovanni Romero M.D.;  Location: [facility];  Service: EUS    EGD W/ENDOSCOPIC ULTRASOUND  05/18/2018    Procedure: EGD W/ENDOSCOPIC ULTRASOUND-  UPPER CURVILLINEAR;  Surgeon: Geovanni Romero M.D.;  Location: [facility];  Service: EUS    EGD WITH ASP/BX  05/18/2018    Procedure: EGD WITH ASP/BX-  GASTRIC BIOPSIES,  FNA BIOPSIES OF PANCREAS HEAD MASS AND OR GALLBLADDER;  Surgeon:  "Geovanni Romero M.D.;  Location: SURGERY [facility];  Service: EUS    DENTAL EXTRACTION(S)  12/2017    AND hx of other dental extractions with sedation    HYSTERECTOMY ROBOTIC XI  07/09/2015    Procedure: HYSTERECTOMY ROBOTIC XI W/ BILATERAL SALPINGO-OOPHORECTOMY, MCCALLS PROCEDURES;  Surgeon: Brian Parks M.D.;  Location: SURGERY [facility];  Service:     COLONOSCOPY  2008    CERVICAL DISK AND FUSION ANTERIOR  2006    neck cage/fusion    DENTAL EXTRACTION(S)  1966    [place] teeth    GYN SURGERY  2020    Hysterectomy    NO PERTINENT PAST SURGICAL HISTORY  2020    Hip replacement broken femor bone    OTHER      Sedation for several MRI's    OTHER NEUROLOGICAL SURG      cervical 2007     SOCHX:  reports that she has never smoked. She has never used smokeless tobacco. She reports that she does not currently use alcohol. She reports that she does not use drugs.  FH:   Family History   Problem Relation Age of Onset    No Known Problems Mother     No Known Problems Father      Review of Systems   Constitutional:  Negative for chills and fever.   HENT:  Positive for congestion, sinus pressure and sinus pain. Negative for sore throat.    Respiratory:  Positive for cough. Negative for shortness of breath.    Gastrointestinal:  Negative for nausea and vomiting.   Musculoskeletal:  Negative for myalgias.   Skin:  Negative for rash.   Neurological:  Negative for dizziness.        Objective:   /88 (BP Location: Left arm, Patient Position: Sitting, BP Cuff Size: Adult)   Pulse 80   Temp 37.3 °C (99.2 °F) (Temporal)   Resp 20   Ht 1.676 m (5' 6\")   Wt 72.6 kg (160 lb)   LMP 06/27/1986   SpO2 93%   BMI 25.82 kg/m²   Physical Exam  Vitals and nursing note reviewed.   Constitutional:       General: She is not in acute distress.     Appearance: She is well-developed.   HENT:      Head: Normocephalic and atraumatic.      Right Ear: Tympanic membrane and external ear normal.      Left Ear: Tympanic membrane and " external ear normal.      Nose: Mucosal edema, congestion and rhinorrhea present.      Right Turbinates: Swollen.      Left Turbinates: Swollen.      Comments: Turbinates edematous, purulent exudate noted     Mouth/Throat:      Mouth: Mucous membranes are moist.      Pharynx: Posterior oropharyngeal erythema (posterior pharyngeal drainage and erythema) present. No oropharyngeal exudate.   Eyes:      Conjunctiva/sclera: Conjunctivae normal.   Cardiovascular:      Rate and Rhythm: Normal rate.   Pulmonary:      Effort: Pulmonary effort is normal. No respiratory distress.      Breath sounds: Normal breath sounds. No wheezing or rhonchi.   Abdominal:      General: There is no distension.   Musculoskeletal:         General: Normal range of motion.   Skin:     General: Skin is warm and dry.   Neurological:      General: No focal deficit present.      Mental Status: She is alert and oriented to person, place, and time. Mental status is at baseline.      Gait: Gait (gait at baseline) normal.   Psychiatric:         Judgment: Judgment normal.           Assessment/Plan:   1. Sinusitis, unspecified chronicity, unspecified location  - amoxicillin (AMOXIL) 875 MG tablet; Take 1 Tablet by mouth 2 times a day for 7 days. Take twice a day for 7 days  Dispense: 14 Tablet; Refill: 0    2. Acute cough  - benzonatate (TESSALON) 100 MG Cap; Take 1 Capsule by mouth 3 times a day as needed for Cough.  Dispense: 30 Capsule; Refill: 0    Other orders  - morphine (MS IR) 15 MG tablet; Take 15 mg by mouth 2 times a day as needed.        Medical Decision Making/Course:  In the course of preparing for this visit with review of the pertinent past medical history, recent and past clinic visits, current medications, and performing chart, immunization, medical history and medication reconciliation, and in the further course of obtaining the current history pertinent to the clinic visit today, performing an exam and evaluation, ordering and  independently evaluating labs, tests  , and/or procedures, prescribing any recommended new medications as noted above, providing any pertinent counseling and education and recommending further coordination of care including recommendations for symptomatic and supportive measures, at least  15 minutes of total time were spent during this encounter.      Discussed close monitoring, return precautions, and supportive measures of maintaining adequate fluid hydration and caloric intake, relative rest and symptom management as needed for pain and/or fever.    Differential diagnosis, natural history, supportive care, and indications for immediate follow-up discussed.     Advised the patient to follow-up with the primary care physician for recheck, reevaluation, and consideration of further management.    Please note that this dictation was created using voice recognition software. I have worked with consultants from the vendor as well as technical experts from [employer] to optimize the interface. I have made every reasonable attempt to correct obvious errors, but I expect that there are errors of grammar and possibly content that I did not discover before finalizing the note.

## 2025-03-08 DIAGNOSIS — E78.5 DYSLIPIDEMIA: ICD-10-CM

## 2025-03-10 RX ORDER — ATORVASTATIN CALCIUM 20 MG/1
20 TABLET, FILM COATED ORAL
Qty: 100 TABLET | Refills: 3 | Status: SHIPPED | OUTPATIENT
Start: 2025-03-10

## 2025-04-10 ENCOUNTER — PATIENT MESSAGE (OUTPATIENT)
Dept: MEDICAL GROUP | Facility: LAB | Age: 80
End: 2025-04-10
Payer: MEDICARE

## 2025-04-10 RX ORDER — CEFDINIR 300 MG/1
300 CAPSULE ORAL 2 TIMES DAILY
Qty: 10 CAPSULE | Refills: 0 | Status: SHIPPED | OUTPATIENT
Start: 2025-04-10 | End: 2025-04-15

## 2025-04-15 ENCOUNTER — HOSPITAL ENCOUNTER (OUTPATIENT)
Dept: LAB | Facility: MEDICAL CENTER | Age: 80
End: 2025-04-15
Attending: INTERNAL MEDICINE
Payer: MEDICARE

## 2025-04-15 PROCEDURE — 36415 COLL VENOUS BLD VENIPUNCTURE: CPT

## 2025-04-15 PROCEDURE — 80053 COMPREHEN METABOLIC PANEL: CPT

## 2025-04-16 LAB
ALBUMIN SERPL BCP-MCNC: 4 G/DL (ref 3.2–4.9)
ALBUMIN/GLOB SERPL: 1.1 G/DL
ALP SERPL-CCNC: 61 U/L (ref 30–99)
ALT SERPL-CCNC: 36 U/L (ref 2–50)
ANION GAP SERPL CALC-SCNC: 11 MMOL/L (ref 7–16)
AST SERPL-CCNC: 35 U/L (ref 12–45)
BILIRUB SERPL-MCNC: 0.3 MG/DL (ref 0.1–1.5)
BUN SERPL-MCNC: 22 MG/DL (ref 8–22)
CALCIUM ALBUM COR SERPL-MCNC: 10.7 MG/DL (ref 8.5–10.5)
CALCIUM SERPL-MCNC: 10.7 MG/DL (ref 8.5–10.5)
CHLORIDE SERPL-SCNC: 98 MMOL/L (ref 96–112)
CO2 SERPL-SCNC: 30 MMOL/L (ref 20–33)
CREAT SERPL-MCNC: 0.67 MG/DL (ref 0.5–1.4)
GFR SERPLBLD CREATININE-BSD FMLA CKD-EPI: 89 ML/MIN/1.73 M 2
GLOBULIN SER CALC-MCNC: 3.8 G/DL (ref 1.9–3.5)
GLUCOSE SERPL-MCNC: 189 MG/DL (ref 65–99)
POTASSIUM SERPL-SCNC: 4.1 MMOL/L (ref 3.6–5.5)
PROT SERPL-MCNC: 7.8 G/DL (ref 6–8.2)
SODIUM SERPL-SCNC: 139 MMOL/L (ref 135–145)

## 2025-04-24 ENCOUNTER — HOSPITAL ENCOUNTER (OUTPATIENT)
Facility: MEDICAL CENTER | Age: 80
End: 2025-04-24
Payer: MEDICARE

## 2025-04-24 PROCEDURE — G0480 DRUG TEST DEF 1-7 CLASSES: HCPCS

## 2025-04-30 LAB
6MAM UR CFM-MCNC: <10 NG/ML
CODEINE UR CFM-MCNC: <20 NG/ML
HYDROCODONE UR CFM-MCNC: <20 NG/ML
HYDROMORPHONE UR CFM-MCNC: <20 NG/ML
MORPHINE UR CFM-MCNC: 537 NG/ML
NORHYDROCODONE UR CFM-MCNC: <20 NG/ML
NOROXYCODONE UR CFM-MCNC: <20 NG/ML
NORTRAMADOL UR-MCNC: NORMAL NG/ML
OPIATES UR NOROXYM Q0836: <20 NG/ML
OXYCODONE UR CFM-MCNC: <20 NG/ML
OXYMORPHONE UR CFM-MCNC: <20 NG/ML
TRAMADOL UR-MCNC: NORMAL NG/ML

## 2025-06-16 ENCOUNTER — HOSPITAL ENCOUNTER (OUTPATIENT)
Dept: LAB | Facility: MEDICAL CENTER | Age: 80
End: 2025-06-16
Attending: INTERNAL MEDICINE
Payer: MEDICARE

## 2025-06-16 LAB
ALBUMIN SERPL BCP-MCNC: 3.8 G/DL (ref 3.2–4.9)
ALBUMIN/GLOB SERPL: 1.1 G/DL
ALP SERPL-CCNC: 76 U/L (ref 30–99)
ALT SERPL-CCNC: 32 U/L (ref 2–50)
ANION GAP SERPL CALC-SCNC: 9 MMOL/L (ref 7–16)
AST SERPL-CCNC: 28 U/L (ref 12–45)
BILIRUB SERPL-MCNC: 0.4 MG/DL (ref 0.1–1.5)
BUN SERPL-MCNC: 20 MG/DL (ref 8–22)
CALCIUM ALBUM COR SERPL-MCNC: 10.7 MG/DL (ref 8.5–10.5)
CALCIUM SERPL-MCNC: 10.5 MG/DL (ref 8.5–10.5)
CHLORIDE SERPL-SCNC: 100 MMOL/L (ref 96–112)
CO2 SERPL-SCNC: 30 MMOL/L (ref 20–33)
CREAT SERPL-MCNC: 0.76 MG/DL (ref 0.5–1.4)
GFR SERPLBLD CREATININE-BSD FMLA CKD-EPI: 80 ML/MIN/1.73 M 2
GLOBULIN SER CALC-MCNC: 3.4 G/DL (ref 1.9–3.5)
GLUCOSE SERPL-MCNC: 260 MG/DL (ref 65–99)
POTASSIUM SERPL-SCNC: 3.9 MMOL/L (ref 3.6–5.5)
PROT SERPL-MCNC: 7.2 G/DL (ref 6–8.2)
SODIUM SERPL-SCNC: 139 MMOL/L (ref 135–145)

## 2025-06-16 PROCEDURE — 80053 COMPREHEN METABOLIC PANEL: CPT

## 2025-06-16 PROCEDURE — 36415 COLL VENOUS BLD VENIPUNCTURE: CPT

## 2025-07-01 NOTE — PROGRESS NOTES
Chemotherapy Verification - PRIMARY RN      Height = 65in  Weight = 67.4kg  BSA = 1.75m2       Medication: Oxaliplatin  Dose: 85mg/m2  Calculated Dose: 149.1mg                             (In mg/m2, AUC, mg/kg)     Medication: Irinotecan  Dose: 150mg/m2  Calculated Dose: 262.5mg                             (In mg/m2, AUC, mg/kg)    Medication: Fluorouracil  Dose: 2400mg/m2  Calculated Dose: 4200mg over 46 hours via CADD pump          I confirm this process was performed independently with the BSA and all final chemotherapy dosing calculations congruent.  Any discrepancies of 5% or greater have been addressed with the chemotherapy pharmacist. The resolution of the discrepancy has been documented in the EPIC progress notes.        unintentional

## 2025-07-02 ENCOUNTER — HOSPITAL ENCOUNTER (OUTPATIENT)
Dept: LAB | Facility: MEDICAL CENTER | Age: 80
End: 2025-07-02
Attending: INTERNAL MEDICINE
Payer: MEDICARE

## 2025-07-02 LAB
ALBUMIN SERPL BCP-MCNC: 3.8 G/DL (ref 3.2–4.9)
ALBUMIN/GLOB SERPL: 1.2 G/DL
ALP SERPL-CCNC: 73 U/L (ref 30–99)
ALT SERPL-CCNC: 38 U/L (ref 2–50)
ANION GAP SERPL CALC-SCNC: 9 MMOL/L (ref 7–16)
AST SERPL-CCNC: 33 U/L (ref 12–45)
BILIRUB SERPL-MCNC: 0.3 MG/DL (ref 0.1–1.5)
BUN SERPL-MCNC: 19 MG/DL (ref 8–22)
CALCIUM ALBUM COR SERPL-MCNC: 10.5 MG/DL (ref 8.5–10.5)
CALCIUM SERPL-MCNC: 10.3 MG/DL (ref 8.5–10.5)
CHLORIDE SERPL-SCNC: 98 MMOL/L (ref 96–112)
CO2 SERPL-SCNC: 27 MMOL/L (ref 20–33)
CREAT SERPL-MCNC: 0.78 MG/DL (ref 0.5–1.4)
GFR SERPLBLD CREATININE-BSD FMLA CKD-EPI: 77 ML/MIN/1.73 M 2
GLOBULIN SER CALC-MCNC: 3.3 G/DL (ref 1.9–3.5)
GLUCOSE SERPL-MCNC: 305 MG/DL (ref 65–99)
POTASSIUM SERPL-SCNC: 4 MMOL/L (ref 3.6–5.5)
PROT SERPL-MCNC: 7.1 G/DL (ref 6–8.2)
SODIUM SERPL-SCNC: 134 MMOL/L (ref 135–145)

## 2025-07-02 PROCEDURE — 80053 COMPREHEN METABOLIC PANEL: CPT

## 2025-07-02 PROCEDURE — 36415 COLL VENOUS BLD VENIPUNCTURE: CPT

## 2025-07-21 ENCOUNTER — HOSPITAL ENCOUNTER (OUTPATIENT)
Dept: LAB | Facility: MEDICAL CENTER | Age: 80
End: 2025-07-21
Attending: INTERNAL MEDICINE
Payer: MEDICARE

## 2025-07-21 LAB
ALBUMIN SERPL BCP-MCNC: 4 G/DL (ref 3.2–4.9)
ALBUMIN/GLOB SERPL: 1.2 G/DL
ALP SERPL-CCNC: 88 U/L (ref 30–99)
ALT SERPL-CCNC: 52 U/L (ref 2–50)
ANION GAP SERPL CALC-SCNC: 10 MMOL/L (ref 7–16)
AST SERPL-CCNC: 41 U/L (ref 12–45)
BILIRUB SERPL-MCNC: 0.2 MG/DL (ref 0.1–1.5)
BUN SERPL-MCNC: 18 MG/DL (ref 8–22)
CALCIUM ALBUM COR SERPL-MCNC: 10.6 MG/DL (ref 8.5–10.5)
CALCIUM SERPL-MCNC: 10.6 MG/DL (ref 8.5–10.5)
CHLORIDE SERPL-SCNC: 99 MMOL/L (ref 96–112)
CO2 SERPL-SCNC: 29 MMOL/L (ref 20–33)
CREAT SERPL-MCNC: 0.79 MG/DL (ref 0.5–1.4)
GFR SERPLBLD CREATININE-BSD FMLA CKD-EPI: 76 ML/MIN/1.73 M 2
GLOBULIN SER CALC-MCNC: 3.3 G/DL (ref 1.9–3.5)
GLUCOSE SERPL-MCNC: 328 MG/DL (ref 65–99)
POTASSIUM SERPL-SCNC: 4.3 MMOL/L (ref 3.6–5.5)
PROT SERPL-MCNC: 7.3 G/DL (ref 6–8.2)
SODIUM SERPL-SCNC: 138 MMOL/L (ref 135–145)

## 2025-07-21 PROCEDURE — 36415 COLL VENOUS BLD VENIPUNCTURE: CPT

## 2025-07-21 PROCEDURE — 80053 COMPREHEN METABOLIC PANEL: CPT

## 2025-08-01 ENCOUNTER — HOSPITAL ENCOUNTER (OUTPATIENT)
Dept: LAB | Facility: MEDICAL CENTER | Age: 80
End: 2025-08-01
Attending: INTERNAL MEDICINE
Payer: MEDICARE

## 2025-08-01 LAB
ALBUMIN SERPL BCP-MCNC: 4 G/DL (ref 3.2–4.9)
ALBUMIN/GLOB SERPL: 1.2 G/DL
ALP SERPL-CCNC: 71 U/L (ref 30–99)
ALT SERPL-CCNC: 61 U/L (ref 2–50)
ANION GAP SERPL CALC-SCNC: 12 MMOL/L (ref 7–16)
AST SERPL-CCNC: 56 U/L (ref 12–45)
BASOPHILS # BLD AUTO: 1.2 % (ref 0–1.8)
BASOPHILS # BLD: 0.09 K/UL (ref 0–0.12)
BILIRUB SERPL-MCNC: 0.2 MG/DL (ref 0.1–1.5)
BUN SERPL-MCNC: 29 MG/DL (ref 8–22)
CALCIUM ALBUM COR SERPL-MCNC: 10.7 MG/DL (ref 8.5–10.5)
CALCIUM SERPL-MCNC: 10.7 MG/DL (ref 8.5–10.5)
CHLORIDE SERPL-SCNC: 101 MMOL/L (ref 96–112)
CO2 SERPL-SCNC: 25 MMOL/L (ref 20–33)
CREAT SERPL-MCNC: 0.67 MG/DL (ref 0.5–1.4)
EOSINOPHIL # BLD AUTO: 0.15 K/UL (ref 0–0.51)
EOSINOPHIL NFR BLD: 2 % (ref 0–6.9)
ERYTHROCYTE [DISTWIDTH] IN BLOOD BY AUTOMATED COUNT: 47.2 FL (ref 35.9–50)
GFR SERPLBLD CREATININE-BSD FMLA CKD-EPI: 89 ML/MIN/1.73 M 2
GLOBULIN SER CALC-MCNC: 3.4 G/DL (ref 1.9–3.5)
GLUCOSE SERPL-MCNC: 118 MG/DL (ref 65–99)
HCT VFR BLD AUTO: 39 % (ref 37–47)
HGB BLD-MCNC: 13 G/DL (ref 12–16)
IMM GRANULOCYTES # BLD AUTO: 0.02 K/UL (ref 0–0.11)
IMM GRANULOCYTES NFR BLD AUTO: 0.3 % (ref 0–0.9)
LYMPHOCYTES # BLD AUTO: 2.52 K/UL (ref 1–4.8)
LYMPHOCYTES NFR BLD: 33.2 % (ref 22–41)
MCH RBC QN AUTO: 31.5 PG (ref 27–33)
MCHC RBC AUTO-ENTMCNC: 33.3 G/DL (ref 32.2–35.5)
MCV RBC AUTO: 94.4 FL (ref 81.4–97.8)
MONOCYTES # BLD AUTO: 1.12 K/UL (ref 0–0.85)
MONOCYTES NFR BLD AUTO: 14.8 % (ref 0–13.4)
NEUTROPHILS # BLD AUTO: 3.69 K/UL (ref 1.82–7.42)
NEUTROPHILS NFR BLD: 48.5 % (ref 44–72)
NRBC # BLD AUTO: 0 K/UL
NRBC BLD-RTO: 0 /100 WBC (ref 0–0.2)
PLATELET # BLD AUTO: 296 K/UL (ref 164–446)
PMV BLD AUTO: 10 FL (ref 9–12.9)
POTASSIUM SERPL-SCNC: 4.1 MMOL/L (ref 3.6–5.5)
PROT SERPL-MCNC: 7.4 G/DL (ref 6–8.2)
RBC # BLD AUTO: 4.13 M/UL (ref 4.2–5.4)
SODIUM SERPL-SCNC: 138 MMOL/L (ref 135–145)
WBC # BLD AUTO: 7.6 K/UL (ref 4.8–10.8)

## 2025-08-01 PROCEDURE — 85025 COMPLETE CBC W/AUTO DIFF WBC: CPT

## 2025-08-01 PROCEDURE — 36415 COLL VENOUS BLD VENIPUNCTURE: CPT

## 2025-08-01 PROCEDURE — 80053 COMPREHEN METABOLIC PANEL: CPT

## 2025-08-29 ENCOUNTER — OFFICE VISIT (OUTPATIENT)
Dept: URGENT CARE | Facility: CLINIC | Age: 80
End: 2025-08-29
Payer: MEDICARE

## 2025-08-29 ENCOUNTER — HOSPITAL ENCOUNTER (OUTPATIENT)
Facility: MEDICAL CENTER | Age: 80
End: 2025-08-29
Payer: MEDICARE

## 2025-08-29 VITALS
SYSTOLIC BLOOD PRESSURE: 152 MMHG | RESPIRATION RATE: 16 BRPM | OXYGEN SATURATION: 95 % | WEIGHT: 160 LBS | TEMPERATURE: 97 F | HEART RATE: 73 BPM | HEIGHT: 66 IN | BODY MASS INDEX: 25.71 KG/M2 | DIASTOLIC BLOOD PRESSURE: 62 MMHG

## 2025-08-29 DIAGNOSIS — R39.9 UTI SYMPTOMS: ICD-10-CM

## 2025-08-29 DIAGNOSIS — N30.01 ACUTE CYSTITIS WITH HEMATURIA: ICD-10-CM

## 2025-08-29 DIAGNOSIS — I10 ELEVATED BLOOD PRESSURE READING WITH DIAGNOSIS OF HYPERTENSION: Primary | ICD-10-CM

## 2025-08-29 LAB
APPEARANCE UR: NORMAL
BILIRUB UR STRIP-MCNC: NORMAL MG/DL
COLOR UR AUTO: NORMAL
GLUCOSE UR STRIP.AUTO-MCNC: NEGATIVE MG/DL
KETONES UR STRIP.AUTO-MCNC: NORMAL MG/DL
LEUKOCYTE ESTERASE UR QL STRIP.AUTO: NORMAL
NITRITE UR QL STRIP.AUTO: NEGATIVE
PH UR STRIP.AUTO: 6 [PH] (ref 5–8)
PROT UR QL STRIP: NORMAL MG/DL
RBC UR QL AUTO: NORMAL
SP GR UR STRIP.AUTO: >=1.03
UROBILINOGEN UR STRIP-MCNC: NORMAL MG/DL

## 2025-08-29 PROCEDURE — 87086 URINE CULTURE/COLONY COUNT: CPT

## 2025-08-29 RX ORDER — CEFDINIR 300 MG/1
300 CAPSULE ORAL EVERY 12 HOURS
Qty: 10 CAPSULE | Refills: 0 | Status: SHIPPED | OUTPATIENT
Start: 2025-08-29 | End: 2025-09-03

## 2025-08-29 ASSESSMENT — ENCOUNTER SYMPTOMS
NAUSEA: 0
CHILLS: 0
PSYCHIATRIC NEGATIVE: 1
CARDIOVASCULAR NEGATIVE: 1
MYALGIAS: 0
EYES NEGATIVE: 1
DIAPHORESIS: 0
VOMITING: 0
ABDOMINAL PAIN: 0
FEVER: 0
FLANK PAIN: 0
WEAKNESS: 0
RESPIRATORY NEGATIVE: 1
DIARRHEA: 0

## 2025-08-29 ASSESSMENT — FIBROSIS 4 INDEX: FIB4 SCORE: 1.91

## 2025-08-31 LAB
BACTERIA UR CULT: NORMAL
SIGNIFICANT IND 70042: NORMAL
SITE SITE: NORMAL
SOURCE SOURCE: NORMAL

## (undated) DEVICE — HEAD HOLDER JUNIOR/ADULT

## (undated) DEVICE — CLIP MED INTNL HRZN TI ESCP - (25/BX)

## (undated) DEVICE — PACK MAJOR BASIN - (2EA/CA)

## (undated) DEVICE — NEEDLE EUS DELIVERY SYSTEM FNB PRE LOADED 19 GA

## (undated) DEVICE — VESSELOOP MAXI BLUE STERILE- SURG-I-LOOP (10EA/BX)

## (undated) DEVICE — KIT ANESTHESIA W/CIRCUIT & 3/LT BAG W/FILTER (20EA/CA)

## (undated) DEVICE — SUTURE 4-0 PROLENE RB-1 D/A 36 (36PK/BX)"

## (undated) DEVICE — GOWN SURGEONS LARGE - (32/CA)

## (undated) DEVICE — MIXER BONE CEMENT REVOLUTION - W/FEMORAL PRESSURIZER (6/CA)

## (undated) DEVICE — KIT  I.V. START (100EA/CA)

## (undated) DEVICE — STAPLER 45MM ARTICULATING - ENDO (3EA/BX)

## (undated) DEVICE — TIP INTPLS HFLO ML ORFC BTRY - (12/CS)  FOR SURGILAV

## (undated) DEVICE — BOVIE BLADE COATED - (50/PK)

## (undated) DEVICE — TRAY SURESTEP FOLEY TEMP SENSING 16FR (10EA/CA) ORDER  #18764 FOR TEMP FOLEY ONLY

## (undated) DEVICE — TUBING CLEARLINK DUO-VENT - C-FLO (48EA/CA)

## (undated) DEVICE — BOVIE  BLADE 6 EXTENDED - (50/PK)

## (undated) DEVICE — GLOVE BIOGEL SZ 8 SURGICAL PF LTX - (50PR/BX 4BX/CA)

## (undated) DEVICE — TUBE E-T HI-LO CUFF 7.0MM (10EA/PK)

## (undated) DEVICE — SUTURE 4-0 PROLENE SH 36 (36PK/BX)"

## (undated) DEVICE — SPONGE LAP XR ST 36X36 LF - (1/PK40PK/CA)

## (undated) DEVICE — STOCKINETTE IMPERVIOUS 12X48 - STERILELF (10/CA)"

## (undated) DEVICE — GLOVE, LITE (PAIR)

## (undated) DEVICE — CLOSURE SKIN STRIP 1/2 X 4 IN - (STERI STRIP) (50/BX 4BX/CA)

## (undated) DEVICE — SUTURE 4-0 MONOCRYL PLUS PS-2 - 27 INCH (36/BX)

## (undated) DEVICE — SUCTION INSTRUMENT YANKAUER BULBOUS TIP W/O VENT (50EA/CA)

## (undated) DEVICE — CHLORAPREP 26 ML APPLICATOR - ORANGE TINT(25/CA)

## (undated) DEVICE — DRAPE LARGE 3 QUARTER - (20/CA)

## (undated) DEVICE — CLIP LG INTNL HRZN TI ESCP LGT - (20/BX)

## (undated) DEVICE — SODIUM CHL IRRIGATION 0.9% 1000ML (12EA/CA)

## (undated) DEVICE — BLADE SURGICAL #11 - (50/BX)

## (undated) DEVICE — NEPTUNE 4 PORT MANIFOLD - (20/PK)

## (undated) DEVICE — STAPLE 35MM WHITE ECHELON FLEX (12/BX)

## (undated) DEVICE — SLEEVE, VASO, THIGH, MED

## (undated) DEVICE — BOVIE NEEDLE TIP INSULATD NON-SAFETY 2CM (50/PK)

## (undated) DEVICE — TUBE CONNECTING SUCTION - CLEAR PLASTIC STERILE 72 IN (50EA/CA)

## (undated) DEVICE — SUTURE GENERAL

## (undated) DEVICE — GLOVE BIOGEL INDICATOR SZ 7SURGICAL PF LTX - (50/BX 4BX/CA)

## (undated) DEVICE — SET EXTENSION WITH 2 PORTS (48EA/CA) ***PART #2C8610 IS A SUBSTITUTE*****

## (undated) DEVICE — STAPLE 45MM VASCULAR WHITE 2.5MM (12EA/BX)

## (undated) DEVICE — TRAY ANESTHESIA - CONTINUOUS EPIDURAL (10EA/CA) THIS IS A CUSTOM PACK

## (undated) DEVICE — DRAPE IOBAN II INCISE 23X17 - (10EA/BX 4BX/CA)

## (undated) DEVICE — SUTURE 1 PDS II PLUS TP-1 - (12PK/BX)

## (undated) DEVICE — LACTATED RINGERS INJ 1000 ML - (14EA/CA 60CA/PF)

## (undated) DEVICE — SYSTEM PREVEAN CUSTOMIZABLE 90CM/150ML CANISTER INCIS

## (undated) DEVICE — SUTURE 2-0 SILK SH 30 IN C/R (12PK/BX)

## (undated) DEVICE — TUBE SUCTION YANKAUER  1/4 X 6FT (20EA/CA)"

## (undated) DEVICE — SENSOR SPO2 NEO LNCS ADHESIVE (20/BX) SEE USER NOTES

## (undated) DEVICE — MASK ANESTHESIA ADULT  - (100/CA)

## (undated) DEVICE — SPONGE GAUZESTER. 2X2 4-PL - (2/PK 50PK/BX 30BX/CS)

## (undated) DEVICE — SUTURE 3-0 PROLENE SH 36 (36PK/BX)"

## (undated) DEVICE — ELECTRODE 850 FOAM ADHESIVE - HYDROGEL RADIOTRNSPRNT (50/PK)

## (undated) DEVICE — SUTURE 3-0 VICRYL PLUS SH - 27 INCH (36/BX)

## (undated) DEVICE — GLOVE BIOGEL PI ORTHO SZ 7.5 PF LF (40PR/BX)

## (undated) DEVICE — DRAPE C-ARM LARGE 41IN X 74 IN - (10/BX 2BX/CA)

## (undated) DEVICE — BLADE SURGICAL #15 - (50/BX 3BX/CA)

## (undated) DEVICE — SPONGE GAUZE NON-STERILE 4X4 - (2000/CA 10PK/CA)

## (undated) DEVICE — SUTURE 3-0 VICRYL PLUS - 12 X 18 INCH (12/BX)

## (undated) DEVICE — GOWN SURGICAL X-LARGE ULTRA - FILM-REINFORCED (20/CA)

## (undated) DEVICE — SUTURE 3-0 SILK SH (12PK/BX)

## (undated) DEVICE — GLOVE BIOGEL INDICATOR SZ 7.5 SURGICAL PF LTX - (50PR/BX 4BX/CA)

## (undated) DEVICE — STAPLER 60MM ENDO SHORT ARTICULATING (3EA/BX)

## (undated) DEVICE — LIGASURE TISSUE FUSION  - SINGLE USE (6/CA)

## (undated) DEVICE — CLIP MED LG INTNL HRZN TI ESCP - (20/BX)

## (undated) DEVICE — BLADE SURGICAL CLIPPER - (50EA/CA)

## (undated) DEVICE — SUTURE 6-0 PROLENE BV-1 D/A 30 (36PK/BX)"

## (undated) DEVICE — STAPLER 35MM ECHELON FLEX PWR VASCULAR (3/CA)

## (undated) DEVICE — CATHETER IV SAFETY 20 GA X 1-1/4 (50/BX)

## (undated) DEVICE — PAD LAP STERILE 18 X 18 - (5/PK 40PK/CA)

## (undated) DEVICE — SPONGE XRAY 8X4 STERL. 12PL - (10EA/TY 80TY/CA)

## (undated) DEVICE — CANISTER SUCTION 3000ML MECHANICAL FILTER AUTO SHUTOFF MEDI-VAC NONSTERILE LF DISP  (40EA/CA)

## (undated) DEVICE — PROTECTOR ULNA NERVE - (36PR/CA)

## (undated) DEVICE — Device

## (undated) DEVICE — CLIP SM INTNL HRZN TI ESCP LGT - (24EA/PK 25PK/BX)

## (undated) DEVICE — SUTURE 0 VICRYL PLUS CTX - 36 INCH (36/BX)

## (undated) DEVICE — SYRINGE SAFETY 5 ML 18 GA X 1-1/2 BLUNT LL (100/BX 4BX/CA)

## (undated) DEVICE — SUTURE 2-0 PROLENE SH (36PK/BX)

## (undated) DEVICE — SENSOR SPO2 ADULT LNCS ADTX (20/BX) ORDER ITEM #19593

## (undated) DEVICE — GOWN WARMING STANDARD FLEX - (30/CA)

## (undated) DEVICE — BAG DECANTER (50EA/CS)

## (undated) DEVICE — DRAIN PENROSE STERILE 1/4 X - 18 IN  (25EA/BX)

## (undated) DEVICE — TUBE E-T HI-LO CUFF 6.5MM (10EA/BX)

## (undated) DEVICE — RESERVOIR SUCTION 100 CC - SILICONE (20EA/CA)

## (undated) DEVICE — DETERGENT RENUZYME PLUS 10 OZ PACKET (50/BX)

## (undated) DEVICE — SUTURE 2-0 ETHILON FS - (36/BX) 18 INCH

## (undated) DEVICE — SYRINGE 30 ML LL (56/BX)

## (undated) DEVICE — SET LEADWIRE 5 LEAD BEDSIDE DISPOSABLE ECG (1SET OF 5/EA)

## (undated) DEVICE — DRESSING POST OP BORDER 4 X 10 (5EA/BX)

## (undated) DEVICE — SYRINGE SAFETY 10 ML 18 GA X 1 1/2 BLUNT LL (100/BX 4BX/CA)

## (undated) DEVICE — CATHETER IV 20 GA X 1-1/4 ---SURG.& SDS ONLY--- (50EA/BX)

## (undated) DEVICE — GLOVE BIOGEL SZ 7 SURGICAL PF LTX - (50PR/BX 4BX/CA)

## (undated) DEVICE — STAPLER SKIN DISP - (6/BX 10BX/CA) VISISTAT

## (undated) DEVICE — PACK TOTAL HIP - (1/CA)

## (undated) DEVICE — SOD. CHL. INJ. 0.9% 250 ML - (36/CA 50CA/PF)

## (undated) DEVICE — ELECTRODE DUAL RETURN W/ CORD - (50/PK)

## (undated) DEVICE — INSTRUMENT JAWCOVER SUTURE - BOOTS (5PK/BX)

## (undated) DEVICE — DRAPE STRLE REG TOWEL 18X24 - (10/BX 4BX/CA)"

## (undated) DEVICE — SUTURE 6-0 PROLENE BV-1 D/A 24 (36PK/BX)"

## (undated) DEVICE — SUTURE 2-0 VICRYL PLUS CTX - 36 INCH (36/BX)

## (undated) DEVICE — DRESSING NON-ADHERING 8 X 3 - (50/BX)

## (undated) DEVICE — SUTURE 3-0 VICRYL PLUS SH - 8X 18 INCH (12/BX)

## (undated) DEVICE — LENS/HOOD FOR SPACESUIT - (32/PK) PEEL AWAY FACE

## (undated) DEVICE — CANNULA W/ SUPPLY TUBING O2 - (50/CA)

## (undated) DEVICE — SUTURE 5 TI-CRON HOS-14 - (36/BX)

## (undated) DEVICE — BLADE SAGITTAL SAW DUAL CUT 25.0 X 90.0 X 1.27MM (1/EA)

## (undated) DEVICE — BLOCK

## (undated) DEVICE — BAG, SPONGE COUNT 50600

## (undated) DEVICE — MASK AIRWAY SIZE 3 UNIQUE SILICON (10/BX)

## (undated) DEVICE — HANDPIECE 10FT INTPLS SCT PLS IRRIGATION HAND CONTROL SET (6/PK)

## (undated) DEVICE — SODIUM CHL. IRRIGATION 0.9% 3000ML (4EA/CA 65CA/PF)

## (undated) DEVICE — DRAPE LAPAROTOMY T SHEET - (12EA/CA)

## (undated) DEVICE — DRAPE MEDI-SLUSH STERILE  - (40/CA)

## (undated) DEVICE — BITE BLOCK ADULT 60FR (100EA/CA)

## (undated) DEVICE — HOOD, SURGICAL

## (undated) DEVICE — TOWELS CLOTH SURGICAL - (4/PK 20PK/CA)

## (undated) DEVICE — CANISTER SUCTION RIGID RED 1500CC (40EA/CA)

## (undated) DEVICE — PACK MINOR BASIN - (2EA/CA)

## (undated) DEVICE — DRESSING TRANSPARENT FILM TEGADERM 4 X 4.75" (50EA/BX)"

## (undated) DEVICE — DRAPE SURG STERI-DRAPE 7X11OD - (40EA/CA)

## (undated) DEVICE — GLOVE BIOGEL SZ 6.5 SURGICAL PF LTX (50PR/BX 4BX/CA)

## (undated) DEVICE — SHEARS ELECTROSURGICAL W/ HANDCONTROL BUTTON STERILE CURVE L23 CM OD5 MM 3 (6EA/BX)

## (undated) DEVICE — MASK WITH FACE SHIELD (25/BX 4BX/CA)

## (undated) DEVICE — DERMABOND ADVANCED - (12EA/BX)

## (undated) DEVICE — KIT HIP PREP IM ENCHANCE TOTAL (5EA/BX)

## (undated) DEVICE — SPONGE PEANUT - (5/PK 50PK/CA)

## (undated) DEVICE — SUTURE 5-0 PROLENE BV-1 HEMOSEAL (36PK/BX)

## (undated) DEVICE — WATER IRRIG. STER. 1500 ML - (9/CA)

## (undated) DEVICE — WATER IRRIGATION STERILE 1000ML (12EA/CA)

## (undated) DEVICE — KIT RADIAL ARTERY 20GA W/MAX BARRIER AND BIOPATCH  (5EA/CA) #10740 IS FOR THE SET RADIAL ARTERIAL

## (undated) DEVICE — KIT ROOM DECONTAMINATION

## (undated) DEVICE — ADHESIVE MASTISOL - (48/BX)

## (undated) DEVICE — SHEET TRANSVERSE LAP - (12EA/CA)

## (undated) DEVICE — GLOVE BIOGEL PI ORTHO SZ 7 PF LF (40PR/BX)

## (undated) DEVICE — BASIN EMESIS DISP. - (250/CA)

## (undated) DEVICE — GRAFT MESH SEPRAFILM PRO PACK - 5/BX CONTAINS 6 3X5 PIECES

## (undated) DEVICE — SYRINGE DISP. 50CC LS - (40/BX)

## (undated) DEVICE — STAPLE 60MM WHTE 2.6MM - (12/BX) WAS PART #ECR60W